# Patient Record
Sex: MALE | Race: BLACK OR AFRICAN AMERICAN | Employment: OTHER | URBAN - METROPOLITAN AREA
[De-identification: names, ages, dates, MRNs, and addresses within clinical notes are randomized per-mention and may not be internally consistent; named-entity substitution may affect disease eponyms.]

---

## 2017-04-03 ENCOUNTER — OP HISTORICAL/CONVERTED ENCOUNTER (OUTPATIENT)
Dept: OTHER | Age: 82
End: 2017-04-03

## 2018-05-16 ENCOUNTER — OP HISTORICAL/CONVERTED ENCOUNTER (OUTPATIENT)
Dept: OTHER | Age: 83
End: 2018-05-16

## 2019-05-22 ENCOUNTER — OP HISTORICAL/CONVERTED ENCOUNTER (OUTPATIENT)
Dept: OTHER | Age: 84
End: 2019-05-22

## 2020-05-26 ENCOUNTER — OP HISTORICAL/CONVERTED ENCOUNTER (OUTPATIENT)
Dept: OTHER | Age: 85
End: 2020-05-26

## 2020-09-23 ENCOUNTER — HOSPITAL ENCOUNTER (EMERGENCY)
Age: 85
Discharge: HOME | End: 2020-09-23
Attending: FAMILY MEDICINE
Payer: MEDICARE

## 2020-09-23 VITALS
OXYGEN SATURATION: 97 % | BODY MASS INDEX: 18.52 KG/M2 | SYSTOLIC BLOOD PRESSURE: 138 MMHG | WEIGHT: 118 LBS | RESPIRATION RATE: 16 BRPM | TEMPERATURE: 97.9 F | DIASTOLIC BLOOD PRESSURE: 82 MMHG | HEIGHT: 67 IN

## 2020-09-23 DIAGNOSIS — N48.1 BALANITIS: Primary | ICD-10-CM

## 2020-09-23 DIAGNOSIS — B35.6 TINEA OF SCROTUM: ICD-10-CM

## 2020-09-23 PROCEDURE — 99282 EMERGENCY DEPT VISIT SF MDM: CPT

## 2020-09-23 RX ORDER — SODIUM BICARBONATE 325 MG/1
325 TABLET ORAL 2 TIMES DAILY
COMMUNITY

## 2020-09-23 RX ORDER — METOPROLOL TARTRATE 50 MG/1
25 TABLET ORAL 2 TIMES DAILY
COMMUNITY

## 2020-09-23 RX ORDER — AMLODIPINE BESYLATE 5 MG/1
5 TABLET ORAL DAILY
Status: ON HOLD | COMMUNITY
End: 2022-04-02

## 2020-09-23 RX ORDER — CETIRIZINE HCL 10 MG
10 TABLET ORAL DAILY
Status: ON HOLD | COMMUNITY
End: 2022-08-04

## 2020-09-23 RX ORDER — CLOTRIMAZOLE AND BETAMETHASONE DIPROPIONATE 10; .64 MG/G; MG/G
CREAM TOPICAL 2 TIMES DAILY
Qty: 1 TUBE | Refills: 0 | Status: ON HOLD | OUTPATIENT
Start: 2020-09-23 | End: 2022-04-02

## 2020-09-23 RX ORDER — DOXAZOSIN 4 MG/1
4 TABLET ORAL DAILY
COMMUNITY

## 2020-09-23 RX ORDER — PANTOPRAZOLE SODIUM 40 MG/1
40 TABLET, DELAYED RELEASE ORAL 2 TIMES DAILY
Status: ON HOLD | COMMUNITY
End: 2022-04-02

## 2020-09-23 NOTE — ED PROVIDER NOTES
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 Patient states he noticed a itchy rash on his testicles and penis causing mild to moderate irritation. Nothing seems to exacerbate or improve the condition. Currently, the patient denies F/C, N/V/D/C, urinary F/U/N/D, or penile discharge. There are no other complaints, changes, or physical findings at this time. PCP: KARL Aly    No current facility-administered medications on file prior to encounter. Current Outpatient Medications on File Prior to Encounter   Medication Sig Dispense Refill    metoprolol tartrate (LOPRESSOR) 50 mg tablet Take 50 mg by mouth two (2) times a day.  sodium bicarbonate 325 mg tablet Take 325 mg by mouth two (2) times a day.  pantoprazole (PROTONIX) 40 mg tablet Take 40 mg by mouth two (2) times a day.  doxazosin (CARDURA) 4 mg tablet Take 4 mg by mouth daily.  amLODIPine (NORVASC) 5 mg tablet Take 5 mg by mouth daily.  cetirizine (ZYRTEC) 10 mg tablet Take 10 mg by mouth daily. Past History     PAST MEDICAL   has a past medical history of Gastrointestinal disorder and Hypertension. PAST SURGICAL   has no past surgical history on file. SOCIAL HISTORY   reports that he quit smoking about 3 years ago. He has never used smokeless tobacco. He reports that he does not drink alcohol or use drugs. Allergies:  No Known Allergies    Review of Systems     Review of Systems   Constitutional: Negative for chills and fever. HENT: Negative. Eyes: Negative for visual disturbance. Respiratory: Negative for cough and shortness of breath.     Cardiovascular: Negative for chest pain. Gastrointestinal: Negative for abdominal pain, diarrhea, nausea and vomiting. Endocrine: Negative. Genitourinary: Negative for discharge, dysuria, frequency and penile pain. Musculoskeletal: Negative. Skin: Negative. Allergic/Immunologic: Negative. Neurological: Negative. Hematological: Negative. Psychiatric/Behavioral: Negative. Physical Exam     Vital Signs-Reviewed the patient's vital signs. Patient Vitals for the past 12 hrs:   Temp Resp BP SpO2   09/23/20 0958 97.9 °F (36.6 °C) 16 138/82 97 %     Physical Exam  Vitals signs and nursing note reviewed. Constitutional:       Appearance: Normal appearance. He is normal weight. HENT:      Head: Normocephalic and atraumatic. Nose: Nose normal.      Mouth/Throat:      Lips: Pink. Mouth: Mucous membranes are moist.   Eyes:      Extraocular Movements: Extraocular movements intact. Conjunctiva/sclera: Conjunctivae normal.      Pupils: Pupils are equal, round, and reactive to light. Neck:      Musculoskeletal: Normal range of motion and neck supple. Cardiovascular:      Pulses: Normal pulses. Pulmonary:      Effort: Pulmonary effort is normal. No respiratory distress. Genitourinary:     Penis: Uncircumcised. Erythema and swelling present. Scrotum/Testes: Normal.       Musculoskeletal: Normal range of motion. Skin:     General: Skin is warm and dry. Neurological:      General: No focal deficit present. Mental Status: He is alert and oriented to person, place, and time. Psychiatric:         Mood and Affect: Mood normal.         Behavior: Behavior normal.       Diagnostic Study Results     Labs -   No results found for this or any previous visit (from the past 12 hour(s)).     Radiologic Studies -   No orders to display     CT Results  (Last 48 hours)    None        CXR Results  (Last 48 hours)    None          Procedures/Critical Care     PROCEDURES  None    Medical Decision Making   I am the first provider for this patient. I reviewed the vital signs, available nursing notes, past medical history, past surgical history, family history and social history. Records Reviewed: Nursing Notes    ED Course:   Initial assessment performed. The patients presenting problems have been discussed, and they are in agreement with the care plan formulated and outlined with them. I have encouraged them to ask questions as they arise throughout their visit. Medications - No data to display    Provider Notes (Medical Decision Making):   Patient presents to the ED with complaint of itchy rash in genital area. Evaluation of the patient is consistent with probable tinea infection involving scrotum and glans penis and foreskin. Topical antifungal prescribed. The patient has received maximum benefit from this visit and felt eligible for discharge. All questions were answered and concerns addressed. The patient was advised to follow up with PCP and/or return to the emergency department if condition worsens. The patient was discharged in stable condition. Diagnosis/Plan/Follow Up     CLINICAL IMPRESSION:      ICD-10-CM ICD-9-CM   1. Balanitis  N48.1 607.1   2. Tinea of scrotum  B35.6 110.3        DISPOSITION:  Discharged to  in stable condition. PLAN/FOLLOW UP  1. Current Discharge Medication List      START taking these medications    Details   clotrimazole-betamethasone (Lotrisone) topical cream Apply  to affected area two (2) times a day. Apply to affected area  Qty: 1 Tube, Refills: 0           2. The patients results have been reviewed with them. The patient has been counseled regarding their diagnosis. The patient verbally conveys understanding and agreement of the signs, symptoms, diagnosis, treatment and prognosis and additionally agrees to follow up as recommended with their PCP.   The patient also agrees with the care-plan and conveys that all of their questions have been answered. I have also put together some discharge instructions for them that include: 1) educational information regarding their diagnosis, 2) how to care for their diagnosis at home, as well as a 3) list of reasons why they would want to return to the ED prior to their follow-up appointment, should their condition change. Follow-up Information     Follow up With Specialties Details Why Contact Info    Kristina Jeff  Schedule an appointment as soon as possible for a visit in 3 days As needed [address] [place] [address]  [phone]      [facility] Emergency Medicine  If symptoms worsen [address] [place] [id number] [phone]        Return to ED if worse       JANY Murrell M.D.   [facility]  Emergency Department Physician

## 2021-09-06 ENCOUNTER — APPOINTMENT (OUTPATIENT)
Dept: GENERAL RADIOLOGY | Age: 86
End: 2021-09-06
Attending: STUDENT IN AN ORGANIZED HEALTH CARE EDUCATION/TRAINING PROGRAM
Payer: MEDICARE

## 2021-09-06 ENCOUNTER — HOSPITAL ENCOUNTER (EMERGENCY)
Age: 86
Discharge: HOME | End: 2021-09-06
Attending: STUDENT IN AN ORGANIZED HEALTH CARE EDUCATION/TRAINING PROGRAM
Payer: MEDICARE

## 2021-09-06 VITALS
DIASTOLIC BLOOD PRESSURE: 70 MMHG | HEART RATE: 69 BPM | SYSTOLIC BLOOD PRESSURE: 140 MMHG | HEIGHT: 67 IN | OXYGEN SATURATION: 99 % | WEIGHT: 105 LBS | BODY MASS INDEX: 16.48 KG/M2 | TEMPERATURE: 98.1 F | RESPIRATION RATE: 21 BRPM

## 2021-09-06 DIAGNOSIS — R63.0 APPETITE ABSENT: Primary | ICD-10-CM

## 2021-09-06 LAB
ALBUMIN SERPL-MCNC: 2.9 G/DL (ref 3.5–5)
ALBUMIN/GLOB SERPL: 0.7 {RATIO} (ref 1.1–2.2)
ALP SERPL-CCNC: 67 U/L (ref 45–117)
ALT SERPL-CCNC: 12 U/L (ref 12–78)
ANION GAP SERPL CALC-SCNC: 7 MMOL/L (ref 5–15)
APPEARANCE UR: ABNORMAL
AST SERPL W P-5'-P-CCNC: 15 U/L (ref 15–37)
BACTERIA URNS QL MICRO: NEGATIVE /HPF
BASOPHILS # BLD: 0.1 K/UL (ref 0–0.1)
BASOPHILS NFR BLD: 1 % (ref 0–1)
BILIRUB SERPL-MCNC: 0.5 MG/DL (ref 0.2–1)
BILIRUB UR QL: NEGATIVE
BUN SERPL-MCNC: 35 MG/DL (ref 6–20)
BUN/CREAT SERPL: 20 (ref 12–20)
CA-I BLD-MCNC: 9 MG/DL (ref 8.5–10.1)
CHLORIDE SERPL-SCNC: 104 MMOL/L (ref 97–108)
CO2 SERPL-SCNC: 27 MMOL/L (ref 21–32)
COLOR UR: ABNORMAL
CREAT SERPL-MCNC: 1.78 MG/DL (ref 0.7–1.3)
DIFFERENTIAL METHOD BLD: ABNORMAL
EOSINOPHIL # BLD: 0.4 K/UL (ref 0–0.4)
EOSINOPHIL NFR BLD: 10 % (ref 0–7)
ERYTHROCYTE [DISTWIDTH] IN BLOOD BY AUTOMATED COUNT: 13.3 % (ref 11.5–14.5)
GLOBULIN SER CALC-MCNC: 4.1 G/DL (ref 2–4)
GLUCOSE SERPL-MCNC: 130 MG/DL (ref 65–100)
GLUCOSE UR STRIP.AUTO-MCNC: NEGATIVE MG/DL
HCT VFR BLD AUTO: 37.5 % (ref 36.6–50.3)
HGB BLD-MCNC: 11.8 G/DL (ref 12.1–17)
HGB UR QL STRIP: ABNORMAL
HYALINE CASTS URNS QL MICRO: ABNORMAL /LPF (ref 0–5)
IMM GRANULOCYTES # BLD AUTO: 0 K/UL (ref 0–0.04)
IMM GRANULOCYTES NFR BLD AUTO: 0 % (ref 0–0.5)
KETONES UR QL STRIP.AUTO: 5 MG/DL
LEUKOCYTE ESTERASE UR QL STRIP.AUTO: NEGATIVE
LYMPHOCYTES # BLD: 1.2 K/UL (ref 0.8–3.5)
LYMPHOCYTES NFR BLD: 28 % (ref 12–49)
MCH RBC QN AUTO: 27.8 PG (ref 26–34)
MCHC RBC AUTO-ENTMCNC: 31.5 G/DL (ref 30–36.5)
MCV RBC AUTO: 88.2 FL (ref 80–99)
MONOCYTES # BLD: 0.5 K/UL (ref 0–1)
MONOCYTES NFR BLD: 12 % (ref 5–13)
MUCOUS THREADS URNS QL MICRO: ABNORMAL /LPF
NEUTS SEG # BLD: 2.1 K/UL (ref 1.8–8)
NEUTS SEG NFR BLD: 49 % (ref 32–75)
NITRITE UR QL STRIP.AUTO: NEGATIVE
NRBC # BLD: 0 K/UL (ref 0–0.01)
NRBC BLD-RTO: 0 PER 100 WBC
PH UR STRIP: 5 [PH] (ref 5–8)
PLATELET # BLD AUTO: 255 K/UL (ref 150–400)
PMV BLD AUTO: 9.8 FL (ref 8.9–12.9)
POTASSIUM SERPL-SCNC: 4.4 MMOL/L (ref 3.5–5.1)
PROT SERPL-MCNC: 7 G/DL (ref 6.4–8.2)
PROT UR STRIP-MCNC: 100 MG/DL
RBC # BLD AUTO: 4.25 M/UL (ref 4.1–5.7)
RBC #/AREA URNS HPF: ABNORMAL /HPF (ref 0–5)
SODIUM SERPL-SCNC: 138 MMOL/L (ref 136–145)
SP GR UR REFRACTOMETRY: 1.02 (ref 1–1.03)
UA: UC IF INDICATED,UAUC: ABNORMAL
UROBILINOGEN UR QL STRIP.AUTO: 0.1 EU/DL (ref 0.1–1)
WBC # BLD AUTO: 4.3 K/UL (ref 4.1–11.1)
WBC URNS QL MICRO: ABNORMAL /HPF (ref 0–4)

## 2021-09-06 PROCEDURE — 80053 COMPREHEN METABOLIC PANEL: CPT

## 2021-09-06 PROCEDURE — 74018 RADEX ABDOMEN 1 VIEW: CPT

## 2021-09-06 PROCEDURE — 99284 EMERGENCY DEPT VISIT MOD MDM: CPT

## 2021-09-06 PROCEDURE — 36415 COLL VENOUS BLD VENIPUNCTURE: CPT

## 2021-09-06 PROCEDURE — 81001 URINALYSIS AUTO W/SCOPE: CPT

## 2021-09-06 PROCEDURE — 85025 COMPLETE CBC W/AUTO DIFF WBC: CPT

## 2021-09-06 RX ORDER — ONDANSETRON 4 MG/1
4 TABLET, ORALLY DISINTEGRATING ORAL
Qty: 8 TABLET | Refills: 0 | Status: ON HOLD | OUTPATIENT
Start: 2021-09-06 | End: 2022-04-02

## 2021-09-06 NOTE — ED NOTES
1:09 PM    Reviewed discharge instructions with patient and patient's family. Vebralized understanding of d/c instruction, follow up, and RX. No complaints verbalized. No acute distress noted. Self ambulated to waiting room to be d/c home.

## 2021-09-06 NOTE — ED PROVIDER NOTES
EMERGENCY DEPARTMENT HISTORY AND PHYSICAL EXAM      Date: 9/6/2021  Patient Name: Georgia Quinones    History of Presenting Illness     Chief Complaint   Patient presents with    Nausea       HPI: Georgia Quinones, 82 y.o. male with a history of hypertension and CKD presents with nausea and decreased appetite. The patient has been feeling nauseous intermittently for the past few weeks and has had decreased p.o. intake. He is tolerating p.o. intake. No vomiting. Denies any abdominal pain, fevers, or chills. No constipation or diarrhea. No dysuria or hematuria. No flank pain. No other symptoms. He has been to his PCP who is aware of his decreased p.o. intake. Patient is alone and is able to take care of himself. He is compliant with all medications. Daughter is at bedside and corroborates the story. PCP: KARL Vang    Current Outpatient Medications   Medication Sig Dispense Refill    ondansetron (Zofran ODT) 4 mg disintegrating tablet 1 Tablet by SubLINGual route every eight (8) hours as needed for Nausea or Vomiting. 8 Tablet 0    metoprolol tartrate (LOPRESSOR) 50 mg tablet Take 50 mg by mouth two (2) times a day.  sodium bicarbonate 325 mg tablet Take 325 mg by mouth two (2) times a day.  pantoprazole (PROTONIX) 40 mg tablet Take 40 mg by mouth two (2) times a day.  doxazosin (CARDURA) 4 mg tablet Take 4 mg by mouth daily.  amLODIPine (NORVASC) 5 mg tablet Take 5 mg by mouth daily.  cetirizine (ZYRTEC) 10 mg tablet Take 10 mg by mouth daily.  clotrimazole-betamethasone (Lotrisone) topical cream Apply  to affected area two (2) times a day.  Apply to affected area 1 Tube 0       Medical History   I reviewed the medical, surgical, family, and social history, as well as allergies:    Past Medical History:  Past Medical History:   Diagnosis Date    Gastrointestinal disorder     Hypertension        Past Surgical History:  No past surgical history on file.    Family History:  History reviewed. No pertinent family history. Social History:  Social History     Tobacco Use    Smoking status: Former Smoker     Quit date: 9/23/2017     Years since quitting: 3.9    Smokeless tobacco: Never Used   Substance Use Topics    Alcohol use: Never    Drug use: Never       Allergies:  No Known Allergies    Review of Systems     Review of Systems   Constitutional: Positive for appetite change. Negative for chills and fever. HENT: Negative for congestion, rhinorrhea and sore throat. Eyes: Negative. Respiratory: Negative for cough and shortness of breath. Cardiovascular: Negative for chest pain and leg swelling. Gastrointestinal: Positive for nausea. Negative for abdominal pain and vomiting. Endocrine: Negative. Genitourinary: Negative for dysuria and hematuria. Musculoskeletal: Negative for back pain and myalgias. Skin: Negative for rash and wound. Allergic/Immunologic: Negative. Neurological: Negative for light-headedness and headaches. Hematological: Negative. Psychiatric/Behavioral: Negative for agitation and confusion. Physical Exam and Vital Signs   Vital Signs - Reviewed the patient's vital signs.     Patient Vitals for the past 12 hrs:   Temp Pulse Resp BP SpO2   09/06/21 1143  74 23 115/72 100 %   09/06/21 1036 98.1 °F (36.7 °C) 78 16 103/65 98 %       Physical Exam:    GENERAL: awake, alert, cooperative, not in distress  HEENT:  * Pupils equal, EOMI  * Head atraumatic  CV:  * regular rhythm  * warm and perfused extremities bilaterally  PULMONARY: Good air movement, no wheezes or crackles  ABDOMEN: soft, not distended, no guarding, not tenderness to palpation  : No suprapubic tenderness  EXTREMITIES/BACK: warm and perfused, no tenderness, no edema  SKIN: no rashes or signs of trauma  NEURO:  * Speech clear  * Moves U&LE to command      Medical Decision Making and ED Course   - I am the first and primary provider for this patient and am the primary provider of record. - I reviewed the vital signs, available nursing notes, past medical history, past surgical history, family history and social history. - Initial assessment performed. The patients presenting problems have been discussed, and the staff are in agreement with the care plan formulated and outlined with them. I have encouraged them to ask questions as they arise throughout their visit. - Available medical records, nursing notes, old EKGs, and EMS run sheets (if patient was EMS transported) were reviewed    MDM:   Patient is a 82 y.o. male presenting for nausea and decreased p.o. intake. Vitals reveal no abnormalities and physical exam reveals abnormalities. Based on the history, physical exam, risk factors, and vitals signs, differential includes: Decreased p.o. intake, dehydration, electro disparities, UTI, obstruction. Results     Labs:  Recent Results (from the past 12 hour(s))   CBC WITH AUTOMATED DIFF    Collection Time: 09/06/21 10:57 AM   Result Value Ref Range    WBC 4.3 4.1 - 11.1 K/uL    RBC 4.25 4. 10 - 5.70 M/uL    HGB 11.8 (L) 12.1 - 17.0 g/dL    HCT 37.5 36.6 - 50.3 %    MCV 88.2 80.0 - 99.0 FL    MCH 27.8 26.0 - 34.0 PG    MCHC 31.5 30.0 - 36.5 g/dL    RDW 13.3 11.5 - 14.5 %    PLATELET 777 177 - 767 K/uL    MPV 9.8 8.9 - 12.9 FL    NRBC 0.0 0.0  WBC    ABSOLUTE NRBC 0.00 0.00 - 0.01 K/uL    NEUTROPHILS 49 32 - 75 %    LYMPHOCYTES 28 12 - 49 %    MONOCYTES 12 5 - 13 %    EOSINOPHILS 10 (H) 0 - 7 %    BASOPHILS 1 0 - 1 %    IMMATURE GRANULOCYTES 0 0 - 0.5 %    ABS. NEUTROPHILS 2.1 1.8 - 8.0 K/UL    ABS. LYMPHOCYTES 1.2 0.8 - 3.5 K/UL    ABS. MONOCYTES 0.5 0.0 - 1.0 K/UL    ABS. EOSINOPHILS 0.4 0.0 - 0.4 K/UL    ABS. BASOPHILS 0.1 0.0 - 0.1 K/UL    ABS. IMM.  GRANS. 0.0 0.00 - 0.04 K/UL    DF AUTOMATED     METABOLIC PANEL, COMPREHENSIVE    Collection Time: 09/06/21 10:57 AM   Result Value Ref Range    Sodium 138 136 - 145 mmol/L    Potassium 4.4 3.5 - 5.1 mmol/L    Chloride 104 97 - 108 mmol/L    CO2 27 21 - 32 mmol/L    Anion gap 7 5 - 15 mmol/L    Glucose 130 (H) 65 - 100 mg/dL    BUN 35 (H) 6 - 20 mg/dL    Creatinine 1.78 (H) 0.70 - 1.30 mg/dL    BUN/Creatinine ratio 20 12 - 20      GFR est AA 44 (L) >60 ml/min/1.73m2    GFR est non-AA 37 (L) >60 ml/min/1.73m2    Calcium 9.0 8.5 - 10.1 mg/dL    Bilirubin, total 0.5 0.2 - 1.0 mg/dL    AST (SGOT) 15 15 - 37 U/L    ALT (SGPT) 12 12 - 78 U/L    Alk. phosphatase 67 45 - 117 U/L    Protein, total 7.0 6.4 - 8.2 g/dL    Albumin 2.9 (L) 3.5 - 5.0 g/dL    Globulin 4.1 (H) 2.0 - 4.0 g/dL    A-G Ratio 0.7 (L) 1.1 - 2.2     URINALYSIS W/ REFLEX CULTURE    Collection Time: 09/06/21 10:57 AM    Specimen: Urine   Result Value Ref Range    Color Yellow/Straw      Appearance Turbid (A) Clear      Specific gravity 1.020 1.003 - 1.030      pH (UA) 5.0 5.0 - 8.0      Protein 100 (A) Negative mg/dL    Glucose Negative Negative mg/dL    Ketone 5 (A) Negative mg/dL    Bilirubin Negative Negative      Blood Small (A) Negative      Urobilinogen 0.1 0.1 - 1.0 EU/dL    Nitrites Negative Negative      Leukocyte Esterase Negative Negative      WBC 0-4 0 - 4 /hpf    RBC 0-5 0 - 5 /hpf    Bacteria Negative Negative /hpf    UA:UC IF INDICATED Culture not indicated by UA result Culture not indicated by UA result      Mucus Trace (A) Negative /lpf    Hyaline cast 5-10 0 - 5 /lpf       Radiologic Studies:  CT Results  (Last 48 hours)    None        CXR Results  (Last 48 hours)    None          Medications ordered:  Medications - No data to display     ED Course     ED Course:     ED Course as of Sep 06 1229   Mon Sep 06, 2021   1221 KB is within normal limits without any signs of obstruction.    [SS]   1223 Urinalysis is within normal limits without any evidence of UTI: no bacteria, nitrites, or leukocyte esterase. Mild ketonemia, likely mild dehydration. No glucosuria. [SS]   1132 CBC is within normal limits.  No evidence of leukocytosis. No anemia. Normal platelet count. Electrolytes are within normal limits. Creatinine is 1.78 with the patient reports that he has a kidney doctor and has had elevated creatinines in the past.  Unknown if acute and chronic however due to the lack of any other findings, likely chronic. Patient is tolerating p.o. intake. No transaminitis noted. Normal bilirubin. [SS]   6416 Patient has normal vital signs and reassuring physical exam.  Patient has decreased appetite with mild nausea without any vomiting. Due to the normal labs and the 2 weeks history of symptoms, it is unlikely the patient has significant pathology or surgical pathology. Patient will be following up with his doctor ASAP this week. Will discharge with rescue doses of Zofran.    [SS]      ED Course User Index  [SS] Sandra Mayer MD         Final Disposition     Disposition: Condition stable  DC- Adult Discharges: All of the diagnostic tests were reviewed and questions answered. Diagnosis, care plan and treatment options were discussed. The patient understands the instructions and will follow up as directed. The patients results have been reviewed with them. They have been counseled regarding their diagnosis. The patient verbally convey understanding and agreement of the signs, symptoms, diagnosis, treatment and prognosis and additionally agrees to follow up as recommended with their PCP in 24 - 48 hours. They also agree with the care-plan and convey that all of their questions have been answered. I have also put together some discharge instructions for them that include: 1) educational information regarding their diagnosis, 2) how to care for their diagnosis at home, as well a 3) list of reasons why they would want to return to the ED prior to their follow-up appointment, should their condition change. DISCHARGE PLAN:  1.    Current Discharge Medication List      CONTINUE these medications which have NOT CHANGED    Details metoprolol tartrate (LOPRESSOR) 50 mg tablet Take 50 mg by mouth two (2) times a day. sodium bicarbonate 325 mg tablet Take 325 mg by mouth two (2) times a day. pantoprazole (PROTONIX) 40 mg tablet Take 40 mg by mouth two (2) times a day. doxazosin (CARDURA) 4 mg tablet Take 4 mg by mouth daily. amLODIPine (NORVASC) 5 mg tablet Take 5 mg by mouth daily. cetirizine (ZYRTEC) 10 mg tablet Take 10 mg by mouth daily. clotrimazole-betamethasone (Lotrisone) topical cream Apply  to affected area two (2) times a day. Apply to affected area  Qty: 1 Tube, Refills: 0           2. Follow-up Information     Follow up With Specialties Details Why Contact Info    Kristina Cervantes Physician Assistant Schedule an appointment as soon as possible for a visit in 2 days  [phone] [phone]          3. Return to ED if worse   4. Current Discharge Medication List      START taking these medications    Details   ondansetron (Zofran ODT) 4 mg disintegrating tablet 1 Tablet by SubLINGual route every eight (8) hours as needed for Nausea or Vomiting. Qty: 8 Tablet, Refills: 0  Start date: 9/6/2021               Diagnosis     Clinical Impression:   1. Appetite absent        Attestations:    Abel Ram MD    Please note that this dictation was completed with [employer], the computer voice recognition software. Quite often unanticipated grammatical, syntax, homophones, and other interpretive errors are inadvertently transcribed by the computer software. Please disregard these errors. Please excuse any errors that have escaped final proofreading. Thank you.

## 2021-09-06 NOTE — DISCHARGE INSTRUCTIONS
Thank you! Thank you for allowing me to care for you in the emergency department. I sincerely hope that you are satisfied with your visit today. It is my goal to provide you with excellent care. Below you will find a list of your labs and imaging from your visit today. Should you have any questions regarding these results please do not hesitate to call the emergency department. Labs -     Recent Results (from the past 12 hour(s))   CBC WITH AUTOMATED DIFF    Collection Time: 09/06/21 10:57 AM   Result Value Ref Range    WBC 4.3 4.1 - 11.1 K/uL    RBC 4.25 4. 10 - 5.70 M/uL    HGB 11.8 (L) 12.1 - 17.0 g/dL    HCT 37.5 36.6 - 50.3 %    MCV 88.2 80.0 - 99.0 FL    MCH 27.8 26.0 - 34.0 PG    MCHC 31.5 30.0 - 36.5 g/dL    RDW 13.3 11.5 - 14.5 %    PLATELET 243 421 - 726 K/uL    MPV 9.8 8.9 - 12.9 FL    NRBC 0.0 0.0  WBC    ABSOLUTE NRBC 0.00 0.00 - 0.01 K/uL    NEUTROPHILS 49 32 - 75 %    LYMPHOCYTES 28 12 - 49 %    MONOCYTES 12 5 - 13 %    EOSINOPHILS 10 (H) 0 - 7 %    BASOPHILS 1 0 - 1 %    IMMATURE GRANULOCYTES 0 0 - 0.5 %    ABS. NEUTROPHILS 2.1 1.8 - 8.0 K/UL    ABS. LYMPHOCYTES 1.2 0.8 - 3.5 K/UL    ABS. MONOCYTES 0.5 0.0 - 1.0 K/UL    ABS. EOSINOPHILS 0.4 0.0 - 0.4 K/UL    ABS. BASOPHILS 0.1 0.0 - 0.1 K/UL    ABS. IMM. GRANS. 0.0 0.00 - 0.04 K/UL    DF AUTOMATED     METABOLIC PANEL, COMPREHENSIVE    Collection Time: 09/06/21 10:57 AM   Result Value Ref Range    Sodium 138 136 - 145 mmol/L    Potassium 4.4 3.5 - 5.1 mmol/L    Chloride 104 97 - 108 mmol/L    CO2 27 21 - 32 mmol/L    Anion gap 7 5 - 15 mmol/L    Glucose 130 (H) 65 - 100 mg/dL    BUN 35 (H) 6 - 20 mg/dL    Creatinine 1.78 (H) 0.70 - 1.30 mg/dL    BUN/Creatinine ratio 20 12 - 20      GFR est AA 44 (L) >60 ml/min/1.73m2    GFR est non-AA 37 (L) >60 ml/min/1.73m2    Calcium 9.0 8.5 - 10.1 mg/dL    Bilirubin, total 0.5 0.2 - 1.0 mg/dL    AST (SGOT) 15 15 - 37 U/L    ALT (SGPT) 12 12 - 78 U/L    Alk.  phosphatase 67 45 - 117 U/L Protein, total 7.0 6.4 - 8.2 g/dL    Albumin 2.9 (L) 3.5 - 5.0 g/dL    Globulin 4.1 (H) 2.0 - 4.0 g/dL    A-G Ratio 0.7 (L) 1.1 - 2.2     URINALYSIS W/ REFLEX CULTURE    Collection Time: 09/06/21 10:57 AM    Specimen: Urine   Result Value Ref Range    Color Yellow/Straw      Appearance Turbid (A) Clear      Specific gravity 1.020 1.003 - 1.030      pH (UA) 5.0 5.0 - 8.0      Protein 100 (A) Negative mg/dL    Glucose Negative Negative mg/dL    Ketone 5 (A) Negative mg/dL    Bilirubin Negative Negative      Blood Small (A) Negative      Urobilinogen 0.1 0.1 - 1.0 EU/dL    Nitrites Negative Negative      Leukocyte Esterase Negative Negative      WBC 0-4 0 - 4 /hpf    RBC 0-5 0 - 5 /hpf    Bacteria Negative Negative /hpf    UA:UC IF INDICATED Culture not indicated by UA result Culture not indicated by UA result      Mucus Trace (A) Negative /lpf    Hyaline cast 5-10 0 - 5 /lpf       Radiologic Studies -   XR ABD (KUB)   Final Result        CT Results  (Last 48 hours)      None          CXR Results  (Last 48 hours)      None               If you feel that you have not received excellent quality care or timely care, please ask to speak to the nurse manager. Please choose us in the future for your continued health care needs. ------------------------------------------------------------------------------------------------------------  The exam and treatment you received in the Emergency Department were for an urgent problem and are not intended as complete care. It is important that you follow-up with a doctor, nurse practitioner, or physician assistant to:  (1) confirm your diagnosis,  (2) re-evaluation of changes in your illness and treatment, and  (3) for ongoing care. If your symptoms become worse or you do not improve as expected and you are unable to reach your usual health care provider, you should return to the Emergency Department. We are available 24 hours a day.      Please take your discharge instructions with you when you go to your follow-up appointment. If you have any problem arranging a follow-up appointment, contact the Emergency Department immediately. If a prescription has been provided, please have it filled as soon as possible to prevent a delay in treatment. Read the entire medication instruction sheet provided to you by the pharmacy. If you have any questions or reservations about taking the medication due to side effects or interactions with other medications, please call your primary care physician or contact the ER to speak with the charge nurse. Make an appointment with your family doctor or the physician you were referred to for follow-up of this visit as instructed on your discharge paperwork, as this is a mandatory follow-up. Return to the ER if you are unable to be seen or if you are unable to be seen in a timely manner. If you have any problem arranging the follow-up visit, contact the Emergency Department immediately.

## 2022-04-01 ENCOUNTER — APPOINTMENT (OUTPATIENT)
Dept: MRI IMAGING | Age: 87
DRG: 066 | End: 2022-04-01
Attending: INTERNAL MEDICINE
Payer: MEDICARE

## 2022-04-01 ENCOUNTER — HOSPITAL ENCOUNTER (INPATIENT)
Age: 87
LOS: 1 days | Discharge: HOME HEALTH | DRG: 066 | End: 2022-04-03
Attending: EMERGENCY MEDICINE | Admitting: INTERNAL MEDICINE
Payer: MEDICARE

## 2022-04-01 ENCOUNTER — APPOINTMENT (OUTPATIENT)
Dept: CT IMAGING | Age: 87
DRG: 066 | End: 2022-04-01
Attending: EMERGENCY MEDICINE
Payer: MEDICARE

## 2022-04-01 DIAGNOSIS — G45.9 TIA (TRANSIENT ISCHEMIC ATTACK): Primary | ICD-10-CM

## 2022-04-01 PROBLEM — M79.609 PARESTHESIA AND PAIN OF RIGHT EXTREMITY: Status: ACTIVE | Noted: 2022-04-01

## 2022-04-01 PROBLEM — R20.2 PARESTHESIA AND PAIN OF RIGHT EXTREMITY: Status: ACTIVE | Noted: 2022-04-01

## 2022-04-01 LAB
ALBUMIN SERPL-MCNC: 2.6 G/DL (ref 3.5–5)
ALBUMIN/GLOB SERPL: 0.8 {RATIO} (ref 1.1–2.2)
ALP SERPL-CCNC: 78 U/L (ref 45–117)
ALT SERPL-CCNC: 15 U/L (ref 12–78)
ANION GAP SERPL CALC-SCNC: 6 MMOL/L (ref 5–15)
AST SERPL W P-5'-P-CCNC: 21 U/L (ref 15–37)
ATRIAL RATE: 58 BPM
BASOPHILS # BLD: 0 K/UL (ref 0–0.1)
BASOPHILS NFR BLD: 1 % (ref 0–1)
BILIRUB SERPL-MCNC: 0.6 MG/DL (ref 0.2–1)
BUN SERPL-MCNC: 28 MG/DL (ref 6–20)
BUN/CREAT SERPL: 13 (ref 12–20)
CA-I BLD-MCNC: 8.7 MG/DL (ref 8.5–10.1)
CALCULATED P AXIS, ECG09: 83 DEGREES
CALCULATED R AXIS, ECG10: -60 DEGREES
CALCULATED T AXIS, ECG11: 81 DEGREES
CHLORIDE SERPL-SCNC: 107 MMOL/L (ref 97–108)
CO2 SERPL-SCNC: 27 MMOL/L (ref 21–32)
CREAT SERPL-MCNC: 2.12 MG/DL (ref 0.7–1.3)
DIAGNOSIS, 93000: NORMAL
DIFFERENTIAL METHOD BLD: ABNORMAL
EOSINOPHIL # BLD: 0.3 K/UL (ref 0–0.4)
EOSINOPHIL NFR BLD: 7 % (ref 0–7)
ERYTHROCYTE [DISTWIDTH] IN BLOOD BY AUTOMATED COUNT: 13.7 % (ref 11.5–14.5)
GLOBULIN SER CALC-MCNC: 3.2 G/DL (ref 2–4)
GLUCOSE BLD STRIP.AUTO-MCNC: 94 MG/DL (ref 65–117)
GLUCOSE SERPL-MCNC: 99 MG/DL (ref 65–100)
HCT VFR BLD AUTO: 36.5 % (ref 36.6–50.3)
HGB BLD-MCNC: 11.2 G/DL (ref 12.1–17)
IMM GRANULOCYTES # BLD AUTO: 0 K/UL (ref 0–0.04)
IMM GRANULOCYTES NFR BLD AUTO: 0 % (ref 0–0.5)
INR PPP: 1 (ref 0.9–1.1)
LYMPHOCYTES # BLD: 1.7 K/UL (ref 0.8–3.5)
LYMPHOCYTES NFR BLD: 35 % (ref 12–49)
MCH RBC QN AUTO: 27.9 PG (ref 26–34)
MCHC RBC AUTO-ENTMCNC: 30.7 G/DL (ref 30–36.5)
MCV RBC AUTO: 91 FL (ref 80–99)
MONOCYTES # BLD: 0.5 K/UL (ref 0–1)
MONOCYTES NFR BLD: 10 % (ref 5–13)
NEUTS SEG # BLD: 2.2 K/UL (ref 1.8–8)
NEUTS SEG NFR BLD: 47 % (ref 32–75)
NRBC # BLD: 0 K/UL (ref 0–0.01)
NRBC BLD-RTO: 0 PER 100 WBC
P-R INTERVAL, ECG05: 134 MS
PERFORMED BY, TECHID: NORMAL
PLATELET # BLD AUTO: 192 K/UL (ref 150–400)
PMV BLD AUTO: 10.3 FL (ref 8.9–12.9)
POTASSIUM SERPL-SCNC: 4 MMOL/L (ref 3.5–5.1)
PROT SERPL-MCNC: 5.8 G/DL (ref 6.4–8.2)
PROTHROMBIN TIME: 13.2 SEC (ref 11.9–14.6)
Q-T INTERVAL, ECG07: 450 MS
QRS DURATION, ECG06: 76 MS
QTC CALCULATION (BEZET), ECG08: 441 MS
RBC # BLD AUTO: 4.01 M/UL (ref 4.1–5.7)
SODIUM SERPL-SCNC: 140 MMOL/L (ref 136–145)
VENTRICULAR RATE, ECG03: 58 BPM
WBC # BLD AUTO: 4.7 K/UL (ref 4.1–11.1)

## 2022-04-01 PROCEDURE — 36415 COLL VENOUS BLD VENIPUNCTURE: CPT

## 2022-04-01 PROCEDURE — 93005 ELECTROCARDIOGRAM TRACING: CPT

## 2022-04-01 PROCEDURE — G0378 HOSPITAL OBSERVATION PER HR: HCPCS

## 2022-04-01 PROCEDURE — 74011250637 HC RX REV CODE- 250/637: Performed by: INTERNAL MEDICINE

## 2022-04-01 PROCEDURE — 82962 GLUCOSE BLOOD TEST: CPT

## 2022-04-01 PROCEDURE — 96372 THER/PROPH/DIAG INJ SC/IM: CPT

## 2022-04-01 PROCEDURE — 85610 PROTHROMBIN TIME: CPT

## 2022-04-01 PROCEDURE — 80053 COMPREHEN METABOLIC PANEL: CPT

## 2022-04-01 PROCEDURE — 96374 THER/PROPH/DIAG INJ IV PUSH: CPT

## 2022-04-01 PROCEDURE — 99285 EMERGENCY DEPT VISIT HI MDM: CPT

## 2022-04-01 PROCEDURE — 94762 N-INVAS EAR/PLS OXIMTRY CONT: CPT

## 2022-04-01 PROCEDURE — 85025 COMPLETE CBC W/AUTO DIFF WBC: CPT

## 2022-04-01 PROCEDURE — 74011250636 HC RX REV CODE- 250/636: Performed by: INTERNAL MEDICINE

## 2022-04-01 PROCEDURE — 92610 EVALUATE SWALLOWING FUNCTION: CPT

## 2022-04-01 PROCEDURE — 70551 MRI BRAIN STEM W/O DYE: CPT

## 2022-04-01 PROCEDURE — 70450 CT HEAD/BRAIN W/O DYE: CPT

## 2022-04-01 RX ORDER — SODIUM BICARBONATE 650 MG/1
325 TABLET ORAL 2 TIMES DAILY
Status: DISCONTINUED | OUTPATIENT
Start: 2022-04-01 | End: 2022-04-03 | Stop reason: HOSPADM

## 2022-04-01 RX ORDER — ATORVASTATIN CALCIUM 40 MG/1
40 TABLET, FILM COATED ORAL
Status: DISCONTINUED | OUTPATIENT
Start: 2022-04-01 | End: 2022-04-03 | Stop reason: HOSPADM

## 2022-04-01 RX ORDER — CETIRIZINE HCL 10 MG
10 TABLET ORAL DAILY
Status: DISCONTINUED | OUTPATIENT
Start: 2022-04-02 | End: 2022-04-03 | Stop reason: HOSPADM

## 2022-04-01 RX ORDER — DOXAZOSIN 2 MG/1
4 TABLET ORAL DAILY
Status: DISCONTINUED | OUTPATIENT
Start: 2022-04-02 | End: 2022-04-03 | Stop reason: HOSPADM

## 2022-04-01 RX ORDER — GUAIFENESIN 100 MG/5ML
81 LIQUID (ML) ORAL DAILY
Status: DISCONTINUED | OUTPATIENT
Start: 2022-04-02 | End: 2022-04-03 | Stop reason: HOSPADM

## 2022-04-01 RX ORDER — CLOTRIMAZOLE AND BETAMETHASONE DIPROPIONATE 10; .64 MG/G; MG/G
CREAM TOPICAL 2 TIMES DAILY
Status: DISCONTINUED | OUTPATIENT
Start: 2022-04-01 | End: 2022-04-02

## 2022-04-01 RX ORDER — ASPIRIN 81 MG/1
81 TABLET ORAL DAILY
Status: ON HOLD | COMMUNITY
End: 2022-08-04

## 2022-04-01 RX ORDER — ACETAMINOPHEN 325 MG/1
650 TABLET ORAL
Status: DISCONTINUED | OUTPATIENT
Start: 2022-04-01 | End: 2022-04-03 | Stop reason: HOSPADM

## 2022-04-01 RX ORDER — HEPARIN SODIUM 5000 [USP'U]/ML
5000 INJECTION, SOLUTION INTRAVENOUS; SUBCUTANEOUS EVERY 8 HOURS
Status: DISCONTINUED | OUTPATIENT
Start: 2022-04-01 | End: 2022-04-03 | Stop reason: HOSPADM

## 2022-04-01 RX ORDER — AMLODIPINE BESYLATE 5 MG/1
5 TABLET ORAL DAILY
Status: DISCONTINUED | OUTPATIENT
Start: 2022-04-02 | End: 2022-04-02

## 2022-04-01 RX ORDER — METOPROLOL TARTRATE 50 MG/1
50 TABLET ORAL 2 TIMES DAILY
Status: DISCONTINUED | OUTPATIENT
Start: 2022-04-01 | End: 2022-04-03 | Stop reason: HOSPADM

## 2022-04-01 RX ORDER — HYDRALAZINE HYDROCHLORIDE 20 MG/ML
10 INJECTION INTRAMUSCULAR; INTRAVENOUS
Status: DISCONTINUED | OUTPATIENT
Start: 2022-04-01 | End: 2022-04-03 | Stop reason: HOSPADM

## 2022-04-01 RX ORDER — PANTOPRAZOLE SODIUM 40 MG/1
40 TABLET, DELAYED RELEASE ORAL 2 TIMES DAILY
Status: DISCONTINUED | OUTPATIENT
Start: 2022-04-01 | End: 2022-04-03 | Stop reason: HOSPADM

## 2022-04-01 RX ORDER — ACETAMINOPHEN 650 MG/1
650 SUPPOSITORY RECTAL
Status: DISCONTINUED | OUTPATIENT
Start: 2022-04-01 | End: 2022-04-03 | Stop reason: HOSPADM

## 2022-04-01 RX ADMIN — METOPROLOL TARTRATE 50 MG: 50 TABLET, FILM COATED ORAL at 21:41

## 2022-04-01 RX ADMIN — HEPARIN SODIUM 5000 UNITS: 5000 INJECTION INTRAVENOUS; SUBCUTANEOUS at 17:22

## 2022-04-01 RX ADMIN — HYDRALAZINE HYDROCHLORIDE 10 MG: 20 INJECTION, SOLUTION INTRAMUSCULAR; INTRAVENOUS at 15:29

## 2022-04-01 RX ADMIN — PANTOPRAZOLE SODIUM 40 MG: 40 TABLET, DELAYED RELEASE ORAL at 21:41

## 2022-04-01 RX ADMIN — SODIUM BICARBONATE 325 MG: 650 TABLET ORAL at 21:41

## 2022-04-01 RX ADMIN — ATORVASTATIN CALCIUM 40 MG: 40 TABLET, FILM COATED ORAL at 21:41

## 2022-04-01 NOTE — ED TRIAGE NOTES
Patient woke up about 830 yesterday morning and noticed right sided weakness. Started having difficulty walking as the day went on, Called daughter this morning to bring to ER.

## 2022-04-01 NOTE — ACP (ADVANCE CARE PLANNING)
Advance Care Planning   Healthcare Decision Maker:       Primary Decision Maker: Elizabeth Hernandez - Daughter - [phone]

## 2022-04-01 NOTE — PROGRESS NOTES
Problem: Dysphagia (Adult)  Goal: *Acute Goals and Plan of Care (Insert Text)  Description: Speech Therapy Goals  Initiated 4/1/2022  -Patient will participate in modified barium swallow study within 7 day(s), as indicated pending pt's progress. [ ] Not met  [ ]  MET   [ ] Progressing  [ ] Gemini Anguiano  -Patient will tolerate easy to chew diet with thin liquids without signs/symptoms of aspiration given no cues within 7 day(s). [ ] Not met  [ ]  MET   [ ] Progressing  [ ] Gemini Annmarie  -Patient will demonstrate understanding of swallow safety precautions and aspiration precautions, diet recs with no cues within 7 day(s). [ ] Not met  [ ]  MET   [ ] Progressing  [ ] Discontinue  Outcome: Not Met   SPEECH [address] EVALUATION  Patient: Mihaela Ho (89 y.o. male)  Date: 4/1/2022  Primary Diagnosis: Paresthesia and pain of right extremity [M79.609, R20.2]        Precautions: aspiration       ASSESSMENT :  Based on the objective data described below, the patient presents with mild oropharyngeal dysphagia c/b persistent throat clear with thin liquids. Pt seen in ED, being admitted for CVA workup due to RLE tingling (now resolved), difficulty ambulating. Pt's speech and language appear intact and pt reports this is baseline. Strained/strangled vocal quality is present and pt reports this is baseline but worse when feeling SOB related to underlying asthma. Pt with top and bottom dentures present. Oral motor function WNL. Pt able to self-feed without difficulty. Pt with somewhat thin/frail appearance but does report he has regained some of the weight he has lost. Pt also reports his wife passed away. Pt given trials of thin via cup/straw, puree, and hard solids. Oral phase WNL. Pharyngeal phase appears WNL to palpation; however, persistent throat clear is intermittently present with thin via cup and straw. 02 sats 99% on RA.  Pt does also  have a congested cough at baseline prior to PO trials. WBC WNL, pt afebrile. Elevated BP noted. Patient will benefit from skilled intervention to address the above impairments. Patients rehabilitation potential is considered to be Good     PLAN :  Recommendations and Planned Interventions: At this time, recommend easy to chew/thin diet with aspiration and GERD precautions, meds as tolerated. Recommend SLP follow-up to ensure diet tolerance and sw safety due to possible s/s aspiration with bedside sw evaluation. Will request MBS as indicated if s/s aspiration persist.   Frequency/Duration: Patient will be followed by speech-language pathology 5 times a week to address goals. Discharge Recommendations: cont SLP tx at this time. SUBJECTIVE:   Patient alert, agreeable, pleasant. OBJECTIVE:     CT Results  (Last 48 hours)                 04/01/22 1152  CT CODE NEURO HEAD WO CONTRAST Final result    Impression:      No acute abnormality. I called the report to Dr. Bernardo Richter 12:07 PM 4/1/2022. Narrative: Indication: Code neuro       Dose reduction: All CT scans done at this facility are performed using dose   reduction optimization techniques as appropriate to a performed exam including   the following: Automated exposure control, adjustments of the mA and/or kV   according to patient size, or use of iterative reconstruction technique. CT head unenhanced 4/1/2022. No comparison. No intracranial hemorrhage. Mild diffuse parenchymal volume loss. Mild heterogeneous white matter hypoattenuation which is nonspecific, may be   sequela of chronic microangiopathy. Bilateral basal ganglia calcification. Normal ventricles. Intact skull. Normal mastoids, tympanic cavities. Normal included paranasal sinuses. Absent bilateral native lens. Past Medical History:   Diagnosis Date    Gastrointestinal disorder     Hypertension    No past surgical history on file.   Prior Level of Function/Home Situation: independent, his daughter checks on him per pt     Diet prior to admission: regular/thin per pt  Current Diet:  regular/thin   Cognitive and Communication Status:  Neurologic State: Alert  Orientation Level: Oriented X4  Cognition: Appropriate decision making,Follows commands,Recognition of people/places    Pain:  0    After treatment:   Patient left in no apparent distress in bed, Call bell within reach, Nursing notified, and Caregiver / family present    COMMUNICATION/EDUCATION:   Patient was educated regarding purpose of SLP assessment, POC, diet recs and sw safety precautions. Patient demonstrated Good understanding as evidenced by verbal responsiveness. The patient's plan of care including recommendations, planned interventions, and recommended diet changes were discussed with: Registered nurse. Patient/family have participated as able in goal setting and plan of care. Patient/family agree to work toward stated goals and plan of care.     Thank you for this referral.  Sandor Fall M.S. CCC-SLP  Time Calculation: 15 mins

## 2022-04-01 NOTE — H&P
GENERAL GENERIC H&P/CONSULT  Presenting complaint:Weakness/numbness    Subjective: 82-year-old male with past medical history multiple comorbidities presents [facility] with complaints of weakness as well as numbness and tingling. Patient states he was in his usual state of health until yesterday a.m. when he started experiencing sudden onset numbness/tingling as well as weakness of his right lower extremity, there is no improvement in the symptoms patient presented to the ED. Patient denies any fevers chills nausea vomiting lightheadedness dizziness dyspnea orthopnea paroxysmal nocturnal dyspnea chest pain palpitations headache auditory or visual symptoms abdominal stool or urinary complaints or any other associated symptoms. Past Medical History:   Diagnosis Date    Gastrointestinal disorder     Hypertension       No past surgical history on file. Prior to Admission medications    Medication Sig Start Date End Date Taking? Authorizing Provider   ondansetron (Zofran ODT) 4 mg disintegrating tablet 1 Tablet by SubLINGual route every eight (8) hours as needed for Nausea or Vomiting. 9/6/21   Camilla Ying MD   metoprolol tartrate (LOPRESSOR) 50 mg tablet Take 50 mg by mouth two (2) times a day. Andrea Rankin MD   sodium bicarbonate 325 mg tablet Take 325 mg by mouth two (2) times a day. Andrea Rankin MD   pantoprazole (PROTONIX) 40 mg tablet Take 40 mg by mouth two (2) times a day. Andrea Rankin MD   doxazosin (CARDURA) 4 mg tablet Take 4 mg by mouth daily. Andrea Rankin MD   amLODIPine (NORVASC) 5 mg tablet Take 5 mg by mouth daily. Andrea Rankin MD   cetirizine (ZYRTEC) 10 mg tablet Take 10 mg by mouth daily. Andrea Rankin MD   clotrimazole-betamethasone (Lotrisone) topical cream Apply  to affected area two (2) times a day.  Apply to affected area 9/23/20   Eben Yousif MD     No Known Allergies   Social History     Tobacco Use    Smoking status: Former Smoker Quit date: 2017     Years since quittin.5    Smokeless tobacco: Never Used   Substance Use Topics    Alcohol use: Never      No family history on file. Review of Systems   Constitutional: Positive for activity change, appetite change and fatigue. Negative for chills, diaphoresis, fever and unexpected weight change. HENT: Negative for congestion, dental problem, drooling, ear discharge, ear pain, facial swelling, hearing loss, mouth sores, nosebleeds, postnasal drip, rhinorrhea, sinus pressure, sinus pain, sneezing, sore throat, tinnitus, trouble swallowing and voice change. Eyes: Negative for photophobia, pain, discharge, redness, itching and visual disturbance. Respiratory: Negative for apnea, cough, choking, chest tightness, shortness of breath, wheezing and stridor. Cardiovascular: Negative for chest pain, palpitations and leg swelling. Gastrointestinal: Negative for abdominal distention, abdominal pain, anal bleeding, blood in stool, constipation, diarrhea, nausea, rectal pain and vomiting. Endocrine: Negative for cold intolerance, heat intolerance, polydipsia, polyphagia and polyuria. Genitourinary: Negative for decreased urine volume, difficulty urinating, dysuria, enuresis, flank pain, frequency, genital sores, hematuria, penile discharge, penile pain, penile swelling, scrotal swelling, testicular pain and urgency. Musculoskeletal: Negative for arthralgias, back pain, gait problem, joint swelling, myalgias, neck pain and neck stiffness. Skin: Negative for color change, pallor, rash and wound. Allergic/Immunologic: Negative for environmental allergies, food allergies and immunocompromised state. Neurological: Positive for weakness and numbness. Negative for dizziness, tremors, seizures, syncope, facial asymmetry, speech difficulty, light-headedness and headaches. Hematological: Negative for adenopathy. Does not bruise/bleed easily.    Psychiatric/Behavioral: Negative for agitation, behavioral problems, confusion, decreased concentration, dysphoric mood, hallucinations, self-injury, sleep disturbance and suicidal ideas. The patient is not nervous/anxious and is not hyperactive. Objective:    No intake/output data recorded. No intake/output data recorded. Patient Vitals for the past 8 hrs:   BP Temp Pulse Resp SpO2 Height Weight   04/01/22 1418 -- -- (P) 60 (P) 16 -- -- --   04/01/22 1110 (!) 200/87 97.9 °F (36.6 °C) 67 18 99 % 5' 7\" (1.702 m) 52.2 kg (115 lb)     Physical Exam  Vitals reviewed. Constitutional:       General: He is not in acute distress. Appearance: Normal appearance. He is normal weight. He is not ill-appearing, toxic-appearing or diaphoretic. HENT:      Head: Normocephalic and atraumatic. Nose: Nose normal. No congestion or rhinorrhea. Mouth/Throat:      Mouth: Mucous membranes are moist.      Pharynx: Oropharynx is clear. No oropharyngeal exudate or posterior oropharyngeal erythema. Eyes:      General: No scleral icterus. Right eye: No discharge. Left eye: No discharge. Extraocular Movements: Extraocular movements intact. Conjunctiva/sclera: Conjunctivae normal.      Pupils: Pupils are equal, round, and reactive to light. Neck:      Vascular: No carotid bruit. Cardiovascular:      Rate and Rhythm: Normal rate and regular rhythm. Pulses: Normal pulses. Heart sounds: Normal heart sounds. No murmur heard. No friction rub. No gallop. Pulmonary:      Effort: Pulmonary effort is normal. No respiratory distress. Breath sounds: Normal breath sounds. No stridor. No wheezing, rhonchi or rales. Chest:      Chest wall: No tenderness. Abdominal:      General: Abdomen is flat. Bowel sounds are normal. There is no distension. Palpations: Abdomen is soft. There is no mass. Tenderness: There is no abdominal tenderness.  There is no right CVA tenderness, left CVA tenderness, guarding or rebound. Hernia: No hernia is present. Musculoskeletal:         General: No swelling, tenderness, deformity or signs of injury. Normal range of motion. Cervical back: Normal range of motion and neck supple. No rigidity or tenderness. Right lower leg: No edema. Left lower leg: No edema. Lymphadenopathy:      Cervical: No cervical adenopathy. Skin:     General: Skin is warm. Capillary Refill: Capillary refill takes less than 2 seconds. Coloration: Skin is not jaundiced or pale. Findings: No bruising, erythema, lesion or rash. Neurological:      General: No focal deficit present. Mental Status: He is alert and oriented to person, place, and time. Mental status is at baseline. Cranial Nerves: No cranial nerve deficit. Sensory: Sensory deficit present. Motor: Weakness present. Coordination: Coordination normal.      Gait: Gait normal.      Deep Tendon Reflexes: Reflexes normal.   Psychiatric:         Mood and Affect: Mood normal.         Behavior: Behavior normal.         Thought Content:  Thought content normal.         Judgment: Judgment normal.          Labs:    Recent Results (from the past 24 hour(s))   GLUCOSE, POC    Collection Time: 04/01/22 11:13 AM   Result Value Ref Range    Glucose (POC) 94 65 - 117 mg/dL    Performed by [facility]    CBC WITH AUTOMATED DIFF    Collection Time: 04/01/22 11:18 AM   Result Value Ref Range    WBC 4.7 4.1 - 11.1 K/uL    RBC 4.01 (L) 4.10 - 5.70 M/uL    HGB 11.2 (L) 12.1 - 17.0 g/dL    HCT 36.5 (L) 36.6 - 50.3 %    MCV 91.0 80.0 - 99.0 FL    MCH 27.9 26.0 - 34.0 PG    MCHC 30.7 30.0 - 36.5 g/dL    RDW 13.7 11.5 - 14.5 %    PLATELET 216 060 - 392 K/uL    MPV 10.3 8.9 - 12.9 FL    NRBC 0.0 0.0  WBC    ABSOLUTE NRBC 0.00 0.00 - 0.01 K/uL    NEUTROPHILS 47 32 - 75 %    LYMPHOCYTES 35 12 - 49 %    MONOCYTES 10 5 - 13 %    EOSINOPHILS 7 0 - 7 %    BASOPHILS 1 0 - 1 %    IMMATURE GRANULOCYTES 0 0 - 0.5 % ABS. NEUTROPHILS 2.2 1.8 - 8.0 K/UL    ABS. LYMPHOCYTES 1.7 0.8 - 3.5 K/UL    ABS. MONOCYTES 0.5 0.0 - 1.0 K/UL    ABS. EOSINOPHILS 0.3 0.0 - 0.4 K/UL    ABS. BASOPHILS 0.0 0.0 - 0.1 K/UL    ABS. IMM. GRANS. 0.0 0.00 - 0.04 K/UL    DF AUTOMATED     METABOLIC PANEL, COMPREHENSIVE    Collection Time: 04/01/22 11:18 AM   Result Value Ref Range    Sodium 140 136 - 145 mmol/L    Potassium 4.0 3.5 - 5.1 mmol/L    Chloride 107 97 - 108 mmol/L    CO2 27 21 - 32 mmol/L    Anion gap 6 5 - 15 mmol/L    Glucose 99 65 - 100 mg/dL    BUN 28 (H) 6 - 20 mg/dL    Creatinine 2.12 (H) 0.70 - 1.30 mg/dL    BUN/Creatinine ratio 13 12 - 20      GFR est AA 36 (L) >60 ml/min/1.73m2    GFR est non-AA 30 (L) >60 ml/min/1.73m2    Calcium 8.7 8.5 - 10.1 mg/dL    Bilirubin, total 0.6 0.2 - 1.0 mg/dL    AST (SGOT) 21 15 - 37 U/L    ALT (SGPT) 15 12 - 78 U/L    Alk. phosphatase 78 45 - 117 U/L    Protein, total 5.8 (L) 6.4 - 8.2 g/dL    Albumin 2.6 (L) 3.5 - 5.0 g/dL    Globulin 3.2 2.0 - 4.0 g/dL    A-G Ratio 0.8 (L) 1.1 - 2.2     PROTHROMBIN TIME + INR    Collection Time: 04/01/22 11:18 AM   Result Value Ref Range    Prothrombin time 13.2 11.9 - 14.6 sec    INR 1.0 0.9 - 1.1         ECG: nonspecific ST and T waves changes     CT head:No intracranial hemorrhage. Mild diffuse parenchymal volume loss. Mild heterogeneous white matter hypoattenuation which is nonspecific, may be  sequela of chronic microangiopathy. Bilateral basal ganglia calcification. Normal ventricles. Intact skull. Normal mastoids, tympanic cavities. Normal included paranasal sinuses.  Absent bilateral native lens.       Assessment:  Active Problems:    Paresthesia and pain of right extremity (4/1/2022)        Plan:    Transient ischemic attack-of note patient presents with above and symptomatology based on patient's clinical presentation as well as physical examination conjunction radiographic findings consistent with transient ischemic attack, patient winsome hemodynamically stable at this time  Continuous telemetry monitoring  Obtain MRI brain further evaluation  Aspirin once daily  Lipitor once daily  If MRI consistent with CVA, will do further CVA work-up  Aspiration seizure precautions  Physical therapy Occupational Therapy evaluation  Neurology consult further evaluation    Hypertensive urgency-of note patient presented with hypertensive urgency  Reinitiate home antihypertensive medications  Hydralazine as needed for systolic blood pressure over 160    Gastroesophageal reflux disease-continue current medications    BPH-continue home medications    Prophylaxis-Heparin subcu  FEN-cardiac diet, replete potassium and magnesium  Full code, will clarify about surrogate decision-maker, admitted to telemetry for observation and further management    Signed:   Letty Patino MD 4/1/2022

## 2022-04-01 NOTE — ED PROVIDER NOTES
EMERGENCY DEPARTMENT HISTORY AND PHYSICAL EXAM      Date: 2022  Patient Name: Elizabeth Oakley      History of Presenting Illness     Chief Complaint   Patient presents with    Extremity Weakness       History Provided By: Patient    HPI: Elizabeth Oakley, 82 y.o. male with a past medical history significant hypertension presents to the ED with cc of right leg tingling that started yesterday. There are no appreciable neuro, motor deficits. He also says that he has good sensation but just has a sharp prickly and tingling feeling at times. There were no other exacerbating or relieving factors and is not treated with anything. He denies any fever, chills, nausea, vomiting, chest pain, shortness of breath, rash, diarrhea, headache, night sweats, weight loss. There are no other complaints, changes, or physical findings at this time. PCP: Radha Laguerre    Current Outpatient Medications   Medication Sig Dispense Refill    amLODIPine (NORVASC) 2.5 mg tablet Take 1 Tablet by mouth daily. 30 Tablet 1    atorvastatin (LIPITOR) 40 mg tablet Take 1 Tablet by mouth nightly. 30 Tablet 1    aspirin delayed-release 81 mg tablet Take 81 mg by mouth daily.  metoprolol tartrate (LOPRESSOR) 50 mg tablet Take 50 mg by mouth two (2) times a day.  sodium bicarbonate 325 mg tablet Take 325 mg by mouth two (2) times a day.  doxazosin (CARDURA) 4 mg tablet Take 4 mg by mouth daily.  cetirizine (ZYRTEC) 10 mg tablet Take 10 mg by mouth daily. Past History     Past Medical History:  Past Medical History:   Diagnosis Date    Gastrointestinal disorder     Hypertension        Past Surgical History:  No past surgical history on file. Family History:  No family history on file.     Social History:  Social History     Tobacco Use    Smoking status: Former Smoker     Quit date: 2017     Years since quittin.5    Smokeless tobacco: Never Used   Substance Use Topics    Alcohol use: Never  Drug use: Never       Allergies:  No Known Allergies      Review of Systems     Review of Systems   Constitutional: Negative. Negative for appetite change, chills, fatigue and fever. HENT: Negative. Negative for congestion and sinus pain. Eyes: Negative. Negative for pain and visual disturbance. Respiratory: Negative. Negative for chest tightness and shortness of breath. Cardiovascular: Negative. Negative for chest pain. Gastrointestinal: Negative. Negative for abdominal pain, diarrhea, nausea and vomiting. Genitourinary: Negative. Negative for difficulty urinating. No discharge   Musculoskeletal: Negative. Negative for arthralgias. Skin: Negative. Negative for rash. Neurological: Negative. Negative for weakness and headaches. Tingling   Hematological: Negative. Psychiatric/Behavioral: Negative. Negative for agitation. The patient is not nervous/anxious. All other systems reviewed and are negative. Physical Exam     Physical Exam  Vitals and nursing note reviewed. Constitutional:       General: He is not in acute distress. Appearance: He is well-developed. HENT:      Head: Normocephalic and atraumatic. Nose: Nose normal.      Mouth/Throat:      Mouth: Mucous membranes are moist.      Pharynx: Oropharynx is clear. No oropharyngeal exudate. Eyes:      General:         Right eye: No discharge. Left eye: No discharge. Conjunctiva/sclera: Conjunctivae normal.      Pupils: Pupils are equal, round, and reactive to light. Cardiovascular:      Rate and Rhythm: Normal rate and regular rhythm. Chest Wall: PMI is not displaced. No thrill. Heart sounds: Normal heart sounds. No murmur heard. No friction rub. No gallop. Pulmonary:      Effort: Pulmonary effort is normal. No respiratory distress. Breath sounds: Normal breath sounds. No wheezing or rales. Chest:      Chest wall: No tenderness.    Abdominal:      General: Bowel sounds are normal. There is no distension. Palpations: Abdomen is soft. There is no mass. Tenderness: There is no abdominal tenderness. There is no guarding or rebound. Musculoskeletal:         General: Normal range of motion. Cervical back: Normal range of motion and neck supple. Lymphadenopathy:      Cervical: No cervical adenopathy. Skin:     General: Skin is warm and dry. Capillary Refill: Capillary refill takes less than 2 seconds. Findings: No erythema or rash. Neurological:      Mental Status: He is alert and oriented to person, place, and time. Cranial Nerves: No cranial nerve deficit. Coordination: Coordination normal.   Psychiatric:         Mood and Affect: Mood normal.         Behavior: Behavior normal.         Lab and Diagnostic Study Results     Labs -     Recent Results (from the past 12 hour(s))   CBC WITH AUTOMATED DIFF    Collection Time: 04/03/22 10:18 PM   Result Value Ref Range    WBC 5.1 4.1 - 11.1 K/uL    RBC 4.23 4. 10 - 5.70 M/uL    HGB 11.8 (L) 12.1 - 17.0 g/dL    HCT 37.7 36.6 - 50.3 %    MCV 89.1 80.0 - 99.0 FL    MCH 27.9 26.0 - 34.0 PG    MCHC 31.3 30.0 - 36.5 g/dL    RDW 13.6 11.5 - 14.5 %    PLATELET 647 726 - 729 K/uL    MPV 10.6 8.9 - 12.9 FL    NRBC 0.0 0.0  WBC    ABSOLUTE NRBC 0.00 0.00 - 0.01 K/uL    NEUTROPHILS 54 32 - 75 %    LYMPHOCYTES 31 12 - 49 %    MONOCYTES 10 5 - 13 %    EOSINOPHILS 4 0 - 7 %    BASOPHILS 1 0 - 1 %    IMMATURE GRANULOCYTES 0 0 - 0.5 %    ABS. NEUTROPHILS 2.8 1.8 - 8.0 K/UL    ABS. LYMPHOCYTES 1.6 0.8 - 3.5 K/UL    ABS. MONOCYTES 0.5 0.0 - 1.0 K/UL    ABS. EOSINOPHILS 0.2 0.0 - 0.4 K/UL    ABS. BASOPHILS 0.0 0.0 - 0.1 K/UL    ABS. IMM.  GRANS. 0.0 0.00 - 0.04 K/UL    DF AUTOMATED     METABOLIC PANEL, COMPREHENSIVE    Collection Time: 04/03/22 10:18 PM   Result Value Ref Range    Sodium 138 136 - 145 mmol/L    Potassium 4.4 3.5 - 5.1 mmol/L    Chloride 106 97 - 108 mmol/L    CO2 26 21 - 32 mmol/L    Anion gap 6 5 - 15 mmol/L    Glucose 164 (H) 65 - 100 mg/dL    BUN 41 (H) 6 - 20 mg/dL    Creatinine 2.82 (H) 0.70 - 1.30 mg/dL    BUN/Creatinine ratio 15 12 - 20      GFR est AA 26 (L) >60 ml/min/1.73m2    GFR est non-AA 22 (L) >60 ml/min/1.73m2    Calcium 8.7 8.5 - 10.1 mg/dL    Bilirubin, total 0.7 0.2 - 1.0 mg/dL    AST (SGOT) 26 15 - 37 U/L    ALT (SGPT) 15 12 - 78 U/L    Alk. phosphatase 75 45 - 117 U/L    Protein, total 5.9 (L) 6.4 - 8.2 g/dL    Albumin 2.8 (L) 3.5 - 5.0 g/dL    Globulin 3.1 2.0 - 4.0 g/dL    A-G Ratio 0.9 (L) 1.1 - 2.2     TROPONIN-HIGH SENSITIVITY    Collection Time: 04/03/22 10:18 PM   Result Value Ref Range    Troponin-High Sensitivity 19 0 - 76 ng/L       Radiologic Studies -   [unfilled]  CT Results  (Last 48 hours)    None        CXR Results  (Last 48 hours)    None          Medical Decision Making and ED Course   - I am the first and primary provider for this patient AND AM THE PRIMARY PROVIDER OF RECORD. - I reviewed the vital signs, available nursing notes, past medical history, past surgical history, family history and social history. - Initial assessment performed. The patients presenting problems have been discussed, and the staff are in agreement with the care plan formulated and outlined with them. I have encouraged them to ask questions as they arise throughout their visit. Vital Signs-Reviewed the patient's vital signs. No data found. Assess with CT of the head as well as checking electrolytes. Patient has no focal neurologic deficits at this point time and is not tender over the greater trochanter. ED Course:              Provider Notes (Medical Decision Making):   77-year-old male with musculoskeletal pain.   MDM           Consultations:       Consultations: - NONE        Procedures and Critical Care       Performed by: Elin Arreguin MD  PROCEDURES:  Procedures           Disposition     Disposition: Condition stable    Admitted    Remove if not discharged  DISCHARGE PLAN:  1. Current Discharge Medication List      CONTINUE these medications which have NOT CHANGED    Details   ondansetron (Zofran ODT) 4 mg disintegrating tablet 1 Tablet by SubLINGual route every eight (8) hours as needed for Nausea or Vomiting. Qty: 8 Tablet, Refills: 0      metoprolol tartrate (LOPRESSOR) 50 mg tablet Take 50 mg by mouth two (2) times a day. sodium bicarbonate 325 mg tablet Take 325 mg by mouth two (2) times a day. pantoprazole (PROTONIX) 40 mg tablet Take 40 mg by mouth two (2) times a day. doxazosin (CARDURA) 4 mg tablet Take 4 mg by mouth daily. amLODIPine (NORVASC) 5 mg tablet Take 5 mg by mouth daily. cetirizine (ZYRTEC) 10 mg tablet Take 10 mg by mouth daily. clotrimazole-betamethasone (Lotrisone) topical cream Apply  to affected area two (2) times a day. Apply to affected area  Qty: 1 Tube, Refills: 0           2. Follow-up Information     Follow up With Specialties Details Why Contact Info    [facility], [address] [place] [address] Nurse Practitioner In 1 week Patient must make an appointment for follow up visit. [facility]      Jorge Beavers MD Neurology In 1 week  [address] [place] [address]  [phone]      [facility], [facility] Vascular Surgery, Interventional Cardiology, Cardiology In 2 weeks  [place] [id number] [phone]          3. Return to ED if worse   4. Discharge Medication List as of 4/3/2022 11:38 AM      START taking these medications    Details   amLODIPine (NORVASC) 2.5 mg tablet Take 1 Tablet by mouth daily. , Normal, Disp-30 Tablet, R-1      atorvastatin (LIPITOR) 40 mg tablet Take 1 Tablet by mouth nightly., Normal, Disp-30 Tablet, R-1         CONTINUE these medications which have NOT CHANGED    Details   aspirin delayed-release 81 mg tablet Take 81 mg by mouth daily. , Historical Med      metoprolol tartrate (LOPRESSOR) 50 mg tablet Take 50 mg by mouth two (2) times a day., Historical Med      sodium bicarbonate 325 mg tablet Take 325 mg by mouth two (2) times a day., Historical Med      doxazosin (CARDURA) 4 mg tablet Take 4 mg by mouth daily. , Historical Med      cetirizine (ZYRTEC) 10 mg tablet Take 10 mg by mouth daily. , Historical Med         STOP taking these medications       pantoprazole (PROTONIX) 40 mg tablet Comments:   Reason for Stopping:               Diagnosis     Clinical Impression:   1. TIA (transient ischemic attack)        Attestations:    Latia Scott MD    Please note that this dictation was completed with [employer], the [employer] voice recognition software. Quite often unanticipated grammatical, syntax, homophones, and other interpretive errors are inadvertently transcribed by the computer software. Please disregard these errors. Please excuse any errors that have escaped final proofreading. Thank you.

## 2022-04-01 NOTE — PROGRESS NOTES
Patient arrived to unit, patient AX4, call bell in reach, bed alarm in place, patient oriented to hospital settings. daughter at bedside.  Skin assessment completed with Tiffanie Hernandez RN

## 2022-04-01 NOTE — PROGRESS NOTES
TRANSFER - OUT REPORT:    Verbal report given to DARIANA Santizo on Fabby Fu  being transferred to [facility] 1209, 4E for routine progression of care       Report consisted of patients Situation, Background, Assessment and   Recommendations(SBAR). Information from the following report(s) ED Summary, MAR and Cardiac Rhythm , NSR-Sundar was reviewed with the receiving nurse. Lines:       Opportunity for questions and clarification was provided.       Patient transported with:   [facility]

## 2022-04-02 ENCOUNTER — APPOINTMENT (OUTPATIENT)
Dept: NON INVASIVE DIAGNOSTICS | Age: 87
DRG: 066 | End: 2022-04-02
Attending: PHYSICIAN ASSISTANT
Payer: MEDICARE

## 2022-04-02 LAB
ANION GAP SERPL CALC-SCNC: 6 MMOL/L (ref 5–15)
BUN SERPL-MCNC: 27 MG/DL (ref 6–20)
BUN/CREAT SERPL: 14 (ref 12–20)
CA-I BLD-MCNC: 8.3 MG/DL (ref 8.5–10.1)
CHLORIDE SERPL-SCNC: 110 MMOL/L (ref 97–108)
CHOLEST SERPL-MCNC: 241 MG/DL
CO2 SERPL-SCNC: 27 MMOL/L (ref 21–32)
CREAT SERPL-MCNC: 1.89 MG/DL (ref 0.7–1.3)
ECHO AO ASC DIAM: 3.4 CM
ECHO AO ASCENDING AORTA INDEX: 2.13 CM/M2
ECHO AO ROOT DIAM: 3.4 CM
ECHO AO ROOT INDEX: 2.13 CM/M2
ECHO AR MAX VEL PISA: 6.5 M/S
ECHO AV AREA PEAK VELOCITY: 4.4 CM2
ECHO AV AREA VTI: 4.7 CM2
ECHO AV AREA/BSA PEAK VELOCITY: 2.8 CM2/M2
ECHO AV AREA/BSA VTI: 2.9 CM2/M2
ECHO AV MEAN GRADIENT: 3 MMHG
ECHO AV MEAN VELOCITY: 0.8 M/S
ECHO AV PEAK GRADIENT: 5 MMHG
ECHO AV PEAK VELOCITY: 1.2 M/S
ECHO AV REGURGITANT PHT: 385 MS
ECHO AV VELOCITY RATIO: 1
ECHO AV VTI: 25.2 CM
ECHO EST RA PRESSURE: 3 MMHG
ECHO LA AREA 2C: 4.5 CM2
ECHO LA AREA 4C: 7.9 CM2
ECHO LA MAJOR AXIS: 2.9 CM
ECHO LA MINOR AXIS: 2.4 CM
ECHO LA VOL BP: 11 ML (ref 18–58)
ECHO LA VOL/BSA BIPLANE: 7 ML/M2 (ref 16–34)
ECHO LV E' LATERAL VELOCITY: 7 CM/S
ECHO LV E' SEPTAL VELOCITY: 5 CM/S
ECHO LV EDV A2C: 30 ML
ECHO LV EDV A4C: 12 ML
ECHO LV EDV INDEX A4C: 8 ML/M2
ECHO LV EDV NDEX A2C: 19 ML/M2
ECHO LV EJECTION FRACTION A2C: 69 %
ECHO LV EJECTION FRACTION A4C: 75 %
ECHO LV ESV A2C: 9 ML
ECHO LV ESV A4C: 3 ML
ECHO LV ESV INDEX A2C: 6 ML/M2
ECHO LV ESV INDEX A4C: 2 ML/M2
ECHO LV FRACTIONAL SHORTENING: 39 % (ref 28–44)
ECHO LV INTERNAL DIMENSION DIASTOLE INDEX: 2.56 CM/M2
ECHO LV INTERNAL DIMENSION DIASTOLIC: 4.1 CM (ref 4.2–5.9)
ECHO LV INTERNAL DIMENSION SYSTOLIC INDEX: 1.56 CM/M2
ECHO LV INTERNAL DIMENSION SYSTOLIC: 2.5 CM
ECHO LV IVSD: 0.9 CM (ref 0.6–1)
ECHO LV MASS 2D: 123 G (ref 88–224)
ECHO LV MASS INDEX 2D: 76.9 G/M2 (ref 49–115)
ECHO LV POSTERIOR WALL DIASTOLIC: 1 CM (ref 0.6–1)
ECHO LV RELATIVE WALL THICKNESS RATIO: 0.49
ECHO LVOT AREA: 4.2 CM2
ECHO LVOT AV VTI INDEX: 1.12
ECHO LVOT DIAM: 2.3 CM
ECHO LVOT MEAN GRADIENT: 4 MMHG
ECHO LVOT PEAK GRADIENT: 6 MMHG
ECHO LVOT PEAK VELOCITY: 1.2 M/S
ECHO LVOT STROKE VOLUME INDEX: 73.4 ML/M2
ECHO LVOT SV: 117.5 ML
ECHO LVOT VTI: 28.3 CM
ECHO MV A VELOCITY: 0.87 M/S
ECHO MV AREA VTI: 5.3 CM2
ECHO MV E VELOCITY: 0.71 M/S
ECHO MV E/A RATIO: 0.82
ECHO MV E/E' LATERAL: 10.14
ECHO MV E/E' RATIO (AVERAGED): 12.17
ECHO MV E/E' SEPTAL: 14.2
ECHO MV LVOT VTI INDEX: 0.79
ECHO MV MAX VELOCITY: 0.8 M/S
ECHO MV MEAN GRADIENT: 1 MMHG
ECHO MV MEAN VELOCITY: 0.4 M/S
ECHO MV PEAK GRADIENT: 3 MMHG
ECHO MV VTI: 22.3 CM
ECHO RIGHT VENTRICULAR SYSTOLIC PRESSURE (RVSP): 34 MMHG
ECHO RV TAPSE: 2.5 CM (ref 1.5–2)
ECHO TV REGURGITANT MAX VELOCITY: 2.78 M/S
ECHO TV REGURGITANT PEAK GRADIENT: 31 MMHG
ERYTHROCYTE [DISTWIDTH] IN BLOOD BY AUTOMATED COUNT: 13.7 % (ref 11.5–14.5)
EST. AVERAGE GLUCOSE BLD GHB EST-MCNC: 108 MG/DL
GLUCOSE SERPL-MCNC: 78 MG/DL (ref 65–100)
HBA1C MFR BLD: 5.4 % (ref 4–5.6)
HCT VFR BLD AUTO: 35.2 % (ref 36.6–50.3)
HDLC SERPL-MCNC: 83 MG/DL
HDLC SERPL: 2.9 {RATIO} (ref 0–5)
HGB BLD-MCNC: 10.8 G/DL (ref 12.1–17)
LDLC SERPL CALC-MCNC: 147 MG/DL (ref 0–100)
LIPID PROFILE,FLP: ABNORMAL
MCH RBC QN AUTO: 27.1 PG (ref 26–34)
MCHC RBC AUTO-ENTMCNC: 30.7 G/DL (ref 30–36.5)
MCV RBC AUTO: 88.4 FL (ref 80–99)
NRBC # BLD: 0 K/UL (ref 0–0.01)
NRBC BLD-RTO: 0 PER 100 WBC
PLATELET # BLD AUTO: 211 K/UL (ref 150–400)
PMV BLD AUTO: 10.6 FL (ref 8.9–12.9)
POTASSIUM SERPL-SCNC: 4.6 MMOL/L (ref 3.5–5.1)
RBC # BLD AUTO: 3.98 M/UL (ref 4.1–5.7)
SODIUM SERPL-SCNC: 143 MMOL/L (ref 136–145)
TRIGL SERPL-MCNC: 55 MG/DL (ref ?–150)
VLDLC SERPL CALC-MCNC: 11 MG/DL
WBC # BLD AUTO: 5 K/UL (ref 4.1–11.1)

## 2022-04-02 PROCEDURE — 80061 LIPID PANEL: CPT

## 2022-04-02 PROCEDURE — 97161 PT EVAL LOW COMPLEX 20 MIN: CPT

## 2022-04-02 PROCEDURE — 93880 EXTRACRANIAL BILAT STUDY: CPT

## 2022-04-02 PROCEDURE — 36415 COLL VENOUS BLD VENIPUNCTURE: CPT

## 2022-04-02 PROCEDURE — 97165 OT EVAL LOW COMPLEX 30 MIN: CPT

## 2022-04-02 PROCEDURE — 83036 HEMOGLOBIN GLYCOSYLATED A1C: CPT

## 2022-04-02 PROCEDURE — 93306 TTE W/DOPPLER COMPLETE: CPT

## 2022-04-02 PROCEDURE — 97116 GAIT TRAINING THERAPY: CPT

## 2022-04-02 PROCEDURE — 85027 COMPLETE CBC AUTOMATED: CPT

## 2022-04-02 PROCEDURE — 80048 BASIC METABOLIC PNL TOTAL CA: CPT

## 2022-04-02 PROCEDURE — 74011250637 HC RX REV CODE- 250/637: Performed by: PHYSICIAN ASSISTANT

## 2022-04-02 PROCEDURE — 74011250637 HC RX REV CODE- 250/637: Performed by: INTERNAL MEDICINE

## 2022-04-02 PROCEDURE — G0378 HOSPITAL OBSERVATION PER HR: HCPCS

## 2022-04-02 RX ORDER — AMLODIPINE BESYLATE 2.5 MG/1
2.5 TABLET ORAL DAILY
Status: DISCONTINUED | OUTPATIENT
Start: 2022-04-02 | End: 2022-04-03 | Stop reason: HOSPADM

## 2022-04-02 RX ADMIN — ATORVASTATIN CALCIUM 40 MG: 40 TABLET, FILM COATED ORAL at 20:54

## 2022-04-02 RX ADMIN — AMLODIPINE BESYLATE 5 MG: 5 TABLET ORAL at 08:32

## 2022-04-02 RX ADMIN — ASPIRIN 81 MG 81 MG: 81 TABLET ORAL at 08:31

## 2022-04-02 RX ADMIN — DOXAZOSIN 4 MG: 2 TABLET ORAL at 08:31

## 2022-04-02 RX ADMIN — CETIRIZINE HYDROCHLORIDE 10 MG: 10 TABLET, FILM COATED ORAL at 08:31

## 2022-04-02 RX ADMIN — AMLODIPINE BESYLATE 2.5 MG: 2.5 TABLET ORAL at 10:05

## 2022-04-02 RX ADMIN — METOPROLOL TARTRATE 50 MG: 50 TABLET, FILM COATED ORAL at 20:54

## 2022-04-02 RX ADMIN — METOPROLOL TARTRATE 50 MG: 50 TABLET, FILM COATED ORAL at 08:31

## 2022-04-02 RX ADMIN — SODIUM BICARBONATE 325 MG: 650 TABLET ORAL at 20:54

## 2022-04-02 RX ADMIN — PANTOPRAZOLE SODIUM 40 MG: 40 TABLET, DELAYED RELEASE ORAL at 08:32

## 2022-04-02 RX ADMIN — PANTOPRAZOLE SODIUM 40 MG: 40 TABLET, DELAYED RELEASE ORAL at 20:54

## 2022-04-02 RX ADMIN — SODIUM BICARBONATE 325 MG: 650 TABLET ORAL at 08:31

## 2022-04-02 NOTE — ROUTINE PROCESS
Bedside and Verbal shift change report given to Vinod Melchor (oncoming nurse) by Kylie Riley (offgoing nurse). Report included the following information SBAR and MAR.

## 2022-04-02 NOTE — PROGRESS NOTES
Problem: Mobility Impaired (Adult and Pediatric)  Goal: *Acute Goals and Plan of Care (Insert Text)  Description: Physical Therapy Goals  Initiated 4/2/22  1)  I with HEP in 7 days to improve overall functional mobility. 2)  Bed mobility and transfers I in 7 days to prevent skin breakdown. 3)  Pt to amb 300ft with LRAD and sup in 7 days    Pt. Goal:  Pt to be able to walk safely without falls. Outcome: Not Met   PHYSICAL THERAPY EVALUATION  Patient: Mihaela Ho (10 y.o. male)  Date: 4/2/2022  Primary Diagnosis: Paresthesia and pain of right extremity [M79.609, R20.2]        Precautions: fall, TIA       ASSESSMENT  Pt admitted for weakness, numbness/tinging in R LE and here for stroke workup. Head CT (-) and MRI (-) for acute process. Pt with hx of HTN, GERD, BPH, JULIENNE with probable CKD. Pt reports he lives in a first floor apartment alone but his sister is close by to check on him. Pt reports he has a walk in entrance with no steps to negotiate and that he was I with ADL's, driving, shopping, cooking and cleaning  and was no using AD for ambulation; however, he has a RW at home. Pt reports no falls and no pain today. Based on the objective data described below, the patient presents with good LE strength, intact sensation, impaired balance, able to perform bed mobility with SBA, transfers with CGA and ambulation was tested with and without AD. Without AD, pt demos slow geoffrey, decreased step length, narrow RAYNE with scissoring of L LE noted with slight R lean. Pt attempted ambulation with 1 HHA and RW, still requiring CGA, but was able to increase step length and increase geoffrey but still required cues to increase RAYNE to avoid scissoring and pt still leaning slightly to the R.  Pt still slightly unsteady with ambulation with RW and required cues to keep walker close to him. Pt reports his R LE still doesn't feeling like it is doing what it is supposed to.   Pt would benefit from continued skilled PT services for balance, functional mobility and gait training so pt can return home safely to PLOF. Recommend d/c to home with family care (sister lives close) and HHPT. Other factors to consider for discharge: impaired mobility, level of assist     Patient will benefit from skilled therapy intervention to address the above noted impairments. PLAN :  Recommendations and Planned Interventions: bed mobility training, transfer training, gait training, therapeutic exercises, patient and family training/education, and therapeutic activities      Frequency/Duration: Patient will be followed by physical therapy:  3-5x/week to address goals. Recommendation for discharge: (in order for the patient to meet his/her long term goals)  Home with [address] [place] [address]    This discharge recommendation:  Has been made in collaboration with the attending provider and/or case management    IF patient discharges home will need the following DME: to be determined (TBD)         SUBJECTIVE:   Patient stated I feel like my leg feels a little numb.     OBJECTIVE DATA SUMMARY:   HISTORY:    Past Medical History:   Diagnosis Date    Gastrointestinal disorder     Hypertension    No past surgical history on file. Personal factors and/or comorbidities impacting plan of care: impaired mobility, level of assist    Home Situation  Home Environment: Apartment  # Steps to Enter: 0  One/Two Story Residence: One story  Living Alone: Yes  Support Systems: Other Family Member(s)  Patient Expects to be Discharged to[de-identified] Home  Current DME Used/Available at Home: Walker, rolling    PLOF: Pt I for ADLS/IADLS, I with mobility prior to admission. EXAMINATION/PRESENTATION/DECISION MAKING:   Critical Behavior:  Neurologic State: Alert  Orientation Level: Oriented X4  Cognition: Appropriate decision making     Hearing:   Auditory  Auditory Impairment: None    Range Of Motion:  AROM: Within functional limits Strength:    Strength: Within functional limits      4+/5 throughout B LE's              Tone & Sensation:                  Sensation: Intact               Coordination:  Coordination: Within functional limits   Finger Opposition Finger to Nose Dysdiadokinesis Proprioception   Right UE  [] Intact    [] Impaired   [] Intact    [] Impaired [] Intact    [] Impaired [] Intact    [] Impaired   Left UE [] Intact    [] Impaired [] Intact    [] Impaired [] Intact    [] Impaired [] Intact    [] Impaired    Heel to Connor Heel taps Trace square Proprioception   Right LE  [x] Intact    [] Impaired [x] Intact    [] Impaired [x] Intact    [] Impaired [x] Intact    [] Impaired   Left LE [x] Intact    [] Impaired [x] Intact    [] Impaired [x] Intact    [] Impaired [x] Intact    [] Impaired     Vision:      Functional Mobility:  Bed Mobility:  Rolling: Stand-by assistance  Supine to Sit: Stand-by assistance     Scooting: Stand-by assistance  Transfers:  Sit to Stand: Contact guard assistance  Stand to Sit: Contact guard assistance                       Balance:   Sitting: Intact  Standing: Impaired  Standing - Static: Good  Standing - Dynamic : Fair;Constant support  Ambulation/Gait Training:  Distance (ft): 125 Feet (ft)  Assistive Device: Gait belt;Walker, rolling (no AD)  Ambulation - Level of Assistance: Minimal assistance     Gait Description (WDL): Exceptions to WDL  Gait Abnormalities: Scissoring              Speed/Melia: Slow  Step Length: Right shortened;Left shortened                Functional Measure:    [facility] [address] [id number] AM-PAC 6 Clicks         Basic Mobility Inpatient Short Form  How much difficulty does the patient currently have. .. Unable A Lot A Little None   1. Turning over in bed (including adjusting bedclothes, sheets and blankets)? [] 1   [] 2   [] 3   [x] 4   2. Sitting down on and standing up from a chair with arms ( e.g., wheelchair, bedside commode, etc.)   [] 1   [] 2   [] 3   [x] 4   3.   Moving from lying on back to sitting on the side of the bed? [] 1   [] 2   [] 3   [x] 4          How much help from another person does the patient currently need. .. Total A Lot A Little None   4. Moving to and from a bed to a chair (including a wheelchair)? [] 1   [] 2   [x] 3   [] 4   5. Need to walk in hospital room? [] 1   [] 2   [x] 3   [] 4   6. Climbing 3-5 steps with a railing? [] 1   [] 2   [x] 3   [] 4   © , Trustees of [facility] [employer], under license to [id number]. All rights reserved     Score:  Initial: 21 Most Recent: X (Date: 22)   Interpretation of Tool:  Represents activities that are increasingly more difficult (i.e. Bed mobility, Transfers, Gait). Score 24 23 22-20 19-15 14-10 9-7 6   Modifier CH CI CJ CK CL CM CN          Physical Therapy Evaluation Charge Determination   History Examination Presentation Decision-Making   MEDIUM  Complexity : 1-2 comorbidities / personal factors will impact the outcome/ POC  MEDIUM Complexity : 3 Standardized tests and measures addressing body structure, function, activity limitation and / or participation in recreation  LOW Complexity : Stable, uncomplicated  Other Functional Measure AMPA 6 low      Based on the above components, the patient evaluation is determined to be of the following complexity level: LOW     Pain Ratin/10    Activity Tolerance:   Fair  Please refer to the flowsheet for vital signs taken during this treatment. After treatment patient left in no apparent distress:   Sitting in chair and Call bell within reach    COMMUNICATION/EDUCATION:   The patients plan of care was discussed with: Registered nurse. Patient/family agree to work toward stated goals and plan of care.     Thank you for this referral.  Yenny Duque   Time Calculation: 30 mins

## 2022-04-02 NOTE — PROGRESS NOTES
Problem: Self Care Deficits Care Plan (Adult)  Goal: *Acute Goals and Plan of Care (Insert Text)  Description: Pt will be independence  LB dressing EOB level  Pt will be independence  sit<->stand   Pt will be independence  BSC/toilet transfer with LRAD  Pt will be independence  toileting/cloth mgmt LRAD  Pt will be independence  grooming standing sink  Pt will be independence bathing sitting/standing sink LRAD  Pt will be MI andrade UE HEP in prep for self care tasks    Outcome: Not Met   OCCUPATIONAL THERAPY EVALUATION  Patient: Jeffrey Ingram (89 y.o. male)  Date: 4/2/2022  Primary Diagnosis: Paresthesia and pain of right extremity [M79.609, R20.2]        Precautions:  Seizure, falls, hypertensive urgency    ASSESSMENT  Pt is an 83 y/o male came to [facility] with R sided weakness with numbness and tingling. He is adm 4/1/22 for TIA. Head CT and brain MRI negative for acute findings in ED. Pt has hx of gastrointestinal disorder and HTN. Pt received semi supine, asleep, however easily aroused, A&Ox4 and agreeable for OT eval/tx. Per pt report, pt lives alone in ground level apartment with 0 [address]. He is independent for self care tasks and functional transfers/mobility. Patient owns a rolling walker, however does not use it. Since being admitted he reports all symptoms have resolved and he is back to his reported baseline. Patient has good, local, family support who checks on him regularly. He is independent with all IADLS, drives himself, takes care of his home tasks, and is R handed. He denies fall hx in the last 6 months. Pt currently presents with decreased standing balance, decreased need for safety awareness, lack of insight, mild impulsivity, and increased need for assist with functional transfers/mobility and self care tasks. SPV bed level mobility rolling, scooting hips fwd to EOB, and transferring sup>sit with minimal exertion. Patient requires SPV to danyelle socks sitting EOB using crossed legs method.  Does not require constant support to maintain good sitting balance. Close CGA sit>stand and CGA for bedroom/bathroom mobility without DME. May benefit from using RW at home for stability. Fair standing dynamic balance without support. CGA toilet transfer in bathroom, minimal vc's for controlled descend using grab bars for balance. SBA bladder/bowel hygiene. Patient cleaning self in standing position with fair balance and reach. Patient managing hospital gown independently to perform all toileting tasks. CGA to walk to the sink and IND for washing hands and face at sink, standing. SPV for oral hygiene standing at sink, patient managing all packages w/o difficulty and tolerates standing for ~5 minutes. Bathing simulated seated EOB with SPV for safety and monitoring vitals. Patient returned to supine with SPV, able to reposition self in bed for comfort. Pt would benefit from skilled OT services while at [facility] in order to increase safety and independence with self care and functional transfers/mobility. Recommend [facility] to [facility] w/ family support/assist when medically appropriate. Other factors to consider for discharge: good family support, symptom severity, insight, DME        PLAN :  Recommendations and Planned Interventions: self care training, functional mobility training, therapeutic exercise, balance training, endurance activities, patient education and home safety training    Frequency/Duration: Patient will be followed by occupational therapy 3-5x/week to address goals. Recommendation for discharge: (in order for the patient to meet his/her long term goals)  Home with [address] [place] [address]    This discharge recommendation:  Has not yet been discussed the attending provider and/or case management    IF patient discharges home will need the following DME: shower chair       SUBJECTIVE:   Patient stated I'm doing pretty good. I have family that checks on my once in awhile. They're real close.     OBJECTIVE DATA SUMMARY:   HISTORY:   Past Medical History:   Diagnosis Date    Gastrointestinal disorder     Hypertension    No past surgical history on file. Expanded or extensive additional review of patient history:     Home Situation  Home Environment: Apartment  # Steps to Enter: 0  Rails to Enter: No  Wheelchair Ramp: No  One/Two Story Residence: One story  Living Alone: Yes  Support Systems: Other Family Member(s)  Patient Expects to be Discharged to[de-identified] Home  Current DME Used/Available at Home: Walker, rolling  Tub or Shower Type: Shower    PLOF: Pt IND for ADLS/IADLS, IND with mobility prior to admission. Hand dominance: Right    EXAMINATION OF PERFORMANCE DEFICITS:  Cognitive/Behavioral Status:  Neurologic State: Alert  Orientation Level: Oriented X4  Cognition: Appropriate for age attention/concentration; Follows commands  Perception: Appears intact  Perseveration: No perseveration noted  Safety/Judgement: Decreased awareness of need for safety    Skin: Intact    Edema: None    Hearing: Auditory  Auditory Impairment: None    Vision/Perceptual:    Tracking: Able to track stimulus in all quadrants w/o difficulty      Range of Motion:  AROM: Within functional limits    Strength:  Strength: Within functional limits    Coordination:  Coordination: Within functional limits  Fine Motor Skills-Upper: Left Intact; Right Intact    Gross Motor Skills-Upper: Left Intact; Right Intact    Tone & Sensation:  Sensation: Intact    Balance:  Sitting: Intact  Standing: Impaired  Standing - Static: Good  Standing - Dynamic : Fair;Unsupported    Functional Mobility and Transfers for ADLs:  Bed Mobility:  Rolling: Supervision  Supine to Sit: Supervision  Sit to Supine: Supervision  Scooting: Supervision    Transfers:  Sit to Stand: Contact guard assistance  Stand to Sit: Contact guard assistance  Bathroom Mobility: Contact guard assistance  Toilet Transfer : Contact guard assistance  Assistive Device : Gait Belt    ADL Assessment:  Feeding: Independent  Oral Facial Hygiene/Grooming: Supervision  Bathing: Supervision  Upper Body Dressing: Independent  Lower Body Dressing: Supervision  Toileting: Contact guard assistance    ADL Intervention and task modifications:  Feeding  Feeding Assistance: Independent  Container Management: Independent  Cutting Food: Independent  Utensil Management: Independent  Food to Mouth: Independent  Drink to Mouth: Independent    Grooming  Grooming Assistance: Supervision  Position Performed: Standing  Washing Face: Independent  Washing Hands: Independent  Brushing Teeth: Supervision    Upper Body Bathing  Bathing Assistance: Supervision  Position Performed: Seated edge of bed    Lower Body Bathing  Bathing Assistance: Supervision  Lower Body : Supervision  Position Performed: Seated edge of bed    Upper Body Dressing Assistance  Dressing Assistance: [id number] [id number]: Independent    Lower Body Dressing Assistance  Dressing Assistance: Supervision  Socks: Supervision  Leg Crossed Method Used: Yes  Position Performed: Seated edge of bed    Toileting  Toileting Assistance: Contact guard assistance  Bladder Hygiene: Stand-by assistance  Bowel Hygiene: Stand-by assistance  Clothing Management: Modified independent  Cues: Verbal cues provided  Adaptive Equipment: Grab bars    Cognitive Retraining  Following Commands: Follows two step commands/directions  Safety/Judgement: Decreased awareness of need for safety  Cues: Verbal cues provided    [address] [place] [address] [address] [id number] -[facility] \"6 Clicks\"                                                       Daily Activity Inpatient Short Form  How much help from another person does the patient currently need. .. Total; A Lot A Little None   1. Putting on and taking off regular lower body clothing? []  1 []  2 []  3 [x]  4   2. Bathing (including washing, rinsing, drying)? []  1 []  2 []  3 [x]  4   3.   Toileting, which includes using toilet, bedpan or urinal? [] 1 []  2 [x]  3 []  4   4. Putting on and taking off regular upper body clothing? []  1 []  2 []  3 [x]  4   5. Taking care of personal grooming such as brushing teeth? []  1 []  2 []  3 [x]  4   6. Eating meals? []  1 []  2 []  3 [x]  4   © 2007, Trustees of [facility], under license to [employer]. All rights reserved     Score: 23/24     Interpretation of Tool:  Represents clinically-significant functional categories (i.e. Activities of daily living). Percentage of Impairment CH    0%   CI    1-19% CJ    20-39% CK    40-59% CL    60-79% CM    80-99% CN     100%   [facility]  Score 6-24 24 23 20-22 15-19 10-14 7-9 6        Occupational Therapy Evaluation Charge Determination   History Examination Decision-Making   LOW Complexity : Brief history review  LOW Complexity : 1-3 performance deficits relating to physical, cognitive , or psychosocial skils that result in activity limitations and / or participation restrictions  LOW Complexity : No comorbidities that affect functional and no verbal or physical assistance needed to complete eval tasks       Based on the above components, the patient evaluation is determined to be of the following complexity level: LOW   Pain Rating:  No pain    Activity Tolerance:   Good and tolerates ADLs without rest breaks  Please refer to the flowsheet for vital signs taken during this treatment. After treatment patient left in no apparent distress:    Supine in bed and Bed / chair alarm activated    COMMUNICATION/EDUCATION:   The patients plan of care was discussed with: Physical therapist and Registered nurse. Home safety education was provided and the patient/caregiver indicated understanding. This patients plan of care is appropriate for delegation to [facility].     Thank you for this referral.  Radha Jones, OT  Time Calculation: 15 mins

## 2022-04-02 NOTE — CONSULTS
NEUROLOGY CONSULT    Name Gab Renner Age 82 y.o. MRN [id number]  1935     Referring Physician: Mayelin Noe    Chief Complaint: Right lower extremity and index finger numbness with some weakness     This is a 82 y.o. right handed -American male who noticed numbness of the right lower extremity and index finger 2 days ago. He also felt slight weakness of the right leg and came to the hospital yesterday. A CT scan of the head was unremarkable and he was admitted subsequently had an MRI of the brain which was also unremarkable for any acute infarct. The patient's symptoms continue to improve. At the time of my evaluation this morning the patient was feeling almost back to his normal self but not 100%    Assessment and Plan:  1. Likely lacunar infarct: Patient developed sudden onset of right leg and index finger numbness and weakness of the leg which has not yet resolved. MRI did not show any acute infarct. The films were reviewed. However, the basis of the history patient seems to have a lacunar infarct in the left hemisphere in the distribution of the left YOLY probably not seen because of a small stroke in between the 2 cuts of the MRI. 2D echo and carotid Doppler results pending. Agree with the aspirin 81 mg a day. We will follow up on the results. 2.  Hypertension  3.   History of GERD    Thank you for the consult    No Known Allergies     Current Facility-Administered Medications   Medication Dose Route Frequency    amLODIPine (NORVASC) tablet 2.5 mg  2.5 mg Oral DAILY    hydrALAZINE (APRESOLINE) 20 mg/mL injection 10 mg  10 mg IntraVENous Q6H PRN    aspirin chewable tablet 81 mg  81 mg Oral DAILY    cetirizine (ZYRTEC) tablet 10 mg  10 mg Oral DAILY    doxazosin (CARDURA) tablet 4 mg  4 mg Oral DAILY    metoprolol tartrate (LOPRESSOR) tablet 50 mg  50 mg Oral BID    pantoprazole (PROTONIX) tablet 40 mg  40 mg Oral BID    sodium bicarbonate tablet 325 mg  325 mg Oral BID    acetaminophen (TYLENOL) tablet 650 mg  650 mg Oral Q4H PRN    Or    acetaminophen (TYLENOL) solution 650 mg  650 mg Per NG tube Q4H PRN    Or    acetaminophen (TYLENOL) suppository 650 mg  650 mg Rectal Q4H PRN    atorvastatin (LIPITOR) tablet 40 mg  40 mg Oral QHS    heparin (porcine) injection 5,000 Units  5,000 Units SubCUTAneous Q8H     Past Medical History:   Diagnosis Date    Gastrointestinal disorder     Hypertension      Social History     Tobacco Use    Smoking status: Former Smoker     Quit date: 2017     Years since quittin.5    Smokeless tobacco: Never Used   Substance Use Topics    Alcohol use: Never    Drug use: Never     Exam  Visit Vitals  BP (!) 150/73 (BP 1 Location: Right upper arm)   Pulse 72   Temp 98.4 °F (36.9 °C)   Resp 18   Ht 5' 7\" (1.702 m)   Wt 52.2 kg (115 lb)   SpO2 98%   BMI 18.01 kg/m²     General: Well developed, well nourished. Patient in no distress   Head: Normocephalic, atraumatic, anicteric sclera   Neck Normal ROM, No thyromegally               Ext: No pedal edema   Skin: Supple no rash     NeurologicExam:  Mental Status: Alert and oriented to person place and time   Speech: Fluent no aphasia or dysarthria. Cranial Nerves:   Pupils are equal round and reactive to light and accommodation, extraocular movements are intact and full, visual fields are intact by confrontation, no nystagmus noted, face is symmetric, sensation on face is intact and symmetric, hearing is intact and symmetric, tongue and uvula are in midline with normal movements, palate is elevating equally, shoulder shrug is intact and symmetric. Motor:  Full and symmetric strength of upper and lower ext. Normal bulk and tone. Reflexes:   Deep tendon reflexes 2/4 and symmetric. Sensory:   Symmetric and intact    Gait:  Gait is slightly wide-based and leaning to the right   Tremor:   No tremor noted. Cerebellar:  Coordination intact for finger-nose-finger.     Neurovascular: No carotid bruits.  No JVD   Lab Review  Lab Results   Component Value Date/Time    WBC 5.0 04/02/2022 06:46 AM    HCT 35.2 (L) 04/02/2022 06:46 AM    HGB 10.8 (L) 04/02/2022 06:46 AM    PLATELET 771 57/05/2028 06:46 AM     Lab Results   Component Value Date/Time    Sodium 143 04/02/2022 06:46 AM    Potassium 4.6 04/02/2022 06:46 AM    Chloride 110 (H) 04/02/2022 06:46 AM    CO2 27 04/02/2022 06:46 AM    Glucose 78 04/02/2022 06:46 AM    BUN 27 (H) 04/02/2022 06:46 AM    Creatinine 1.89 (H) 04/02/2022 06:46 AM    Calcium 8.3 (L) 04/02/2022 06:46 AM     No results found for: B12LT, FOL, RBCF  Lab Results   Component Value Date/Time    LDL, calculated 147 (H) 04/02/2022 06:46 AM     No results found for: HBA1C, HPS9SSQI, [id number]  No components found for: TROPQUANT  No results found for: TIFFANY

## 2022-04-02 NOTE — PROGRESS NOTES
Hospitalist Progress Note    Subjective:   Daily Progress Note: 2022 8:54 AM    Right arm weakness resolved    Current Facility-Administered Medications   Medication Dose Route Frequency    amLODIPine (NORVASC) tablet 2.5 mg  2.5 mg Oral DAILY    hydrALAZINE (APRESOLINE) 20 mg/mL injection 10 mg  10 mg IntraVENous Q6H PRN    aspirin chewable tablet 81 mg  81 mg Oral DAILY    cetirizine (ZYRTEC) tablet 10 mg  10 mg Oral DAILY    doxazosin (CARDURA) tablet 4 mg  4 mg Oral DAILY    metoprolol tartrate (LOPRESSOR) tablet 50 mg  50 mg Oral BID    pantoprazole (PROTONIX) tablet 40 mg  40 mg Oral BID    sodium bicarbonate tablet 325 mg  325 mg Oral BID    acetaminophen (TYLENOL) tablet 650 mg  650 mg Oral Q4H PRN    Or    acetaminophen (TYLENOL) solution 650 mg  650 mg Per NG tube Q4H PRN    Or    acetaminophen (TYLENOL) suppository 650 mg  650 mg Rectal Q4H PRN    atorvastatin (LIPITOR) tablet 40 mg  40 mg Oral QHS    heparin (porcine) injection 5,000 Units  5,000 Units SubCUTAneous Q8H        Review of Systems  Review of Systems   Constitutional: Negative for chills and malaise/fatigue. HENT: Negative. Respiratory: Negative for cough and shortness of breath. Cardiovascular: Negative for chest pain and leg swelling. Gastrointestinal: Negative for abdominal pain, nausea and vomiting. Genitourinary: Negative. Musculoskeletal: Negative. Skin: Negative. Neurological: Negative for dizziness, tingling, weakness and headaches. Psychiatric/Behavioral: Negative. Objective:     Visit Vitals  BP (!) 171/78 (BP 1 Location: Right upper arm, BP Patient Position: At rest;Lying)   Pulse 71   Temp 98.6 °F (37 °C)   Resp 18   Ht 5' 7\" (1.702 m)   Wt 52.2 kg (115 lb)   SpO2 98%   BMI 18.01 kg/m²      O2 Device: None (Room air)    Temp (24hrs), Av °F (36.7 °C), Min:97.3 °F (36.3 °C), Max:98.6 °F (37 °C)      No intake/output data recorded. No intake/output data recorded.     Recent Results (from the past 24 hour(s))   GLUCOSE, POC    Collection Time: 04/01/22 11:13 AM   Result Value Ref Range    Glucose (POC) 94 65 - 117 mg/dL    Performed by MANNY OLIVER    CBC WITH AUTOMATED DIFF    Collection Time: 04/01/22 11:18 AM   Result Value Ref Range    WBC 4.7 4.1 - 11.1 K/uL    RBC 4.01 (L) 4.10 - 5.70 M/uL    HGB 11.2 (L) 12.1 - 17.0 g/dL    HCT 36.5 (L) 36.6 - 50.3 %    MCV 91.0 80.0 - 99.0 FL    MCH 27.9 26.0 - 34.0 PG    MCHC 30.7 30.0 - 36.5 g/dL    RDW 13.7 11.5 - 14.5 %    PLATELET 925 668 - 145 K/uL    MPV 10.3 8.9 - 12.9 FL    NRBC 0.0 0.0  WBC    ABSOLUTE NRBC 0.00 0.00 - 0.01 K/uL    NEUTROPHILS 47 32 - 75 %    LYMPHOCYTES 35 12 - 49 %    MONOCYTES 10 5 - 13 %    EOSINOPHILS 7 0 - 7 %    BASOPHILS 1 0 - 1 %    IMMATURE GRANULOCYTES 0 0 - 0.5 %    ABS. NEUTROPHILS 2.2 1.8 - 8.0 K/UL    ABS. LYMPHOCYTES 1.7 0.8 - 3.5 K/UL    ABS. MONOCYTES 0.5 0.0 - 1.0 K/UL    ABS. EOSINOPHILS 0.3 0.0 - 0.4 K/UL    ABS. BASOPHILS 0.0 0.0 - 0.1 K/UL    ABS. IMM. GRANS. 0.0 0.00 - 0.04 K/UL    DF AUTOMATED     METABOLIC PANEL, COMPREHENSIVE    Collection Time: 04/01/22 11:18 AM   Result Value Ref Range    Sodium 140 136 - 145 mmol/L    Potassium 4.0 3.5 - 5.1 mmol/L    Chloride 107 97 - 108 mmol/L    CO2 27 21 - 32 mmol/L    Anion gap 6 5 - 15 mmol/L    Glucose 99 65 - 100 mg/dL    BUN 28 (H) 6 - 20 mg/dL    Creatinine 2.12 (H) 0.70 - 1.30 mg/dL    BUN/Creatinine ratio 13 12 - 20      GFR est AA 36 (L) >60 ml/min/1.73m2    GFR est non-AA 30 (L) >60 ml/min/1.73m2    Calcium 8.7 8.5 - 10.1 mg/dL    Bilirubin, total 0.6 0.2 - 1.0 mg/dL    AST (SGOT) 21 15 - 37 U/L    ALT (SGPT) 15 12 - 78 U/L    Alk.  phosphatase 78 45 - 117 U/L    Protein, total 5.8 (L) 6.4 - 8.2 g/dL    Albumin 2.6 (L) 3.5 - 5.0 g/dL    Globulin 3.2 2.0 - 4.0 g/dL    A-G Ratio 0.8 (L) 1.1 - 2.2     PROTHROMBIN TIME + INR    Collection Time: 04/01/22 11:18 AM   Result Value Ref Range    Prothrombin time 13.2 11.9 - 14.6 sec    INR 1.0 0.9 - 1.1     EKG, 12 LEAD, INITIAL    Collection Time: 04/01/22  2:24 PM   Result Value Ref Range    Ventricular Rate 58 BPM    Atrial Rate 58 BPM    P-R Interval 134 ms    QRS Duration 76 ms    Q-T Interval 450 ms    QTC Calculation (Bezet) 441 ms    Calculated P Axis 83 degrees    Calculated R Axis -60 degrees    Calculated T Axis 81 degrees    Diagnosis       Sinus bradycardia with marked sinus arrhythmia  Left axis deviation  Anteroseptal infarct , age undetermined  Abnormal ECG  No previous ECGs available  Confirmed by Mena Humphrey MD, [facility] ([id number]) on 4/1/2022 11:01:38 PM     CBC W/O DIFF    Collection Time: 04/02/22  6:46 AM   Result Value Ref Range    WBC 5.0 4.1 - 11.1 K/uL    RBC 3.98 (L) 4.10 - 5.70 M/uL    HGB 10.8 (L) 12.1 - 17.0 g/dL    HCT 35.2 (L) 36.6 - 50.3 %    MCV 88.4 80.0 - 99.0 FL    MCH 27.1 26.0 - 34.0 PG    MCHC 30.7 30.0 - 36.5 g/dL    RDW 13.7 11.5 - 14.5 %    PLATELET 112 812 - 064 K/uL    MPV 10.6 8.9 - 12.9 FL    NRBC 0.0 0.0  WBC    ABSOLUTE NRBC 0.00 0.00 - 2.40 K/uL   METABOLIC PANEL, BASIC    Collection Time: 04/02/22  6:46 AM   Result Value Ref Range    Sodium 143 136 - 145 mmol/L    Potassium 4.6 3.5 - 5.1 mmol/L    Chloride 110 (H) 97 - 108 mmol/L    CO2 27 21 - 32 mmol/L    Anion gap 6 5 - 15 mmol/L    Glucose 78 65 - 100 mg/dL    BUN 27 (H) 6 - 20 mg/dL    Creatinine 1.89 (H) 0.70 - 1.30 mg/dL    BUN/Creatinine ratio 14 12 - 20      GFR est AA 41 (L) >60 ml/min/1.73m2    GFR est non-AA 34 (L) >60 ml/min/1.73m2    Calcium 8.3 (L) 8.5 - 10.1 mg/dL        MRI BRAIN WO CONT   Final Result   Ischemic microvascular disease and cerebral atrophy. No acute   intracranial abnormality. CT CODE NEURO HEAD WO CONTRAST   Final Result      No acute abnormality. I called the report to Dr. Imani Rolon 12:07 PM 4/1/2022. DUPLEX CAROTID BILATERAL    (Results Pending)        PHYSICAL EXAM:    Physical Exam  Vitals reviewed. HENT:      Head: Normocephalic and atraumatic. Mouth/Throat:      Mouth: Mucous membranes are dry. Eyes:      Conjunctiva/sclera: Conjunctivae normal.   Cardiovascular:      Rate and Rhythm: Normal rate and regular rhythm. Heart sounds: Normal heart sounds. Pulmonary:      Effort: Pulmonary effort is normal.      Breath sounds: Normal breath sounds. Abdominal:      General: Abdomen is flat. Bowel sounds are normal.      Palpations: Abdomen is soft. Musculoskeletal:         General: Normal range of motion. Cervical back: Normal range of motion and neck supple. Skin:     General: Skin is warm and dry. Neurological:      General: No focal deficit present. Mental Status: He is alert and oriented to person, place, and time. Mental status is at baseline. Psychiatric:         Mood and Affect: Mood normal.          Data Review    Recent Results (from the past 24 hour(s))   GLUCOSE, POC    Collection Time: 04/01/22 11:13 AM   Result Value Ref Range    Glucose (POC) 94 65 - 117 mg/dL    Performed by MANNY OLIVER    CBC WITH AUTOMATED DIFF    Collection Time: 04/01/22 11:18 AM   Result Value Ref Range    WBC 4.7 4.1 - 11.1 K/uL    RBC 4.01 (L) 4.10 - 5.70 M/uL    HGB 11.2 (L) 12.1 - 17.0 g/dL    HCT 36.5 (L) 36.6 - 50.3 %    MCV 91.0 80.0 - 99.0 FL    MCH 27.9 26.0 - 34.0 PG    MCHC 30.7 30.0 - 36.5 g/dL    RDW 13.7 11.5 - 14.5 %    PLATELET 222 299 - 035 K/uL    MPV 10.3 8.9 - 12.9 FL    NRBC 0.0 0.0  WBC    ABSOLUTE NRBC 0.00 0.00 - 0.01 K/uL    NEUTROPHILS 47 32 - 75 %    LYMPHOCYTES 35 12 - 49 %    MONOCYTES 10 5 - 13 %    EOSINOPHILS 7 0 - 7 %    BASOPHILS 1 0 - 1 %    IMMATURE GRANULOCYTES 0 0 - 0.5 %    ABS. NEUTROPHILS 2.2 1.8 - 8.0 K/UL    ABS. LYMPHOCYTES 1.7 0.8 - 3.5 K/UL    ABS. MONOCYTES 0.5 0.0 - 1.0 K/UL    ABS. EOSINOPHILS 0.3 0.0 - 0.4 K/UL    ABS. BASOPHILS 0.0 0.0 - 0.1 K/UL    ABS. IMM.  GRANS. 0.0 0.00 - 0.04 K/UL    DF AUTOMATED     METABOLIC PANEL, COMPREHENSIVE    Collection Time: 04/01/22 11:18 AM   Result Value Ref Range    Sodium 140 136 - 145 mmol/L    Potassium 4.0 3.5 - 5.1 mmol/L    Chloride 107 97 - 108 mmol/L    CO2 27 21 - 32 mmol/L    Anion gap 6 5 - 15 mmol/L    Glucose 99 65 - 100 mg/dL    BUN 28 (H) 6 - 20 mg/dL    Creatinine 2.12 (H) 0.70 - 1.30 mg/dL    BUN/Creatinine ratio 13 12 - 20      GFR est AA 36 (L) >60 ml/min/1.73m2    GFR est non-AA 30 (L) >60 ml/min/1.73m2    Calcium 8.7 8.5 - 10.1 mg/dL    Bilirubin, total 0.6 0.2 - 1.0 mg/dL    AST (SGOT) 21 15 - 37 U/L    ALT (SGPT) 15 12 - 78 U/L    Alk.  phosphatase 78 45 - 117 U/L    Protein, total 5.8 (L) 6.4 - 8.2 g/dL    Albumin 2.6 (L) 3.5 - 5.0 g/dL    Globulin 3.2 2.0 - 4.0 g/dL    A-G Ratio 0.8 (L) 1.1 - 2.2     PROTHROMBIN TIME + INR    Collection Time: 04/01/22 11:18 AM   Result Value Ref Range    Prothrombin time 13.2 11.9 - 14.6 sec    INR 1.0 0.9 - 1.1     EKG, 12 LEAD, INITIAL    Collection Time: 04/01/22  2:24 PM   Result Value Ref Range    Ventricular Rate 58 BPM    Atrial Rate 58 BPM    P-R Interval 134 ms    QRS Duration 76 ms    Q-T Interval 450 ms    QTC Calculation (Bezet) 441 ms    Calculated P Axis 83 degrees    Calculated R Axis -60 degrees    Calculated T Axis 81 degrees    Diagnosis       Sinus bradycardia with marked sinus arrhythmia  Left axis deviation  Anteroseptal infarct , age undetermined  Abnormal ECG  No previous ECGs available  Confirmed by Pro Tapia MD, [facility] ([id number]) on 4/1/2022 11:01:38 PM     CBC W/O DIFF    Collection Time: 04/02/22  6:46 AM   Result Value Ref Range    WBC 5.0 4.1 - 11.1 K/uL    RBC 3.98 (L) 4.10 - 5.70 M/uL    HGB 10.8 (L) 12.1 - 17.0 g/dL    HCT 35.2 (L) 36.6 - 50.3 %    MCV 88.4 80.0 - 99.0 FL    MCH 27.1 26.0 - 34.0 PG    MCHC 30.7 30.0 - 36.5 g/dL    RDW 13.7 11.5 - 14.5 %    PLATELET 447 209 - 404 K/uL    MPV 10.6 8.9 - 12.9 FL    NRBC 0.0 0.0  WBC    ABSOLUTE NRBC 0.00 0.00 - 3.15 K/uL   METABOLIC PANEL, BASIC    Collection Time: 04/02/22  6:46 AM   Result Value Ref Range    Sodium 143 136 - 145 mmol/L    Potassium 4.6 3.5 - 5.1 mmol/L    Chloride 110 (H) 97 - 108 mmol/L    CO2 27 21 - 32 mmol/L    Anion gap 6 5 - 15 mmol/L    Glucose 78 65 - 100 mg/dL    BUN 27 (H) 6 - 20 mg/dL    Creatinine 1.89 (H) 0.70 - 1.30 mg/dL    BUN/Creatinine ratio 14 12 - 20      GFR est AA 41 (L) >60 ml/min/1.73m2    GFR est non-AA 34 (L) >60 ml/min/1.73m2    Calcium 8.3 (L) 8.5 - 10.1 mg/dL        Assessment/Plan:     Active Problems:    Paresthesia and pain of right extremity (4/1/2022)      Hospital course: This is an 79-year-old male with a past medical history of essential hypertension who presents emergency department and admitted on 4/1/2022 for right arm weakness, numbness and tingling. The symptoms have resolved consistent with a TIA. CT of the head revealed no acute abnormalities. MRI of the brain revealed ischemic microvascular disease and central atrophy with no acute intracranial abnormality. Neurology consultation pending. Echocardiogram and carotid duplex pending. Patient with accelerated hypertension and was on metoprolol and added Norvasc with hydralazine as as needed. Pression\plan:    1. TIA  Right arm weakness, numbness and tingling resolved  MRI with no acute intracranial abnormality  CT of the head with no acute intracranial abnormality  Echocardiogram pending  Carotid duplex pending  Neurology consultation pending  May be able to be discharged pending neurology consultation  Hemoglobin A1c pending    2. Essential hypertension  Continue metoprolol  Continue Norvasc  Hydralazine as needed  Echocardiogram    3.   JULIENNE with presumed chronic kidney disease  Creatinine 2.12  Continue gentle hydration    CODE STATUS: Full code    DVT prophylaxis: Heparin  Ulcer prophylaxis: Protonix    Discharge barriers: Neurology consultation, echocardiogram and carotid duplex as well as improvement in overall blood pressure    Care Plan discussed with: Patient/Family    Total time spent with patient: >35 minutes.

## 2022-04-02 NOTE — PROGRESS NOTES
Reason for Admission:  Paresthesia and pain of right extremity                     RUR Score:    N/A - OBS                 Plan for utilizing home health:      Yes    PCP: First and Last name:  Adri Watson     Name of Practice:    Are you a current patient: Yes/No: Yes   Approximate date of last visit: about six weeks ago. Patient has an appointment scheduled for April 15th   Can you participate in a virtual visit with your PCP:                     Current Advanced Directive/Advance Care Plan: Full Code      Healthcare Decision Maker:   Click here to complete [address] including selection of the Healthcare Decision Maker Relationship (ie \"Primary\")             Primary Decision Maker: Amauri Goode - Daughter - [phone]                  Transition of Care Plan:                      Cm met with pt and pt's daughter - Tania Martin at the bedside to complete discharge assessment. Cm verified home address and daughter's contact information. Daughter would like to add the following contact# [phone]. Pt lives at home alone. Pt ambulates without DME and is not current with home health. Pt's PCP is Dr. Ange Rod. PT/OT is recommending home health services. Cm discussed and educated home health. Pt is agreeable with home health services. Choice letter signed for no preference. Medicare Outpatient Observation Notice (MOON)/ [place] Outpatient Observation Notice (Ceferino Garcia) provided to patient/representative with verbal explanation of the notice. Time allotted for questions regarding the notice. Patient /representative provided a completed copy of the MOON/VOON notice. Copy placed on bedside chart. Referral made via Ricardo Gray.

## 2022-04-03 ENCOUNTER — HOSPITAL ENCOUNTER (EMERGENCY)
Age: 87
Discharge: HOME | End: 2022-04-04
Payer: MEDICARE

## 2022-04-03 VITALS
TEMPERATURE: 97.8 F | DIASTOLIC BLOOD PRESSURE: 73 MMHG | RESPIRATION RATE: 19 BRPM | HEIGHT: 67 IN | OXYGEN SATURATION: 97 % | WEIGHT: 117 LBS | BODY MASS INDEX: 18.36 KG/M2 | HEART RATE: 77 BPM | SYSTOLIC BLOOD PRESSURE: 134 MMHG

## 2022-04-03 VITALS
SYSTOLIC BLOOD PRESSURE: 153 MMHG | HEIGHT: 67 IN | BODY MASS INDEX: 18.44 KG/M2 | WEIGHT: 117.5 LBS | RESPIRATION RATE: 16 BRPM | DIASTOLIC BLOOD PRESSURE: 68 MMHG | HEART RATE: 69 BPM | OXYGEN SATURATION: 96 % | TEMPERATURE: 97.8 F

## 2022-04-03 DIAGNOSIS — R53.1 WEAKNESS: Primary | ICD-10-CM

## 2022-04-03 PROBLEM — E78.00 HYPERCHOLESTEREMIA: Status: ACTIVE | Noted: 2022-04-03

## 2022-04-03 PROBLEM — I10 ACCELERATED HYPERTENSION: Status: ACTIVE | Noted: 2022-04-03

## 2022-04-03 PROBLEM — I63.9 CVA (CEREBRAL VASCULAR ACCIDENT) (HCC): Status: ACTIVE | Noted: 2022-04-03

## 2022-04-03 PROBLEM — I63.9 ACUTE CVA (CEREBROVASCULAR ACCIDENT) (HCC): Status: ACTIVE | Noted: 2022-04-03

## 2022-04-03 LAB
ALBUMIN SERPL-MCNC: 2.4 G/DL (ref 3.5–5)
ALBUMIN SERPL-MCNC: 2.8 G/DL (ref 3.5–5)
ALBUMIN/GLOB SERPL: 0.8 {RATIO} (ref 1.1–2.2)
ALBUMIN/GLOB SERPL: 0.9 {RATIO} (ref 1.1–2.2)
ALP SERPL-CCNC: 67 U/L (ref 45–117)
ALP SERPL-CCNC: 75 U/L (ref 45–117)
ALT SERPL-CCNC: 12 U/L (ref 12–78)
ALT SERPL-CCNC: 15 U/L (ref 12–78)
ANION GAP SERPL CALC-SCNC: 5 MMOL/L (ref 5–15)
ANION GAP SERPL CALC-SCNC: 6 MMOL/L (ref 5–15)
AST SERPL W P-5'-P-CCNC: 22 U/L (ref 15–37)
AST SERPL W P-5'-P-CCNC: 26 U/L (ref 15–37)
BASOPHILS # BLD: 0 K/UL (ref 0–0.1)
BASOPHILS # BLD: 0 K/UL (ref 0–0.1)
BASOPHILS NFR BLD: 1 % (ref 0–1)
BASOPHILS NFR BLD: 1 % (ref 0–1)
BILIRUB SERPL-MCNC: 0.7 MG/DL (ref 0.2–1)
BILIRUB SERPL-MCNC: 0.9 MG/DL (ref 0.2–1)
BUN SERPL-MCNC: 30 MG/DL (ref 6–20)
BUN SERPL-MCNC: 41 MG/DL (ref 6–20)
BUN/CREAT SERPL: 14 (ref 12–20)
BUN/CREAT SERPL: 15 (ref 12–20)
CA-I BLD-MCNC: 8.5 MG/DL (ref 8.5–10.1)
CA-I BLD-MCNC: 8.7 MG/DL (ref 8.5–10.1)
CHLORIDE SERPL-SCNC: 106 MMOL/L (ref 97–108)
CHLORIDE SERPL-SCNC: 107 MMOL/L (ref 97–108)
CHOLEST SERPL-MCNC: 236 MG/DL
CO2 SERPL-SCNC: 26 MMOL/L (ref 21–32)
CO2 SERPL-SCNC: 28 MMOL/L (ref 21–32)
CREAT SERPL-MCNC: 2.2 MG/DL (ref 0.7–1.3)
CREAT SERPL-MCNC: 2.82 MG/DL (ref 0.7–1.3)
DIFFERENTIAL METHOD BLD: ABNORMAL
DIFFERENTIAL METHOD BLD: ABNORMAL
EOSINOPHIL # BLD: 0.2 K/UL (ref 0–0.4)
EOSINOPHIL # BLD: 0.3 K/UL (ref 0–0.4)
EOSINOPHIL NFR BLD: 4 % (ref 0–7)
EOSINOPHIL NFR BLD: 8 % (ref 0–7)
ERYTHROCYTE [DISTWIDTH] IN BLOOD BY AUTOMATED COUNT: 13.5 % (ref 11.5–14.5)
ERYTHROCYTE [DISTWIDTH] IN BLOOD BY AUTOMATED COUNT: 13.6 % (ref 11.5–14.5)
GLOBULIN SER CALC-MCNC: 2.9 G/DL (ref 2–4)
GLOBULIN SER CALC-MCNC: 3.1 G/DL (ref 2–4)
GLUCOSE SERPL-MCNC: 164 MG/DL (ref 65–100)
GLUCOSE SERPL-MCNC: 88 MG/DL (ref 65–100)
HCT VFR BLD AUTO: 35.6 % (ref 36.6–50.3)
HCT VFR BLD AUTO: 37.7 % (ref 36.6–50.3)
HDLC SERPL-MCNC: 86 MG/DL
HDLC SERPL: 2.7 {RATIO} (ref 0–5)
HGB BLD-MCNC: 11.2 G/DL (ref 12.1–17)
HGB BLD-MCNC: 11.8 G/DL (ref 12.1–17)
IMM GRANULOCYTES # BLD AUTO: 0 K/UL (ref 0–0.04)
IMM GRANULOCYTES # BLD AUTO: 0 K/UL (ref 0–0.04)
IMM GRANULOCYTES NFR BLD AUTO: 0 % (ref 0–0.5)
IMM GRANULOCYTES NFR BLD AUTO: 0 % (ref 0–0.5)
LDLC SERPL CALC-MCNC: 139 MG/DL (ref 0–100)
LEFT ARM BP: 176 MMHG
LEFT CCA DIST DIAS: 10 CM/S
LEFT CCA DIST SYS: 55.9 CM/S
LEFT CCA PROX DIAS: 4.6 CM/S
LEFT CCA PROX SYS: 87.8 CM/S
LEFT ECA DIAS: 0 CM/S
LEFT ECA SYS: 61.4 CM/S
LEFT ICA DIST DIAS: 15.1 CM/S
LEFT ICA DIST SYS: 76.6 CM/S
LEFT ICA PROX DIAS: 16.3 CM/S
LEFT ICA PROX SYS: 97.1 CM/S
LEFT ICA/CCA SYS: 1.74
LEFT VERTEBRAL DIAS: 0 CM/S
LEFT VERTEBRAL SYS: 14.3 CM/S
LIPID PROFILE,FLP: ABNORMAL
LYMPHOCYTES # BLD: 1.4 K/UL (ref 0.8–3.5)
LYMPHOCYTES # BLD: 1.6 K/UL (ref 0.8–3.5)
LYMPHOCYTES NFR BLD: 31 % (ref 12–49)
LYMPHOCYTES NFR BLD: 36 % (ref 12–49)
MCH RBC QN AUTO: 27.8 PG (ref 26–34)
MCH RBC QN AUTO: 27.9 PG (ref 26–34)
MCHC RBC AUTO-ENTMCNC: 31.3 G/DL (ref 30–36.5)
MCHC RBC AUTO-ENTMCNC: 31.5 G/DL (ref 30–36.5)
MCV RBC AUTO: 88.3 FL (ref 80–99)
MCV RBC AUTO: 89.1 FL (ref 80–99)
MONOCYTES # BLD: 0.5 K/UL (ref 0–1)
MONOCYTES # BLD: 0.5 K/UL (ref 0–1)
MONOCYTES NFR BLD: 10 % (ref 5–13)
MONOCYTES NFR BLD: 12 % (ref 5–13)
NEUTS SEG # BLD: 1.7 K/UL (ref 1.8–8)
NEUTS SEG # BLD: 2.8 K/UL (ref 1.8–8)
NEUTS SEG NFR BLD: 43 % (ref 32–75)
NEUTS SEG NFR BLD: 54 % (ref 32–75)
NRBC # BLD: 0 K/UL (ref 0–0.01)
NRBC # BLD: 0 K/UL (ref 0–0.01)
NRBC BLD-RTO: 0 PER 100 WBC
NRBC BLD-RTO: 0 PER 100 WBC
PLATELET # BLD AUTO: 195 K/UL (ref 150–400)
PLATELET # BLD AUTO: 208 K/UL (ref 150–400)
PMV BLD AUTO: 10.4 FL (ref 8.9–12.9)
PMV BLD AUTO: 10.6 FL (ref 8.9–12.9)
POTASSIUM SERPL-SCNC: 4.1 MMOL/L (ref 3.5–5.1)
POTASSIUM SERPL-SCNC: 4.4 MMOL/L (ref 3.5–5.1)
PROT SERPL-MCNC: 5.3 G/DL (ref 6.4–8.2)
PROT SERPL-MCNC: 5.9 G/DL (ref 6.4–8.2)
RBC # BLD AUTO: 4.03 M/UL (ref 4.1–5.7)
RBC # BLD AUTO: 4.23 M/UL (ref 4.1–5.7)
RIGHT ARM BP: 170 MMHG
RIGHT CCA DIST DIAS: 0 CM/S
RIGHT CCA DIST SYS: 70 CM/S
RIGHT CCA PROX DIAS: 0 CM/S
RIGHT CCA PROX SYS: 88.7 CM/S
RIGHT ECA DIAS: 0 CM/S
RIGHT ECA SYS: 80.8 CM/S
RIGHT ICA DIST DIAS: 11.9 CM/S
RIGHT ICA DIST SYS: 68.3 CM/S
RIGHT ICA PROX DIAS: 6.1 CM/S
RIGHT ICA PROX SYS: 52.3 CM/S
RIGHT ICA/CCA SYS: 0.75
RIGHT VERTEBRAL DIAS: 13.8 CM/S
RIGHT VERTEBRAL SYS: 74.5 CM/S
SODIUM SERPL-SCNC: 138 MMOL/L (ref 136–145)
SODIUM SERPL-SCNC: 140 MMOL/L (ref 136–145)
TRIGL SERPL-MCNC: 55 MG/DL (ref ?–150)
TROPONIN-HIGH SENSITIVITY: 19 NG/L (ref 0–76)
VAS LEFT SUBCLAVIAN PROX EDV: 0 CM/S
VAS LEFT SUBCLAVIAN PROX PSV: 79.4 CM/S
VAS RIGHT SUBCLAVIAN PROX EDV: 0 CM/S
VAS RIGHT SUBCLAVIAN PROX PSV: 80.2 CM/S
VLDLC SERPL CALC-MCNC: 11 MG/DL
WBC # BLD AUTO: 3.9 K/UL (ref 4.1–11.1)
WBC # BLD AUTO: 5.1 K/UL (ref 4.1–11.1)

## 2022-04-03 PROCEDURE — 74011250637 HC RX REV CODE- 250/637: Performed by: PHYSICIAN ASSISTANT

## 2022-04-03 PROCEDURE — 85025 COMPLETE CBC W/AUTO DIFF WBC: CPT

## 2022-04-03 PROCEDURE — G0378 HOSPITAL OBSERVATION PER HR: HCPCS

## 2022-04-03 PROCEDURE — 65270000029 HC RM PRIVATE

## 2022-04-03 PROCEDURE — 80053 COMPREHEN METABOLIC PANEL: CPT

## 2022-04-03 PROCEDURE — 80061 LIPID PANEL: CPT

## 2022-04-03 PROCEDURE — 84484 ASSAY OF TROPONIN QUANT: CPT

## 2022-04-03 PROCEDURE — 93005 ELECTROCARDIOGRAM TRACING: CPT

## 2022-04-03 PROCEDURE — 74011250637 HC RX REV CODE- 250/637: Performed by: INTERNAL MEDICINE

## 2022-04-03 PROCEDURE — 74011250636 HC RX REV CODE- 250/636: Performed by: INTERNAL MEDICINE

## 2022-04-03 PROCEDURE — 93880 EXTRACRANIAL BILAT STUDY: CPT | Performed by: SURGERY

## 2022-04-03 PROCEDURE — 99284 EMERGENCY DEPT VISIT MOD MDM: CPT

## 2022-04-03 PROCEDURE — 36415 COLL VENOUS BLD VENIPUNCTURE: CPT

## 2022-04-03 PROCEDURE — 97110 THERAPEUTIC EXERCISES: CPT

## 2022-04-03 PROCEDURE — 97116 GAIT TRAINING THERAPY: CPT

## 2022-04-03 RX ORDER — ATORVASTATIN CALCIUM 40 MG/1
40 TABLET, FILM COATED ORAL
Qty: 30 TABLET | Refills: 1 | Status: SHIPPED | OUTPATIENT
Start: 2022-04-03

## 2022-04-03 RX ORDER — AMLODIPINE BESYLATE 2.5 MG/1
2.5 TABLET ORAL DAILY
Qty: 30 TABLET | Refills: 1 | Status: SHIPPED | OUTPATIENT
Start: 2022-04-04

## 2022-04-03 RX ADMIN — AMLODIPINE BESYLATE 2.5 MG: 2.5 TABLET ORAL at 08:57

## 2022-04-03 RX ADMIN — CETIRIZINE HYDROCHLORIDE 10 MG: 10 TABLET, FILM COATED ORAL at 08:56

## 2022-04-03 RX ADMIN — PANTOPRAZOLE SODIUM 40 MG: 40 TABLET, DELAYED RELEASE ORAL at 08:56

## 2022-04-03 RX ADMIN — HEPARIN SODIUM 5000 UNITS: 5000 INJECTION INTRAVENOUS; SUBCUTANEOUS at 08:57

## 2022-04-03 RX ADMIN — DOXAZOSIN 4 MG: 2 TABLET ORAL at 08:56

## 2022-04-03 RX ADMIN — METOPROLOL TARTRATE 50 MG: 50 TABLET, FILM COATED ORAL at 08:56

## 2022-04-03 RX ADMIN — SODIUM BICARBONATE 325 MG: 650 TABLET ORAL at 08:56

## 2022-04-03 RX ADMIN — ASPIRIN 81 MG 81 MG: 81 TABLET ORAL at 08:57

## 2022-04-03 NOTE — PROGRESS NOTES
Problem: Falls - Risk of  Goal: *Absence of Falls  Description: Document Fiordaliza Knapp Fall Risk and appropriate interventions in the flowsheet.   Outcome: Progressing Towards Goal  Note: Fall Risk Interventions:            Medication Interventions: Bed/chair exit alarm

## 2022-04-03 NOTE — PROGRESS NOTES
Discharge plan of care/case management plan validated with provider discharge order. All discharge instruction reviewed with patient and daughter. IV removed with no issues.  Patient left in stable condition with all belongings

## 2022-04-03 NOTE — PROGRESS NOTES
Problem: Mobility Impaired (Adult and Pediatric)  Goal: *Acute Goals and Plan of Care (Insert Text)  Description: Physical Therapy Goals  Initiated 4/2/22  1)  I with HEP in 7 days to improve overall functional mobility. 2)  Bed mobility and transfers I in 7 days to prevent skin breakdown. 3)  Pt to amb 300ft with LRAD and sup in 7 days    Pt. Goal:  Pt to be able to walk safely without falls. Outcome: Progressing Towards Goal   PHYSICAL THERAPY TREATMENT  Patient: Delphina Olszewski (20 y.o. male)  Date: 4/3/2022  Diagnosis: Paresthesia and pain of right extremity [M79.609, R20.2]  CVA (cerebral vascular accident) ([facility] [address] [I63.9] <principal problem not specified>       Precautions: Seizure  Chart, physical therapy assessment, plan of care and goals were reviewed. ASSESSMENT  Patient continues with skilled PT services and is progressing towards goals. IND with bed mobility. Increased amb distance. Amb first 100ft with RW, narrowed RAYNE with occ scissoring noted, Able to correct with verbal/visual cues. Amb 100ft back to room with no AD, improved RAYNE, however became increasingly unsteady. Participated with seated, and standing therex, tolerated added exercises well. Current Level of Function Impacting Discharge (mobility/balance): Fair balance. PLAN :  Patient continues to benefit from skilled intervention to address the above impairments. Continue treatment per established plan of care. to address goals. Recommendation for discharge: (in order for the patient to meet his/her long term goals)  Home with Home Health Therapy       SUBJECTIVE:   Patient stated i'm feeling better than yesterday.     OBJECTIVE DATA SUMMARY:   Critical Behavior:  Neurologic State: Alert  Orientation Level: Oriented X4  Cognition: Appropriate decision making  Safety/Judgement: Decreased awareness of need for safety  Functional Mobility Training:  Bed Mobility:  Rolling: Independent  Supine to Sit: Independent  Sit to Supine: Independent  Scooting: Independent        Transfers:  Sit to Stand: Stand-by assistance  Stand to Sit: Stand-by assistance                             Balance:  Sitting: Intact  Standing: Intact; With support  Standing - Static: Good  Standing - Dynamic : Fair  Ambulation/Gait Training:  Distance (ft): 200 Feet (ft)  Assistive Device: Gait belt;Walker, rolling (~100ft with no AD)  Ambulation - Level of Assistance: Minimal assistance        Gait Abnormalities: Scissoring        Base of Support: Narrowed     Speed/Melia: Slow             Therapeutic Exercises:   Standing:   Hip abd/add x15  Seated:   Heel/toe raises x15   LAQ x15  S/L   Clam shells x10  Pain Ratin/10    Activity Tolerance:   Good    After treatment patient left in no apparent distress:   Supine in bed, Call bell within reach, and Caregiver / family present      Jaylan Syed   Time Calculation: 30 mins

## 2022-04-03 NOTE — PROGRESS NOTES
Screen completed. Nsg reports patient tolerating ETC diet/thin liquids without difficulty. Patient's family member present in room. Patient with D/C orders home this date with [facility]. Patient reports no difficulty with mastication or swallowing with current diet. Educated patient and family member that [facility] can be completed as outpatient if/as indicated.

## 2022-04-03 NOTE — PROGRESS NOTES
OT treatment attempt this date with patient stating he already worked with therapy once today and wanted to save his energy due to patient discharging home this date. Educated pt on home safety with pt verbalizing understanding. Thank you.

## 2022-04-03 NOTE — DISCHARGE SUMMARY
Admit date: 4/1/2022   Admitting Provider: Wille Gaucher, MD    Discharge date: 4/3/2022  Discharging Provider: Eliel Denney PA-C      * Admission Diagnoses: Paresthesia and pain of right extremity [M79.609, R20.2]  CVA (cerebral vascular accident) [facility]) [I63.9]    * Discharge Diagnoses:    Hospital Problems as of 4/3/2022 Never Reviewed          Codes Class Noted - Resolved POA    Acute CVA (cerebrovascular accident) ([facility] [address] ICD-10-CM: I63.9  ICD-9-CM: 434.91  4/3/2022 - Present Unknown        Hypercholesteremia ICD-10-CM: E78.00  ICD-9-CM: 272.0  4/3/2022 - Present Unknown        Accelerated hypertension ICD-10-CM: I10  ICD-9-CM: 401.0  4/3/2022 - Present Unknown        CVA (cerebral vascular accident) [facility]) ICD-10-CM: I63.9  ICD-9-CM: 434.91  4/3/2022 - Present Unknown        Paresthesia and pain of right extremity ICD-10-CM: M79.609, R20.2  ICD-9-CM: 729.5, 782.0  4/1/2022 - Present Unknown              * Hospital Course: This is an 53-year-old male with a past medical history of essential hypertension who presents emergency department and admitted on 4/1/2022 for right arm weakness, numbness and tingling. The symptoms wax and wane. CT of the head revealed no acute abnormalities. MRI of the brain revealed ischemic microvascular disease and central atrophy with no acute intracranial abnormality. Neurology consultation and feels that this truly was indeed a CVA. [facility] Echocardiogram revealed an EF of 65 to 70%. There was mild hypokinesis present in the basal inferior and mid and inferior segments. EKG did not reveal atrial fibrillation. Patient will follow up with cardiology as an outpatient. 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 Bilateral vertebral arteries with anterograde flow and patent. Patient with accelerated hypertension and was on metoprolol and added Norvasc with improvement. Cholesterol was elevated at 241 and Lipitor added. Hemoglobin A1c was 5.4.   Patient be discharged to home with physical therapy after neurology clearance. * Procedures:   * No surgery found *      Consults:   Neurology    Significant Diagnostic Studies: As discussed in hospital course    Discharge Exam:  Visit Vitals  BP (!) 153/68 (BP 1 Location: Right upper arm, BP Patient Position: At rest)   Pulse 69   Temp 97.8 °F (36.6 °C)   Resp 16   Ht 5' 7\" (1.702 m)   Wt 53.3 kg (117 lb 8.1 oz)   SpO2 96%   BMI 18.40 kg/m²     PHYSICAL EXAM:  Constitutional: Alert in no acute distress   HEENT: Sclerae anicteric, The neck is supple. Cardiovascular: Regular rate and rhythm. No murmurs, gallops, or rubs. Donzell Pyo Respiratory: Clear breath sounds with no wheezes, rales, or rhonchi. GI: Abdomen nondistended, soft, and nontender. Normal active bowel sounds. Rectal: Deferred   Musculoskeletal: No pitting edema of the lower legs. Extremities have good range of motion. Neurological:  Patient is alert and oriented. Cranial nerves II-XII intact  Psychiatric: Mood appears appropriate with judgement intact. Lymphatic: No cervical or supraclavicular adenopathy. Skin: No rashes or breakdown of the skin      * Discharge Condition: stable  * Disposition: Home with home health services    Discharge Medications:  Current Discharge Medication List      START taking these medications    Details   amLODIPine (NORVASC) 2.5 mg tablet Take 1 Tablet by mouth daily. Qty: 30 Tablet, Refills: 1  Start date: 4/4/2022      atorvastatin (LIPITOR) 40 mg tablet Take 1 Tablet by mouth nightly. Qty: 30 Tablet, Refills: 1  Start date: 4/3/2022         CONTINUE these medications which have NOT CHANGED    Details   aspirin delayed-release 81 mg tablet Take 81 mg by mouth daily. metoprolol tartrate (LOPRESSOR) 50 mg tablet Take 50 mg by mouth two (2) times a day. sodium bicarbonate 325 mg tablet Take 325 mg by mouth two (2) times a day. doxazosin (CARDURA) 4 mg tablet Take 4 mg by mouth daily.       cetirizine (ZYRTEC) 10 mg tablet Take 10 mg by mouth daily. STOP taking these medications       pantoprazole (PROTONIX) 40 mg tablet Comments:   Reason for Stopping:               * Follow-up Care/Patient Instructions: Activity: Activity as tolerated  Diet: Cardiac Diet  Wound Care: None needed    Follow-up Information     Follow up With Specialties Details Why Contact Info    Marc Marcelo Nurse Practitioner In 1 week Patient must make an appointment for follow up visit.  [facility]      Chris Ty MD Neurology In 1 week  [address]  [phone]      Rebeka Todd, [address] [place] [address] Vascular Surgery, Interventional Cardiology, Cardiology In 2 weeks  [address] [facility]  [phone]            Discharge summary greater than 35 minutes spent with the patient performing discharge instructions, medication review and physical exam    Signed:  Kassy Jerez PA-C  4/3/2022  11:39 AM

## 2022-04-03 NOTE — PROGRESS NOTES
DC  Order noted. Chart reviewed. Pt has been accepted for post hospital care with [address] for PT/OT  Their anticipated [facility] will be 04/05/2022    Recent clinicals faxed with DC order to [address] for their use with continued care. Medicare pt has received, reviewed, and signed 2nd IM letter informing them of their right to appeal the discharge. Signed copy by daughter, Molly Parry filed in [address]. No other intervention is required for this pt DC. Please call [phone] if status changes and assist is needed. DC summary faxed. 12:40

## 2022-04-03 NOTE — PROGRESS NOTES
Neurology Progress Note    Patient ID:  Judy Urban  [id number]  82 y.o.  1935    Subjective: Patient is seen in follow-up this morning because of the right index finger and leg weakness and numbness and he is feeling even better today but when walking he feels numbness in the whole right hand and the leg. Patient is a 82 y.o. right handed -American male who noticed numbness of the right lower extremity and index finger 2 days ago. He also felt slight weakness of the right leg and came to the hospital yesterday. A CT scan of the head was unremarkable and he was admitted subsequently had an MRI of the brain which was also unremarkable for any acute infarct. The patient's symptoms continue to improve. Current Facility-Administered Medications   Medication Dose Route Frequency    amLODIPine (NORVASC) tablet 2.5 mg  2.5 mg Oral DAILY    hydrALAZINE (APRESOLINE) 20 mg/mL injection 10 mg  10 mg IntraVENous Q6H PRN    aspirin chewable tablet 81 mg  81 mg Oral DAILY    cetirizine (ZYRTEC) tablet 10 mg  10 mg Oral DAILY    doxazosin (CARDURA) tablet 4 mg  4 mg Oral DAILY    metoprolol tartrate (LOPRESSOR) tablet 50 mg  50 mg Oral BID    pantoprazole (PROTONIX) tablet 40 mg  40 mg Oral BID    sodium bicarbonate tablet 325 mg  325 mg Oral BID    acetaminophen (TYLENOL) tablet 650 mg  650 mg Oral Q4H PRN    Or    acetaminophen (TYLENOL) solution 650 mg  650 mg Per NG tube Q4H PRN    Or    acetaminophen (TYLENOL) suppository 650 mg  650 mg Rectal Q4H PRN    atorvastatin (LIPITOR) tablet 40 mg  40 mg Oral QHS    heparin (porcine) injection 5,000 Units  5,000 Units SubCUTAneous Q8H     Objective:     Patient Vitals for the past 8 hrs:   BP Temp Pulse Resp SpO2   04/03/22 1200 -- -- 69 -- --   04/03/22 0818 (!) 153/68 97.8 °F (36.6 °C) 69 16 96 %   04/03/22 0800 -- -- 70 -- --     No intake/output data recorded. No intake/output data recorded.     Lab Review   Recent Results (from the past 24 hour(s))   CBC WITH AUTOMATED DIFF    Collection Time: 04/03/22  8:10 AM   Result Value Ref Range    WBC 3.9 (L) 4.1 - 11.1 K/uL    RBC 4.03 (L) 4.10 - 5.70 M/uL    HGB 11.2 (L) 12.1 - 17.0 g/dL    HCT 35.6 (L) 36.6 - 50.3 %    MCV 88.3 80.0 - 99.0 FL    MCH 27.8 26.0 - 34.0 PG    MCHC 31.5 30.0 - 36.5 g/dL    RDW 13.5 11.5 - 14.5 %    PLATELET 039 223 - 804 K/uL    MPV 10.4 8.9 - 12.9 FL    NRBC 0.0 0.0  WBC    ABSOLUTE NRBC 0.00 0.00 - 0.01 K/uL    NEUTROPHILS 43 32 - 75 %    LYMPHOCYTES 36 12 - 49 %    MONOCYTES 12 5 - 13 %    EOSINOPHILS 8 (H) 0 - 7 %    BASOPHILS 1 0 - 1 %    IMMATURE GRANULOCYTES 0 0 - 0.5 %    ABS. NEUTROPHILS 1.7 (L) 1.8 - 8.0 K/UL    ABS. LYMPHOCYTES 1.4 0.8 - 3.5 K/UL    ABS. MONOCYTES 0.5 0.0 - 1.0 K/UL    ABS. EOSINOPHILS 0.3 0.0 - 0.4 K/UL    ABS. BASOPHILS 0.0 0.0 - 0.1 K/UL    ABS. IMM. GRANS. 0.0 0.00 - 0.04 K/UL    DF AUTOMATED     METABOLIC PANEL, COMPREHENSIVE    Collection Time: 04/03/22  8:10 AM   Result Value Ref Range    Sodium 140 136 - 145 mmol/L    Potassium 4.1 3.5 - 5.1 mmol/L    Chloride 107 97 - 108 mmol/L    CO2 28 21 - 32 mmol/L    Anion gap 5 5 - 15 mmol/L    Glucose 88 65 - 100 mg/dL    BUN 30 (H) 6 - 20 mg/dL    Creatinine 2.20 (H) 0.70 - 1.30 mg/dL    BUN/Creatinine ratio 14 12 - 20      GFR est AA 35 (L) >60 ml/min/1.73m2    GFR est non-AA 29 (L) >60 ml/min/1.73m2    Calcium 8.5 8.5 - 10.1 mg/dL    Bilirubin, total 0.9 0.2 - 1.0 mg/dL    AST (SGOT) 22 15 - 37 U/L    ALT (SGPT) 12 12 - 78 U/L    Alk. phosphatase 67 45 - 117 U/L    Protein, total 5.3 (L) 6.4 - 8.2 g/dL    Albumin 2.4 (L) 3.5 - 5.0 g/dL    Globulin 2.9 2.0 - 4.0 g/dL    A-G Ratio 0.8 (L) 1.1 - 2.2       Additional comments: Patient walked with a walker with the physical therapist and did very well. NEUROLOGICAL EXAM:    Appearance: The patient is well developed, well nourished, provides a coherent history and is in no acute distress. Mental Status: Oriented to time, place and person. Mood and affect appropriate. Cranial Nerves:   Intact visual fields. MIKHAIL, EOM's full, no nystagmus, no ptosis. Facial movement is symmetric. Hearing is normal bilaterally. Motor:  5/5 strength in upper and lower proximal and distal muscles. Normal bulk and tone. Reflexes:   Deep tendon reflexes 2/4 and symmetrical.   Sensory:   Normal to touch, pinprick. Gait:  Walks with a walker without any difficulty. Tremor:   No tremor noted. Cerebellar:  No cerebellar signs present. Neurovascular:  Normal heart sounds and regular rhythm. Assessment:   1. Likely lacunar infarct: Patient developed sudden onset of right leg and index finger numbness and weakness of the leg which is much better. MRI did not show any acute infarct. The films were reviewed. However, on the basis of history patient seems to have a lacunar infarct in the left hemisphere in the distribution of the left YOLY probably a small stroke in between the 2 cuts of the MRI. 2D echo showed an EF of 60 to 70% and no shunt and carotid Doppler is unremarkable. Continue with the aspirin 81 mg a day. The patient can be discharged from neurology standpoint on aspirin 81 mg daily and atorvastatin. 2.  Hypertension  3. History of GERD  4. Paresthesia and pain of right extremity (4/1/2022)  5. Hypercholesteremia (4/3/2022)  6. Accelerated hypertension (4/3/2022)    Plan:   1. The patient is doing very well with mild symptoms in the right hand and the leg when he walks but did very well with a walker. He can be discharged from neurology standpoint and follow-up in the clinic if needed. 2.  Continue with aspirin 81 mg a day and Lipitor 40 mg at night. 3.  Follow-up as needed.     Thank you,    Signed:  Aniyah Doty MD  4/3/2022  2:24 PM

## 2022-04-04 LAB
ATRIAL RATE: 90 BPM
CALCULATED P AXIS, ECG09: 71 DEGREES
CALCULATED R AXIS, ECG10: -28 DEGREES
CALCULATED T AXIS, ECG11: 79 DEGREES
DIAGNOSIS, 93000: NORMAL
P-R INTERVAL, ECG05: 152 MS
Q-T INTERVAL, ECG07: 362 MS
QRS DURATION, ECG06: 68 MS
QTC CALCULATION (BEZET), ECG08: 442 MS
VENTRICULAR RATE, ECG03: 90 BPM

## 2022-04-04 PROCEDURE — 36415 COLL VENOUS BLD VENIPUNCTURE: CPT

## 2022-04-04 NOTE — ED PROVIDER NOTES
EMERGENCY DEPARTMENT HISTORY AND PHYSICAL EXAM      Date: 4/3/2022  Patient Name: Eugenio Henson    History of Presenting Illness     Chief Complaint   Patient presents with    Extremity Weakness       History Provided By: Patient and Caregiver    HPI: Eugenio Henson, 82 y.o. male with a past medical history significant For recent stroke, treatment and discharge presents to the ED with cc of continued weakness and fearful of being home alone. I discussed the care with the caregiver and he said no new symptoms at this point time is able to ambulate but she feels that he might need more care at home. To have home health will be showing up tomorrow to evaluate the patient. We have discussed with them and as well as family in [place] that perhaps asking for more aid and assistance at home would be helpful. They will make that determination after their evaluation    There are no other complaints, changes, or physical findings at this time.     PCP: Dannielle Olvera    Current Facility-Administered Medications on File Prior to Encounter   Medication Dose Route Frequency Provider Last Rate Last Admin    [DISCONTINUED] amLODIPine (NORVASC) tablet 2.5 mg  2.5 mg Oral DAILY Luca Herrera PA-C   2.5 mg at 04/03/22 0857    [DISCONTINUED] hydrALAZINE (APRESOLINE) 20 mg/mL injection 10 mg  10 mg IntraVENous Q6H PRN Gaurav Pineda MD   10 mg at 04/01/22 1529    [DISCONTINUED] aspirin chewable tablet 81 mg  81 mg Oral DAILY Ana Maria Ozuna MD   81 mg at 04/03/22 0857    [DISCONTINUED] cetirizine (ZYRTEC) tablet 10 mg  10 mg Oral DAILY Olu Ozuna MD   10 mg at 04/03/22 0856    [DISCONTINUED] doxazosin (CARDURA) tablet 4 mg  4 mg Oral DAILY Olu Ozuna MD   4 mg at 04/03/22 0856    [DISCONTINUED] metoprolol tartrate (LOPRESSOR) tablet 50 mg  50 mg Oral BID Gaurav Pineda MD   50 mg at 04/03/22 0856    [DISCONTINUED] pantoprazole (PROTONIX) tablet 40 mg  40 mg Oral BID Liseth Pleitez MD   40 mg at 22 6974    [DISCONTINUED] sodium bicarbonate tablet 325 mg  325 mg Oral BID Liseth Pleitez MD   325 mg at 22 0856    [DISCONTINUED] acetaminophen (TYLENOL) tablet 650 mg  650 mg Oral Q4H PRN Liseth Pleitez MD        [DISCONTINUED] acetaminophen (TYLENOL) solution 650 mg  650 mg Per NG tube Q4H PRN Liseth Pleitez MD        [DISCONTINUED] acetaminophen (TYLENOL) suppository 650 mg  650 mg Rectal Q4H PRN Liseth Pleitez MD        [DISCONTINUED] atorvastatin (LIPITOR) tablet 40 mg  40 mg Oral QHS WaltdaOlu escalante MD   40 mg at 22    [DISCONTINUED] heparin (porcine) injection 5,000 Units  5,000 Units SubCUTAneous Olu Dickey MD   5,000 Units at 22 9801     Current Outpatient Medications on File Prior to Encounter   Medication Sig Dispense Refill    [START ON 2022] amLODIPine (NORVASC) 2.5 mg tablet Take 1 Tablet by mouth daily. 30 Tablet 1    atorvastatin (LIPITOR) 40 mg tablet Take 1 Tablet by mouth nightly. 30 Tablet 1    aspirin delayed-release 81 mg tablet Take 81 mg by mouth daily.  metoprolol tartrate (LOPRESSOR) 50 mg tablet Take 50 mg by mouth two (2) times a day.  sodium bicarbonate 325 mg tablet Take 325 mg by mouth two (2) times a day.  doxazosin (CARDURA) 4 mg tablet Take 4 mg by mouth daily.  cetirizine (ZYRTEC) 10 mg tablet Take 10 mg by mouth daily. Past History     Past Medical History:  Past Medical History:   Diagnosis Date    Gastrointestinal disorder     Hypertension        Past Surgical History:  No past surgical history on file. Family History:  No family history on file.     Social History:  Social History     Tobacco Use    Smoking status: Former Smoker     Quit date: 2017     Years since quittin.5    Smokeless tobacco: Never Used   Substance Use Topics    Alcohol use: Never    Drug use: Never       Allergies:  No Known Allergies      Review of Systems     Review of Systems   Constitutional: Negative. Negative for appetite change, chills, fatigue and fever. HENT: Negative. Negative for congestion and sinus pain. Eyes: Negative. Negative for pain and visual disturbance. Respiratory: Negative. Negative for chest tightness and shortness of breath. Cardiovascular: Negative. Negative for chest pain. Gastrointestinal: Negative. Negative for abdominal pain, diarrhea, nausea and vomiting. Genitourinary: Negative. Negative for difficulty urinating. No discharge   Musculoskeletal: Negative. Negative for arthralgias. Skin: Negative. Negative for rash. Neurological: Positive for weakness. Negative for headaches. Hematological: Negative. Psychiatric/Behavioral: Negative. Negative for agitation. The patient is not nervous/anxious. All other systems reviewed and are negative. Physical Exam     Physical Exam  Vitals and nursing note reviewed. Constitutional:       General: He is not in acute distress. Appearance: He is well-developed. HENT:      Head: Normocephalic and atraumatic. Nose: Nose normal.      Mouth/Throat:      Mouth: Mucous membranes are moist.      Pharynx: Oropharynx is clear. No oropharyngeal exudate. Eyes:      General:         Right eye: No discharge. Left eye: No discharge. Conjunctiva/sclera: Conjunctivae normal.      Pupils: Pupils are equal, round, and reactive to light. Cardiovascular:      Rate and Rhythm: Normal rate and regular rhythm. Chest Wall: PMI is not displaced. No thrill. Heart sounds: Normal heart sounds. No murmur heard. No friction rub. No gallop. Pulmonary:      Effort: Pulmonary effort is normal. No respiratory distress. Breath sounds: Normal breath sounds. No wheezing or rales. Chest:      Chest wall: No tenderness.    Abdominal:      General: Bowel sounds are normal. There is no distension. Palpations: Abdomen is soft. There is no mass. Tenderness: There is no abdominal tenderness. There is no guarding or rebound. Musculoskeletal:         General: Normal range of motion. Cervical back: Normal range of motion and neck supple. Lymphadenopathy:      Cervical: No cervical adenopathy. Skin:     General: Skin is warm and dry. Capillary Refill: Capillary refill takes less than 2 seconds. Findings: No erythema or rash. Neurological:      Mental Status: He is alert and oriented to person, place, and time. Cranial Nerves: No cranial nerve deficit. Coordination: Coordination normal.   Psychiatric:         Mood and Affect: Mood normal.         Behavior: Behavior normal.         Lab and Diagnostic Study Results     Labs -     Recent Results (from the past 12 hour(s))   CBC WITH AUTOMATED DIFF    Collection Time: 04/03/22 10:18 PM   Result Value Ref Range    WBC 5.1 4.1 - 11.1 K/uL    RBC 4.23 4. 10 - 5.70 M/uL    HGB 11.8 (L) 12.1 - 17.0 g/dL    HCT 37.7 36.6 - 50.3 %    MCV 89.1 80.0 - 99.0 FL    MCH 27.9 26.0 - 34.0 PG    MCHC 31.3 30.0 - 36.5 g/dL    RDW 13.6 11.5 - 14.5 %    PLATELET 651 370 - 762 K/uL    MPV 10.6 8.9 - 12.9 FL    NRBC 0.0 0.0  WBC    ABSOLUTE NRBC 0.00 0.00 - 0.01 K/uL    NEUTROPHILS 54 32 - 75 %    LYMPHOCYTES 31 12 - 49 %    MONOCYTES 10 5 - 13 %    EOSINOPHILS 4 0 - 7 %    BASOPHILS 1 0 - 1 %    IMMATURE GRANULOCYTES 0 0 - 0.5 %    ABS. NEUTROPHILS 2.8 1.8 - 8.0 K/UL    ABS. LYMPHOCYTES 1.6 0.8 - 3.5 K/UL    ABS. MONOCYTES 0.5 0.0 - 1.0 K/UL    ABS. EOSINOPHILS 0.2 0.0 - 0.4 K/UL    ABS. BASOPHILS 0.0 0.0 - 0.1 K/UL    ABS. IMM. GRANS. 0.0 0.00 - 0.04 K/UL    DF AUTOMATED         Radiologic Studies -   @lastxrresult@  CT Results  (Last 48 hours)    None        CXR Results  (Last 48 hours)    None            Medical Decision Making   - I am the first provider for this patient.     - I reviewed the vital signs, available nursing notes, past medical history, past surgical history, family history and social history. - Initial assessment performed. The patients presenting problems have been discussed, and they are in agreement with the care plan formulated and outlined with them. I have encouraged them to ask questions as they arise throughout their visit. Vital Signs-Reviewed the patient's vital signs. Patient Vitals for the past 12 hrs:   Temp Pulse Resp BP SpO2   04/03/22 2223 -- -- -- -- 96 %   04/03/22 2215 97.8 °F (36.6 °C) 82 22 (!) 109/97 94 %         ED Course:     ED Course as of 04/03/22 2306   Sun Apr 03, 2022   2302 Stood patient and ambulated without difficulty with assist with no noted weakness, arm drift, leg weakness or pulling/favoring one side. Patient has home health set up to start at home. His family members just concerned that he lives at home alone. Dr. Imani Rolon has also evaluated and agreed patient was stable for discharge home with home health follow-up and is going to speak with the family member as well. [KR]      ED Course User Index  [KR] Denise Jhaveri NP       Provider Notes (Medical Decision Making):   63-year-old male with recent stroke presents with greater needs for home health. He will receive home health evaluation tomorrow between [phone] according to the family. [facility]       Procedures   Medical Decision Makingedical Decision Making  Performed by: Fiorella Porras MD  PROCEDURES:  Procedures       Disposition   Disposition: Condition stable    Discharged    DISCHARGE PLAN:  1. Current Discharge Medication List      CONTINUE these medications which have NOT CHANGED    Details   amLODIPine (NORVASC) 2.5 mg tablet Take 1 Tablet by mouth daily. Qty: 30 Tablet, Refills: 1      atorvastatin (LIPITOR) 40 mg tablet Take 1 Tablet by mouth nightly. Qty: 30 Tablet, Refills: 1      aspirin delayed-release 81 mg tablet Take 81 mg by mouth daily.       metoprolol tartrate (LOPRESSOR) 50 mg tablet Take 50 mg by mouth two (2) times a day. sodium bicarbonate 325 mg tablet Take 325 mg by mouth two (2) times a day. doxazosin (CARDURA) 4 mg tablet Take 4 mg by mouth daily. cetirizine (ZYRTEC) 10 mg tablet Take 10 mg by mouth daily. 2.   Follow-up Information     Follow up With Specialties Details Why Contact Info    [facility], [address] [facility] Nurse Practitioner Call in 2 days  Aliyah Rangel 2  [phone]          3. Return to ED if worse   4. Current Discharge Medication List            Diagnosis     Clinical Impression:   1. Weakness        Attestations:    Sylvester Florence MD    Please note that this dictation was completed with [employer], the computer voice recognition software. Quite often unanticipated grammatical, syntax, homophones, and other interpretive errors are inadvertently transcribed by the computer software. Please disregard these errors. Please excuse any errors that have escaped final proofreading. Thank you.

## 2022-04-04 NOTE — ED TRIAGE NOTES
Pt to ER via EMS from home. C/o right sided leg weakness since Thursday, discharged this morning from [facility]. A&ox4 on arrival to ER.

## 2022-04-04 NOTE — ED PROVIDER NOTES
EMERGENCY DEPARTMENT HISTORY AND PHYSICAL EXAM      Date: 4/3/2022  Patient Name: Zoya Casiano    History of Presenting Illness     Chief Complaint   Patient presents with    Extremity Weakness       History Provided By: Patient, Patient's Daughter and Patient's Sister    HPI: Zoya Casiano, 82 y.o. male with a past medical history significant hypertension, hyperlipidemia, stroke, osteoarthritis and TIA presents to the ED with cc of right-sided weakness. He was just discharged yesterday after admission for same complaints and found to not have had a stroke and is supposed to be following with neurology with home health to see at home for PT and OT. Patient lives alone and when his family member came to check on him he had been sitting in his chair all day and was having trouble ambulating and felt that his right side was weak again prompting her to bring him to the emergency department. He denies falls, numbness, confusion, head injury, fever, nausea vomiting diarrhea, decreased intake or decreased water or fluid intake since discharge. There are no other complaints, changes, or physical findings at this time.     PCP: Alka Wolf    Current Facility-Administered Medications on File Prior to Encounter   Medication Dose Route Frequency Provider Last Rate Last Admin    [DISCONTINUED] amLODIPine (NORVASC) tablet 2.5 mg  2.5 mg Oral DAILY Gideon Herrera PA-C   2.5 mg at 04/03/22 0857    [DISCONTINUED] hydrALAZINE (APRESOLINE) 20 mg/mL injection 10 mg  10 mg IntraVENous Q6H PRN Benita Doan MD   10 mg at 04/01/22 1529    [DISCONTINUED] aspirin chewable tablet 81 mg  81 mg Oral DAILY Benita Doan MD   81 mg at 04/03/22 0857    [DISCONTINUED] cetirizine (ZYRTEC) tablet 10 mg  10 mg Oral DAILY Olu Ozuna MD   10 mg at 04/03/22 0856    [DISCONTINUED] doxazosin (CARDURA) tablet 4 mg  4 mg Oral DAILY Olu Ozuna MD   4 mg at 04/03/22 1008  [DISCONTINUED] metoprolol tartrate (LOPRESSOR) tablet 50 mg  50 mg Oral BID Era Muse MD   50 mg at 04/03/22 0856    [DISCONTINUED] pantoprazole (PROTONIX) tablet 40 mg  40 mg Oral BID Era Muse MD   40 mg at 04/03/22 1754    [DISCONTINUED] sodium bicarbonate tablet 325 mg  325 mg Oral BID Era Muse MD   325 mg at 04/03/22 0856    [DISCONTINUED] acetaminophen (TYLENOL) tablet 650 mg  650 mg Oral Q4H PRN Era Muse MD        [DISCONTINUED] acetaminophen (TYLENOL) solution 650 mg  650 mg Per NG tube Q4H PRN Era Muse MD        [DISCONTINUED] acetaminophen (TYLENOL) suppository 650 mg  650 mg Rectal Q4H PRN Era Muse MD        [DISCONTINUED] atorvastatin (LIPITOR) tablet 40 mg  40 mg Oral QHS Olu Ozuna MD   40 mg at 04/02/22 2054    [DISCONTINUED] heparin (porcine) injection 5,000 Units  5,000 Units SubCUTAneous Olu Martinez MD   5,000 Units at 04/03/22 8126     Current Outpatient Medications on File Prior to Encounter   Medication Sig Dispense Refill    [START ON 4/4/2022] amLODIPine (NORVASC) 2.5 mg tablet Take 1 Tablet by mouth daily. 30 Tablet 1    atorvastatin (LIPITOR) 40 mg tablet Take 1 Tablet by mouth nightly. 30 Tablet 1    aspirin delayed-release 81 mg tablet Take 81 mg by mouth daily.  metoprolol tartrate (LOPRESSOR) 50 mg tablet Take 50 mg by mouth two (2) times a day.  sodium bicarbonate 325 mg tablet Take 325 mg by mouth two (2) times a day.  doxazosin (CARDURA) 4 mg tablet Take 4 mg by mouth daily.  cetirizine (ZYRTEC) 10 mg tablet Take 10 mg by mouth daily. Past History     Past Medical History:  Past Medical History:   Diagnosis Date    Gastrointestinal disorder     Hypertension        Past Surgical History:  No past surgical history on file. Family History:  No family history on file.     Social History:  Social History     Tobacco Use    Smoking status: Former Smoker     Quit date: 2017     Years since quittin.5    Smokeless tobacco: Never Used   Substance Use Topics    Alcohol use: Never    Drug use: Never       Allergies:  No Known Allergies      Review of Systems     Review of Systems   Constitutional: Negative. Negative for chills, fatigue and fever. HENT: Negative. Eyes: Negative. Negative for visual disturbance. Respiratory: Negative. Negative for cough and shortness of breath. Cardiovascular: Negative. Negative for chest pain and palpitations. Gastrointestinal: Negative. Negative for abdominal pain, diarrhea, nausea and vomiting. Endocrine: Negative. Genitourinary: Negative. Negative for decreased urine volume, difficulty urinating, dysuria and frequency. Musculoskeletal: Positive for gait problem. Negative for arthralgias, back pain and myalgias. Skin: Negative. Allergic/Immunologic: Negative. Neurological: Positive for weakness. Negative for dizziness, tremors, speech difficulty, light-headedness, numbness and headaches. Hematological: Negative. Psychiatric/Behavioral: Negative. All other systems reviewed and are negative. Physical Exam     Physical Exam  Vitals and nursing note reviewed. Constitutional:       Appearance: He is underweight. He is not ill-appearing, toxic-appearing or diaphoretic. HENT:      Head: Normocephalic and atraumatic. Nose: Nose normal.   Eyes:      Conjunctiva/sclera: Conjunctivae normal.   Cardiovascular:      Rate and Rhythm: Normal rate and regular rhythm. Pulses: Normal pulses. Heart sounds: Normal heart sounds. Pulmonary:      Effort: Pulmonary effort is normal. No respiratory distress. Breath sounds: Normal breath sounds. Abdominal:      General: Bowel sounds are normal.      Palpations: Abdomen is soft. Tenderness: There is no abdominal tenderness.    Musculoskeletal:         General: Normal range of motion. Cervical back: Normal range of motion and neck supple. Right lower leg: No edema. Left lower leg: No edema. Neurological:      General: No focal deficit present. Mental Status: He is alert and oriented to person, place, and time. Mental status is at baseline. Cranial Nerves: Cranial nerves are intact. Sensory: Sensation is intact. Motor: Weakness present. No abnormal muscle tone or pronator drift. Gait: Gait is intact. Comments: Generalized weakness and frailty         Lab and Diagnostic Study Results     Labs -     Recent Results (from the past 12 hour(s))   CBC WITH AUTOMATED DIFF    Collection Time: 04/03/22 10:18 PM   Result Value Ref Range    WBC 5.1 4.1 - 11.1 K/uL    RBC 4.23 4. 10 - 5.70 M/uL    HGB 11.8 (L) 12.1 - 17.0 g/dL    HCT 37.7 36.6 - 50.3 %    MCV 89.1 80.0 - 99.0 FL    MCH 27.9 26.0 - 34.0 PG    MCHC 31.3 30.0 - 36.5 g/dL    RDW 13.6 11.5 - 14.5 %    PLATELET 663 391 - 878 K/uL    MPV 10.6 8.9 - 12.9 FL    NRBC 0.0 0.0  WBC    ABSOLUTE NRBC 0.00 0.00 - 0.01 K/uL    NEUTROPHILS 54 32 - 75 %    LYMPHOCYTES 31 12 - 49 %    MONOCYTES 10 5 - 13 %    EOSINOPHILS 4 0 - 7 %    BASOPHILS 1 0 - 1 %    IMMATURE GRANULOCYTES 0 0 - 0.5 %    ABS. NEUTROPHILS 2.8 1.8 - 8.0 K/UL    ABS. LYMPHOCYTES 1.6 0.8 - 3.5 K/UL    ABS. MONOCYTES 0.5 0.0 - 1.0 K/UL    ABS. EOSINOPHILS 0.2 0.0 - 0.4 K/UL    ABS. BASOPHILS 0.0 0.0 - 0.1 K/UL    ABS. IMM.  GRANS. 0.0 0.00 - 0.04 K/UL    DF AUTOMATED       Labs Reviewed   CBC WITH AUTOMATED DIFF - Abnormal; Notable for the following components:       Result Value    HGB 11.8 (*)     All other components within normal limits   METABOLIC PANEL, COMPREHENSIVE - Abnormal; Notable for the following components:    Glucose 164 (*)     BUN 41 (*)     Creatinine 2.82 (*)     GFR est AA 26 (*)     GFR est non-AA 22 (*)     Protein, total 5.9 (*)     Albumin 2.8 (*)     A-G Ratio 0.9 (*)     All other components within normal limits   TROPONIN-HIGH SENSITIVITY     Radiologic Studies -   @lastxrresult@  CT Results  (Last 48 hours)    None        CXR Results  (Last 48 hours)    None            Medical Decision Making   - I am the first provider for this patient. - I reviewed the vital signs, available nursing notes, past medical history, past surgical history, family history and social history. - Initial assessment performed. The patients presenting problems have been discussed, and they are in agreement with the care plan formulated and outlined with them. I have encouraged them to ask questions as they arise throughout their visit. Vital Signs-Reviewed the patient's vital signs. Patient Vitals for the past 12 hrs:   Temp Pulse Resp BP SpO2   04/03/22 2223 -- -- -- -- 96 %   04/03/22 2215 97.8 °F (36.6 °C) 82 22 (!) 109/97 94 %       Records Reviewed: Nursing Notes, Old Medical Records, Previous electrocardiograms, Previous Radiology Studies and Previous Laboratory Studies    The patient presents with right-sided weakness with a differential diagnosis of TIA, CVA, electrolyte derangement, anemia, frailty      ED Course:     ED Course as of 04/03/22 2307   Sun Apr 03, 2022   2302 Stood patient and ambulated without difficulty with assist with no noted weakness, arm drift, leg weakness or pulling/favoring one side. Patient has home health set up to start at home. His family members just concerned that he lives at home alone. Dr. Simi Calvillo has also evaluated and agreed patient was stable for discharge home with home health follow-up and is going to speak with the family member as well.  [KR]      ED Course User Index  [KR] Ramesh Ruiz NP       Provider Notes (Medical Decision Making):     MDM  Number of Diagnoses or Management Options  Weakness: new, needed workup     Amount and/or Complexity of Data Reviewed  Clinical lab tests: reviewed and ordered  Tests in the radiology section of CPT®: reviewed  Obtain history from someone other than the patient: yes  Review and summarize past medical records: yes  Discuss the patient with other providers: yes  Independent visualization of images, tracings, or specimens: yes    Risk of Complications, Morbidity, and/or Mortality  Presenting problems: low  Management options: low    Patient Progress  Patient progress: stable         Disposition   Disposition: Condition stable and improved  DC- Adult Discharges: All of the diagnostic tests were reviewed and questions answered. Diagnosis, care plan and treatment options were discussed. The patient understands the instructions and will follow up as directed. The patients results have been reviewed with them. They have been counseled regarding their diagnosis. The patient and family member patient and sister verbally convey understanding and agreement of the signs, symptoms, diagnosis, treatment and prognosis and additionally agrees to follow up as recommended with their PCP in 24 - 48 hours. They also agree with the care-plan and convey that all of their questions have been answered. I have also put together some discharge instructions for them that include: 1) educational information regarding their diagnosis, 2) how to care for their diagnosis at home, as well a 3) list of reasons why they would want to return to the ED prior to their follow-up appointment, should their condition change. Discharged    DISCHARGE PLAN:  1. Current Discharge Medication List      CONTINUE these medications which have NOT CHANGED    Details   amLODIPine (NORVASC) 2.5 mg tablet Take 1 Tablet by mouth daily. Qty: 30 Tablet, Refills: 1      atorvastatin (LIPITOR) 40 mg tablet Take 1 Tablet by mouth nightly. Qty: 30 Tablet, Refills: 1      aspirin delayed-release 81 mg tablet Take 81 mg by mouth daily. metoprolol tartrate (LOPRESSOR) 50 mg tablet Take 50 mg by mouth two (2) times a day.       sodium bicarbonate 325 mg tablet Take 325 mg by mouth two (2) times a day. doxazosin (CARDURA) 4 mg tablet Take 4 mg by mouth daily. cetirizine (ZYRTEC) 10 mg tablet Take 10 mg by mouth daily. 2.   Follow-up Information     Follow up With Specialties Details Why Contact Tiffany Sánchez, [address] [facility] Nurse Practitioner Call in 2 days  Aliyah Rangel 2  [phone]          3. Return to ED if worse   4. Discharge Medication List as of 4/3/2022 11:43 PM            Diagnosis     Clinical Impression:   1. Weakness        Attestations:    Rolland Gosselin, NP    Please note that this dictation was completed with [employer], the computer voice recognition software. Quite often unanticipated grammatical, syntax, homophones, and other interpretive errors are inadvertently transcribed by the computer software. Please disregard these errors. Please excuse any errors that have escaped final proofreading. Thank you.

## 2022-07-29 ENCOUNTER — HOSPITAL ENCOUNTER (EMERGENCY)
Age: 87
Discharge: HOME | End: 2022-07-29
Attending: EMERGENCY MEDICINE
Payer: MEDICARE

## 2022-07-29 ENCOUNTER — APPOINTMENT (OUTPATIENT)
Dept: GENERAL RADIOLOGY | Age: 87
End: 2022-07-29
Attending: EMERGENCY MEDICINE
Payer: MEDICARE

## 2022-07-29 VITALS
BODY MASS INDEX: 17.27 KG/M2 | HEIGHT: 67 IN | DIASTOLIC BLOOD PRESSURE: 80 MMHG | OXYGEN SATURATION: 100 % | TEMPERATURE: 97.7 F | WEIGHT: 110 LBS | HEART RATE: 78 BPM | RESPIRATION RATE: 18 BRPM | SYSTOLIC BLOOD PRESSURE: 157 MMHG

## 2022-07-29 DIAGNOSIS — R53.82 CHRONIC FATIGUE: Primary | ICD-10-CM

## 2022-07-29 DIAGNOSIS — R06.02 SOB (SHORTNESS OF BREATH): ICD-10-CM

## 2022-07-29 LAB
ALBUMIN SERPL-MCNC: 2.4 G/DL (ref 3.5–5)
ALBUMIN/GLOB SERPL: 0.6 {RATIO} (ref 1.1–2.2)
ALP SERPL-CCNC: 79 U/L (ref 45–117)
ALT SERPL-CCNC: 17 U/L (ref 12–78)
ANION GAP SERPL CALC-SCNC: 5 MMOL/L (ref 5–15)
APPEARANCE UR: CLEAR
AST SERPL W P-5'-P-CCNC: 17 U/L (ref 15–37)
BACTERIA URNS QL MICRO: NEGATIVE /HPF
BASOPHILS # BLD: 0 K/UL (ref 0–0.1)
BASOPHILS NFR BLD: 1 % (ref 0–1)
BILIRUB SERPL-MCNC: 0.3 MG/DL (ref 0.2–1)
BILIRUB UR QL: NEGATIVE
BUN SERPL-MCNC: 45 MG/DL (ref 6–20)
BUN/CREAT SERPL: 21 (ref 12–20)
CA-I BLD-MCNC: 8.4 MG/DL (ref 8.5–10.1)
CHLORIDE SERPL-SCNC: 110 MMOL/L (ref 97–108)
CO2 SERPL-SCNC: 25 MMOL/L (ref 21–32)
COLOR UR: ABNORMAL
CREAT SERPL-MCNC: 2.19 MG/DL (ref 0.7–1.3)
DIFFERENTIAL METHOD BLD: ABNORMAL
EOSINOPHIL # BLD: 0.4 K/UL (ref 0–0.4)
EOSINOPHIL NFR BLD: 8 % (ref 0–7)
ERYTHROCYTE [DISTWIDTH] IN BLOOD BY AUTOMATED COUNT: 13.8 % (ref 11.5–14.5)
GLOBULIN SER CALC-MCNC: 3.8 G/DL (ref 2–4)
GLUCOSE SERPL-MCNC: 89 MG/DL (ref 65–100)
GLUCOSE UR STRIP.AUTO-MCNC: NORMAL MG/DL
HCT VFR BLD AUTO: 31 % (ref 36.6–50.3)
HGB BLD-MCNC: 9.7 G/DL (ref 12.1–17)
HGB UR QL STRIP: 50
IMM GRANULOCYTES # BLD AUTO: 0 K/UL (ref 0–0.04)
IMM GRANULOCYTES NFR BLD AUTO: 0 % (ref 0–0.5)
KETONES UR QL STRIP.AUTO: NEGATIVE MG/DL
LEUKOCYTE ESTERASE UR QL STRIP.AUTO: NEGATIVE
LYMPHOCYTES # BLD: 1.4 K/UL (ref 0.8–3.5)
LYMPHOCYTES NFR BLD: 30 % (ref 12–49)
MCH RBC QN AUTO: 27.9 PG (ref 26–34)
MCHC RBC AUTO-ENTMCNC: 31.3 G/DL (ref 30–36.5)
MCV RBC AUTO: 89.1 FL (ref 80–99)
MONOCYTES # BLD: 0.5 K/UL (ref 0–1)
MONOCYTES NFR BLD: 11 % (ref 5–13)
NEUTS SEG # BLD: 2.3 K/UL (ref 1.8–8)
NEUTS SEG NFR BLD: 50 % (ref 32–75)
NITRITE UR QL STRIP.AUTO: NEGATIVE
NRBC # BLD: 0 K/UL (ref 0–0.01)
NRBC BLD-RTO: 0 PER 100 WBC
PH UR STRIP: 6 [PH] (ref 5–8)
PLATELET # BLD AUTO: 213 K/UL (ref 150–400)
PMV BLD AUTO: 10.4 FL (ref 8.9–12.9)
POTASSIUM SERPL-SCNC: 4.5 MMOL/L (ref 3.5–5.1)
PROT SERPL-MCNC: 6.2 G/DL (ref 6.4–8.2)
PROT UR STRIP-MCNC: 100 MG/DL
RBC # BLD AUTO: 3.48 M/UL (ref 4.1–5.7)
RBC #/AREA URNS HPF: ABNORMAL /HPF (ref 0–5)
SODIUM SERPL-SCNC: 140 MMOL/L (ref 136–145)
SP GR UR REFRACTOMETRY: 1.01 (ref 1–1.03)
SP GR UR REFRACTOMETRY: 1.01 (ref 1–1.03)
TROPONIN-HIGH SENSITIVITY: 9 NG/L (ref 0–76)
UROBILINOGEN UR QL STRIP.AUTO: NORMAL EU/DL (ref 0.1–1)
WBC # BLD AUTO: 4.8 K/UL (ref 4.1–11.1)
WBC URNS QL MICRO: ABNORMAL /HPF (ref 0–4)

## 2022-07-29 PROCEDURE — 99284 EMERGENCY DEPT VISIT MOD MDM: CPT

## 2022-07-29 PROCEDURE — 36415 COLL VENOUS BLD VENIPUNCTURE: CPT

## 2022-07-29 PROCEDURE — 74011250636 HC RX REV CODE- 250/636: Performed by: EMERGENCY MEDICINE

## 2022-07-29 PROCEDURE — 81003 URINALYSIS AUTO W/O SCOPE: CPT

## 2022-07-29 PROCEDURE — 80053 COMPREHEN METABOLIC PANEL: CPT

## 2022-07-29 PROCEDURE — 84484 ASSAY OF TROPONIN QUANT: CPT

## 2022-07-29 PROCEDURE — 85025 COMPLETE CBC W/AUTO DIFF WBC: CPT

## 2022-07-29 PROCEDURE — 71045 X-RAY EXAM CHEST 1 VIEW: CPT

## 2022-07-29 RX ADMIN — SODIUM CHLORIDE 500 ML: 9 INJECTION, SOLUTION INTRAVENOUS at 20:01

## 2022-07-29 NOTE — ED TRIAGE NOTES
EMS reports pt had a stroke back in April. Denies any deficits, chest pain and SOB. Per EMS family reports that they are concerned about his walking. EMS reports that pt walked to the stretcher with steady gait for pt age. Pt c/o weakness since having the stroke.

## 2022-07-29 NOTE — ED TRIAGE NOTES
Pt arrived via EMS for a complaint of Generalized Weakness. Pt has a hx of CVA and heart attack x2 around April 1, 2022. Pt states he has been feeling weak since.

## 2022-07-30 NOTE — ED PROVIDER NOTES
EMERGENCY DEPARTMENT HISTORY AND PHYSICAL EXAM      Date: 7/29/2022  Patient Name: Kari Beavers    History of Presenting Illness     Chief Complaint   Patient presents with    Lethargy    Fatigue       History Provided By: Patient and Patient's Daughter    HPI: Kari Beavers, 82 y.o. male with a significant past medical history of COPD, hypertension, CVA presents to the ED with progressively worsening fatigue, weakness and shortness of breath. Patient reports that the symptoms been ongoing since his stroke in April. Family at bedside is concerned that patient lives at home and is unable to properly care for himself. Family states that they feel that he needs assisted living or rehab placement. Per EMS reports, patient ambulatory upon their arrival.  Patient has no other complaints at this time. Reports chronic, generalized fatigue. There are no other complaints, changes, or physical findings at this time. PCP: Katelynn Busby    No current facility-administered medications on file prior to encounter. Current Outpatient Medications on File Prior to Encounter   Medication Sig Dispense Refill    amLODIPine (NORVASC) 2.5 mg tablet Take 1 Tablet by mouth daily. 30 Tablet 1    atorvastatin (LIPITOR) 40 mg tablet Take 1 Tablet by mouth nightly. 30 Tablet 1    aspirin delayed-release 81 mg tablet Take 81 mg by mouth daily. metoprolol tartrate (LOPRESSOR) 50 mg tablet Take 50 mg by mouth two (2) times a day. sodium bicarbonate 325 mg tablet Take 325 mg by mouth two (2) times a day. doxazosin (CARDURA) 4 mg tablet Take 4 mg by mouth daily. cetirizine (ZYRTEC) 10 mg tablet Take 10 mg by mouth daily. Past History     Past Medical History:  Past Medical History:   Diagnosis Date    Chronic obstructive pulmonary disease ([address]     Gastrointestinal disorder     Hypertension     Psychiatric disorder     Stroke [facility])        Past Surgical History:  History reviewed.  No pertinent surgical history. Family History:  History reviewed. No pertinent family history. Social History:  Social History     Tobacco Use    Smoking status: Former     Types: Cigarettes     Quit date: 2017     Years since quittin.8    Smokeless tobacco: Never   Substance Use Topics    Alcohol use: Never    Drug use: Never       Allergies:  No Known Allergies    Review of Systems   Review of Systems   Constitutional:  Positive for fatigue. Negative for chills and fever. HENT:  Negative for congestion and rhinorrhea. Respiratory:  Positive for shortness of breath. Negative for cough. Cardiovascular:  Negative for chest pain and palpitations. Gastrointestinal:  Negative for abdominal pain, diarrhea, nausea and vomiting. Genitourinary:  Negative for difficulty urinating and dysuria. Musculoskeletal:  Negative for arthralgias and myalgias. Skin:  Negative for color change and rash. Neurological:  Negative for weakness and headaches. Psychiatric/Behavioral:  Negative for dysphoric mood and sleep disturbance. Physical Exam   Physical Exam  Constitutional:       General: He is not in acute distress. Appearance: Normal appearance. He is not ill-appearing. HENT:      Head: Normocephalic and atraumatic. Right Ear: External ear normal.      Left Ear: External ear normal.      Nose: Nose normal.      Mouth/Throat:      Mouth: Mucous membranes are moist.   Eyes:      Extraocular Movements: Extraocular movements intact. Conjunctiva/sclera: Conjunctivae normal.      Pupils: Pupils are equal, round, and reactive to light. Cardiovascular:      Rate and Rhythm: Normal rate and regular rhythm. Pulses: Normal pulses. Pulmonary:      Effort: Pulmonary effort is normal. No respiratory distress. Breath sounds: Normal breath sounds. Abdominal:      General: Abdomen is flat. There is no distension. Musculoskeletal:         General: Normal range of motion.       Cervical back: Normal range of motion. Skin:     General: Skin is warm and dry. Neurological:      General: No focal deficit present. Mental Status: He is alert and oriented to person, place, and time. Psychiatric:         Mood and Affect: Mood normal.         Behavior: Behavior normal.         Thought Content: Thought content normal.         Judgment: Judgment normal.       Lab and Diagnostic Study Results   Labs -   No results found for this or any previous visit (from the past 12 hour(s)). Radiologic Studies -   @lastxrresult@  CT Results  (Last 48 hours)      None          CXR Results  (Last 48 hours)                 07/29/22 2028  XR CHEST PORT Final result    Impression:  No acute cardiopulmonary process       Narrative:  EXAM: XR CHEST PORT       INDICATION: fatigue       COMPARISON: None. FINDINGS: A portable AP radiograph of the chest was obtained at 2024 hours. The   patient is on a cardiac monitor. The lungs are clear. The cardiac and   mediastinal contours and pulmonary vascularity are normal.  The bones and soft   tissues are grossly within normal limits. Medical Decision Making and ED Course   - I am the first provider for this patient. I reviewed the vital signs, available nursing notes, past medical history, past surgical history, family history and social history. - Initial assessment performed. The patients presenting problems have been discussed, and they are in agreement with the care plan formulated and outlined with them. I have encouraged them to ask questions as they arise throughout their visit. Vital Signs-Reviewed the patient's vital signs. No data found. Differential Diagnosis & Medical Decision Making Provider Note:   79-year-old male presenting to the emergency department with his daughters for evaluation of generalized fatigue and shortness of breath ongoing, however worsening since his stroke in April. On exam, patient resting comfortably in bed. Vital signs within normal limits. SPO2 100% on room air. Patient is nontachypneic. Chest x-ray, CBC, CMP, troponin, urinalysis without significant abnormality. Discussed with family at bedside that since patient has arrived on a Friday evening, we do not have case management available until Monday morning. Patient and family do not want to stay in the hospital until Monday and instead state they will follow-up with their primary care physician for evaluation of alternate living situation. Patient is stable for discharge home at this time. [facility]       ED Course:        Procedures   Performed by: Gail Gerber DO  Procedures      Disposition   Disposition: Condition stable  DC- Adult Discharges: All of the diagnostic tests were reviewed and questions answered. Diagnosis, care plan and treatment options were discussed. The patient understands the instructions and will follow up as directed. The patients results have been reviewed with them. They have been counseled regarding their diagnosis. The patient verbally convey understanding and agreement of the signs, symptoms, diagnosis, treatment and prognosis and additionally agrees to follow up as recommended with their PCP in 24 - 48 hours. They also agree with the care-plan and convey that all of their questions have been answered. I have also put together some discharge instructions for them that include: 1) educational information regarding their diagnosis, 2) how to care for their diagnosis at home, as well a 3) list of reasons why they would want to return to the ED prior to their follow-up appointment, should their condition change. DC-The patient was given verbal follow-up and fatigue instructions  Discharged    DISCHARGE PLAN:  1. Cannot display discharge medications since this patient is not currently admitted.     2.   Follow-up Information       Follow up With Specialties Details Why [address] EMERGENCY DEPT Emergency Medicine  As needed, If symptoms worsen [address] [facility], [place] [address]  [phone]    [facility] Nurse Practitioner Schedule an appointment as soon as possible for a visit   Aliyah Rangel 2  [phone]            3. Return to ED if worse   4. Discharge Medication List as of 7/29/2022 10:32 PM         Remove if admitted/transferred    Diagnosis/Clinical Impression     Clinical Impression:   1. Chronic fatigue    2. SOB (shortness of breath)        Attestations: I, Yaneli Awad, DO, am the primary clinician of record. Please note that this dictation was completed with [employer], the [employer] voice recognition software. Quite often unanticipated grammatical, syntax, homophones, and other interpretive errors are inadvertently transcribed by the computer software. Please disregard these errors. Please excuse any errors that have escaped final proofreading. Thank you.

## 2022-07-30 NOTE — DISCHARGE INSTRUCTIONS
Thank you! Thank you for allowing me to care for you in the emergency department. It is my goal to provide you with excellent care. If you have not received excellent quality care, please ask to speak to the nurse manager. Please fill out the survey that will come to you by mail or email since we listen to your feedback! Below you will find a list of your tests from today's visit. Should you have any questions, please do not hesitate to call the emergency department. Labs  Recent Results (from the past 12 hour(s))   CBC WITH AUTOMATED DIFF    Collection Time: 07/29/22  8:15 PM   Result Value Ref Range    WBC 4.8 4.1 - 11.1 K/uL    RBC 3.48 (L) 4.10 - 5.70 M/uL    HGB 9.7 (L) 12.1 - 17.0 g/dL    HCT 31.0 (L) 36.6 - 50.3 %    MCV 89.1 80.0 - 99.0 FL    MCH 27.9 26.0 - 34.0 PG    MCHC 31.3 30.0 - 36.5 g/dL    RDW 13.8 11.5 - 14.5 %    PLATELET 247 906 - 091 K/uL    MPV 10.4 8.9 - 12.9 FL    NRBC 0.0 0.0  WBC    ABSOLUTE NRBC 0.00 0.00 - 0.01 K/uL    NEUTROPHILS 50 32 - 75 %    LYMPHOCYTES 30 12 - 49 %    MONOCYTES 11 5 - 13 %    EOSINOPHILS 8 (H) 0 - 7 %    BASOPHILS 1 0 - 1 %    IMMATURE GRANULOCYTES 0 0 - 0.5 %    ABS. NEUTROPHILS 2.3 1.8 - 8.0 K/UL    ABS. LYMPHOCYTES 1.4 0.8 - 3.5 K/UL    ABS. MONOCYTES 0.5 0.0 - 1.0 K/UL    ABS. EOSINOPHILS 0.4 0.0 - 0.4 K/UL    ABS. BASOPHILS 0.0 0.0 - 0.1 K/UL    ABS. IMM.  GRANS. 0.0 0.00 - 0.04 K/UL    DF AUTOMATED     METABOLIC PANEL, COMPREHENSIVE    Collection Time: 07/29/22  8:15 PM   Result Value Ref Range    Sodium 140 136 - 145 mmol/L    Potassium 4.5 3.5 - 5.1 mmol/L    Chloride 110 (H) 97 - 108 mmol/L    CO2 25 21 - 32 mmol/L    Anion gap 5 5 - 15 mmol/L    Glucose 89 65 - 100 mg/dL    BUN 45 (H) 6 - 20 mg/dL    Creatinine 2.19 (H) 0.70 - 1.30 mg/dL    BUN/Creatinine ratio 21 (H) 12 - 20      GFR est AA 35 (L) >60 ml/min/1.73m2    GFR est non-AA 29 (L) >60 ml/min/1.73m2    Calcium 8.4 (L) 8.5 - 10.1 mg/dL    Bilirubin, total 0.3 0.2 - 1.0 mg/dL    AST (SGOT) 17 15 - 37 U/L    ALT (SGPT) 17 12 - 78 U/L    Alk. phosphatase 79 45 - 117 U/L    Protein, total 6.2 (L) 6.4 - 8.2 g/dL    Albumin 2.4 (L) 3.5 - 5.0 g/dL    Globulin 3.8 2.0 - 4.0 g/dL    A-G Ratio 0.6 (L) 1.1 - 2.2     TROPONIN-HIGH SENSITIVITY    Collection Time: 07/29/22  8:15 PM   Result Value Ref Range    Troponin-High Sensitivity 9 0 - 76 ng/L   URINALYSIS W/ RFLX MICROSCOPIC    Collection Time: 07/29/22  9:17 PM   Result Value Ref Range    Color Straw     Appearance Clear Clear    Specific gravity 1.015 1.003 - 1.030      Specific gravity 1.015 1.003 - 1.030      pH (UA) 6.0 5.0 - 8.0      Protein 100 (A) Negative mg/dL    Glucose Normal (A) Negative mg/dL    Ketone Negative Negative mg/dL    Bilirubin Negative Negative      Blood 50 (A) Negative      Urobilinogen Normal 0.1 - 1.0 EU/dL    Nitrites Negative Negative      Leukocyte Esterase Negative Negative         Radiologic Studies  XR CHEST PORT   Final Result   No acute cardiopulmonary process        CT Results  (Last 48 hours)      None          CXR Results  (Last 48 hours)                 07/29/22 2028  XR CHEST PORT Final result    Impression:  No acute cardiopulmonary process       Narrative:  EXAM: XR CHEST PORT       INDICATION: fatigue       COMPARISON: None. FINDINGS: A portable AP radiograph of the chest was obtained at 2024 hours. The   patient is on a cardiac monitor. The lungs are clear. The cardiac and   mediastinal contours and pulmonary vascularity are normal.  The bones and soft   tissues are grossly within normal limits.                  ------------------------------------------------------------------------------------------------------------  The exam and treatment you received in the Emergency Department were for an urgent problem and are not intended as complete care.  It is important that you follow-up with a doctor, nurse practitioner, or physician assistant to:  (1) confirm your diagnosis,  (2) re-evaluation of changes in your illness and treatment, and  (3) for ongoing care. Please take your discharge instructions with you when you go to your follow-up appointment. If you have any problem arranging a follow-up appointment, contact the Emergency Department. If your symptoms become worse or you do not improve as expected and you are unable to reach your health care provider, please return to the Emergency Department. We are available 24 hours a day. If a prescription has been provided, please have it filled as soon as possible to prevent a delay in treatment. If you have any questions or reservations about taking the medication due to side effects or interactions with other medications, please call your primary care provider or contact the ER.

## 2022-08-01 ENCOUNTER — APPOINTMENT (OUTPATIENT)
Dept: GENERAL RADIOLOGY | Age: 87
End: 2022-08-01
Attending: EMERGENCY MEDICINE
Payer: MEDICARE

## 2022-08-01 ENCOUNTER — HOSPITAL ENCOUNTER (EMERGENCY)
Age: 87
Discharge: HOME | End: 2022-08-01
Attending: STUDENT IN AN ORGANIZED HEALTH CARE EDUCATION/TRAINING PROGRAM | Admitting: STUDENT IN AN ORGANIZED HEALTH CARE EDUCATION/TRAINING PROGRAM
Payer: MEDICARE

## 2022-08-01 VITALS
SYSTOLIC BLOOD PRESSURE: 130 MMHG | BODY MASS INDEX: 16.95 KG/M2 | HEIGHT: 67 IN | OXYGEN SATURATION: 98 % | HEART RATE: 76 BPM | RESPIRATION RATE: 18 BRPM | WEIGHT: 108 LBS | TEMPERATURE: 97.7 F | DIASTOLIC BLOOD PRESSURE: 75 MMHG

## 2022-08-01 DIAGNOSIS — R53.1 WEAKNESS: Primary | ICD-10-CM

## 2022-08-01 LAB
ALBUMIN SERPL-MCNC: 2.6 G/DL (ref 3.5–5)
ALBUMIN/GLOB SERPL: 0.7 {RATIO} (ref 1.1–2.2)
ALP SERPL-CCNC: 81 U/L (ref 45–117)
ALT SERPL-CCNC: 15 U/L (ref 12–78)
ANION GAP SERPL CALC-SCNC: 4 MMOL/L (ref 5–15)
AST SERPL W P-5'-P-CCNC: 17 U/L (ref 15–37)
BASOPHILS # BLD: 0.1 K/UL (ref 0–0.1)
BASOPHILS NFR BLD: 1 % (ref 0–1)
BILIRUB SERPL-MCNC: 0.3 MG/DL (ref 0.2–1)
BNP SERPL-MCNC: 946 PG/ML
BUN SERPL-MCNC: 41 MG/DL (ref 6–20)
BUN/CREAT SERPL: 19 (ref 12–20)
CA-I BLD-MCNC: 8.7 MG/DL (ref 8.5–10.1)
CHLORIDE SERPL-SCNC: 110 MMOL/L (ref 97–108)
CO2 SERPL-SCNC: 28 MMOL/L (ref 21–32)
CREAT SERPL-MCNC: 2.14 MG/DL (ref 0.7–1.3)
DIFFERENTIAL METHOD BLD: ABNORMAL
EOSINOPHIL # BLD: 0.5 K/UL (ref 0–0.4)
EOSINOPHIL NFR BLD: 8 % (ref 0–7)
ERYTHROCYTE [DISTWIDTH] IN BLOOD BY AUTOMATED COUNT: 13.7 % (ref 11.5–14.5)
GLOBULIN SER CALC-MCNC: 3.5 G/DL (ref 2–4)
GLUCOSE SERPL-MCNC: 110 MG/DL (ref 65–100)
HCT VFR BLD AUTO: 34 % (ref 36.6–50.3)
HGB BLD-MCNC: 10.6 G/DL (ref 12.1–17)
IMM GRANULOCYTES # BLD AUTO: 0 K/UL (ref 0–0.04)
IMM GRANULOCYTES NFR BLD AUTO: 0 % (ref 0–0.5)
LYMPHOCYTES # BLD: 2.1 K/UL (ref 0.8–3.5)
LYMPHOCYTES NFR BLD: 34 % (ref 12–49)
MCH RBC QN AUTO: 27.7 PG (ref 26–34)
MCHC RBC AUTO-ENTMCNC: 31.2 G/DL (ref 30–36.5)
MCV RBC AUTO: 89 FL (ref 80–99)
MONOCYTES # BLD: 0.5 K/UL (ref 0–1)
MONOCYTES NFR BLD: 9 % (ref 5–13)
NEUTS SEG # BLD: 2.9 K/UL (ref 1.8–8)
NEUTS SEG NFR BLD: 48 % (ref 32–75)
NRBC # BLD: 0 K/UL (ref 0–0.01)
NRBC BLD-RTO: 0 PER 100 WBC
PLATELET # BLD AUTO: 242 K/UL (ref 150–400)
PMV BLD AUTO: 9.9 FL (ref 8.9–12.9)
POTASSIUM SERPL-SCNC: 5 MMOL/L (ref 3.5–5.1)
PROT SERPL-MCNC: 6.1 G/DL (ref 6.4–8.2)
RBC # BLD AUTO: 3.82 M/UL (ref 4.1–5.7)
SODIUM SERPL-SCNC: 142 MMOL/L (ref 136–145)
TROPONIN-HIGH SENSITIVITY: 10 NG/L (ref 0–76)
WBC # BLD AUTO: 6 K/UL (ref 4.1–11.1)

## 2022-08-01 PROCEDURE — 93005 ELECTROCARDIOGRAM TRACING: CPT

## 2022-08-01 PROCEDURE — 83880 ASSAY OF NATRIURETIC PEPTIDE: CPT

## 2022-08-01 PROCEDURE — 80053 COMPREHEN METABOLIC PANEL: CPT

## 2022-08-01 PROCEDURE — 85025 COMPLETE CBC W/AUTO DIFF WBC: CPT

## 2022-08-01 PROCEDURE — 84484 ASSAY OF TROPONIN QUANT: CPT

## 2022-08-01 PROCEDURE — 71045 X-RAY EXAM CHEST 1 VIEW: CPT

## 2022-08-01 PROCEDURE — 36415 COLL VENOUS BLD VENIPUNCTURE: CPT

## 2022-08-01 PROCEDURE — 99285 EMERGENCY DEPT VISIT HI MDM: CPT

## 2022-08-02 LAB
ATRIAL RATE: 79 BPM
CALCULATED P AXIS, ECG09: 75 DEGREES
CALCULATED R AXIS, ECG10: 39 DEGREES
CALCULATED T AXIS, ECG11: 80 DEGREES
DIAGNOSIS, 93000: NORMAL
P-R INTERVAL, ECG05: 124 MS
Q-T INTERVAL, ECG07: 390 MS
QRS DURATION, ECG06: 70 MS
QTC CALCULATION (BEZET), ECG08: 447 MS
VENTRICULAR RATE, ECG03: 79 BPM

## 2022-08-02 NOTE — ED PROVIDER NOTES
EMERGENCY DEPARTMENT HISTORY AND PHYSICAL EXAM      Date: 8/1/2022  Patient Name: Wayne Frey    History of Presenting Illness     Chief Complaint   Patient presents with    Shortness of Breath    Chest Pain    Cough       History Provided By: Patient    HPI: Wayne Frey, 82 y.o. male with a past medical history significant  COPD, hypertension, stroke  presents to the ED with cc of shortness of breath, generalized weakness. Patient was seen 3 days ago for similar, work-up at that time did not show any acute process. Patient was discharged home, instructed that they have case management available Monday morning, family came to emergency department late afternoon today. No note of any recent fevers chills, no worsening cough shortness of breath. Concerned that patient is having increased trouble caring for himself. There are no other complaints, changes, or physical findings at this time. PCP: Shyla Rod    No current facility-administered medications on file prior to encounter. Current Outpatient Medications on File Prior to Encounter   Medication Sig Dispense Refill    amLODIPine (NORVASC) 2.5 mg tablet Take 1 Tablet by mouth daily. 30 Tablet 1    atorvastatin (LIPITOR) 40 mg tablet Take 1 Tablet by mouth nightly. 30 Tablet 1    aspirin delayed-release 81 mg tablet Take 81 mg by mouth daily. metoprolol tartrate (LOPRESSOR) 50 mg tablet Take 50 mg by mouth two (2) times a day. sodium bicarbonate 325 mg tablet Take 325 mg by mouth two (2) times a day. doxazosin (CARDURA) 4 mg tablet Take 4 mg by mouth daily. cetirizine (ZYRTEC) 10 mg tablet Take 10 mg by mouth daily. Past History     Past Medical History:  Past Medical History:   Diagnosis Date    Chronic obstructive pulmonary disease ([address]     Gastrointestinal disorder     Hypertension     Psychiatric disorder     Stroke [facility])        Past Surgical History:  No past surgical history on file.     Family History:  No family history on file. Social History:  Social History     Tobacco Use    Smoking status: Former     Types: Cigarettes     Quit date: 2017     Years since quittin.8    Smokeless tobacco: Never   Substance Use Topics    Alcohol use: Never    Drug use: Never       Allergies:  No Known Allergies      Review of Systems     Review of Systems   Constitutional:  Positive for fatigue. Negative for activity change, appetite change, chills and fever. HENT:  Negative for congestion, sore throat and trouble swallowing. Eyes:  Negative for photophobia and visual disturbance. Respiratory:  Positive for shortness of breath. Negative for cough and chest tightness. Cardiovascular:  Negative for chest pain and palpitations. Gastrointestinal:  Negative for abdominal pain and nausea. Genitourinary:  Negative for dysuria and flank pain. Musculoskeletal:  Negative for arthralgias and neck pain. Skin:  Negative for color change and pallor. Neurological:  Positive for dizziness and weakness. Negative for numbness and headaches. Physical Exam     Physical Exam  Vitals and nursing note reviewed. Constitutional:       Appearance: Normal appearance. He is underweight. HENT:      Head: Normocephalic and atraumatic. Nose: Nose normal.      Mouth/Throat:      Mouth: Mucous membranes are moist.   Eyes:      Extraocular Movements: Extraocular movements intact. Pupils: Pupils are equal, round, and reactive to light. Cardiovascular:      Rate and Rhythm: Normal rate and regular rhythm. Pulses: Normal pulses. Heart sounds: Normal heart sounds. Pulmonary:      Effort: Pulmonary effort is normal.      Breath sounds: Normal breath sounds. Abdominal:      General: Abdomen is flat. Bowel sounds are normal.      Palpations: Abdomen is soft. Musculoskeletal:         General: No swelling or tenderness. Normal range of motion.       Cervical back: Normal range of motion and neck supple. Right lower leg: No edema. Left lower leg: No edema. Skin:     General: Skin is warm and dry. Capillary Refill: Capillary refill takes less than 2 seconds. Neurological:      General: No focal deficit present. Mental Status: He is alert and oriented to person, place, and time. Cranial Nerves: No cranial nerve deficit. Sensory: No sensory deficit. Motor: No weakness. Psychiatric:         Mood and Affect: Mood normal.         Behavior: Behavior normal.       Diagnostic Study Results     Labs -   No results found for this or any previous visit (from the past 12 hour(s)). Radiologic Studies -   @lastxrresult@  CT Results  (Last 48 hours)      None          CXR Results  (Last 48 hours)                 08/01/22 2107  XR CHEST PORT Final result    Impression:  1. Questionable pulmonary nodules in the right lung measuring up 17 mm. Recommend CT chest for further evaluation. 2. Emphysema. Narrative:  EXAM:  XR CHEST PORT       INDICATION:   chest pain       COMPARISON: Chest radiograph 7/29/2022. FINDINGS: AP radiograph of the chest was obtained. Unchanged chronic emphysematous changes with hyperexpanded lungs and flattening   of the hemidiaphragms. Unchanged pleural thickening at the lung bases. There are   questionable pulmonary nodules in the right mid and lower lung measuring up to   17 mm. No pneumothorax. Heart size is normal. Calcifications of the thoracic   aorta. No acute osseous abnormalities. Medical Decision Making   I am the first provider for this patient. I reviewed the vital signs, available nursing notes, past medical history, past surgical history, family history and social history. Vital Signs-Reviewed the patient's vital signs. No data found.     EKG: Normal sinus rhythm 79, no ST elevation, normal axis      Records Reviewed: Nursing Notes    The patient presents with generalized weakness fatigue shortness of breath with a differential diagnosis of pneumonia, electrolyte abnormality, dehydration, deconditioning,      Provider Notes (Medical Decision Making):     MDM  24-year-old male, past medical history of COPD, hypertension, presents emergency department from home for evaluation for increased generalized weakness, occasional shortness of breath, increased difficulty caring for self at home. Physical exam shows underweight male however in no distress, stable vitals, saturations 98% on room air, clear breath sounds bilateral, nonfocal neurological exam        At this time do not suspect acute process, most likely patient suffering from chronic deconditioning. ED Course:   Initial assessment performed. The patients presenting problems have been discussed, and they are in agreement with the care plan formulated and outlined with them. I have encouraged them to ask questions as they arise throughout their visit. ED Course as of 08/03/22 0035   Mon Aug 01, 2022   2258 Patient's lab work resulting, metabolic panel shows chronic renal sufficiency BUN 41 creatinine 2.14, unchanged from previous, CBC shows no leukocytosis, stable H&H, troponin 10. At this time do not suspect acute process causing weakness.  did not show evidence of UTI. Patient denies any urinary symptoms, denies feel the patient requires repeat urinalysis at this time. Discussed results with patient family. At this time patient requires evaluation by case management for possible home health aide, also family interested in starting discussions regarding placement of patient. Will place case management consult. [PZ]      ED Course User Index  [PZ] Rashi Carter MD         PROCEDURES  Procedures         PLAN:  1. Discharge Medication List as of 8/1/2022 11:12 PM        2.    Follow-up Information       Follow up With Specialties Details Why Contact Tiffany Tripp Nurse Practitioner In 3 days  [address] [place] [address] [address] [id number]  [phone]      [facility] EMERGENCY DEPT Emergency Medicine  If symptoms worsen [address] [address] [phone]          Return to ED if worse     Diagnosis     Clinical Impression:   1.  Weakness

## 2022-08-02 NOTE — DISCHARGE INSTRUCTIONS
You were evaluated for generalized fatigue and weakness, your blood work today was not significantly changed from when you were here 2 days ago. At this time we do not feel that you have a significant infection, electrolyte imbalance causing the generalized weakness. We would like for the case management to evaluate you. An order has been placed for them to contact you look further call within the next 24 to 48 hours and they will discuss home health and possible placement options.

## 2022-08-04 ENCOUNTER — HOSPITAL ENCOUNTER (OUTPATIENT)
Age: 87
Setting detail: OUTPATIENT SURGERY
Discharge: HOME | End: 2022-08-04
Attending: INTERNAL MEDICINE | Admitting: INTERNAL MEDICINE
Payer: MEDICARE

## 2022-08-04 ENCOUNTER — ANESTHESIA (OUTPATIENT)
Dept: ENDOSCOPY | Age: 87
End: 2022-08-04
Payer: MEDICARE

## 2022-08-04 ENCOUNTER — APPOINTMENT (OUTPATIENT)
Dept: ENDOSCOPY | Age: 87
End: 2022-08-04
Attending: INTERNAL MEDICINE
Payer: MEDICARE

## 2022-08-04 ENCOUNTER — ANESTHESIA EVENT (OUTPATIENT)
Dept: ENDOSCOPY | Age: 87
End: 2022-08-04
Payer: MEDICARE

## 2022-08-04 VITALS
SYSTOLIC BLOOD PRESSURE: 148 MMHG | HEIGHT: 67 IN | RESPIRATION RATE: 18 BRPM | HEART RATE: 70 BPM | WEIGHT: 115 LBS | TEMPERATURE: 97.5 F | DIASTOLIC BLOOD PRESSURE: 77 MMHG | BODY MASS INDEX: 18.05 KG/M2 | OXYGEN SATURATION: 99 %

## 2022-08-04 PROCEDURE — 2709999900 HC NON-CHARGEABLE SUPPLY: Performed by: INTERNAL MEDICINE

## 2022-08-04 PROCEDURE — 76040000008: Performed by: INTERNAL MEDICINE

## 2022-08-04 PROCEDURE — 88360 TUMOR IMMUNOHISTOCHEM/MANUAL: CPT

## 2022-08-04 PROCEDURE — 88305 TISSUE EXAM BY PATHOLOGIST: CPT

## 2022-08-04 PROCEDURE — 77030020018 HC MRKR ENDOSC SPOT 5ML SYR GISP -B: Performed by: INTERNAL MEDICINE

## 2022-08-04 PROCEDURE — 74011250636 HC RX REV CODE- 250/636: Performed by: ANESTHESIOLOGY

## 2022-08-04 PROCEDURE — 77030041709 HC NDL SCLER BSC -C: Performed by: INTERNAL MEDICINE

## 2022-08-04 PROCEDURE — 74011250636 HC RX REV CODE- 250/636: Performed by: INTERNAL MEDICINE

## 2022-08-04 PROCEDURE — 88342 IMHCHEM/IMCYTCHM 1ST ANTB: CPT

## 2022-08-04 PROCEDURE — 88341 IMHCHEM/IMCYTCHM EA ADD ANTB: CPT

## 2022-08-04 PROCEDURE — 77030019988 HC FCPS ENDOSC DISP BSC -B: Performed by: INTERNAL MEDICINE

## 2022-08-04 PROCEDURE — 77030021593 HC FCPS BIOP ENDOSC BSC -A: Performed by: INTERNAL MEDICINE

## 2022-08-04 PROCEDURE — 76060000033 HC ANESTHESIA 1 TO 1.5 HR: Performed by: INTERNAL MEDICINE

## 2022-08-04 RX ORDER — ERGOCALCIFEROL 1.25 MG/1
50000 CAPSULE ORAL
COMMUNITY

## 2022-08-04 RX ORDER — SODIUM CHLORIDE, SODIUM LACTATE, POTASSIUM CHLORIDE, CALCIUM CHLORIDE 600; 310; 30; 20 MG/100ML; MG/100ML; MG/100ML; MG/100ML
INJECTION, SOLUTION INTRAVENOUS
Status: DISCONTINUED | OUTPATIENT
Start: 2022-08-04 | End: 2022-08-04 | Stop reason: HOSPADM

## 2022-08-04 RX ORDER — SODIUM CHLORIDE, SODIUM LACTATE, POTASSIUM CHLORIDE, CALCIUM CHLORIDE 600; 310; 30; 20 MG/100ML; MG/100ML; MG/100ML; MG/100ML
50 INJECTION, SOLUTION INTRAVENOUS CONTINUOUS
Status: DISCONTINUED | OUTPATIENT
Start: 2022-08-04 | End: 2022-08-04 | Stop reason: HOSPADM

## 2022-08-04 RX ORDER — PROPOFOL 10 MG/ML
INJECTION, EMULSION INTRAVENOUS
Status: COMPLETED
Start: 2022-08-04 | End: 2022-08-04

## 2022-08-04 RX ORDER — PROPOFOL 10 MG/ML
INJECTION, EMULSION INTRAVENOUS AS NEEDED
Status: DISCONTINUED | OUTPATIENT
Start: 2022-08-04 | End: 2022-08-04 | Stop reason: HOSPADM

## 2022-08-04 RX ORDER — EPINEPHRINE INHALATION 125 UG/1
AEROSOL RESPIRATORY (INHALATION)
Status: ON HOLD | COMMUNITY
End: 2022-09-15

## 2022-08-04 RX ADMIN — PROPOFOL 30 MG: 10 INJECTION, EMULSION INTRAVENOUS at 11:46

## 2022-08-04 RX ADMIN — SODIUM CHLORIDE, POTASSIUM CHLORIDE, SODIUM LACTATE AND CALCIUM CHLORIDE: 600; 310; 30; 20 INJECTION, SOLUTION INTRAVENOUS at 11:37

## 2022-08-04 RX ADMIN — PROPOFOL 10 MG: 10 INJECTION, EMULSION INTRAVENOUS at 11:43

## 2022-08-04 RX ADMIN — PROPOFOL 30 MG: 10 INJECTION, EMULSION INTRAVENOUS at 11:42

## 2022-08-04 RX ADMIN — SODIUM CHLORIDE, POTASSIUM CHLORIDE, SODIUM LACTATE AND CALCIUM CHLORIDE 50 ML/HR: 600; 310; 30; 20 INJECTION, SOLUTION INTRAVENOUS at 09:26

## 2022-08-04 RX ADMIN — PROPOFOL 60 MG: 10 INJECTION, EMULSION INTRAVENOUS at 11:41

## 2022-08-04 NOTE — PERIOP NOTES
Notified ENDO and spoke with Micaela Atkins RN that daughters here to  patient and they wish to speak with Dr. Gary Han about findings.

## 2022-08-04 NOTE — DISCHARGE INSTRUCTIONS
Any issue with procedure, such as pain, bleeding, fever, please call my office at daytime, or call [phone] to page me, or go to ER at other time. Monitory HB, may need more blood transfusion. Follow-up biopsy report. Surgical consultation.

## 2022-08-04 NOTE — ANESTHESIA POSTPROCEDURE EVALUATION
Procedure(s):  COLONOSCOPY  ESOPHAGOGASTRODUODENOSCOPY (EGD)  ESOPHAGOGASTRODUODENAL (EGD) BIOPSY.     MAC    Anesthesia Post Evaluation        Patient location during evaluation: PACU  Patient participation: complete - patient participated  Level of consciousness: awake  Pain score: 0  Pain management: adequate  Airway patency: patent  Anesthetic complications: no  Cardiovascular status: acceptable  Respiratory status: acceptable  Hydration status: acceptable  Post anesthesia nausea and vomiting:  controlled  Final Post Anesthesia Temperature Assessment:  Normothermia (36.0-37.5 degrees C)      INITIAL Post-op Vital signs:   Vitals Value Taken Time   /89 08/04/22 1201   Temp     Pulse 77 08/04/22 1201   Resp 17 08/04/22 1201   SpO2 100 % 08/04/22 1201

## 2022-08-04 NOTE — PERIOP NOTES
Patient alert and oriented x4, VS stable, no complaints of pain at this time. Daughters at bedside. Discharge instructions/education provided to daughter, Ela Bhat and she verbalized understanding and had no questions. Patient was discharged to home in wheelchair at main entrance of hospital with daughters via private vehicle.

## 2022-08-04 NOTE — ANESTHESIA PREPROCEDURE EVALUATION
Relevant Problems   No relevant active problems       Anesthetic History               Review of Systems / Medical History  Patient summary reviewed, nursing notes reviewed and pertinent labs reviewed    Pulmonary    COPD               Neuro/Psych       CVA (RIGHT SIDED WEAKNESS. )  Psychiatric history     Cardiovascular    Hypertension          Past MI      Comments:   EKG (8-2-22) Normal sinus rhythm   Anteroseptal infarct (cited on or before 01-APR-2022)   Abnormal ECG   When compared with ECG of 03-APR-2022 22:14,   Premature atrial complexes are no longer Present   ST elevation now present in Lateral leads. GI/Hepatic/Renal               Comments: COLON POLYPS  RECTAL BLEEDING.  Endo/Other        Anemia     Other Findings              Physical Exam    Airway  Mallampati: I  TM Distance: > 6 cm  Neck ROM: normal range of motion   Mouth opening: Normal     Cardiovascular    Rhythm: regular  Rate: normal         Dental    Dentition: Edentulous     Pulmonary  Breath sounds clear to auscultation               Abdominal  GI exam deferred       Other Findings            Anesthetic Plan    ASA: 3  Anesthesia type: MAC          Induction: Intravenous  Anesthetic plan and risks discussed with: Patient

## 2022-08-10 ENCOUNTER — TRANSCRIBE ORDER (OUTPATIENT)
Dept: SCHEDULING | Age: 87
End: 2022-08-10

## 2022-08-10 DIAGNOSIS — K25.9 ESOPHAGOGASTRIC ULCER: ICD-10-CM

## 2022-08-10 DIAGNOSIS — K92.1 BLOOD IN STOOL: ICD-10-CM

## 2022-08-10 DIAGNOSIS — K29.00 GASTRITIS, ACUTE: ICD-10-CM

## 2022-08-10 DIAGNOSIS — D12.6 BENIGN NEOPLASM OF COLON: Primary | ICD-10-CM

## 2022-08-19 ENCOUNTER — HOSPITAL ENCOUNTER (OUTPATIENT)
Dept: CT IMAGING | Age: 87
Discharge: HOME | End: 2022-08-19
Attending: INTERNAL MEDICINE
Payer: MEDICARE

## 2022-08-19 DIAGNOSIS — K92.1 BLOOD IN STOOL: ICD-10-CM

## 2022-08-19 DIAGNOSIS — D12.6 BENIGN NEOPLASM OF COLON: ICD-10-CM

## 2022-08-19 DIAGNOSIS — K25.9 ESOPHAGOGASTRIC ULCER: ICD-10-CM

## 2022-08-19 DIAGNOSIS — K29.00 GASTRITIS, ACUTE: ICD-10-CM

## 2022-08-19 PROCEDURE — 74176 CT ABD & PELVIS W/O CONTRAST: CPT

## 2022-09-09 ENCOUNTER — HOSPITAL ENCOUNTER (OUTPATIENT)
Dept: PREADMISSION TESTING | Age: 87
Discharge: HOME | End: 2022-09-09
Payer: MEDICARE

## 2022-09-09 VITALS
BODY MASS INDEX: 15.91 KG/M2 | HEART RATE: 71 BPM | TEMPERATURE: 97.7 F | RESPIRATION RATE: 18 BRPM | DIASTOLIC BLOOD PRESSURE: 60 MMHG | OXYGEN SATURATION: 100 % | HEIGHT: 68 IN | SYSTOLIC BLOOD PRESSURE: 100 MMHG | WEIGHT: 105 LBS

## 2022-09-09 LAB
ABO + RH BLD: NORMAL
ANION GAP SERPL CALC-SCNC: 5 MMOL/L (ref 5–15)
BLOOD GROUP ANTIBODIES SERPL: NEGATIVE
BUN SERPL-MCNC: 42 MG/DL (ref 6–20)
BUN/CREAT SERPL: 20 (ref 12–20)
CA-I BLD-MCNC: 9.1 MG/DL (ref 8.5–10.1)
CHLORIDE SERPL-SCNC: 109 MMOL/L (ref 97–108)
CO2 SERPL-SCNC: 27 MMOL/L (ref 21–32)
CREAT SERPL-MCNC: 2.07 MG/DL (ref 0.7–1.3)
ERYTHROCYTE [DISTWIDTH] IN BLOOD BY AUTOMATED COUNT: 14.2 % (ref 11.5–14.5)
EST. AVERAGE GLUCOSE BLD GHB EST-MCNC: 120 MG/DL
GLUCOSE SERPL-MCNC: 55 MG/DL (ref 65–100)
HBA1C MFR BLD: 5.8 % (ref 4–5.6)
HCT VFR BLD AUTO: 32.4 % (ref 36.6–50.3)
HGB BLD-MCNC: 10.1 G/DL (ref 12.1–17)
MCH RBC QN AUTO: 27.8 PG (ref 26–34)
MCHC RBC AUTO-ENTMCNC: 31.2 G/DL (ref 30–36.5)
MCV RBC AUTO: 89.3 FL (ref 80–99)
MRSA DNA SPEC QL NAA+PROBE: NOT DETECTED
NRBC # BLD: 0 K/UL (ref 0–0.01)
NRBC BLD-RTO: 0 PER 100 WBC
PLATELET # BLD AUTO: 214 K/UL (ref 150–400)
PMV BLD AUTO: 10.3 FL (ref 8.9–12.9)
POTASSIUM SERPL-SCNC: 4.5 MMOL/L (ref 3.5–5.1)
RBC # BLD AUTO: 3.63 M/UL (ref 4.1–5.7)
SODIUM SERPL-SCNC: 141 MMOL/L (ref 136–145)
SPECIMEN EXP DATE BLD: NORMAL
WBC # BLD AUTO: 4.7 K/UL (ref 4.1–11.1)

## 2022-09-09 PROCEDURE — 83036 HEMOGLOBIN GLYCOSYLATED A1C: CPT

## 2022-09-09 PROCEDURE — 86900 BLOOD TYPING SEROLOGIC ABO: CPT

## 2022-09-09 PROCEDURE — 80048 BASIC METABOLIC PNL TOTAL CA: CPT

## 2022-09-09 PROCEDURE — 85027 COMPLETE CBC AUTOMATED: CPT

## 2022-09-09 PROCEDURE — 87641 MR-STAPH DNA AMP PROBE: CPT

## 2022-09-09 PROCEDURE — 36415 COLL VENOUS BLD VENIPUNCTURE: CPT

## 2022-09-09 RX ORDER — ALBUTEROL SULFATE 90 UG/1
1 AEROSOL, METERED RESPIRATORY (INHALATION)
COMMUNITY

## 2022-09-09 NOTE — PROGRESS NOTES
200- Dr. Joseline Calvert called made aware of patient's abnormal labs CBC-- RBC 3.63, HGB 10.1, HCT 32.4, CMP-- Chloride 109, Glucose 55, BUN 42, Creatinine 2.07, GFR est AA 37, GFR est non-AA 31. No new orders received Dr. Joseline Calvert stated he will discuss patient's kidney function with his nephrologist Dr. Ina Yarbrough prior to surgery. Will follow up with Dr. Joseline Calvert. Also verified with Dr. Joseline Calvert Consent and order should read Exploratory Laparotomy and Left Hemicolectomy possible colostomy. 46-  [facility] anesthesiologist called reviewed patient chart no new orders received stated ok to proceed with surgery as scheduled.

## 2022-09-09 NOTE — PROGRESS NOTES
1300- Cardiac clearance note and EKG received from Dr. Banda Repress office, placed on patient's chart.

## 2022-09-09 NOTE — ROUTINE PROCESS
1514- Attempted to obtain patient's office note from Dr. Ernestine Toure neurologist office unable to obtain note has not been signed off. Office staff stated they will send a message to Dr. Ernestine Toure to have him sign off and fax to [facility]. Office staff stated per note patient was instructed to follow up as needed after May 2022 visit.

## 2022-09-09 NOTE — PROGRESS NOTES
26- Dr. Brandon Ortiz office called spoke with Mrs. Ruffin made her aware patient and his daughter were not made aware of the patient needing a bowel prep. Mrs. Ruffin stated the patient will need a bowel prep and she will have Dr. Ameena Huertas call it into the patient's pharmacy over the weekend and if the patient or his daughter have any questions they can reach out to the office. Patient and his daughter instructed verbalized understanding. Will follow up with patient beginning of next week.

## 2022-09-12 NOTE — PROGRESS NOTES
Robertuth with patient's daughter she verified that her father received a call from his pharmacy that his bowel prep will be ready for  tomorrow 9/13/22 and he plans to  at this time.

## 2022-09-13 NOTE — PROGRESS NOTES
56- Spoke with patient's daughter made aware Dr. Carr Diver to reach out regarding her questions about patient's medications. Also verified patient's pharmacy has the prescription for patient's bowel prep patient just has to  when bowel prep is ready.

## 2022-09-13 NOTE — PROGRESS NOTES
1348- Followed up with Dr. Hola Cheema regarding patient's kidney function. Dr. Hola Cheema stated he has not yet spoken to Dr. Emery Null will do so today 9/13/22. Made Dr. Hola Cheema aware patient is to start his bowel prep in the morning 9/14/22, will follow up. Also made Dr. Hola Cheema aware patient's daughter has questions as to which medications patient should take morning of surgery. Dr. Hola Cheema stated he will reach out to the patient's daughter this afternoon. Also made aware of patient's hemoglobin A1C level of 5.8. No new orders received.

## 2022-09-14 NOTE — PERIOP NOTES
Spoke to Dr. Rebel Jaimes MD stated he spoke to patient's daughter , confirmed bowel prep was received and stated patient took prep early this morning, patient is aware to continue clear liquids. Dr. Rebel Jaimes also stated he spoke to Dr. Rishi Real in re: to patient CKD and CMP results, Dr. Rishi Real is aware and ok to proceed with scheduled surgery    Dr. Rebel Jaimes stated patient will need fluids morning of surgery to hydrate patient and confirmed patient had type and screen drawn.

## 2022-09-15 ENCOUNTER — APPOINTMENT (OUTPATIENT)
Dept: GENERAL RADIOLOGY | Age: 87
DRG: 329 | End: 2022-09-15
Attending: SURGERY
Payer: MEDICARE

## 2022-09-15 ENCOUNTER — HOSPITAL ENCOUNTER (INPATIENT)
Age: 87
LOS: 8 days | Discharge: NURSING, REHAB OR LONG-TERM CARE | DRG: 329 | End: 2022-09-23
Attending: SURGERY | Admitting: SURGERY
Payer: MEDICARE

## 2022-09-15 ENCOUNTER — ANESTHESIA EVENT (OUTPATIENT)
Dept: SURGERY | Age: 87
DRG: 329 | End: 2022-09-15
Payer: MEDICARE

## 2022-09-15 ENCOUNTER — APPOINTMENT (OUTPATIENT)
Dept: GENERAL RADIOLOGY | Age: 87
DRG: 329 | End: 2022-09-15
Attending: ANESTHESIOLOGY
Payer: MEDICARE

## 2022-09-15 ENCOUNTER — ANESTHESIA (OUTPATIENT)
Dept: SURGERY | Age: 87
DRG: 329 | End: 2022-09-15
Payer: MEDICARE

## 2022-09-15 PROBLEM — Z98.890 STATUS POST SURGERY: Status: ACTIVE | Noted: 2022-09-15

## 2022-09-15 PROBLEM — C18.9 COLON CANCER (HCC): Status: ACTIVE | Noted: 2022-09-15

## 2022-09-15 LAB
BASE DEFICIT BLD-SCNC: 3.6 MMOL/L
CA-I BLD-MCNC: 1.16 MMOL/L (ref 1.12–1.32)
CHLORIDE BLD-SCNC: 108 MMOL/L (ref 98–107)
CO2 BLD-SCNC: 23 MMOL/L
CREAT UR-MCNC: 2.53 MG/DL (ref 0.6–1.3)
FIO2, L/MIN - FIO2P: ABNORMAL
GLUCOSE BLD STRIP.AUTO-MCNC: 129 MG/DL (ref 65–100)
GLUCOSE BLD STRIP.AUTO-MCNC: 81 MG/DL (ref 65–100)
GLUCOSE BLD STRIP.AUTO-MCNC: 85 MG/DL (ref 65–100)
HCO3 BLD-SCNC: 22.6 MMOL/L (ref 19–28)
LACTATE BLD-SCNC: 2.04 MMOL/L (ref 0.4–2)
PCO2 BLD: 44.7 MMHG (ref 35–45)
PERFORMED BY, TECHID: ABNORMAL
PERFORMED BY, TECHID: ABNORMAL
PERFORMED BY, TECHID: NORMAL
PH BLD: 7.31 [PH] (ref 7.35–7.45)
PO2 BLD: 25 MMHG (ref 75–100)
POTASSIUM BLD-SCNC: 5.1 MMOL/L (ref 3.5–5.5)
RESPIRATORY RATE: ABNORMAL (ref 5–40)
SAO2 % BLD: 40 %
SODIUM BLD-SCNC: 142 MMOL/L (ref 136–145)
SPECIMEN SITE: ABNORMAL

## 2022-09-15 PROCEDURE — 0DTF0ZZ RESECTION OF RIGHT LARGE INTESTINE, OPEN APPROACH: ICD-10-PCS | Performed by: SURGERY

## 2022-09-15 PROCEDURE — 77030011278 HC ELECTRD LIG IMPT COVD -F: Performed by: SURGERY

## 2022-09-15 PROCEDURE — 77030003029 HC SUT VCRL J&J -B: Performed by: SURGERY

## 2022-09-15 PROCEDURE — 77030010294 HC STPLR INT TI J&J -C: Performed by: SURGERY

## 2022-09-15 PROCEDURE — 88112 CYTOPATH CELL ENHANCE TECH: CPT

## 2022-09-15 PROCEDURE — 77030008462 HC STPLR SKN PROX J&J -A: Performed by: SURGERY

## 2022-09-15 PROCEDURE — 87086 URINE CULTURE/COLONY COUNT: CPT

## 2022-09-15 PROCEDURE — 74011250636 HC RX REV CODE- 250/636: Performed by: SURGERY

## 2022-09-15 PROCEDURE — 74011000250 HC RX REV CODE- 250: Performed by: SURGERY

## 2022-09-15 PROCEDURE — 71045 X-RAY EXAM CHEST 1 VIEW: CPT

## 2022-09-15 PROCEDURE — 77030011264 HC ELECTRD BLD EXT COVD -A: Performed by: SURGERY

## 2022-09-15 PROCEDURE — 82962 GLUCOSE BLOOD TEST: CPT

## 2022-09-15 PROCEDURE — 77030031139 HC SUT VCRL2 J&J -A: Performed by: SURGERY

## 2022-09-15 PROCEDURE — C1765 ADHESION BARRIER: HCPCS | Performed by: SURGERY

## 2022-09-15 PROCEDURE — 82947 ASSAY GLUCOSE BLOOD QUANT: CPT

## 2022-09-15 PROCEDURE — 88305 TISSUE EXAM BY PATHOLOGIST: CPT

## 2022-09-15 PROCEDURE — 74011000250 HC RX REV CODE- 250: Performed by: ANESTHESIOLOGY

## 2022-09-15 PROCEDURE — 88309 TISSUE EXAM BY PATHOLOGIST: CPT

## 2022-09-15 PROCEDURE — 74018 RADEX ABDOMEN 1 VIEW: CPT

## 2022-09-15 PROCEDURE — 77030040361 HC SLV COMPR DVT MDII -B: Performed by: SURGERY

## 2022-09-15 PROCEDURE — 77030002996 HC SUT SLK J&J -A: Performed by: SURGERY

## 2022-09-15 PROCEDURE — 65610000006 HC RM INTENSIVE CARE

## 2022-09-15 PROCEDURE — 76210000001 HC OR PH I REC 2.5 TO 3 HR: Performed by: SURGERY

## 2022-09-15 PROCEDURE — 2709999900 HC NON-CHARGEABLE SUPPLY: Performed by: SURGERY

## 2022-09-15 PROCEDURE — 74011000258 HC RX REV CODE- 258: Performed by: ANESTHESIOLOGY

## 2022-09-15 PROCEDURE — 88331 PATH CONSLTJ SURG 1 BLK 1SPC: CPT

## 2022-09-15 PROCEDURE — 77030005513 HC CATH URETH FOL11 MDII -B: Performed by: SURGERY

## 2022-09-15 PROCEDURE — 77030009978 HC RELD STPLR TCR J&J -B: Performed by: SURGERY

## 2022-09-15 PROCEDURE — 76010000174 HC OR TIME 3.5 TO 4 HR INTENSV-TIER 1: Performed by: SURGERY

## 2022-09-15 PROCEDURE — 74011250636 HC RX REV CODE- 250/636: Performed by: ANESTHESIOLOGY

## 2022-09-15 PROCEDURE — 77030012058: Performed by: SURGERY

## 2022-09-15 PROCEDURE — 76060000038 HC ANESTHESIA 3.5 TO 4 HR: Performed by: SURGERY

## 2022-09-15 DEVICE — BARRIER, ABSORBABLE, ADHESION
Type: IMPLANTABLE DEVICE | Site: ABDOMEN | Status: FUNCTIONAL
Brand: SEPRAFILM®

## 2022-09-15 RX ORDER — ROCURONIUM BROMIDE 10 MG/ML
INJECTION, SOLUTION INTRAVENOUS AS NEEDED
Status: DISCONTINUED | OUTPATIENT
Start: 2022-09-15 | End: 2022-09-15 | Stop reason: HOSPADM

## 2022-09-15 RX ORDER — SODIUM CHLORIDE 9 MG/ML
20 INJECTION, SOLUTION INTRAVENOUS CONTINUOUS
Status: DISCONTINUED | OUTPATIENT
Start: 2022-09-15 | End: 2022-09-18

## 2022-09-15 RX ORDER — LIDOCAINE HYDROCHLORIDE 20 MG/ML
INJECTION, SOLUTION EPIDURAL; INFILTRATION; INTRACAUDAL; PERINEURAL AS NEEDED
Status: DISCONTINUED | OUTPATIENT
Start: 2022-09-15 | End: 2022-09-15 | Stop reason: HOSPADM

## 2022-09-15 RX ORDER — ONDANSETRON 2 MG/ML
INJECTION INTRAMUSCULAR; INTRAVENOUS AS NEEDED
Status: DISCONTINUED | OUTPATIENT
Start: 2022-09-15 | End: 2022-09-15 | Stop reason: HOSPADM

## 2022-09-15 RX ORDER — SODIUM CHLORIDE 0.9 % (FLUSH) 0.9 %
5-40 SYRINGE (ML) INJECTION AS NEEDED
Status: DISCONTINUED | OUTPATIENT
Start: 2022-09-15 | End: 2022-09-15 | Stop reason: HOSPADM

## 2022-09-15 RX ORDER — HYDROMORPHONE HYDROCHLORIDE 1 MG/ML
0.2 INJECTION, SOLUTION INTRAMUSCULAR; INTRAVENOUS; SUBCUTANEOUS
Status: DISCONTINUED | OUTPATIENT
Start: 2022-09-15 | End: 2022-09-15 | Stop reason: HOSPADM

## 2022-09-15 RX ORDER — BUPIVACAINE HYDROCHLORIDE 2.5 MG/ML
INJECTION, SOLUTION EPIDURAL; INFILTRATION; INTRACAUDAL AS NEEDED
Status: DISCONTINUED | OUTPATIENT
Start: 2022-09-15 | End: 2022-09-15 | Stop reason: HOSPADM

## 2022-09-15 RX ORDER — PROPOFOL 10 MG/ML
INJECTION, EMULSION INTRAVENOUS AS NEEDED
Status: DISCONTINUED | OUTPATIENT
Start: 2022-09-15 | End: 2022-09-15 | Stop reason: HOSPADM

## 2022-09-15 RX ORDER — FENTANYL CITRATE 50 UG/ML
INJECTION, SOLUTION INTRAMUSCULAR; INTRAVENOUS AS NEEDED
Status: DISCONTINUED | OUTPATIENT
Start: 2022-09-15 | End: 2022-09-15 | Stop reason: HOSPADM

## 2022-09-15 RX ORDER — SODIUM CHLORIDE 0.9 % (FLUSH) 0.9 %
5-40 SYRINGE (ML) INJECTION AS NEEDED
Status: DISCONTINUED | OUTPATIENT
Start: 2022-09-15 | End: 2022-09-21

## 2022-09-15 RX ORDER — SODIUM CHLORIDE 0.9 % (FLUSH) 0.9 %
5-40 SYRINGE (ML) INJECTION EVERY 8 HOURS
Status: DISCONTINUED | OUTPATIENT
Start: 2022-09-15 | End: 2022-09-15 | Stop reason: HOSPADM

## 2022-09-15 RX ORDER — SODIUM CHLORIDE, SODIUM LACTATE, POTASSIUM CHLORIDE, CALCIUM CHLORIDE 600; 310; 30; 20 MG/100ML; MG/100ML; MG/100ML; MG/100ML
INJECTION, SOLUTION INTRAVENOUS
Status: DISCONTINUED | OUTPATIENT
Start: 2022-09-15 | End: 2022-09-15 | Stop reason: HOSPADM

## 2022-09-15 RX ORDER — SODIUM CHLORIDE 0.9 % (FLUSH) 0.9 %
5-40 SYRINGE (ML) INJECTION EVERY 8 HOURS
Status: DISCONTINUED | OUTPATIENT
Start: 2022-09-15 | End: 2022-09-21

## 2022-09-15 RX ORDER — NORETHINDRONE AND ETHINYL ESTRADIOL 0.5-0.035
KIT ORAL AS NEEDED
Status: DISCONTINUED | OUTPATIENT
Start: 2022-09-15 | End: 2022-09-15 | Stop reason: HOSPADM

## 2022-09-15 RX ORDER — SODIUM CHLORIDE, SODIUM LACTATE, POTASSIUM CHLORIDE, CALCIUM CHLORIDE 600; 310; 30; 20 MG/100ML; MG/100ML; MG/100ML; MG/100ML
75 INJECTION, SOLUTION INTRAVENOUS CONTINUOUS
Status: DISCONTINUED | OUTPATIENT
Start: 2022-09-15 | End: 2022-09-17

## 2022-09-15 RX ORDER — MORPHINE SULFATE 10 MG/ML
1 INJECTION, SOLUTION INTRAMUSCULAR; INTRAVENOUS
Status: DISCONTINUED | OUTPATIENT
Start: 2022-09-15 | End: 2022-09-16

## 2022-09-15 RX ADMIN — PROPOFOL 200 MG: 10 INJECTION, EMULSION INTRAVENOUS at 12:14

## 2022-09-15 RX ADMIN — FENTANYL CITRATE 50 MCG: 50 INJECTION, SOLUTION INTRAMUSCULAR; INTRAVENOUS at 12:38

## 2022-09-15 RX ADMIN — FENTANYL CITRATE 50 MCG: 50 INJECTION, SOLUTION INTRAMUSCULAR; INTRAVENOUS at 12:15

## 2022-09-15 RX ADMIN — AMPICILLIN SODIUM AND SULBACTAM SODIUM 3 G: 2; 1 INJECTION, POWDER, FOR SOLUTION INTRAMUSCULAR; INTRAVENOUS at 12:29

## 2022-09-15 RX ADMIN — ROCURONIUM BROMIDE 10 MG: 10 INJECTION, SOLUTION INTRAVENOUS at 13:16

## 2022-09-15 RX ADMIN — LIDOCAINE HYDROCHLORIDE 50 MG: 20 INJECTION, SOLUTION EPIDURAL; INFILTRATION; INTRACAUDAL; PERINEURAL at 12:14

## 2022-09-15 RX ADMIN — PHENYLEPHRINE HYDROCHLORIDE 200 MCG: 10 INJECTION INTRAVENOUS at 13:27

## 2022-09-15 RX ADMIN — ROCURONIUM BROMIDE 40 MG: 10 INJECTION, SOLUTION INTRAVENOUS at 12:14

## 2022-09-15 RX ADMIN — EPHEDRINE SULFATE 10 MG: 50 INJECTION INTRAVENOUS at 13:25

## 2022-09-15 RX ADMIN — EPHEDRINE SULFATE 10 MG: 50 INJECTION INTRAVENOUS at 13:26

## 2022-09-15 RX ADMIN — PHENYLEPHRINE HYDROCHLORIDE 200 MCG: 10 INJECTION INTRAVENOUS at 13:30

## 2022-09-15 RX ADMIN — SODIUM CHLORIDE, POTASSIUM CHLORIDE, SODIUM LACTATE AND CALCIUM CHLORIDE 75 ML/HR: 600; 310; 30; 20 INJECTION, SOLUTION INTRAVENOUS at 22:29

## 2022-09-15 RX ADMIN — PHENYLEPHRINE HYDROCHLORIDE 100 MCG: 10 INJECTION INTRAVENOUS at 13:22

## 2022-09-15 RX ADMIN — EPHEDRINE SULFATE 10 MG: 50 INJECTION INTRAVENOUS at 13:24

## 2022-09-15 RX ADMIN — PHENYLEPHRINE HYDROCHLORIDE 200 MCG: 10 INJECTION INTRAVENOUS at 13:31

## 2022-09-15 RX ADMIN — ONDANSETRON 4 MG: 2 INJECTION INTRAMUSCULAR; INTRAVENOUS at 12:30

## 2022-09-15 RX ADMIN — SODIUM CHLORIDE, POTASSIUM CHLORIDE, SODIUM LACTATE AND CALCIUM CHLORIDE: 600; 310; 30; 20 INJECTION, SOLUTION INTRAVENOUS at 12:14

## 2022-09-15 RX ADMIN — PHENYLEPHRINE HYDROCHLORIDE 100 MCG: 10 INJECTION INTRAVENOUS at 13:24

## 2022-09-15 RX ADMIN — PHENYLEPHRINE HYDROCHLORIDE 200 MCG: 10 INJECTION INTRAVENOUS at 13:33

## 2022-09-15 RX ADMIN — EPHEDRINE SULFATE 10 MG: 50 INJECTION INTRAVENOUS at 13:28

## 2022-09-15 RX ADMIN — MORPHINE SULFATE 1 MG: 10 INJECTION INTRAVENOUS at 19:41

## 2022-09-15 RX ADMIN — SODIUM CHLORIDE, PRESERVATIVE FREE 5 ML: 5 INJECTION INTRAVENOUS at 22:30

## 2022-09-15 RX ADMIN — SODIUM CHLORIDE, POTASSIUM CHLORIDE, SODIUM LACTATE AND CALCIUM CHLORIDE: 600; 310; 30; 20 INJECTION, SOLUTION INTRAVENOUS at 11:58

## 2022-09-15 NOTE — PROGRESS NOTES
Bedside and Verbal shift change report given to Tory Munoz (oncoming nurse) by Jeremy Stevens (offgoing nurse). Report included the following information SBAR, OR Summary, Cardiac Rhythm NSR, and Quality Measures.

## 2022-09-15 NOTE — PROGRESS NOTES
Pharmacy Note - Zosyn    2.25 mg Zosyn IVPB q 8 h ordered for treatment of Intra-Abdominal Infection. Per [facility]  Renal / Extended Infusion B Lactam Policy, Zosyn will be changed to 4500 mg IVPB x 1 over 30 min to be followed in ~ 8 h. Estimated Creatinine Clearance: Estimated Creatinine Clearance: 17.6 mL/min (A) (by C-G formula based on SCr of 2.07 mg/dL (H)). Dialysis Status, JULIENNE, CKD: CKD    BMI:  There is no height or weight on file to calculate BMI. 16.1    No results for input(s): WBC in the last 72 hours. Temp (24hrs), Av.6 °F (36.4 °C), Min:97.6 °F (36.4 °C), Max:97.6 °F (36.4 °C)      Rationale for Adjustment:  [facility] Renal / Extended Infusion B Lactam Policy, CrCl <94  (74.2 ml/min)    Pharmacy will continue to monitor and adjust dose as necessary. Please call Inpatient Pharmacy with any questions.     Thank you,  Norm Bucio MS R.PH

## 2022-09-15 NOTE — PROGRESS NOTES
Patient arrived to ICU bed 271 prior to shift from PACU. Patient in bed moaning with c/o abdominal pain. SHift report given at bedside from Cesar Cooley, [facility]. As patient just arrived, will assume patient as a new admit.

## 2022-09-15 NOTE — PERIOP NOTES
17:25    Dr Marco Antonio Barrera inserted a new NG Tube in the right nostril without complication. NG Tube was attached on the patient's nose.     JUAN CARLOS Hunter

## 2022-09-15 NOTE — OP NOTES
OPERATIVE NOTE  Patient: Laura Montes  YOB: 1935  MRN: [id number]    Date of Procedure: 9/15/2022     Pre-Op Diagnosis:Transverse colon /Hepatic flexure mass/Cancer    Post-Op Diagnosis: Same as preoperative diagnosis. Procedure(s):  EXPLORATORY LAPAROTOMY, EXTENDED RIGHT  ELIZABETH COLECTOMY   Surgeon(s):  Carolyn Hobson MD    Surgical Assistant: Surg Asst-1: Chelsea Oneil    Anesthesia: General / local    Anesthesiologist: Dr. 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 Patient was laid supine. Patient received prophylactic dose of antibiotic. Patient had teds and SCD applied to both lower extremities. After intubation a Wright catheter was placed under aseptic precaution. The abdomen was prepped and draped usual sterile fashion. Timeout was completed. An incision was placed over the old right paramedian incision and extended inferiorly up to the level of the umbilicus. Incision deepened through subcutaneous tissue and the linea alba. The peritoneal cavity was entered without any complication. A Bookwalter retractor was placed. Initially we looked for a mass with the tattoo along the sigmoid colon and the descending colon since the colonoscopy was dictated as descending colon mass. Unfortunately could not feel a mass or even see a tattoo either in the sigmoid colon or in the descending colon. Then we started running the colon more proximally along the entire transverse colon where he felt a mass hard and nodular close to the hepatic flexure. But unfortunately there was no tattoo. I examined the colon further proximally along the ascending colon and the cecum. We did not find any mass or feel any mass. But there seem to be a tattoo in the cecum. So this confused the whole picture.   Colonoscopy dictation was suggestive of a 6 cm near obstructing mass located in the descending colon at approximately 65 cm with tatoo. But contrary to the colonoscopy report we found a mass in the transverse colon close to the hepatic flexure but without any tattoo. The tattoo marks were in the cecum. Ran the entire colon starting from sigmoid up the descending colon and the transverse and ascending colon multiple times to make sure that this was the only mass that we could clinically feel and was located in the transverse colon close to the hepatic flexure. This mass was corresponding to the findings in the CT scan that was dictated. At this time the decision was made to proceed with an extended right hemicolectomy. The cecum and ascending colon was mobilized along the white line of Toldt. The hepatic flexure was taken down carefully. The transverse colon was  from the gastrocolic omentum all the area past the middle colic vessels. The entire right and the transverse colon was completely mobilized. The right ureter was identified and carefully preserved. The duodenum was identified and carefully preserved. Then we went and divided the small bowel centimeter proximal to the terminal ileum with a MAGED-75 blue load. In a similar fashion the transverse colon was divided midway with MAGED-75 blue load. The mesentery was taken down with the ileocolic vessels and the right colic and the right branch of the middle colic using a LigaSure device. The specimen was passed off. This was sent immediately for was frozen section to the pathology department to make sure that this mass was the only mass and was malignant. The frozen section confirmed that this was a malignant neoplasm. Also we sent some peritoneal fluid for cytology as well. Brought the distal ileum and anastomosed to the transverse colon in a side-to-side functional end-to-end fashion using a MAGED-75 blue load.   The common channel was closed using 2 layer closure technique using an inner layer of 3-0 Vicryl and outer layer of 3-0 silk pop-off's. The mesenteric defect was closed using 2-0 Vicryl running stitches. A crotch stitch was also placed using 3-0 silk pop-off. There was a large wad of omentum that was also excised using ligasure device and submitted as a separate specimen. Then we went ahead and replaced the bowel back and is well cavity. Seprafilm was placed. The fascia was closed using #0 looped PDS from above and below. Gloves were changed. The incision site was irrigated. Subtenons tissues were closed using 3-0 Vicryl simple interrupted stitches. Skin was stapled together. A Silverlon dressing was placed. An abdominal binder was placed. Patient tolerated procedure very well. There were no complication. Estimated blood loss was minimal.  Patient was extubated and transferred to recovery room in a stable condition. All counts were correct. Estimated Blood Loss (mL): Minimal    Complications: None    Specimens:   ID Type Source Tests Collected by Time Destination   1 : TRANSVERSE COLON MASS  Frozen Section Colon  Santiago Matthew MD 9/15/2022 1358 Pathology   1 : URINE CULTURE  Urine Wright Specimen CULTURE, ANAEROBIC Yanelis Ruffin MD 9/15/2022 1241 Microbiology   1 : peritoneal fluid Fresh Peritoneal Fluid  Santiago Matthew MD 9/15/2022 1323 Cytology        Implants:   Implant Name Type Inv.  Item Serial No.  Lot No. LRB No. Used Action   BARRIER ADH SHT 6X5 IN SODIUM HYALURONATE CMC SEPRAFILM - SNA  BARRIER ADH SHT 6X5 IN SODIUM HYALURONATE CMC SEPRAFILM NA [employer]_WD [id number] N/A 1 Implanted       Drains: None    Findings: as above    Electronically Signed by Ilene Griffiths MD on 9/15/2022 at 3:38 PM

## 2022-09-15 NOTE — PERIOP NOTES
15:55    I just got this patient out of the operating room with an NG tube in his right nostril not attached to the patient's nose. I checked the position of the NG tube and this test was inconclusive. We called Dr. Kamla Wolf who guided us to insert the NG tube a little further. We tried to do that, found resistance and called the X-Ray to check the position of the NG Tube. The result is still inconclusive and the patient has an oxygen saturation of approximately 80% at this moment even with 5l of oxygen. After the X-ray I called Dr Kamla Wolf, explained the doubt about the placement of the NG Tube and he oriented me to send this patient to the unit (505). We spoke with Dr Devonte Almeida (Anesthetist) who agreed with us about the doubt and about the X-Ray result and, given the situation, he removed the NG Tube. After removing the NG Tube, the patient's oxygen saturation reached 100% with the same 5l of oxygen.     JUAN CARLOS Yo No

## 2022-09-15 NOTE — PROGRESS NOTES
TRANSFER - OUT REPORT:    Verbal report given to Amelia Reed RN(name) on Rosi Doe  being transferred to 271(unit) for routine post - op       Report consisted of patients Situation, Background, Assessment and   Recommendations(SBAR). Information from the following report(s) SBAR, Procedure Summary, Intake/Output, and MAR was reviewed with the receiving nurse. Opportunity for questions and clarification was provided.       Patient transported with:   O2 @ 3 liters  Registered Nurse

## 2022-09-15 NOTE — ANESTHESIA PREPROCEDURE EVALUATION
Relevant Problems   NEUROLOGY   (+) Acute CVA (cerebrovascular accident) ([facility] [address]   (+) CVA (cerebral vascular accident) ([facility] [address]      CARDIOVASCULAR   (+) Accelerated hypertension       Anesthetic History               Review of Systems / Medical History  Patient summary reviewed, nursing notes reviewed and pertinent labs reviewed    Pulmonary    COPD               Neuro/Psych       CVA  TIA and psychiatric history     Cardiovascular    Hypertension          CAD and hyperlipidemia         GI/Hepatic/Renal         Renal disease: CRI       Endo/Other        Cancer     Other Findings              Physical Exam    Airway  Mallampati: II  TM Distance: 4 - 6 cm         Cardiovascular    Rhythm: regular           Dental    Dentition: Edentulous     Pulmonary      Decreased breath sounds: bilateral           Abdominal         Other Findings            Anesthetic Plan    ASA: 3  Anesthesia type: general          Induction: Intravenous  Anesthetic plan and risks discussed with: Patient

## 2022-09-16 LAB — MRSA DNA SPEC QL NAA+PROBE: NOT DETECTED

## 2022-09-16 PROCEDURE — 74011000250 HC RX REV CODE- 250: Performed by: SURGERY

## 2022-09-16 PROCEDURE — 65610000006 HC RM INTENSIVE CARE

## 2022-09-16 PROCEDURE — 87641 MR-STAPH DNA AMP PROBE: CPT

## 2022-09-16 PROCEDURE — 77010033678 HC OXYGEN DAILY

## 2022-09-16 PROCEDURE — 74011000258 HC RX REV CODE- 258: Performed by: SURGERY

## 2022-09-16 PROCEDURE — 74011000250 HC RX REV CODE- 250: Performed by: ANESTHESIOLOGY

## 2022-09-16 PROCEDURE — 74011250636 HC RX REV CODE- 250/636: Performed by: SURGERY

## 2022-09-16 RX ORDER — HYDROMORPHONE HYDROCHLORIDE 1 MG/ML
0.2 INJECTION, SOLUTION INTRAMUSCULAR; INTRAVENOUS; SUBCUTANEOUS
Status: DISPENSED | OUTPATIENT
Start: 2022-09-16 | End: 2022-09-19

## 2022-09-16 RX ADMIN — HYDROMORPHONE HYDROCHLORIDE 0.2 MG: 1 INJECTION, SOLUTION INTRAMUSCULAR; INTRAVENOUS; SUBCUTANEOUS at 17:02

## 2022-09-16 RX ADMIN — SODIUM CHLORIDE, PRESERVATIVE FREE 20 MG: 5 INJECTION INTRAVENOUS at 16:56

## 2022-09-16 RX ADMIN — SODIUM CHLORIDE, PRESERVATIVE FREE 10 ML: 5 INJECTION INTRAVENOUS at 16:56

## 2022-09-16 RX ADMIN — HYDROMORPHONE HYDROCHLORIDE 0.2 MG: 1 INJECTION, SOLUTION INTRAMUSCULAR; INTRAVENOUS; SUBCUTANEOUS at 10:34

## 2022-09-16 RX ADMIN — PIPERACILLIN AND TAZOBACTAM 3.38 G: 3; .375 INJECTION, POWDER, FOR SOLUTION INTRAVENOUS at 00:29

## 2022-09-16 RX ADMIN — PIPERACILLIN AND TAZOBACTAM 3.38 G: 3; .375 INJECTION, POWDER, FOR SOLUTION INTRAVENOUS at 12:04

## 2022-09-16 RX ADMIN — SODIUM CHLORIDE, PRESERVATIVE FREE 5 ML: 5 INJECTION INTRAVENOUS at 05:56

## 2022-09-16 NOTE — PROGRESS NOTES
Patient rested well overnight. Pain control managed with Morphine. NAD noted. Wright drained total of 350 cc over 12 hours. O2 removed as patient is 100%on 2L, will add prn. Midline abd incision dressing intact with minimal old drainage noted.

## 2022-09-16 NOTE — PROGRESS NOTES
CXR reviewed. NG tube placed in the OR found to be in right main stem bronchus. Discussed with Dr. Fredy Layton. NG tube was removed & repeat CXR showed no pneumothorax or pneumomediastinum. New NG tube placed by Dr. Fredy Layton and repeat CXR looked ok with NG tube in appropriate location.

## 2022-09-16 NOTE — PROGRESS NOTES
Bedside and Verbal shift change report given to Marisa Love RN (oncoming nurse) by Doloris Essex, RN (offgoing nurse). Report included the following information SBAR, Kardex, Intake/Output, and Cardiac Rhythm SINUS ARRYTHMIA .

## 2022-09-16 NOTE — PROGRESS NOTES
Reason for Admission:  Colon cancer [facility]), Status post surgery                     RUR Score:    11% Low                 Plan for utilizing home health:      Prior [facility]. TBD after therapy eval    PCP: First and Last name:  Krystyna Tripp     Name of Practice:    Are you a current patient: Yes/No:    Approximate date of last visit: August 19th, 2022   Can you participate in a virtual visit with your PCP:                     Current Advanced Directive/Advance Care Plan: Full Code      Healthcare Decision Maker:   Click here to complete [employer] including selection of the Healthcare Decision Maker Relationship (ie \"Primary\")             Primary Decision Maker: Byron Hernandez - Daughter - [phone]    Secondary Decision Maker: Loki Torres - Daughter - [phone]                  Transition of Care Plan:                      Met w/patient and daughter Eduardo Griffith) at bedside. Patient awake and participated w/assessment. Lives alone in a senior apartment located on the second floor, and the apartment has an elevator. Last seen in PCP Bryson Cevallos) office Aug 19th, 2022. Oncologist is Dr. Smith Neri and currently isn't doing chemo treatment. [facility] at PCP. Self reports independent w/all ADL's & IADL's and drives himself to/from all medical appointment's. Family assists w/transportation needs when needed. No home O2. DME consists of a walker-PRN usage. Prior [facility] & [facility] (inpatient acute rehab) in the past.  No prior SNF. Patient has six adult children total.  Patient verbalized daughter's Eduardo Griffith as primary decision maker, and David Brooks as his secondary decision maker. Will require PT/OT eval when medically appropriate. Care Management Interventions  PCP Verified by CM: Yes  Last Visit to PCP: 08/19/22  Mode of Transport at Discharge:  Other (see comment) (Family)  Transition of Care Consult (CM Consult): Discharge Planning  Discharge Durable Medical Equipment: No (DME (walker-PRN))  Support Systems: Child(elba)  Confirm Follow Up Transport: Family  Discharge Location  Patient Expects to be Discharged to[de-identified] [address], [place]

## 2022-09-17 ENCOUNTER — APPOINTMENT (OUTPATIENT)
Dept: GENERAL RADIOLOGY | Age: 87
DRG: 329 | End: 2022-09-17
Attending: SURGERY
Payer: MEDICARE

## 2022-09-17 ENCOUNTER — APPOINTMENT (OUTPATIENT)
Dept: NON INVASIVE DIAGNOSTICS | Age: 87
DRG: 329 | End: 2022-09-17
Attending: INTERNAL MEDICINE
Payer: MEDICARE

## 2022-09-17 LAB
ALBUMIN SERPL-MCNC: 2.4 G/DL (ref 3.5–5)
ALBUMIN/GLOB SERPL: 1 {RATIO} (ref 1.1–2.2)
ALP SERPL-CCNC: 42 U/L (ref 45–117)
ALT SERPL-CCNC: 17 U/L (ref 12–78)
ANION GAP SERPL CALC-SCNC: 8 MMOL/L (ref 5–15)
ARTERIAL PATENCY WRIST A: YES
AST SERPL W P-5'-P-CCNC: 42 U/L (ref 15–37)
BASE DEFICIT BLDA-SCNC: 2.1 MMOL/L
BASOPHILS # BLD: 0 K/UL (ref 0–0.1)
BASOPHILS NFR BLD: 0 % (ref 0–1)
BDY SITE: ABNORMAL
BILIRUB SERPL-MCNC: 1.5 MG/DL (ref 0.2–1)
BNP SERPL-MCNC: 1897 PG/ML
BODY TEMPERATURE: 98
BUN SERPL-MCNC: 29 MG/DL (ref 6–20)
BUN/CREAT SERPL: 16 (ref 12–20)
CA-I BLD-MCNC: 7.7 MG/DL (ref 8.5–10.1)
CHLORIDE SERPL-SCNC: 112 MMOL/L (ref 97–108)
CO2 SERPL-SCNC: 23 MMOL/L (ref 21–32)
COHGB MFR BLD: 0.3 % (ref 1–2)
CREAT SERPL-MCNC: 1.87 MG/DL (ref 0.7–1.3)
DIFFERENTIAL METHOD BLD: ABNORMAL
EOSINOPHIL # BLD: 0 K/UL (ref 0–0.4)
EOSINOPHIL NFR BLD: 0 % (ref 0–7)
ERYTHROCYTE [DISTWIDTH] IN BLOOD BY AUTOMATED COUNT: 14.2 % (ref 11.5–14.5)
FIO2 ON VENT: 28 %
GAS FLOW.O2 O2 DELIVERY SYS: 35 L/MIN
GLOBULIN SER CALC-MCNC: 2.5 G/DL (ref 2–4)
GLUCOSE SERPL-MCNC: 71 MG/DL (ref 65–100)
HCO3 BLDA-SCNC: 22 MMOL/L (ref 22–26)
HCT VFR BLD AUTO: 25.4 % (ref 36.6–50.3)
HCT VFR BLD AUTO: 29 % (ref 36.6–50.3)
HGB BLD-MCNC: 8 G/DL (ref 12.1–17)
HGB BLD-MCNC: 9.3 G/DL (ref 12.1–17)
IMM GRANULOCYTES # BLD AUTO: 0 K/UL
IMM GRANULOCYTES NFR BLD AUTO: 0 %
LYMPHOCYTES # BLD: 0.6 K/UL (ref 0.8–3.5)
LYMPHOCYTES NFR BLD: 9 % (ref 12–49)
MCH RBC QN AUTO: 27.5 PG (ref 26–34)
MCHC RBC AUTO-ENTMCNC: 31.5 G/DL (ref 30–36.5)
MCV RBC AUTO: 87.3 FL (ref 80–99)
METHGB MFR BLD: 0.6 % (ref 0–1.4)
MONOCYTES # BLD: 0.5 K/UL (ref 0–1)
MONOCYTES NFR BLD: 7 % (ref 5–13)
NEUTS SEG # BLD: 6 K/UL (ref 1.8–8)
NEUTS SEG NFR BLD: 84 % (ref 32–75)
NRBC # BLD: 0 K/UL (ref 0–0.01)
NRBC BLD-RTO: 0 PER 100 WBC
OXYHGB MFR BLD: 95.4 % (ref 95–99)
PCO2 BLDA: 36 MMHG (ref 35–45)
PERFORMED BY, TECHID: ABNORMAL
PH BLDA: 7.41 [PH] (ref 7.35–7.45)
PLATELET # BLD AUTO: 157 K/UL (ref 150–400)
PMV BLD AUTO: 10.7 FL (ref 8.9–12.9)
PO2 BLDA: 89 MMHG (ref 80–100)
POTASSIUM SERPL-SCNC: 4.6 MMOL/L (ref 3.5–5.1)
PROT SERPL-MCNC: 4.9 G/DL (ref 6.4–8.2)
RBC # BLD AUTO: 2.91 M/UL (ref 4.1–5.7)
RBC MORPH BLD: ABNORMAL
SAO2 % BLD: 96 % (ref 95–99)
SAO2% DEVICE SAO2% SENSOR NAME: ABNORMAL
SODIUM SERPL-SCNC: 143 MMOL/L (ref 136–145)
SPECIMEN SITE: ABNORMAL
WBC # BLD AUTO: 7.1 K/UL (ref 4.1–11.1)

## 2022-09-17 PROCEDURE — 93970 EXTREMITY STUDY: CPT

## 2022-09-17 PROCEDURE — 71045 X-RAY EXAM CHEST 1 VIEW: CPT

## 2022-09-17 PROCEDURE — 74011000258 HC RX REV CODE- 258: Performed by: SURGERY

## 2022-09-17 PROCEDURE — 77010033678 HC OXYGEN DAILY

## 2022-09-17 PROCEDURE — 80053 COMPREHEN METABOLIC PANEL: CPT

## 2022-09-17 PROCEDURE — 74011000250 HC RX REV CODE- 250: Performed by: INTERNAL MEDICINE

## 2022-09-17 PROCEDURE — 82803 BLOOD GASES ANY COMBINATION: CPT

## 2022-09-17 PROCEDURE — 93005 ELECTROCARDIOGRAM TRACING: CPT

## 2022-09-17 PROCEDURE — 74018 RADEX ABDOMEN 1 VIEW: CPT

## 2022-09-17 PROCEDURE — 74011000250 HC RX REV CODE- 250: Performed by: ANESTHESIOLOGY

## 2022-09-17 PROCEDURE — 74011000250 HC RX REV CODE- 250: Performed by: SURGERY

## 2022-09-17 PROCEDURE — 83880 ASSAY OF NATRIURETIC PEPTIDE: CPT

## 2022-09-17 PROCEDURE — 36415 COLL VENOUS BLD VENIPUNCTURE: CPT

## 2022-09-17 PROCEDURE — 85025 COMPLETE CBC W/AUTO DIFF WBC: CPT

## 2022-09-17 PROCEDURE — 65610000006 HC RM INTENSIVE CARE

## 2022-09-17 PROCEDURE — 85018 HEMOGLOBIN: CPT

## 2022-09-17 PROCEDURE — 36600 WITHDRAWAL OF ARTERIAL BLOOD: CPT

## 2022-09-17 PROCEDURE — 74011250636 HC RX REV CODE- 250/636: Performed by: INTERNAL MEDICINE

## 2022-09-17 PROCEDURE — 74011250636 HC RX REV CODE- 250/636: Performed by: SURGERY

## 2022-09-17 RX ORDER — FUROSEMIDE 10 MG/ML
80 INJECTION INTRAMUSCULAR; INTRAVENOUS EVERY 12 HOURS
Status: COMPLETED | OUTPATIENT
Start: 2022-09-17 | End: 2022-09-18

## 2022-09-17 RX ORDER — FUROSEMIDE 10 MG/ML
80 INJECTION INTRAMUSCULAR; INTRAVENOUS ONCE
Status: DISCONTINUED | OUTPATIENT
Start: 2022-09-17 | End: 2022-09-17

## 2022-09-17 RX ORDER — FUROSEMIDE 10 MG/ML
40 INJECTION INTRAMUSCULAR; INTRAVENOUS ONCE
Status: COMPLETED | OUTPATIENT
Start: 2022-09-17 | End: 2022-09-17

## 2022-09-17 RX ORDER — METOPROLOL TARTRATE 5 MG/5ML
5 INJECTION INTRAVENOUS EVERY 6 HOURS
Status: DISCONTINUED | OUTPATIENT
Start: 2022-09-17 | End: 2022-09-23 | Stop reason: HOSPADM

## 2022-09-17 RX ADMIN — METOPROLOL TARTRATE 5 MG: 5 INJECTION INTRAVENOUS at 08:30

## 2022-09-17 RX ADMIN — PIPERACILLIN AND TAZOBACTAM 3.38 G: 3; .375 INJECTION, POWDER, FOR SOLUTION INTRAVENOUS at 11:31

## 2022-09-17 RX ADMIN — SODIUM CHLORIDE, PRESERVATIVE FREE 10 ML: 5 INJECTION INTRAVENOUS at 15:35

## 2022-09-17 RX ADMIN — HYDROMORPHONE HYDROCHLORIDE 0.2 MG: 1 INJECTION, SOLUTION INTRAMUSCULAR; INTRAVENOUS; SUBCUTANEOUS at 00:56

## 2022-09-17 RX ADMIN — FUROSEMIDE 80 MG: 10 INJECTION, SOLUTION INTRAMUSCULAR; INTRAVENOUS at 11:31

## 2022-09-17 RX ADMIN — SODIUM CHLORIDE, PRESERVATIVE FREE 10 ML: 5 INJECTION INTRAVENOUS at 00:48

## 2022-09-17 RX ADMIN — SODIUM CHLORIDE, PRESERVATIVE FREE 20 MG: 5 INJECTION INTRAVENOUS at 15:34

## 2022-09-17 RX ADMIN — SODIUM CHLORIDE, POTASSIUM CHLORIDE, SODIUM LACTATE AND CALCIUM CHLORIDE 75 ML/HR: 600; 310; 30; 20 INJECTION, SOLUTION INTRAVENOUS at 00:47

## 2022-09-17 RX ADMIN — SODIUM CHLORIDE, PRESERVATIVE FREE 10 ML: 5 INJECTION INTRAVENOUS at 05:33

## 2022-09-17 RX ADMIN — FUROSEMIDE 40 MG: 10 INJECTION, SOLUTION INTRAMUSCULAR; INTRAVENOUS at 07:47

## 2022-09-17 RX ADMIN — PIPERACILLIN AND TAZOBACTAM 3.38 G: 3; .375 INJECTION, POWDER, FOR SOLUTION INTRAVENOUS at 00:55

## 2022-09-17 RX ADMIN — PIPERACILLIN AND TAZOBACTAM 3.38 G: 3; .375 INJECTION, POWDER, FOR SOLUTION INTRAVENOUS at 23:33

## 2022-09-17 RX ADMIN — METOPROLOL TARTRATE 5 MG: 5 INJECTION INTRAVENOUS at 23:35

## 2022-09-17 RX ADMIN — SODIUM CHLORIDE, PRESERVATIVE FREE 10 ML: 5 INJECTION INTRAVENOUS at 21:13

## 2022-09-17 RX ADMIN — METOPROLOL TARTRATE 5 MG: 5 INJECTION INTRAVENOUS at 11:31

## 2022-09-17 RX ADMIN — METOPROLOL TARTRATE 5 MG: 5 INJECTION INTRAVENOUS at 17:37

## 2022-09-17 NOTE — PROGRESS NOTES
12:47- Patient removed NG tube and IV from right AC. Informed Dr. Guerra Moder he stated to leave out NG tube until further notice.

## 2022-09-17 NOTE — PROGRESS NOTES
POD # 2    Chief Complaint: confused      Subjective:  Mr. Kelsey Rose is a 82y.o. year old * male S/P underwent extended right hemicolectomy . Earlier today desaturated & tachycardic & required high flow nasal O2. Had to be diuresed & appreciate Pulmonary consult. No fever or chills. No abdominal pain. Slightly confused but answers appropriately. No new complaints. Pulled out NG tube. Review of Systems:   Constitutional:  no fever,  no chills,  no sweats, No weakness, No fatigue, No decreased activity. Respiratory: No shortness of breath, No cough, No sputum production, No hemoptysis, No wheezing, No cyanosis. Cardiovascular: No chest pain, No palpitations, No bradycardia, No tachycardia, No peripheral edema, No syncope. Gastrointestinal: No nausea,  No vomiting, No diarrhea, No constipation, No heartburn, No abdominal pain. Genitourinary: No dysuria, No hematuria, No change in urine stream, No urethral discharge, No lesions. Musculoskeletal: No back pain, No neck pain, No joint pain, No muscle pain, No claudication, No decreased range of motion, No trauma. Integumentary: No rash, No pruritus, No abrasions. Neurologic: Alert and oriented X4, No abnormal balance, No headache, No confusion, No numbness, No tingling. Psychiatric: No anxiety, No depression, No rupinder. Physical Exam:     Vitals & Measurements:     Wt Readings from Last 3 Encounters:   09/09/22 47.6 kg (105 lb)   07/27/22 52.2 kg (115 lb)   08/01/22 49 kg (108 lb)     Temp Readings from Last 3 Encounters:   09/17/22 98.1 °F (36.7 °C)   09/09/22 97.7 °F (36.5 °C)   08/04/22 97.5 °F (36.4 °C)     BP Readings from Last 3 Encounters:   09/17/22 (!) 145/82   09/09/22 100/60   08/04/22 (!) 148/77     Pulse Readings from Last 3 Encounters:   09/17/22 94   09/09/22 71   08/04/22 70      Ht Readings from Last 3 Encounters:   09/09/22 5' 7.72\" (1.72 m)   07/27/22 5' 7\" (1.702 m)   08/01/22 5' 7\" (1.702 m)      Date 09/16/22 0700 - 09/17/22 0659 09/17/22 0700 - 09/18/22 0659   Shift 0767-5458 8174-2077 24 Hour Total 8279-9937 5642-2132 24 Hour Total   INTAKE   I.V.  1350 1350 100  100     Volume (0.9% sodium chloride infusion 20 mL)    0  0     Volume (lactated Ringers infusion)  1250 1250        Volume (piperacillin-tazobactam (ZOSYN) 3.375 g in 0.9% sodium chloride (MBP/ADV) 100 mL MBP)  100 100 100  100   Shift Total  1350 1350 100  100   OUTPUT   Urine    3200     Urine Output (mL) (Urinary Catheter 09/15/22 Wright)    3200   Shift Total    3200   NET  775 775 -3100  -3100   Weight (kg)               General: well appearing, no acute distress  Respiratory: normal chest wall expansion, CTA B, no r/r/w, no rubs  Cardiovascular: RRR, no m/r/g, Normal S1 and S2  Abdomen: Soft, non tender, non-distended, normal bowel sounds in all quadrants, no hepatosplenomegaly, no tympany. Incision scar: dressing Intact.   Genitourinary: No inguinal hernia, normal external gentalia, Testis & scrotum normal, no renal angle tenderness  Musculoskeletal: normal ROM in upper and lower extremities  Integumentary: warm, dry, and pink, with no rash, purpura, or petechia  Neurological:Cranial Nerves II-XII grossly intact, No sensory or motor deficit  Psychiatric: Cooperative with normal mood, affect, and cognition    Laboratory Values:   Recent Results (from the past 24 hour(s))   CBC WITH AUTOMATED DIFF    Collection Time: 09/17/22  4:10 AM   Result Value Ref Range    WBC 7.1 4.1 - 11.1 K/uL    RBC 2.91 (L) 4.10 - 5.70 M/uL    HGB 8.0 (L) 12.1 - 17.0 g/dL    HCT 25.4 (L) 36.6 - 50.3 %    MCV 87.3 80.0 - 99.0 FL    MCH 27.5 26.0 - 34.0 PG    MCHC 31.5 30.0 - 36.5 g/dL    RDW 14.2 11.5 - 14.5 %    PLATELET 590 859 - 389 K/uL    MPV 10.7 8.9 - 12.9 FL    NRBC 0.0 0.0  WBC    ABSOLUTE NRBC 0.00 0.00 - 0.01 K/uL    NEUTROPHILS 84 (H) 32 - 75 %    LYMPHOCYTES 9 (L) 12 - 49 %    MONOCYTES 7 5 - 13 %    EOSINOPHILS 0 0 - 7 % BASOPHILS 0 0 - 1 %    IMMATURE GRANULOCYTES 0 %    ABS. NEUTROPHILS 6.0 1.8 - 8.0 K/UL    ABS. LYMPHOCYTES 0.6 (L) 0.8 - 3.5 K/UL    ABS. MONOCYTES 0.5 0.0 - 1.0 K/UL    ABS. EOSINOPHILS 0.0 0.0 - 0.4 K/UL    ABS. BASOPHILS 0.0 0.0 - 0.1 K/UL    ABS. IMM. GRANS. 0.0 K/UL    DF Smear Scanned      RBC COMMENTS Normocytic, Normochromic     METABOLIC PANEL, COMPREHENSIVE    Collection Time: 09/17/22  4:10 AM   Result Value Ref Range    Sodium 143 136 - 145 mmol/L    Potassium 4.6 3.5 - 5.1 mmol/L    Chloride 112 (H) 97 - 108 mmol/L    CO2 23 21 - 32 mmol/L    Anion gap 8 5 - 15 mmol/L    Glucose 71 65 - 100 mg/dL    BUN 29 (H) 6 - 20 mg/dL    Creatinine 1.87 (H) 0.70 - 1.30 mg/dL    BUN/Creatinine ratio 16 12 - 20      GFR est AA 42 (L) >60 ml/min/1.73m2    GFR est non-AA 34 (L) >60 ml/min/1.73m2    Calcium 7.7 (L) 8.5 - 10.1 mg/dL    Bilirubin, total 1.5 (H) 0.2 - 1.0 mg/dL    AST (SGOT) 42 (H) 15 - 37 U/L    ALT (SGPT) 17 12 - 78 U/L    Alk.  phosphatase 42 (L) 45 - 117 U/L    Protein, total 4.9 (L) 6.4 - 8.2 g/dL    Albumin 2.4 (L) 3.5 - 5.0 g/dL    Globulin 2.5 2.0 - 4.0 g/dL    A-G Ratio 1.0 (L) 1.1 - 2.2     BLOOD GAS, ARTERIAL    Collection Time: 09/17/22  4:13 AM   Result Value Ref Range    pH 7.41 7.35 - 7.45      PCO2 36 35 - 45 mmHg    PO2 89 80 - 100 mmHg    O2 SATURATION 96 95 - 99 %    BICARBONATE 22 22 - 26 mmol/L    BASE DEFICIT 2.1 mmol/L    O2 METHOD High Flow Nasal Cannula      O2 FLOW RATE 35.00 L/min    FIO2 28 %    Sample source Arterial      SITE Right Radial      RAFIA'S TEST YES      Carboxy-Hgb 0.3 (L) 1 - 2 %    Methemoglobin 0.6 0 - 1.4 %    Oxyhemoglobin 95.4 95 - 99 %    Performed by [facility]     TEMPERATURE 98.0     NT-PRO BNP    Collection Time: 09/17/22  8:15 AM   Result Value Ref Range    NT pro-BNP 1,897 (H) <450 pg/mL   HGB & HCT    Collection Time: 09/17/22  3:40 PM   Result Value Ref Range    HGB 9.3 (L) 12.1 - 17.0 g/dL    HCT 29.0 (L) 36.6 - 50.3 %         Radiology:   XR ABD PORT  1 V   Final Result   Unremarkable bowel gas pattern. DUPLEX LOWER EXT VENOUS BILAT   Final Result      XR CHEST PORT   Final Result   Bilateral pleural effusions. XR CHEST PORT   Final Result   Enteric tube is in good position. No acute process on portable chest.         XR ABD (KUB)   Final Result   1. Satisfactory positioning of nasogastric tube         XR CHEST PORT   Final Result      Enteric tube has been removed. No evidence of pulmonary hemorrhage or   pneumothorax. XR CHEST PORT   Final Result   Enteric tube is in in the right lung base bronchus. At the time of this   interpretation, enteric tube had been removed. XR CHEST PORT    (Results Pending)         Assessment:  Transverse colon cancer  S/P Extended right hemicolectomy, POD 2    Plan:    Admission  Diet: NPO  IV fluids  SCD  IS  Pain medications  Antibiotics  Nausea medication  Wright  KUB stat  Labs in am.  Appreciate Pulmonary consult. 13. Plan discussed with patient and family and answered all their questions. Thank you for allowing me to participate in the care of this patient.

## 2022-09-17 NOTE — CONSULTS
Consult  Pulmonary, Critical Care    Name: Laura Ards MRN: [id number]   : 1935 Hospital: [facility]   Date: 2022  Admission date: 9/15/2022 Hospital Day: 3       Subjective/Interval History:   Seen in the ICU currently on nasal high flow oxygen. Patient underwent exploratory laparotomy 9/15 with right hemicolectomy for colon cancer. He has been on IV fluids since that time. He is normally on metoprolol as an outpatient. Yesterday he developed tachycardia and during the night hypoxia and placed on nasal high flow oxygen. Chest x-ray shows bilateral pleural effusions with mild congestion. At the current time he denies any discomfort. Patient Active Problem List   Diagnosis Code    Paresthesia and pain of right extremity M79.609, R20.2    Acute CVA (cerebrovascular accident) ([facility] [address] I63.9    Hypercholesteremia E78.00    Accelerated hypertension I10    CVA (cerebral vascular accident) ([facility] [address] I63.9    Colon cancer ([facility] [address] C18.9    Status post surgery Z98.890       IMPRESSION:   Acute hypoxic respiratory failure  Pulmonary edema  Bilateral pleural effusion  Diastolic left ventricular dysfunction  Anemia  Chronic kidney disease  Sinus tachycardia  Colon cancer status post exploratory laparotomy with right hemicolectomy  There is no height or weight on file to calculate BMI. RECOMMENDATIONS/PLAN:   Have ordered 1 dose of Lasix 40 mg. We will observe urinary response will probably need a higher dose with his chronic kidney disease if inadequate urine output from the 40 we will place him on 80 mg every 12 hours  He normally is on Lopressor probably having tachycardia secondary to beta-blocker lack we will start IV Lopressor  Will check duplex scan of the legs to rule out DVT   follow electrolytes, renal function and and blood counts  Hopefully can taper oxygen as he improves         Subjective/Initial History:   I have reviewed the flowsheet and previous days notes.      I was asked by Ilene Griffiths MD to see Soraya carvajal 82 y.o.  male  in consultation for a chief complaint of hypoxic respiratory failure pulmonary edema bilateral pleural effusions          Patient PCP: Robinson Woods  PMH:  has a past medical history of Cancer ([facility] [address] Chronic kidney disease, Chronic obstructive pulmonary disease ([address] Gastrointestinal disorder, Heart attack ([address] Hyperlipidemia, Hypertension, Psychiatric disorder, and Stroke ([facility] [address] PSH:   has a past surgical history that includes colonoscopy (N/A, 08/04/2022); pr abdomen surgery proc unlisted; and hx heent (Bilateral). FHX: family history includes Cancer in his father and mother. SHX:  reports that he quit smoking about 4 years ago. His smoking use included cigarettes. He has never used smokeless tobacco. He reports that he does not drink alcohol and does not use drugs.     Systemic review: He seems very mildly confused but seems adequate for history  General no significant problem prior to surgery denies any chronic fever chills or sweats ankle edema chest pain or diaphoresis  Eyes no double vision or momentary blindness  ENT no nasal congestion drainage or facial pain  Musculoskeletal no swollen tender joints  Endocrinologic no polyuria polydipsia  Neurologic no seizures or syncope  Gastrointestinal no nausea or vomiting NG tube is in place at the current time  Genitourinary no pain or discomfort on urination is followed by Dr. Kirsten Galeano for chronic kidney disease  Cardiovascular states he has a heart doctor does not remember his name not sure what he sees them for he is normally on Norvasc and metoprolol  Respiratory stop smoking 10 or 15 years ago denies any history of asthma emphysema is not on any inhalers although his med list mentions albuterol again that may be some degree of confusion on his part    No Known Allergies   MEDS:   Current Facility-Administered Medications   Medication    metoprolol (LOPRESSOR) injection 5 mg    furosemide (LASIX) injection 80 mg    HYDROmorphone (DILAUDID) syringe 0.2 mg    0.9% sodium chloride infusion 20 mL    sodium chloride (NS) flush 5-40 mL    sodium chloride (NS) flush 5-40 mL    famotidine (PF) (PEPCID) 20 mg in 0.9% sodium chloride 10 mL injection    piperacillin-tazobactam (ZOSYN) 3.375 g in 0.9% sodium chloride (MBP/ADV) 100 mL MBP        Current Facility-Administered Medications:     metoprolol (LOPRESSOR) injection 5 mg, 5 mg, IntraVENous, Q6H, Kenia Chen MD    furosemide (LASIX) injection 80 mg, 80 mg, IntraVENous, Q12H, Kenia Chen MD    HYDROmorphone (DILAUDID) syringe 0.2 mg, 0.2 mg, IntraVENous, Q4H PRN, Sergio Ruffin MD, 0.2 mg at 22 0056    0.9% sodium chloride infusion 20 mL, 20 mL, IntraVENous, CONTINUOUS, Sergio Ruffin MD    sodium chloride (NS) flush 5-40 mL, 5-40 mL, IntraVENous, Q8H, Nick Donaldson MD, 10 mL at 22 0533    sodium chloride (NS) flush 5-40 mL, 5-40 mL, IntraVENous, PRN, Nick Donaldson MD    famotidine (PF) (PEPCID) 20 mg in 0.9% sodium chloride 10 mL injection, 20 mg, IntraVENous, Q24H, Sergio Ruffin MD, 20 mg at 22 1656    piperacillin-tazobactam (ZOSYN) 3.375 g in 0.9% sodium chloride (MBP/ADV) 100 mL MBP, 3.375 g, IntraVENous, Q12H, Sergio Ruffin MD, Last Rate: 25 mL/hr at 22, 3.375 g at 22      Objective:     Vital Signs: Telemetry:    Rapid sinus rhythm Intake/Output:   Visit Vitals  BP (!) 144/76   Pulse 93   Temp 98.5 °F (36.9 °C)   Resp 13   SpO2 97%       Temp (24hrs), Av.5 °F (36.9 °C), Min:98.2 °F (36.8 °C), Max:99 °F (37.2 °C)        O2 Device: Heated, Hi flow nasal cannula O2 Flow Rate (L/min): 35 l/min         There is no height or weight on file to calculate BMI.     Wt Readings from Last 4 Encounters:   22 47.6 kg (105 lb)   22 52.2 kg (115 lb)   22 49 kg (108 lb)   22 49.9 kg (110 lb)          Intake/Output Summary (Last 24 hours) at 9/17/2022 0820  Last data filed at 9/17/2022 0383  Gross per 24 hour   Intake 1350 ml   Output 575 ml   Net 775 ml       Last shift:      No intake/output data recorded. Last 3 shifts: 09/15 1901 - 09/17 0700  In: 2013.8 [I.V.:2013.8]  Out: 36 [Urine:925]       Hemodynamics:    CO:    CI:    CVP:    SVR:   PAP Systolic:    PAP Diastolic:    PVR:    EK45:       Ventilator Settings:      Mode Rate TV Press PEEP FiO2 PIP Min. Vent               28 %            Physical Exam:    General:  male; lying in bed currently on nasal high flow oxygen looks comfortable no accessory muscle use  HEENT: NCAT, normal oral mucosa  Eyes: anicteric; conjunctiva clear extraocular movements intact  Neck: no nodes, no definite JVD no accessory MM use. Chest: no deformity,   Cardiac: Rapid regular rhythm  Lungs: Clear anteriorly and laterally and upper lungs posteriorly with rales in the lower lungs with no breath sounds in the bases  Abd: Abdominal bandaging in place mildly tender no bowel sounds  Ext: no edema; no joint swelling;  No clubbing  : clear urine  Neuro: Awake alert oriented speech is clear moves all 4 extremities  Psych- no agitation, oriented to person; some mild memory impairment  Skin: warm, dry, no cyanosis;   Pulses: Brachial and radial pulses intact  Capillary: Normal capillary refill      Labs:    Recent Labs     09/17/22  0410   WBC 7.1   HGB 8.0*        Recent Labs     09/17/22  0410      K 4.6   *   CO2 23   GLU 71   BUN 29*   CREA 1.87*   CA 7.7*   ALB 2.4*   ALT 17     Recent Labs     09/17/22  0413   PH 7.41   PCO2 36   PO2 89   HCO3 22   FIO2 28   Nasal MRSA smear negative  4/2/2022 Echo ejection fraction 47% diastolic dysfunction multiple wall motion abnormalities and RVSP 34  Imaging:  I have personally reviewed the patients radiographs and have reviewed the reports:    CXR Results  (Last 48 hours)                 09/17/22 0235  XR CHEST PORT Final result    Impression:  Bilateral pleural effusions. Narrative:  Clinical indication: Shortness of breath. Portable upright view of the chest obtained, comparison September 15, 2022. There are some mild bilateral pleural effusion, the heart size is normal. Tip of   the NG tube is below the left hemidiaphragm. 09/15/22 1824  XR CHEST PORT Final result    Impression:  Enteric tube is in good position. No acute process on portable chest.           Narrative:  EXAM:  XR CHEST PORT       INDICATION: Enteric tube placement. Colon carcinoma. COMPARISON: Portable chest earlier today and on 8/1/2022. TECHNIQUE: 2 images of upright portable chest AP view       FINDINGS: Enteric tube extends into the stomach without the field-of-view. Cardiac monitoring wires overlie the thorax. The cardiomediastinal and hilar   contours are within normal limits. The pulmonary vasculature is within normal   limits. Emphysema is unchanged. No pneumothorax. Bones are unchanged. 09/15/22 1700  XR CHEST PORT Final result    Impression:      Enteric tube has been removed. No evidence of pulmonary hemorrhage or   pneumothorax. Narrative:  EXAM:  XR CHEST PORT       INDICATION: Enteric tube removal       COMPARISON: Portable chest on 9/15/2022 and 7/29/2022       TECHNIQUE: Semiupright portable chest AP view       FINDINGS: Enteric tube has been removed. The cardiomediastinal and hilar   contours are within normal limits. The pulmonary vasculature is within normal   limits. Pulmonary lucencies may represent emphysema. Subtle opacities in the right   midlung are unchanged. Calcified granuloma in the right lateral lung is   unchanged. No pneumothorax. The visualized bones and upper abdomen are   age-appropriate. 09/15/22 1630  XR CHEST PORT Final result    Impression:  Enteric tube is in in the right lung base bronchus.  At the time of this   interpretation, enteric tube had been removed. Narrative:  EXAM: XR CHEST PORT       INDICATION: Enteric tube placement. COMPARISON: Portable chest on 8/1/2022. TECHNIQUE: Portable AP chest view       FINDINGS: Enteric tube terminates in the right lower lobe bronchus. Cardiac   monitoring wires overlie the thorax. Normal cardiac silhouette. Aortic arch is   atherosclerotic but not enlarged. Lungs are grossly clear. No pneumothorax. Results from Hospital Encounter encounter on 09/15/22    XR CHEST PORT    Narrative  Clinical indication: Shortness of breath. Portable upright view of the chest obtained, comparison September 15, 2022. There are some mild bilateral pleural effusion, the heart size is normal. Tip of  the NG tube is below the left hemidiaphragm. Impression  Bilateral pleural effusions. XR ABD (KUB)    Narrative  INDICATION:  NGT placement    EXAM: Supine portable abdomen 1805 hours. FINDINGS: Nasogastric tube in satisfactory position mid stomach. Surgical clips  lower abdomen. No obstruction of the lower abdomen is not included on the film    Impression  1. Satisfactory positioning of nasogastric tube      XR CHEST PORT    Narrative  EXAM:  XR CHEST PORT    INDICATION: Enteric tube placement. Colon carcinoma. COMPARISON: Portable chest earlier today and on 8/1/2022. TECHNIQUE: 2 images of upright portable chest AP view    FINDINGS: Enteric tube extends into the stomach without the field-of-view. Cardiac monitoring wires overlie the thorax. The cardiomediastinal and hilar  contours are within normal limits. The pulmonary vasculature is within normal  limits. Emphysema is unchanged. No pneumothorax. Bones are unchanged. Impression  Enteric tube is in good position. No acute process on portable chest.    Results from Hospital Encounter encounter on 08/19/22    CT ABD PELV WO CONT    Narrative  EXAM: CT ABD PELV WO CONT    INDICATION: Benign neoplasm of colon. Gastritis. Blood in stool. COMPARISON: None    IV CONTRAST: None. ORAL CONTRAST: Oral contrast was administered to better evaluate the bowel. TECHNIQUE:  Thin axial images were obtained through the abdomen and pelvis. Coronal and  sagittal reformats were generated. CT dose reduction was achieved through use of  a standardized protocol tailored for this examination and automatic exposure  control for dose modulation. The absence of intravenous contrast material reduces the sensitivity for  evaluation of the vasculature and solid organs. FINDINGS:  LOWER THORAX: Emphysematous changes at the lung bases. Trace left pleural fluid. LIVER: Scattered hepatic cysts. BILIARY TREE: Gallbladder is within normal limits. CBD is not dilated. SPLEEN: within normal limits. PANCREAS: No focal abnormality. ADRENALS: 2.8 x 1.7 cm solid right adrenal lesion, incompletely characterized on  the basis of this study. Recommend contrast-enhanced imaging for further  evaluation. KIDNEYS/URETERS: Simple bilateral renal cysts not requiring further follow-up. No hydronephrosis or stones. STOMACH: Unremarkable. SMALL BOWEL: No dilatation or wall thickening. COLON: Focal wall thickening of the mid transverse colon (201:75) may be due to  peristalsis, however an underlying lesion cannot be excluded. APPENDIX: Normal  PERITONEUM: No ascites or pneumoperitoneum. RETROPERITONEUM: 3.1 x 3.1 cm infrarenal abdominal aortic aneurysm. REPRODUCTIVE ORGANS: Within normal limits. URINARY BLADDER: No mass or calculus. BONES: Degenerative changes of the lumbar spine. No fracture or aggressive  osseous lesion. ABDOMINAL WALL: No mass or hernia. ADDITIONAL COMMENTS: N/A    Impression  1. Focal wall thickening of the mid transverse colon might be due to  peristalsis but an underlying lesion cannot be excluded. Recommend correlation  with colonoscopy.   2.  Indeterminant right adrenal mass measuring 2.8 x 1.7 cm.  3.  Hepatic and renal cysts. 4.  Emphysematous changes at the lung bases. Contrast-enhanced multiphasic CT of the abdomen is recommended for further  characterization of the right adrenal lesion. Discussion: Acute hypoxic respiratory failure with chest x-ray showing bilateral pleural effusions mild congestion. Previous echo shows diastolic dysfunction. He normally is on metoprolol at home currently has sinus tachycardia. We will start IV Lopressor to control heart rate will give IV Lasix follow renal function. Preop hemoglobin was 10 currently 8 we will repeat later today to make sure not having active bleeding. Time of care 50 minutes           Thank you for allowing us to participate in the care of this patient.   We will follow along with you     Doron Mariscal MD

## 2022-09-17 NOTE — PROGRESS NOTES
0150: Patient suddenly desaturated in the 40's with heart rate in the 140's and blood pressure of 157/88. Patient placed on high flow 35/35  RN called Dr. Hola Cheema and orders given for chest xray, ABG, CBC ,BMP and to consult Dr. Bunch Primer  Dr. Hola Cheema updated on the above examinations.

## 2022-09-17 NOTE — PROGRESS NOTES
4080: Dr. Emiliana Waters notified per consult orders. Chest xray findings read to MD and orders given to discontinued LR and administer lasix 40 mg injection. Bedside and Verbal shift change report given to JUAN CARLOS Landeros  (oncoming nurse) by Chato Nye (offgoing nurse). Report included the following information SBAR, Kardex, Procedure Summary, MAR, Accordion, Recent Results, and Med Rec Status. From: Karrie Gaston  To: Rebekah Menon  Sent: 10/10/2021 10:59 AM CDT  Subject: Flu Vaccine received 10-8-2021    Just notifying you that I received the flu vaccine at work on 10-8-2021 through [facility]. Do you need the administration record? Thank you - Karrie

## 2022-09-17 NOTE — PROGRESS NOTES
POD # 1    Chief Complaint:nonr      Subjective:  Mr. Florian Mayen is a 82y.o. year old * male S/P underwent extended right hemicolectomy . Complaints. No fever or chills. No abdominal pain. No new complaints. Review of Systems:   Constitutional:  no fever,  no chills,  no sweats, No weakness, No fatigue, No decreased activity. Respiratory: No shortness of breath, No cough, No sputum production, No hemoptysis, No wheezing, No cyanosis. Cardiovascular: No chest pain, No palpitations, No bradycardia, No tachycardia, No peripheral edema, No syncope. Gastrointestinal: No nausea,  No vomiting, No diarrhea, No constipation, No heartburn, No abdominal pain. Genitourinary: No dysuria, No hematuria, No change in urine stream, No urethral discharge, No lesions. Musculoskeletal: No back pain, No neck pain, No joint pain, No muscle pain, No claudication, No decreased range of motion, No trauma. Integumentary: No rash, No pruritus, No abrasions. Neurologic: Alert and oriented X4, No abnormal balance, No headache, No confusion, No numbness, No tingling. Psychiatric: No anxiety, No depression, No rupinder. Physical Exam:     Vitals & Measurements:     Wt Readings from Last 3 Encounters:   09/09/22 47.6 kg (105 lb)   07/27/22 52.2 kg (115 lb)   08/01/22 49 kg (108 lb)     Temp Readings from Last 3 Encounters:   09/16/22 98.3 °F (36.8 °C)   09/09/22 97.7 °F (36.5 °C)   08/04/22 97.5 °F (36.4 °C)     BP Readings from Last 3 Encounters:   09/16/22 (!) 142/65   09/09/22 100/60   08/04/22 (!) 148/77     Pulse Readings from Last 3 Encounters:   09/16/22 93   09/09/22 71   08/04/22 70      Ht Readings from Last 3 Encounters:   09/09/22 5' 7.72\" (1.72 m)   07/27/22 5' 7\" (1.702 m)   08/01/22 5' 7\" (1.702 m)      Date 09/15/22 1900 - 09/16/22 0659 09/16/22 0700 - 09/17/22 0659   Shift 6043-6815 24 Hour Total 9179-6555 4543-9052 24 Hour Total   INTAKE   I.V. 663.8 1763.8        Volume (0.9% sodium chloride infusion 20 mL) 0 0        Volume (lactated Ringers infusion)  1000        Volume (lactated Ringers infusion) 563.8 563.8        Volume (ampicillin-sulbactam (UNASYN) 3 g in 0.9% sodium chloride (MBP/ADV) 100 mL MBP)  100        Volume (piperacillin-tazobactam (ZOSYN) 3.375 g in 0.9% sodium chloride (MBP/ADV) 100 mL MBP) 100 100      Shift Total 663.8 1763.8      OUTPUT   Urine 350 350        Urine Output (mL) (Urinary Catheter 09/15/22 Wright) 350 350      Blood  100        Estimated Blood Loss  100      Shift Total 350 450      .8 1313.8      Weight (kg)            General: well appearing, no acute distress  Respiratory: normal chest wall expansion, CTA B, no r/r/w, no rubs  Cardiovascular: RRR, no m/r/g, Normal S1 and S2  Abdomen: Soft, non tender, non-distended, normal bowel sounds in all quadrants, no hepatosplenomegaly, no tympany. Incision scar: dressing Intact. Genitourinary: No inguinal hernia, normal external gentalia, Testis & scrotum normal, no renal angle tenderness  Musculoskeletal: normal ROM in upper and lower extremities  Integumentary: warm, dry, and pink, with no rash, purpura, or petechia  Neurological:Cranial Nerves II-XII grossly intact, No sensory or motor deficit  Psychiatric: Cooperative with normal mood, affect, and cognition    Laboratory Values:   Recent Results (from the past 24 hour(s))   GLUCOSE, POC    Collection Time: 09/15/22 10:35 PM   Result Value Ref Range    Glucose (POC) 129 (H) 65 - 100 mg/dL    Performed by Emerson Ovalle    MRSA SCREEN - PCR (NASAL)    Collection Time: 09/16/22  6:00 AM   Result Value Ref Range    MRSA by PCR, Nasal Not Detected Not Detected           Radiology:   XR CHEST PORT   Final Result   Enteric tube is in good position. No acute process on portable chest.         XR ABD (KUB)   Final Result   1. Satisfactory positioning of nasogastric tube         XR CHEST PORT   Final Result      Enteric tube has been removed.  No evidence of pulmonary hemorrhage or pneumothorax. XR CHEST PORT   Final Result   Enteric tube is in in the right lung base bronchus. At the time of this   interpretation, enteric tube had been removed. Assessment:  Transverse colon cancer  S/P Extended right hemicolectomy, POD 1    Plan:    Admission  Diet: NPO  IV fluids  SCD  IS  Pain medications  Antibiotics  Nausea medication  Wright  NG to low continuous wall suction  Labs & Radiology:  12. Plan discussed with patient and family and answered all their questions. Thank you for allowing me to participate in the care of this patient.

## 2022-09-17 NOTE — PROGRESS NOTES
Patient received 120 mg injection of Lasix during the day. Urine output of 1200 ml during the day. 80 mg of lasix is ordered this evening at 2100. RN called Dr. Cristopher Corbin with concerns of administering this dose. MD recommended to hold 9 pm dose and said to give in the morning.

## 2022-09-18 ENCOUNTER — APPOINTMENT (OUTPATIENT)
Dept: GENERAL RADIOLOGY | Age: 87
DRG: 329 | End: 2022-09-18
Attending: SURGERY
Payer: MEDICARE

## 2022-09-18 ENCOUNTER — APPOINTMENT (OUTPATIENT)
Dept: GENERAL RADIOLOGY | Age: 87
DRG: 329 | End: 2022-09-18
Attending: INTERNAL MEDICINE
Payer: MEDICARE

## 2022-09-18 LAB
ALBUMIN SERPL-MCNC: 2.6 G/DL (ref 3.5–5)
ANION GAP SERPL CALC-SCNC: 9 MMOL/L (ref 5–15)
BACTERIA SPEC CULT: NORMAL
BASOPHILS # BLD: 0 K/UL (ref 0–0.1)
BASOPHILS NFR BLD: 0 % (ref 0–1)
BNP SERPL-MCNC: 4790 PG/ML
BUN SERPL-MCNC: 43 MG/DL (ref 6–20)
BUN/CREAT SERPL: 16 (ref 12–20)
CA-I BLD-MCNC: 8.3 MG/DL (ref 8.5–10.1)
CHLORIDE SERPL-SCNC: 105 MMOL/L (ref 97–108)
CO2 SERPL-SCNC: 26 MMOL/L (ref 21–32)
CREAT SERPL-MCNC: 2.68 MG/DL (ref 0.7–1.3)
DIFFERENTIAL METHOD BLD: ABNORMAL
EOSINOPHIL # BLD: 0 K/UL (ref 0–0.4)
EOSINOPHIL NFR BLD: 0 % (ref 0–7)
ERYTHROCYTE [DISTWIDTH] IN BLOOD BY AUTOMATED COUNT: 14.4 % (ref 11.5–14.5)
FERRITIN SERPL-MCNC: 244 NG/ML (ref 26–388)
GLUCOSE SERPL-MCNC: 63 MG/DL (ref 65–100)
HCT VFR BLD AUTO: 26.2 % (ref 36.6–50.3)
HCT VFR BLD AUTO: 28.1 % (ref 36.6–50.3)
HGB BLD-MCNC: 8.5 G/DL (ref 12.1–17)
HGB BLD-MCNC: 9 G/DL (ref 12.1–17)
IMM GRANULOCYTES # BLD AUTO: 0.1 K/UL (ref 0–0.04)
IMM GRANULOCYTES NFR BLD AUTO: 1 % (ref 0–0.5)
IRON SATN MFR SERPL: 10 % (ref 20–50)
IRON SERPL-MCNC: 16 UG/DL (ref 35–150)
LYMPHOCYTES # BLD: 0.7 K/UL (ref 0.8–3.5)
LYMPHOCYTES NFR BLD: 8 % (ref 12–49)
MAGNESIUM SERPL-MCNC: 2 MG/DL (ref 1.6–2.4)
MCH RBC QN AUTO: 28.4 PG (ref 26–34)
MCHC RBC AUTO-ENTMCNC: 32.4 G/DL (ref 30–36.5)
MCV RBC AUTO: 87.6 FL (ref 80–99)
MONOCYTES # BLD: 0.6 K/UL (ref 0–1)
MONOCYTES NFR BLD: 7 % (ref 5–13)
NEUTS SEG # BLD: 7.8 K/UL (ref 1.8–8)
NEUTS SEG NFR BLD: 84 % (ref 32–75)
NRBC # BLD: 0 K/UL (ref 0–0.01)
NRBC BLD-RTO: 0 PER 100 WBC
PHOSPHATE SERPL-MCNC: 4.4 MG/DL (ref 2.6–4.7)
PLATELET # BLD AUTO: 175 K/UL (ref 150–400)
PMV BLD AUTO: 10.6 FL (ref 8.9–12.9)
POTASSIUM SERPL-SCNC: 4.7 MMOL/L (ref 3.5–5.1)
RBC # BLD AUTO: 2.99 M/UL (ref 4.1–5.7)
SODIUM SERPL-SCNC: 140 MMOL/L (ref 136–145)
SPECIAL REQUESTS,SREQ: NORMAL
TIBC SERPL-MCNC: 155 UG/DL (ref 250–450)
WBC # BLD AUTO: 9.2 K/UL (ref 4.1–11.1)

## 2022-09-18 PROCEDURE — 74011000250 HC RX REV CODE- 250: Performed by: ANESTHESIOLOGY

## 2022-09-18 PROCEDURE — 83880 ASSAY OF NATRIURETIC PEPTIDE: CPT

## 2022-09-18 PROCEDURE — 74011000250 HC RX REV CODE- 250: Performed by: INTERNAL MEDICINE

## 2022-09-18 PROCEDURE — 77010033678 HC OXYGEN DAILY

## 2022-09-18 PROCEDURE — 74011250636 HC RX REV CODE- 250/636: Performed by: SURGERY

## 2022-09-18 PROCEDURE — 85025 COMPLETE CBC W/AUTO DIFF WBC: CPT

## 2022-09-18 PROCEDURE — 82728 ASSAY OF FERRITIN: CPT

## 2022-09-18 PROCEDURE — 74011000258 HC RX REV CODE- 258: Performed by: INTERNAL MEDICINE

## 2022-09-18 PROCEDURE — 74011250636 HC RX REV CODE- 250/636: Performed by: INTERNAL MEDICINE

## 2022-09-18 PROCEDURE — 83735 ASSAY OF MAGNESIUM: CPT

## 2022-09-18 PROCEDURE — 80069 RENAL FUNCTION PANEL: CPT

## 2022-09-18 PROCEDURE — 71045 X-RAY EXAM CHEST 1 VIEW: CPT

## 2022-09-18 PROCEDURE — 74011000258 HC RX REV CODE- 258: Performed by: SURGERY

## 2022-09-18 PROCEDURE — 65610000006 HC RM INTENSIVE CARE

## 2022-09-18 PROCEDURE — 83540 ASSAY OF IRON: CPT

## 2022-09-18 PROCEDURE — 36415 COLL VENOUS BLD VENIPUNCTURE: CPT

## 2022-09-18 PROCEDURE — P9047 ALBUMIN (HUMAN), 25%, 50ML: HCPCS | Performed by: INTERNAL MEDICINE

## 2022-09-18 PROCEDURE — 85018 HEMOGLOBIN: CPT

## 2022-09-18 PROCEDURE — 74011000250 HC RX REV CODE- 250: Performed by: SURGERY

## 2022-09-18 PROCEDURE — 74018 RADEX ABDOMEN 1 VIEW: CPT

## 2022-09-18 RX ORDER — METOCLOPRAMIDE HYDROCHLORIDE 5 MG/ML
10 INJECTION INTRAMUSCULAR; INTRAVENOUS
Status: COMPLETED | OUTPATIENT
Start: 2022-09-18 | End: 2022-09-18

## 2022-09-18 RX ORDER — ALBUMIN HUMAN 250 G/1000ML
25 SOLUTION INTRAVENOUS EVERY 6 HOURS
Status: COMPLETED | OUTPATIENT
Start: 2022-09-18 | End: 2022-09-18

## 2022-09-18 RX ORDER — DEXTROSE, SODIUM CHLORIDE, AND POTASSIUM CHLORIDE 5; .45; .15 G/100ML; G/100ML; G/100ML
50 INJECTION INTRAVENOUS CONTINUOUS
Status: DISCONTINUED | OUTPATIENT
Start: 2022-09-18 | End: 2022-09-18

## 2022-09-18 RX ORDER — DEXTROSE MONOHYDRATE AND SODIUM CHLORIDE 5; .9 G/100ML; G/100ML
125 INJECTION, SOLUTION INTRAVENOUS CONTINUOUS
Status: DISCONTINUED | OUTPATIENT
Start: 2022-09-18 | End: 2022-09-21

## 2022-09-18 RX ADMIN — ALBUMIN (HUMAN) 25 G: 0.25 INJECTION, SOLUTION INTRAVENOUS at 10:48

## 2022-09-18 RX ADMIN — DEXTROSE AND SODIUM CHLORIDE 30 ML/HR: 5; 900 INJECTION, SOLUTION INTRAVENOUS at 09:43

## 2022-09-18 RX ADMIN — HYDROMORPHONE HYDROCHLORIDE 0.2 MG: 1 INJECTION, SOLUTION INTRAMUSCULAR; INTRAVENOUS; SUBCUTANEOUS at 07:44

## 2022-09-18 RX ADMIN — POTASSIUM CHLORIDE, DEXTROSE MONOHYDRATE AND SODIUM CHLORIDE 50 ML/HR: 150; 5; 450 INJECTION, SOLUTION INTRAVENOUS at 08:48

## 2022-09-18 RX ADMIN — FUROSEMIDE 80 MG: 10 INJECTION, SOLUTION INTRAMUSCULAR; INTRAVENOUS at 07:46

## 2022-09-18 RX ADMIN — METOPROLOL TARTRATE 5 MG: 5 INJECTION INTRAVENOUS at 17:47

## 2022-09-18 RX ADMIN — PIPERACILLIN AND TAZOBACTAM 3.38 G: 3; .375 INJECTION, POWDER, FOR SOLUTION INTRAVENOUS at 12:30

## 2022-09-18 RX ADMIN — METOPROLOL TARTRATE 5 MG: 5 INJECTION INTRAVENOUS at 06:07

## 2022-09-18 RX ADMIN — SODIUM CHLORIDE, PRESERVATIVE FREE 10 ML: 5 INJECTION INTRAVENOUS at 06:07

## 2022-09-18 RX ADMIN — METOCLOPRAMIDE 10 MG: 5 INJECTION, SOLUTION INTRAMUSCULAR; INTRAVENOUS at 15:03

## 2022-09-18 RX ADMIN — SODIUM CHLORIDE, PRESERVATIVE FREE 10 ML: 5 INJECTION INTRAVENOUS at 22:40

## 2022-09-18 RX ADMIN — SODIUM CHLORIDE, PRESERVATIVE FREE 10 ML: 5 INJECTION INTRAVENOUS at 16:38

## 2022-09-18 RX ADMIN — METOPROLOL TARTRATE 5 MG: 5 INJECTION INTRAVENOUS at 12:30

## 2022-09-18 RX ADMIN — SODIUM CHLORIDE, PRESERVATIVE FREE 20 MG: 5 INJECTION INTRAVENOUS at 16:37

## 2022-09-18 RX ADMIN — ALBUMIN (HUMAN) 25 G: 0.25 INJECTION, SOLUTION INTRAVENOUS at 17:47

## 2022-09-18 NOTE — PROGRESS NOTES
0- Notified Dr. Ameena Huertas that patient bowel movement was only blood with clots. Dr. Ameena Huertas came to see bedpan full of blood stated \"Repeat H&H blood was likely collected in bowel from previous surgery. \" Writer acquired about paredes removal Dr. Ameena Huertas stated \"We will continue to leave the paredes in to have accurate I&O\"

## 2022-09-18 NOTE — PROGRESS NOTES
POD # 3    Chief Complaint: confused      Subjective:  Mr. Kristina Durand is a 82y.o. year old * male S/P underwent extended right hemicolectomy . Earlier today desaturated & tachycardic & required high flow nasal O2. Had to be diuresed & appreciate Pulmonary consult. Appreciate Nephrology consult. Today he has no fever or chills. No abdominal pain. Mor appropriate today. No new complaints. No flatus. .    Review of Systems:   Constitutional:  no fever,  no chills,  no sweats, No weakness, No fatigue, No decreased activity. Respiratory: No shortness of breath, No cough, No sputum production, No hemoptysis, No wheezing, No cyanosis. Cardiovascular: No chest pain, No palpitations, No bradycardia, No tachycardia, No peripheral edema, No syncope. Gastrointestinal: No nausea,  No vomiting, No diarrhea, No constipation, No heartburn, No abdominal pain. Genitourinary: No dysuria, No hematuria, No change in urine stream, No urethral discharge, No lesions. Musculoskeletal: No back pain, No neck pain, No joint pain, No muscle pain, No claudication, No decreased range of motion, No trauma. Integumentary: No rash, No pruritus, No abrasions. Neurologic: Alert and oriented X4, No abnormal balance, No headache, No confusion, No numbness, No tingling. Psychiatric: No anxiety, No depression, No rupinder. Physical Exam:     Vitals & Measurements:     Wt Readings from Last 3 Encounters:   09/09/22 47.6 kg (105 lb)   07/27/22 52.2 kg (115 lb)   08/01/22 49 kg (108 lb)     Temp Readings from Last 3 Encounters:   09/18/22 97.4 °F (36.3 °C)   09/09/22 97.7 °F (36.5 °C)   08/04/22 97.5 °F (36.4 °C)     BP Readings from Last 3 Encounters:   09/18/22 (!) 140/109   09/09/22 100/60   08/04/22 (!) 148/77     Pulse Readings from Last 3 Encounters:   09/18/22 98   09/09/22 71   08/04/22 70      Ht Readings from Last 3 Encounters:   09/09/22 5' 7.72\" (1.72 m)   07/27/22 5' 7\" (1.702 m)   08/01/22 5' 7\" (1.702 m)      Date 09/17/22 0700 - 09/18/22 0659 09/18/22 0700 - 09/19/22 0659   Shift [id number] 1900-0659 24 Hour Total 1866-3879 7369-0976 24 Hour Total   INTAKE   I.V. 100 100 200 146.7  146. 7     Volume (0.9% sodium chloride infusion 20 mL) 0  0        Volume (dextrose 5% - 0.45% NaCl with KCl 20 mEq/L infusion)    46.7  46.7     Volume (piperacillin-tazobactam (ZOSYN) 3.375 g in 0.9% sodium chloride (MBP/ADV) 100 mL MBP) 100 100 200        Volume (albumin human 25% (BUMINATE) solution 25 g)    100  100   Shift Total 100 100 200 146.7  146.7   OUTPUT   Urine 3200 1125 4325 800  800     Urine Output (mL) (Urinary Catheter 09/15/22 Wright) 3200 1125 4325 800  800   Shift Total 3200 1125 4325 800  800   NET -3100 -1025 -4125 -653.3  -653.3   Weight (kg)               General: well appearing, no acute distress  Respiratory: normal chest wall expansion, CTA B, no r/r/w, no rubs  Cardiovascular: RRR, no m/r/g, Normal S1 and S2  Abdomen: Soft, non tender, non-distended, normal bowel sounds in all quadrants, no hepatosplenomegaly, no tympany. Incision scar: dressing Intact.   Genitourinary: No inguinal hernia, normal external gentalia, Testis & scrotum normal, no renal angle tenderness  Musculoskeletal: normal ROM in upper and lower extremities  Integumentary: warm, dry, and pink, with no rash, purpura, or petechia  Neurological:Cranial Nerves II-XII grossly intact, No sensory or motor deficit  Psychiatric: Cooperative with normal mood, affect, and cognition    Laboratory Values:   Recent Results (from the past 24 hour(s))   HGB & HCT    Collection Time: 09/17/22  3:40 PM   Result Value Ref Range    HGB 9.3 (L) 12.1 - 17.0 g/dL    HCT 29.0 (L) 36.6 - 50.3 %   RENAL FUNCTION PANEL    Collection Time: 09/18/22  4:45 AM   Result Value Ref Range    Sodium 140 136 - 145 mmol/L    Potassium 4.7 3.5 - 5.1 mmol/L    Chloride 105 97 - 108 mmol/L    CO2 26 21 - 32 mmol/L    Anion gap 9 5 - 15 mmol/L    Glucose 63 (L) 65 - 100 mg/dL    BUN 43 (H) 6 - 20 mg/dL    Creatinine 2.68 (H) 0.70 - 1.30 mg/dL    BUN/Creatinine ratio 16 12 - 20      GFR est AA 28 (L) >60 ml/min/1.73m2    GFR est non-AA 23 (L) >60 ml/min/1.73m2    Calcium 8.3 (L) 8.5 - 10.1 mg/dL    Phosphorus 4.4 2.6 - 4.7 mg/dL    Albumin 2.6 (L) 3.5 - 5.0 g/dL   MAGNESIUM    Collection Time: 09/18/22  4:45 AM   Result Value Ref Range    Magnesium 2.0 1.6 - 2.4 mg/dL   NT-PRO BNP    Collection Time: 09/18/22  4:45 AM   Result Value Ref Range    NT pro-BNP 4,790 (H) <450 pg/mL   CBC WITH AUTOMATED DIFF    Collection Time: 09/18/22  4:45 AM   Result Value Ref Range    WBC 9.2 4.1 - 11.1 K/uL    RBC 2.99 (L) 4.10 - 5.70 M/uL    HGB 8.5 (L) 12.1 - 17.0 g/dL    HCT 26.2 (L) 36.6 - 50.3 %    MCV 87.6 80.0 - 99.0 FL    MCH 28.4 26.0 - 34.0 PG    MCHC 32.4 30.0 - 36.5 g/dL    RDW 14.4 11.5 - 14.5 %    PLATELET 785 653 - 252 K/uL    MPV 10.6 8.9 - 12.9 FL    NRBC 0.0 0.0  WBC    ABSOLUTE NRBC 0.00 0.00 - 0.01 K/uL    NEUTROPHILS 84 (H) 32 - 75 %    LYMPHOCYTES 8 (L) 12 - 49 %    MONOCYTES 7 5 - 13 %    EOSINOPHILS 0 0 - 7 %    BASOPHILS 0 0 - 1 %    IMMATURE GRANULOCYTES 1 (H) 0 - 0.5 %    ABS. NEUTROPHILS 7.8 1.8 - 8.0 K/UL    ABS. LYMPHOCYTES 0.7 (L) 0.8 - 3.5 K/UL    ABS. MONOCYTES 0.6 0.0 - 1.0 K/UL    ABS. EOSINOPHILS 0.0 0.0 - 0.4 K/UL    ABS. BASOPHILS 0.0 0.0 - 0.1 K/UL    ABS. IMM. GRANS. 0.1 (H) 0.00 - 0.04 K/UL    DF AUTOMATED           Radiology:   XR ABD (KUB)   Final Result   1. Unchanged few borderline dilated loops of small bowel likely representing   ileus, though difficult to exclude evolving small bowel obstruction. Continued   radiographic follow-up should be considered. XR CHEST PORT   Final Result   1. Unchanged small bilateral pleural effusions, including fluid loculated in the   right minor fissure. 2. Emphysema. XR ABD PORT  1 V   Final Result   Unremarkable bowel gas pattern.       DUPLEX LOWER EXT VENOUS BILAT   Final Result      XR CHEST PORT   Final Result Bilateral pleural effusions. XR CHEST PORT   Final Result   Enteric tube is in good position. No acute process on portable chest.         XR ABD (KUB)   Final Result   1. Satisfactory positioning of nasogastric tube         XR CHEST PORT   Final Result      Enteric tube has been removed. No evidence of pulmonary hemorrhage or   pneumothorax. XR CHEST PORT   Final Result   Enteric tube is in in the right lung base bronchus. At the time of this   interpretation, enteric tube had been removed. XR CHEST PORT    (Results Pending)         Assessment:  Transverse colon cancer  S/P Extended right hemicolectomy, POD 3    Plan:    Admission  Diet: NPO  IV fluids D51/2NS with 20KCL at 50ml/hour. SCD  IS  Pain medications  Antibiotics  Nausea medication  Wright  CXR & KUB stat  Labs in am.  Appreciate Pulmonary & Nephrology consult. 13. Plan discussed with patient and family and answered all their questions. Thank you for allowing me to participate in the care of this patient.

## 2022-09-18 NOTE — PROGRESS NOTES
2100: New onset of agitation and restlessness, with frequent attempts of getting out of bed, hitting staff, and attempting to pull out lines Patient is aware that he is in the hospital but is not redirectable by staff to stay in bed. He is very impulsive. RN called Dr. Abdulaziz Fernandez about the above, recommending medications for anxiety and agitation. MD recommended mitts. Patient would not let RN put on the mitt, but was persuaded by nursing staff. He continuous to get up with mitts and has been refusing nursing care and temperature checks. RN called Dr. Kamal Wolf couple of times but got his voicemail. Rn called Nursing supervisor about the above. Supervisor had to get in touch with MD and call back. Neither MD nor supervisor called back.

## 2022-09-18 NOTE — PROGRESS NOTES
Consult  Pulmonary, Critical Care    Name: Chela Montiel MRN: [id number]   : 1935 Hospital: [facility]   Date: 2022  Admission date: 9/15/2022 Hospital Day: 4       Subjective/Interval History:   Seen in the ICU currently on nasal high flow oxygen. Patient underwent exploratory laparotomy 9/15 with right hemicolectomy for colon cancer. He has been on IV fluids since that time. He is normally on metoprolol as an outpatient. Yesterday he developed tachycardia and during the night hypoxia and placed on nasal high flow oxygen. Chest x-ray shows bilateral pleural effusions with mild congestion. At the current time he denies any discomfort.  now on nasal oxygen 2 L saturation 100%. Mildly confused    Patient Active Problem List   Diagnosis Code    Paresthesia and pain of right extremity M79.609, R20.2    Acute CVA (cerebrovascular accident) ([facility] [address] I63.9    Hypercholesteremia E78.00    Accelerated hypertension I10    CVA (cerebral vascular accident) ([facility] [address] I63.9    Colon cancer ([facility] [address] C18.9    Status post surgery Z98.890       IMPRESSION:   Acute hypoxic respiratory failure improving  Pulmonary edema mildly improved  Bilateral pleural effusion radiology reads as unchanged to me there is slightly decreased fluid  Diastolic left ventricular dysfunction  Anemia  Chronic kidney disease  Sinus tachycardia  Colon cancer status post exploratory laparotomy with right hemicolectomy  There is no height or weight on file to calculate BMI. RECOMMENDATIONS/PLAN:   Tremendous diuresis yesterday but creatinine has risen.   We will hold further Lasix today and give 2 doses of albumin  BNP remains elevated  Duplex scan of the legs no DVT  Hemoglobin stable  Have placed on room air oxygen saturation 96 to 97% but when he moves around it drops questionably artifact from movement of his hand but will maintain oxygen today         Subjective/Initial History:   I have reviewed the flowsheet and previous days notes. I was asked by Sav Claudio MD to see Blaze Tony a 82 y.o.  male  in consultation for a chief complaint of hypoxic respiratory failure pulmonary edema bilateral pleural effusions          Patient PCP: Krystyna Tripp  PMH:  has a past medical history of Cancer ([facility] [address] Chronic kidney disease, Chronic obstructive pulmonary disease ([address] Gastrointestinal disorder, Heart attack ([address] Hyperlipidemia, Hypertension, Psychiatric disorder, and Stroke ([facility] [address] PSH:   has a past surgical history that includes colonoscopy (N/A, 08/04/2022); pr abdomen surgery proc unlisted; and hx heent (Bilateral). FHX: family history includes Cancer in his father and mother. SHX:  reports that he quit smoking about 4 years ago. His smoking use included cigarettes. He has never used smokeless tobacco. He reports that he does not drink alcohol and does not use drugs.     Systemic review: He seems very mildly confused but seems adequate for history  General no significant problem prior to surgery denies any chronic fever chills or sweats ankle edema chest pain or diaphoresis  Eyes no double vision or momentary blindness  ENT no nasal congestion drainage or facial pain  Musculoskeletal no swollen tender joints  Endocrinologic no polyuria polydipsia  Neurologic no seizures or syncope  Gastrointestinal no nausea or vomiting NG tube is in place at the current time  Genitourinary no pain or discomfort on urination is followed by Dr. Smith Neri for chronic kidney disease  Cardiovascular states he has a heart doctor does not remember his name not sure what he sees them for he is normally on Norvasc and metoprolol  Respiratory stop smoking 10 or 15 years ago denies any history of asthma emphysema is not on any inhalers although his med list mentions albuterol again that may be some degree of confusion on his part    No Known Allergies   MEDS:   Current Facility-Administered Medications Medication    dextrose 5% and 0.9% NaCl infusion    metoprolol (LOPRESSOR) injection 5 mg    HYDROmorphone (DILAUDID) syringe 0.2 mg    0.9% sodium chloride infusion 20 mL    sodium chloride (NS) flush 5-40 mL    sodium chloride (NS) flush 5-40 mL    famotidine (PF) (PEPCID) 20 mg in 0.9% sodium chloride 10 mL injection    piperacillin-tazobactam (ZOSYN) 3.375 g in 0.9% sodium chloride (MBP/ADV) 100 mL MBP        Current Facility-Administered Medications:     dextrose 5% and 0.9% NaCl infusion, 30 mL/hr, IntraVENous, CONTINUOUS, Maggy Holm MD    metoprolol (LOPRESSOR) injection 5 mg, 5 mg, IntraVENous, Q6H, Maggy Holm MD, 5 mg at 22 6002    HYDROmorphone (DILAUDID) syringe 0.2 mg, 0.2 mg, IntraVENous, Q4H PRN, Sergio Ruffin MD, 0.2 mg at 22 0744    0.9% sodium chloride infusion 20 mL, 20 mL, IntraVENous, CONTINUOUS, Sergio Ruffin MD    sodium chloride (NS) flush 5-40 mL, 5-40 mL, IntraVENous, Q8H, Annette Aparicio MD, 10 mL at 22 0607    sodium chloride (NS) flush 5-40 mL, 5-40 mL, IntraVENous, PRN, Annette Aparicio MD    famotidine (PF) (PEPCID) 20 mg in 0.9% sodium chloride 10 mL injection, 20 mg, IntraVENous, Q24H, Sergio Ruffin MD, 20 mg at 22 1534    piperacillin-tazobactam (ZOSYN) 3.375 g in 0.9% sodium chloride (MBP/ADV) 100 mL MBP, 3.375 g, IntraVENous, Q12H, Sergio Ruffin MD, Last Rate: 25 mL/hr at 22 2333, 3.375 g at 22 2333      Objective:     Vital Signs: Telemetry:    Rapid sinus rhythm Intake/Output:   Visit Vitals  /88   Pulse 95   Temp 98 °F (36.7 °C)   Resp 20   SpO2 100%       Temp (24hrs), Av.1 °F (36.7 °C), Min:98 °F (36.7 °C), Max:98.1 °F (36.7 °C)        O2 Device: Nasal cannula O2 Flow Rate (L/min): 2 l/min         There is no height or weight on file to calculate BMI.     Wt Readings from Last 4 Encounters:   22 47.6 kg (105 lb)   22 52.2 kg (115 lb)   22 49 kg (108 lb) 07/29/22 49.9 kg (110 lb)          Intake/Output Summary (Last 24 hours) at 9/18/2022 0940  Last data filed at 9/18/2022 0600  Gross per 24 hour   Intake 200 ml   Output 4325 ml   Net -4125 ml         Last shift:      No intake/output data recorded. Last 3 shifts: 09/16 1901 - 09/18 0700  In: 1550 [I.V.:1550]  Out: 4900 [Urine:4900]       Hemodynamics:    CO:    CI:    CVP:    SVR:   PAP Systolic:    PAP Diastolic:    PVR:    WZ93:       Ventilator Settings:      Mode Rate TV Press PEEP FiO2 PIP Min. Vent               28 %            Physical Exam:    General:  male; lying in bed currently on nasal oxygen 2 L  HEENT: NCAT, normal oral mucosa  Eyes: anicteric; conjunctiva clear extraocular movements intact  Neck: no nodes, no definite JVD no accessory MM use. Chest: no deformity,   Cardiac: Regular rate and rhythm  Lungs: Clear anteriorly and laterally and upper lungs posteriorly with rales in the lower lungs with no breath sounds in the bases  Abd: Abdominal bandaging in place mildly tender normal bowel sounds  Ext: no edema; no joint swelling;  No clubbing  : clear urine  Neuro: Awake alert oriented speech is clear moves all 4 extremities  Psych- no agitation, oriented to person; some mild memory impairment mildly confused  Skin: warm, dry, no cyanosis;   Pulses: Brachial and radial pulses intact  Capillary: Normal capillary refill      Labs:    Recent Labs     09/18/22  0445 09/17/22  1540 09/17/22  0410   WBC 9.2  --  7.1   HGB 8.5* 9.3* 8.0*     --  157       Recent Labs     09/18/22  0445 09/17/22  0410    143   K 4.7 4.6    112*   CO2 26 23   GLU 63* 71   BUN 43* 29*   CREA 2.68* 1.87*   CA 8.3* 7.7*   MG 2.0  --    PHOS 4.4  --    ALB 2.6* 2.4*   ALT  --  17       Recent Labs     09/17/22  0413   PH 7.41   PCO2 36   PO2 89   HCO3 22   FIO2 28     Nasal MRSA smear negative  4/2/2022 Echo ejection fraction 41% diastolic dysfunction multiple wall motion abnormalities and RUST 34  Imaging:  I have personally reviewed the patients radiographs and have reviewed the reports:    CXR Results  (Last 48 hours)                 09/18/22 0303  XR CHEST PORT Final result    Impression:  1. Unchanged small bilateral pleural effusions, including fluid loculated in the   right minor fissure. 2. Emphysema. Narrative:  EXAM:  XR CHEST PORT       INDICATION:   Pulmonary edema       COMPARISON: Chest radiograph 9/17/2022. FINDINGS: AP radiograph of the chest was obtained. Unchanged small bilateral pleural effusions, including fluid loculated in the   right minor fissure. Emphysema. There is no new focal consolidation. No   pneumothorax. Heart size is normal. Calcifications of the thoracic aorta. No   acute osseous abnormality. 09/17/22 0235  XR CHEST PORT Final result    Impression:  Bilateral pleural effusions. Narrative:  Clinical indication: Shortness of breath. Portable upright view of the chest obtained, comparison September 15, 2022. There are some mild bilateral pleural effusion, the heart size is normal. Tip of   the NG tube is below the left hemidiaphragm. Results from [place] encounter on 09/15/22    XR ABD PORT  1 V    Narrative  INDICATION: Post op abdominal surgery    EXAM: Abdomen portable, 1809 hours. COMPARISON: 9/15/2022. FINDINGS: Portable supine KUB shows interval removal of NG tube. Bowel gas  pattern is unremarkable. Skin staples remain. Bladder catheter is midline. Impression  Unremarkable bowel gas pattern. XR CHEST PORT    Narrative  EXAM:  XR CHEST PORT    INDICATION:   Pulmonary edema    COMPARISON: Chest radiograph 9/17/2022. FINDINGS: AP radiograph of the chest was obtained. Unchanged small bilateral pleural effusions, including fluid loculated in the  right minor fissure. Emphysema. There is no new focal consolidation. No  pneumothorax.  Heart size is normal. Calcifications of the thoracic aorta. No  acute osseous abnormality. Impression  1. Unchanged small bilateral pleural effusions, including fluid loculated in the  right minor fissure. 2. Emphysema. XR CHEST PORT    Narrative  Clinical indication: Shortness of breath. Portable upright view of the chest obtained, comparison September 15, 2022. There are some mild bilateral pleural effusion, the heart size is normal. Tip of  the NG tube is below the left hemidiaphragm. Impression  Bilateral pleural effusions. Results from [place] encounter on 08/19/22    CT ABD PELV WO CONT    Narrative  EXAM: CT ABD PELV WO CONT    INDICATION: Benign neoplasm of colon. Gastritis. Blood in stool. COMPARISON: None    IV CONTRAST: None. ORAL CONTRAST: Oral contrast was administered to better evaluate the bowel. TECHNIQUE:  Thin axial images were obtained through the abdomen and pelvis. Coronal and  sagittal reformats were generated. CT dose reduction was achieved through use of  a standardized protocol tailored for this examination and automatic exposure  control for dose modulation. The absence of intravenous contrast material reduces the sensitivity for  evaluation of the vasculature and solid organs. FINDINGS:  LOWER THORAX: Emphysematous changes at the lung bases. Trace left pleural fluid. LIVER: Scattered hepatic cysts. BILIARY TREE: Gallbladder is within normal limits. CBD is not dilated. SPLEEN: within normal limits. PANCREAS: No focal abnormality. ADRENALS: 2.8 x 1.7 cm solid right adrenal lesion, incompletely characterized on  the basis of this study. Recommend contrast-enhanced imaging for further  evaluation. KIDNEYS/URETERS: Simple bilateral renal cysts not requiring further follow-up. No hydronephrosis or stones. STOMACH: Unremarkable. SMALL BOWEL: No dilatation or wall thickening.   COLON: Focal wall thickening of the mid transverse colon (201:75) may be due to  peristalsis, however an underlying lesion cannot be excluded. APPENDIX: Normal  PERITONEUM: No ascites or pneumoperitoneum. RETROPERITONEUM: 3.1 x 3.1 cm infrarenal abdominal aortic aneurysm. REPRODUCTIVE ORGANS: Within normal limits. URINARY BLADDER: No mass or calculus. BONES: Degenerative changes of the lumbar spine. No fracture or aggressive  osseous lesion. ABDOMINAL WALL: No mass or hernia. ADDITIONAL COMMENTS: N/A    Impression  1. Focal wall thickening of the mid transverse colon might be due to  peristalsis but an underlying lesion cannot be excluded. Recommend correlation  with colonoscopy. 2.  Indeterminant right adrenal mass measuring 2.8 x 1.7 cm.  3.  Hepatic and renal cysts. 4.  Emphysematous changes at the lung bases. Contrast-enhanced multiphasic CT of the abdomen is recommended for further  characterization of the right adrenal lesion. Discussion: Acute hypoxic respiratory failure with chest x-ray showing bilateral pleural effusions mild congestion. Previous echo shows diastolic dysfunction. He normally is on metoprolol at home currently has sinus tachycardia. We will start IV Lopressor to control heart rate will give IV Lasix follow renal function. Preop hemoglobin was 10 currently 8 we will repeat later today to make sure not having active bleeding. 9/18 oxygenation improved chest x-ray minimal to no improvement. Good diuresis yesterday over 4 L but creatinine has risen. Will give Lasix today we will give 2 doses of albumin BNP remains elevated. We will give small amount IV fluids follow chest x-ray in a.m. Time of care 30 minutes           Thank you for allowing us to participate in the care of this patient.   We will follow along with you     Chastity Olivas MD

## 2022-09-18 NOTE — PROGRESS NOTES
VAT PICC team consult - PowerGlide Pro midline placement note (see vascular access properties and assessment for additional device data): Veins assessed, client had no other suitable vessels for traditional US-guided PIV cannulation. Client noted to be JULIENNE and requiring large bore IV placement for acute blood loss and frequent blood draws per primary care ICU nurse at bedside. Verbal explanation and demonstration given as client education and patient was given opportunity to ask questions; client verbalized understanding, reinforcement required. PowerGlide Pro 20 gauge 10 centimeter (cm) midline placed with ultrasound per policy. Site Location: Right Brachial  Blood return: Yes  Flushes easily with 10 mL NS. Arm circumference: 23 cm    Client tolerated well, no complaints. No complications noted. Dressed per policy with BARD PowerGlide Statlock stabilization device, CHG antimicrobial disk, and sterile Tegaderm. PowerGlide Pro patient education handout with device information placed in chart. Primary care ICU nurse, Jennifer Hernandez, notified.      LOT#: [id number]  Exp: 08/31/2023

## 2022-09-18 NOTE — PROGRESS NOTES
Vascular Access Consult - 20g 1.75 inch PIV placed with US-guidance right anterior [id number] forearm non-cephalic vein. Brisk blood return noted, blood drawn, needleless connector changed and flushed easily with 10 mL NS. Patient tolerated well, no complaints. Primary care ICU nurse notified. Patient benefits from US-guided PIV placement. 09/18/22 1913   Peripheral IV 09/18/22 Anterior;Right Forearm   Placement Date/Time: (c) 09/18/22 1912   Number of Attempts: 1  Inserted By: Agustin Son RN  Present on Admission/Arrival: No  Size: (c) 20 G  Orientation: (c) Anterior;Right  Location: (c) Forearm   Site Assessment Clean, dry, & intact   Phlebitis Assessment 0   Infiltration Assessment 0   Dressing Status Clean, dry, & intact; New   Dressing Type Transparent;Tape   Hub Color/Line Status Pink;Flushed;Patent; End cap changed; Capped   Action Taken Blood drawn;Open ports on tubing capped   Alcohol Cap Used Yes

## 2022-09-18 NOTE — PROGRESS NOTES
Bedside and Verbal shift change report given to JUAN CARLOS Landeros (oncoming nurse) by Wellington Pierre (offgoing nurse). Report included the following information SBAR, Kardex, Intake/Output, MAR, and Med Rec Status.
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
## 2022-09-19 ENCOUNTER — APPOINTMENT (OUTPATIENT)
Dept: GENERAL RADIOLOGY | Age: 87
DRG: 329 | End: 2022-09-19
Attending: INTERNAL MEDICINE
Payer: MEDICARE

## 2022-09-19 LAB
ALBUMIN SERPL-MCNC: 3.6 G/DL (ref 3.5–5)
ANION GAP SERPL CALC-SCNC: 9 MMOL/L (ref 5–15)
ATRIAL RATE: 108 BPM
BASOPHILS # BLD: 0 K/UL (ref 0–0.1)
BASOPHILS # BLD: 0 K/UL (ref 0–0.1)
BASOPHILS NFR BLD: 0 % (ref 0–1)
BASOPHILS NFR BLD: 0 % (ref 0–1)
BUN SERPL-MCNC: 56 MG/DL (ref 6–20)
BUN/CREAT SERPL: 15 (ref 12–20)
CA-I BLD-MCNC: 8.7 MG/DL (ref 8.5–10.1)
CALCULATED P AXIS, ECG09: 80 DEGREES
CALCULATED R AXIS, ECG10: -19 DEGREES
CALCULATED T AXIS, ECG11: 78 DEGREES
CHLORIDE SERPL-SCNC: 106 MMOL/L (ref 97–108)
CO2 SERPL-SCNC: 27 MMOL/L (ref 21–32)
CREAT SERPL-MCNC: 3.62 MG/DL (ref 0.7–1.3)
CREAT UR-MCNC: 112 MG/DL
CREAT UR-MCNC: 113 MG/DL
CRP SERPL-MCNC: 12 MG/DL (ref 0–0.6)
DIAGNOSIS, 93000: NORMAL
DIFFERENTIAL METHOD BLD: ABNORMAL
DIFFERENTIAL METHOD BLD: ABNORMAL
EOSINOPHIL # BLD: 0.3 K/UL (ref 0–0.4)
EOSINOPHIL # BLD: 0.3 K/UL (ref 0–0.4)
EOSINOPHIL NFR BLD: 3 % (ref 0–7)
EOSINOPHIL NFR BLD: 4 % (ref 0–7)
ERYTHROCYTE [DISTWIDTH] IN BLOOD BY AUTOMATED COUNT: 14.1 % (ref 11.5–14.5)
ERYTHROCYTE [DISTWIDTH] IN BLOOD BY AUTOMATED COUNT: 14.2 % (ref 11.5–14.5)
GLUCOSE SERPL-MCNC: 113 MG/DL (ref 65–100)
HCT VFR BLD AUTO: 25.1 % (ref 36.6–50.3)
HCT VFR BLD AUTO: 26.8 % (ref 36.6–50.3)
HCT VFR BLD AUTO: 27.2 % (ref 36.6–50.3)
HGB BLD-MCNC: 8.1 G/DL (ref 12.1–17)
HGB BLD-MCNC: 8.6 G/DL (ref 12.1–17)
HGB BLD-MCNC: 8.9 G/DL (ref 12.1–17)
IMM GRANULOCYTES # BLD AUTO: 0 K/UL (ref 0–0.04)
IMM GRANULOCYTES # BLD AUTO: 0 K/UL (ref 0–0.04)
IMM GRANULOCYTES NFR BLD AUTO: 0 % (ref 0–0.5)
IMM GRANULOCYTES NFR BLD AUTO: 0 % (ref 0–0.5)
LYMPHOCYTES # BLD: 0.8 K/UL (ref 0.8–3.5)
LYMPHOCYTES # BLD: 1.1 K/UL (ref 0.8–3.5)
LYMPHOCYTES NFR BLD: 12 % (ref 12–49)
LYMPHOCYTES NFR BLD: 12 % (ref 12–49)
MCH RBC QN AUTO: 28.2 PG (ref 26–34)
MCH RBC QN AUTO: 28.6 PG (ref 26–34)
MCHC RBC AUTO-ENTMCNC: 32.3 G/DL (ref 30–36.5)
MCHC RBC AUTO-ENTMCNC: 32.7 G/DL (ref 30–36.5)
MCV RBC AUTO: 87.5 FL (ref 80–99)
MCV RBC AUTO: 87.5 FL (ref 80–99)
MONOCYTES # BLD: 0.4 K/UL (ref 0–1)
MONOCYTES # BLD: 0.5 K/UL (ref 0–1)
MONOCYTES NFR BLD: 5 % (ref 5–13)
MONOCYTES NFR BLD: 8 % (ref 5–13)
NEUTS SEG # BLD: 5.1 K/UL (ref 1.8–8)
NEUTS SEG # BLD: 6.8 K/UL (ref 1.8–8)
NEUTS SEG NFR BLD: 76 % (ref 32–75)
NEUTS SEG NFR BLD: 80 % (ref 32–75)
NRBC # BLD: 0 K/UL (ref 0–0.01)
NRBC # BLD: 0 K/UL (ref 0–0.01)
NRBC BLD-RTO: 0 PER 100 WBC
NRBC BLD-RTO: 0 PER 100 WBC
P-R INTERVAL, ECG05: 152 MS
PHOSPHATE SERPL-MCNC: 5.1 MG/DL (ref 2.6–4.7)
PLATELET # BLD AUTO: 185 K/UL (ref 150–400)
PLATELET # BLD AUTO: 216 K/UL (ref 150–400)
PMV BLD AUTO: 10.5 FL (ref 8.9–12.9)
PMV BLD AUTO: 10.8 FL (ref 8.9–12.9)
POTASSIUM SERPL-SCNC: 4 MMOL/L (ref 3.5–5.1)
POTASSIUM UR-SCNC: 41 MMOL/L
PROT UR-MCNC: 123 MG/DL (ref 0–11.9)
PROT UR-MCNC: 126 MG/DL (ref 0–11.9)
PROT/CREAT UR-RTO: 1.1
Q-T INTERVAL, ECG07: 338 MS
QRS DURATION, ECG06: 76 MS
QTC CALCULATION (BEZET), ECG08: 452 MS
RBC # BLD AUTO: 2.87 M/UL (ref 4.1–5.7)
RBC # BLD AUTO: 3.11 M/UL (ref 4.1–5.7)
SODIUM SERPL-SCNC: 142 MMOL/L (ref 136–145)
SODIUM UR-SCNC: 54 MMOL/L
VENTRICULAR RATE, ECG03: 108 BPM
WBC # BLD AUTO: 6.8 K/UL (ref 4.1–11.1)
WBC # BLD AUTO: 8.6 K/UL (ref 4.1–11.1)

## 2022-09-19 PROCEDURE — 74011000250 HC RX REV CODE- 250: Performed by: SURGERY

## 2022-09-19 PROCEDURE — 97530 THERAPEUTIC ACTIVITIES: CPT

## 2022-09-19 PROCEDURE — 84133 ASSAY OF URINE POTASSIUM: CPT

## 2022-09-19 PROCEDURE — 85025 COMPLETE CBC W/AUTO DIFF WBC: CPT

## 2022-09-19 PROCEDURE — 65610000006 HC RM INTENSIVE CARE

## 2022-09-19 PROCEDURE — 97161 PT EVAL LOW COMPLEX 20 MIN: CPT

## 2022-09-19 PROCEDURE — 71045 X-RAY EXAM CHEST 1 VIEW: CPT

## 2022-09-19 PROCEDURE — 86900 BLOOD TYPING SEROLOGIC ABO: CPT

## 2022-09-19 PROCEDURE — 80069 RENAL FUNCTION PANEL: CPT

## 2022-09-19 PROCEDURE — 74011000258 HC RX REV CODE- 258: Performed by: INTERNAL MEDICINE

## 2022-09-19 PROCEDURE — 36415 COLL VENOUS BLD VENIPUNCTURE: CPT

## 2022-09-19 PROCEDURE — 74011000258 HC RX REV CODE- 258: Performed by: SURGERY

## 2022-09-19 PROCEDURE — 84300 ASSAY OF URINE SODIUM: CPT

## 2022-09-19 PROCEDURE — 74011250636 HC RX REV CODE- 250/636: Performed by: SURGERY

## 2022-09-19 PROCEDURE — 84156 ASSAY OF PROTEIN URINE: CPT

## 2022-09-19 PROCEDURE — 86140 C-REACTIVE PROTEIN: CPT

## 2022-09-19 PROCEDURE — 74011000250 HC RX REV CODE- 250: Performed by: ANESTHESIOLOGY

## 2022-09-19 PROCEDURE — 74011000250 HC RX REV CODE- 250: Performed by: INTERNAL MEDICINE

## 2022-09-19 PROCEDURE — 82570 ASSAY OF URINE CREATININE: CPT

## 2022-09-19 PROCEDURE — 86923 COMPATIBILITY TEST ELECTRIC: CPT

## 2022-09-19 PROCEDURE — 85018 HEMOGLOBIN: CPT

## 2022-09-19 RX ORDER — SODIUM CHLORIDE 9 MG/ML
250 INJECTION, SOLUTION INTRAVENOUS AS NEEDED
Status: DISCONTINUED | OUTPATIENT
Start: 2022-09-19 | End: 2022-09-21

## 2022-09-19 RX ADMIN — METOPROLOL TARTRATE 5 MG: 5 INJECTION INTRAVENOUS at 06:23

## 2022-09-19 RX ADMIN — SODIUM CHLORIDE, PRESERVATIVE FREE 10 ML: 5 INJECTION INTRAVENOUS at 06:26

## 2022-09-19 RX ADMIN — METOPROLOL TARTRATE 5 MG: 5 INJECTION INTRAVENOUS at 00:28

## 2022-09-19 RX ADMIN — METOPROLOL TARTRATE 5 MG: 5 INJECTION INTRAVENOUS at 18:19

## 2022-09-19 RX ADMIN — SODIUM CHLORIDE, PRESERVATIVE FREE 10 ML: 5 INJECTION INTRAVENOUS at 18:20

## 2022-09-19 RX ADMIN — DEXTROSE AND SODIUM CHLORIDE 125 ML/HR: 5; 900 INJECTION, SOLUTION INTRAVENOUS at 13:19

## 2022-09-19 RX ADMIN — PIPERACILLIN AND TAZOBACTAM 3.38 G: 3; .375 INJECTION, POWDER, FOR SOLUTION INTRAVENOUS at 00:28

## 2022-09-19 RX ADMIN — METOPROLOL TARTRATE 5 MG: 5 INJECTION INTRAVENOUS at 11:10

## 2022-09-19 RX ADMIN — PIPERACILLIN AND TAZOBACTAM 3.38 G: 3; .375 INJECTION, POWDER, FOR SOLUTION INTRAVENOUS at 11:10

## 2022-09-19 RX ADMIN — DEXTROSE AND SODIUM CHLORIDE 125 ML/HR: 5; 900 INJECTION, SOLUTION INTRAVENOUS at 19:24

## 2022-09-19 RX ADMIN — SODIUM CHLORIDE, PRESERVATIVE FREE 20 MG: 5 INJECTION INTRAVENOUS at 18:19
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
## 2022-09-19 NOTE — PROGRESS NOTES
Consult  Pulmonary, Critical Care    Name: Kristina Durand MRN: [id number]   : 1935 Hospital: [facility]   Date: 2022  Admission date: 9/15/2022 Hospital Day: 5       Subjective/Interval History:   Seen in the ICU currently on nasal high flow oxygen. Patient underwent exploratory laparotomy 9/15 with right hemicolectomy for colon cancer. He has been on IV fluids since that time. He is normally on metoprolol as an outpatient. Yesterday he developed tachycardia and during the night hypoxia and placed on nasal high flow oxygen. Chest x-ray shows bilateral pleural effusions with mild congestion. At the current time he denies any discomfort.  now on nasal oxygen 2 L saturation 100%. Mildly confused   more awake today carries on a simple conversation. He is passing gas and had several bloody bowel movements during the night. Despite no Lasix yesterday he had over 2 L of urine output and creatinine has worsened further    Patient Active Problem List   Diagnosis Code    Paresthesia and pain of right extremity M79.609, R20.2    Acute CVA (cerebrovascular accident) ([address] I63.9    Hypercholesteremia E78.00    Accelerated hypertension I10    CVA (cerebral vascular accident) ([address] I63.9    Colon cancer ([address] C18.9    Status post surgery Z98.890       IMPRESSION:   Acute hypoxic respiratory failure now on room air  Pulmonary edema improved  Bilateral pleural effusion slightly decreased on x-ray  Diastolic left ventricular dysfunction  Anemia hemoglobin stable but he is dehydrated  Acute kidney injury despite no Lasix he had diuresis of over 2 L. We will replace IV today  Chronic kidney disease  Sinus tachycardia controlled with IV Lopressor  Colon cancer status post exploratory laparotomy with right hemicolectomy  There is no height or weight on file to calculate BMI. RECOMMENDATIONS/PLAN:   Continue diuresis without Lasix dosing.   Dehydrated at this point increase IV fluids  If worsening effusions with fluid replacement would consider thoracentesis to help establish if its just a transudate  BNP remains elevated  Duplex scan of the legs no DVT  Hemoglobin stable but is dehydrated           Subjective/Initial History:   I have reviewed the flowsheet and previous days notes. I was asked by Shaq Nolan MD to see Sheila Contreras a 82 y.o.  male  in consultation for a chief complaint of hypoxic respiratory failure pulmonary edema bilateral pleural effusions          Patient PCP: Tristan Freeman  PMH:  has a past medical history of Cancer ([address] Chronic kidney disease, Chronic obstructive pulmonary disease ([address] Gastrointestinal disorder, Heart attack ([address] Hyperlipidemia, Hypertension, Psychiatric disorder, and Stroke ([address] PSH:   has a past surgical history that includes colonoscopy (N/A, 08/04/2022); pr abdomen surgery proc unlisted; and hx heent (Bilateral). FHX: family history includes Cancer in his father and mother. SHX:  reports that he quit smoking about 4 years ago. His smoking use included cigarettes. He has never used smokeless tobacco. He reports that he does not drink alcohol and does not use drugs.     Systemic review: He seems very mildly confused but seems adequate for history  General no significant problem prior to surgery denies any chronic fever chills or sweats ankle edema chest pain or diaphoresis  Eyes no double vision or momentary blindness  ENT no nasal congestion drainage or facial pain  Musculoskeletal no swollen tender joints  Endocrinologic no polyuria polydipsia  Neurologic no seizures or syncope  Gastrointestinal no nausea or vomiting NG tube is in place at the current time  Genitourinary no pain or discomfort on urination is followed by Dr. Yuly Sauceda for chronic kidney disease  Cardiovascular states he has a heart doctor does not remember his name not sure what he sees them for he is normally on Norvasc and metoprolol  Respiratory stop smoking 10 or 15 years ago denies any history of asthma emphysema is not on any inhalers although his med list mentions albuterol again that may be some degree of confusion on his part    No Known Allergies   MEDS:   Current Facility-Administered Medications   Medication    dextrose 5% and 0.9% NaCl infusion    metoprolol (LOPRESSOR) injection 5 mg    sodium chloride (NS) flush 5-40 mL    sodium chloride (NS) flush 5-40 mL    famotidine (PF) (PEPCID) 20 mg in 0.9% sodium chloride 10 mL injection    piperacillin-tazobactam (ZOSYN) 3.375 g in 0.9% sodium chloride (MBP/ADV) 100 mL MBP        Current Facility-Administered Medications:     dextrose 5% and 0.9% NaCl infusion, 125 mL/hr, IntraVENous, CONTINUOUS, Andrez Yousif MD, Last Rate: 30 mL/hr at 22 0943, 30 mL/hr at 22 0943    metoprolol (LOPRESSOR) injection 5 mg, 5 mg, IntraVENous, Q6H, Andrez Yousif MD, 5 mg at 22 3989    sodium chloride (NS) flush 5-40 mL, 5-40 mL, IntraVENous, Q8H, Noelle Cortez MD, 10 mL at 22 0626    sodium chloride (NS) flush 5-40 mL, 5-40 mL, IntraVENous, PRN, Noelle Cortez MD    famotidine (PF) (PEPCID) 20 mg in 0.9% sodium chloride 10 mL injection, 20 mg, IntraVENous, Q24H, Sergio Ruffin MD, 20 mg at 22 1637    piperacillin-tazobactam (ZOSYN) 3.375 g in 0.9% sodium chloride (MBP/ADV) 100 mL MBP, 3.375 g, IntraVENous, Q12H, Sergio Ruffin MD, Last Rate: 25 mL/hr at 22 0028, 3.375 g at 22 0028      Objective:     Vital Signs: Telemetry:    Rapid sinus rhythm Intake/Output:   Visit Vitals  /86   Pulse (!) 104   Temp 97.5 °F (36.4 °C)   Resp 19   SpO2 97%       Temp (24hrs), Av.4 °F (36.3 °C), Min:97.3 °F (36.3 °C), Max:97.5 °F (36.4 °C)        O2 Device: None (Room air) O2 Flow Rate (L/min): 2 l/min         There is no height or weight on file to calculate BMI.     Wt Readings from Last 4 Encounters:   22 47.6 kg (105 lb)   07/27/22 52.2 kg (115 lb)   08/01/22 49 kg (108 lb)   07/29/22 49.9 kg (110 lb)          Intake/Output Summary (Last 24 hours) at 9/19/2022 0910  Last data filed at 9/19/2022 0500  Gross per 24 hour   Intake 146.67 ml   Output 2225 ml   Net -2078.33 ml         Last shift:      No intake/output data recorded. Last 3 shifts: 09/17 1901 - 09/19 0700  In: 246.7 [I.V.:246.7]  Out: 3350 [Urine:3350]       Hemodynamics:    CO:    CI:    CVP:    SVR:   PAP Systolic:    PAP Diastolic:    PVR:    KQ16:       Ventilator Settings:      Mode Rate TV Press PEEP FiO2 PIP Min. Vent               28 %            Physical Exam:    General:  male; lying in bed no distress no accessory muscle use on room air saturation 97%  HEENT: NCAT, normal oral mucosa  Eyes: anicteric; conjunctiva clear extraocular movements intact  Neck: no nodes, no JVD no accessory MM use. Chest: no deformity,   Cardiac: Regular rate and rhythm  Lungs: Clear anteriorly and laterally and upper lungs posteriorly with diminished in the bases  Abd: Abdominal bandaging in place mildly tender normal bowel sounds  Ext: no edema; no joint swelling; No clubbing  : clear urine  Neuro: Awake alert oriented speech is clear moves all 4 extremities  Psych- no agitation, oriented to person; some mild memory impairment mildly confused  Skin: warm, dry, no cyanosis;   Pulses: Brachial and radial pulses intact  Capillary: Normal capillary refill      Labs:    Recent Labs     09/19/22 0445 09/19/22  0018 09/18/22  1841 09/18/22  0445 09/17/22  1540 09/17/22  0410   WBC 8.6  --   --  9.2  --  7.1   HGB 8.9* 8.6* 9.0* 8.5*   < > 8.0*     --   --  175  --  157    < > = values in this interval not displayed.        Recent Labs     09/19/22 0445 09/18/22  0445 09/17/22  0410    140 143   K 4.0 4.7 4.6    105 112*   CO2 27 26 23   * 63* 71   BUN 56* 43* 29*   CREA 3.62* 2.68* 1.87*   CA 8.7 8.3* 7.7*   MG  --  2.0  --    PHOS 5.1* 4.4 --    ALB 3.6 2.6* 2.4*   ALT  --   --  17       Recent Labs     09/17/22  0413   PH 7.41   PCO2 36   PO2 89   HCO3 22   FIO2 28     9/19 room air oxygen saturation 97%   nasal MRSA smear negative  4/2/2022 Echo ejection fraction 00% diastolic dysfunction multiple wall motion abnormalities and RVSP 34  Imaging:  I have personally reviewed the patients radiographs and have reviewed the reports:    CXR Results  (Last 48 hours)                 09/19/22 0321  XR CHEST PORT Final result    Impression:      Unchanged, trace fluid along the horizontal fissure. Mild LEFT basilar   atelectasis. Narrative:  EXAM: XR CHEST PORT       DATE: 9/19/2022 3:21 AM       INDICATION: Pleural effusions       COMPARISON: Chest radiograph September 18, 2022       FINDINGS: AP portable chest radiograph. The lungs are hyperinflated. Heart size   is normal. No new consolidation is seen. Trace pleural fluid is noted along the   horizontal fissure and at the LEFT base. There is improving aeration of the LEFT   days with mild discoid atelectasis. No pneumothorax is seen. 09/18/22 0303  XR CHEST PORT Final result    Impression:  1. Unchanged small bilateral pleural effusions, including fluid loculated in the   right minor fissure. 2. Emphysema. Narrative:  EXAM:  XR CHEST PORT       INDICATION:   Pulmonary edema       COMPARISON: Chest radiograph 9/17/2022. FINDINGS: AP radiograph of the chest was obtained. Unchanged small bilateral pleural effusions, including fluid loculated in the   right minor fissure. Emphysema. There is no new focal consolidation. No   pneumothorax. Heart size is normal. Calcifications of the thoracic aorta. No   acute osseous abnormality.                  Results from Hospital Encounter encounter on 09/15/22    XR CHEST PORT    Narrative  EXAM: XR CHEST PORT    DATE: 9/19/2022 3:21 AM    INDICATION: Pleural effusions    COMPARISON: Chest radiograph September 18, 2022    FINDINGS: AP portable chest radiograph. The lungs are hyperinflated. Heart size  is normal. No new consolidation is seen. Trace pleural fluid is noted along the  horizontal fissure and at the LEFT base. There is improving aeration of the LEFT  days with mild discoid atelectasis. No pneumothorax is seen. Impression  Unchanged, trace fluid along the horizontal fissure. Mild LEFT basilar  atelectasis. XR ABD (KUB)    Narrative  EXAM:  XR ABD (KUB)    INDICATION:   ileus    COMPARISON: Abdominal radiograph 9/17/2022. FINDINGS: Single view the abdomen was obtained. There are a few borderline  dilated loops of small bowel in the left abdomen measuring up to 3.0 cm,  unchanged. Catheter noted in the pelvis. Skin staples noted. Degenerative  changes in the lumbar spine. Impression  1. Unchanged few borderline dilated loops of small bowel likely representing  ileus, though difficult to exclude evolving small bowel obstruction. Continued  radiographic follow-up should be considered. XR ABD PORT  1 V    Narrative  INDICATION: Post op abdominal surgery    EXAM: Abdomen portable, 1809 hours. COMPARISON: 9/15/2022. FINDINGS: Portable supine KUB shows interval removal of NG tube. Bowel gas  pattern is unremarkable. Skin staples remain. Bladder catheter is midline. Impression  Unremarkable bowel gas pattern. Results from [place] encounter on 08/19/22    CT ABD PELV WO CONT    Narrative  EXAM: CT ABD PELV WO CONT    INDICATION: Benign neoplasm of colon. Gastritis. Blood in stool. COMPARISON: None    IV CONTRAST: None. ORAL CONTRAST: Oral contrast was administered to better evaluate the bowel. TECHNIQUE:  Thin axial images were obtained through the abdomen and pelvis. Coronal and  sagittal reformats were generated. CT dose reduction was achieved through use of  a standardized protocol tailored for this examination and automatic exposure  control for dose modulation.     The absence of intravenous contrast material reduces the sensitivity for  evaluation of the vasculature and solid organs. FINDINGS:  LOWER THORAX: Emphysematous changes at the lung bases. Trace left pleural fluid. LIVER: Scattered hepatic cysts. BILIARY TREE: Gallbladder is within normal limits. CBD is not dilated. SPLEEN: within normal limits. PANCREAS: No focal abnormality. ADRENALS: 2.8 x 1.7 cm solid right adrenal lesion, incompletely characterized on  the basis of this study. Recommend contrast-enhanced imaging for further  evaluation. KIDNEYS/URETERS: Simple bilateral renal cysts not requiring further follow-up. No hydronephrosis or stones. STOMACH: Unremarkable. SMALL BOWEL: No dilatation or wall thickening. COLON: Focal wall thickening of the mid transverse colon (201:75) may be due to  peristalsis, however an underlying lesion cannot be excluded. APPENDIX: Normal  PERITONEUM: No ascites or pneumoperitoneum. RETROPERITONEUM: 3.1 x 3.1 cm infrarenal abdominal aortic aneurysm. REPRODUCTIVE ORGANS: Within normal limits. URINARY BLADDER: No mass or calculus. BONES: Degenerative changes of the lumbar spine. No fracture or aggressive  osseous lesion. ABDOMINAL WALL: No mass or hernia. ADDITIONAL COMMENTS: N/A    Impression  1. Focal wall thickening of the mid transverse colon might be due to  peristalsis but an underlying lesion cannot be excluded. Recommend correlation  with colonoscopy. 2.  Indeterminant right adrenal mass measuring 2.8 x 1.7 cm.  3.  Hepatic and renal cysts. 4.  Emphysematous changes at the lung bases. Contrast-enhanced multiphasic CT of the abdomen is recommended for further  characterization of the right adrenal lesion. Discussion: Acute hypoxic respiratory failure with chest x-ray showing bilateral pleural effusions mild congestion. Previous echo shows diastolic dysfunction. He normally is on metoprolol at home currently has sinus tachycardia.   We will start IV Lopressor to control heart rate will give IV Lasix follow renal function. Preop hemoglobin was 10 currently 8 we will repeat later today to make sure not having active bleeding. 9/18 oxygenation improved chest x-ray minimal to no improvement. Good diuresis yesterday over 4 L but creatinine has risen. Will give Lasix today we will give 2 doses of albumin BNP remains elevated. We will give small amount IV fluids follow chest x-ray in a.m.  9/19 oxygenation has improved chest x-ray has slightly diminished effusion although some persists. Lasix held yesterday due to rising creatinine despite this had diuresis over 2 L with resultant worsening creatinine. Will give IV fluids today continue to hold Lasix until creatinine improving. Question if can start oral nutrition today  Time of care 30 minutes           Thank you for allowing us to participate in the care of this patient.   We will follow along with you     Janet Ortiz MD

## 2022-09-19 NOTE — PROGRESS NOTES
[facility] KIDNEY     Renal Daily Progress Note:     Admission Date: 9/15/2022     Subjective: Patient known from office practice. Came in hospital for left hemicolectomy. Pathology is pending. Postop did develop period of confusion which is resolved. Also has worsening azotemia. Without any evidence of hypotension or nephrotoxic toxic drug use. Today he is alert and oriented. Conversant. Now able to have sips of fluids as he is having flatus. No nausea or vomiting. No abdominal pain. Did have some specks of blood in his stool. No gross bleeding. Not short of breath at rest      Review of Systems  Pertinent items are noted in HPI.     Objective:     Visit Vitals  /86   Pulse (!) 104   Temp 97.3 °F (36.3 °C)   Resp 19   SpO2 97%     Temp (24hrs), Av.4 °F (36.3 °C), Min:97.3 °F (36.3 °C), Max:97.5 °F (36.4 °C)        Intake/Output Summary (Last 24 hours) at 2022 1203  Last data filed at 2022 0500  Gross per 24 hour   Intake --   Output 1425 ml   Net -1425 ml     Current Facility-Administered Medications   Medication Dose Route Frequency    dextrose 5% and 0.9% NaCl infusion  125 mL/hr IntraVENous CONTINUOUS    metoprolol (LOPRESSOR) injection 5 mg  5 mg IntraVENous Q6H    sodium chloride (NS) flush 5-40 mL  5-40 mL IntraVENous Q8H    sodium chloride (NS) flush 5-40 mL  5-40 mL IntraVENous PRN    famotidine (PF) (PEPCID) 20 mg in 0.9% sodium chloride 10 mL injection  20 mg IntraVENous Q24H    piperacillin-tazobactam (ZOSYN) 3.375 g in 0.9% sodium chloride (MBP/ADV) 100 mL MBP  3.375 g IntraVENous Q12H       Physical Exam:General appearance: alert, cooperative, no distress, appears stated age  Head: Normocephalic, without obvious abnormality, atraumatic, mild temporal wasting  Eyes: negative findings: anicteric sclera  Neck: supple, symmetrical, trachea midline, no adenopathy, and no JVD  Lungs: clear to auscultation bilaterally  Heart: regularly irregular rhythm, no S3 or S4  Abdomen: Surgical binder in place, bowel sounds positive, no pain on soft palpation  Extremities: no edema  Neurologic: Grossly normal    Data Review:     LABS:  Recent Labs     09/19/22 0445 09/18/22 0445 09/17/22  0410    140 143   K 4.0 4.7 4.6    105 112*   CO2 27 26 23   BUN 56* 43* 29*   CREA 3.62* 2.68* 1.87*   CA 8.7 8.3* 7.7*   ALB 3.6 2.6* 2.4*   PHOS 5.1* 4.4  --    MG  --  2.0  --      Recent Labs     09/19/22 0445 09/19/22  0018 09/18/22  1841 09/18/22 0445 09/17/22  1540 09/17/22  0410   WBC 8.6  --   --  9.2  --  7.1   HGB 8.9* 8.6* 9.0* 8.5*   < > 8.0*   HCT 27.2* 26.8* 28.1* 26.2*   < > 25.4*     --   --  175  --  157    < > = values in this interval not displayed. No results for input(s): MITZI, KU, CLU, CREAU in the last 72 hours. No lab exists for component: PROU    Assessment:   Renal Specific Problems  Chronic kidney disease stage IIIb at baseline  Acute kidney injury postop  Status post left hemicolectomy for colon cancer  Anemia with renal failure and significant iron deficiency        Plan:     Obtain/ Order: labs/cultures/radiology/procedures:  urine lytes and urine creatinine and renal panel        Therapeutic: We will replace iron with IV prep  Increase in serum creatinine postop may be response to the inflammation and not necessarily volume depletion. However, he does have a fairly significant diuresis.   We will continue with IV fluids for the time being watch for worsening shortness of breath    Advanced diet order, will increase protein intake        Bennie Angel MD    [phone]

## 2022-09-19 NOTE — PROGRESS NOTES
Patient had six episodic of red watery bloody stools last night. Hgb this am is 8.9. Blood pressure and heart rate stable.

## 2022-09-19 NOTE — PROGRESS NOTES
Dr. Emelina Cruz notified about two episodes of red bloody watery stool. Patient is not having any abdominal cramping, pain  and discomfort. Patient is aware when he is about to defecate. MD recommended H & H at midnight and at 4 am . Will continue to monitor.

## 2022-09-19 NOTE — PROGRESS NOTES
Bedside and Verbal shift change report given to JUAN CARLOS Landeros (oncoming nurse) by Ramu Lozano (offgoing nurse). Report included the following information SBAR, Kardex, Intake/Output, MAR, Recent Results, and Med Rec Status.

## 2022-09-20 ENCOUNTER — APPOINTMENT (OUTPATIENT)
Dept: GENERAL RADIOLOGY | Age: 87
DRG: 329 | End: 2022-09-20
Attending: INTERNAL MEDICINE
Payer: MEDICARE

## 2022-09-20 LAB
ALBUMIN SERPL-MCNC: 3.2 G/DL (ref 3.5–5)
ANION GAP SERPL CALC-SCNC: 9 MMOL/L (ref 5–15)
BASOPHILS # BLD: 0 K/UL (ref 0–0.1)
BASOPHILS NFR BLD: 0 % (ref 0–1)
BNP SERPL-MCNC: 3011 PG/ML
BUN SERPL-MCNC: 60 MG/DL (ref 6–20)
BUN/CREAT SERPL: 14 (ref 12–20)
CA-I BLD-MCNC: 8.4 MG/DL (ref 8.5–10.1)
CHLORIDE SERPL-SCNC: 113 MMOL/L (ref 97–108)
CO2 SERPL-SCNC: 22 MMOL/L (ref 21–32)
CREAT SERPL-MCNC: 4.18 MG/DL (ref 0.7–1.3)
CRP SERPL-MCNC: 8.63 MG/DL (ref 0–0.6)
DIFFERENTIAL METHOD BLD: ABNORMAL
EOSINOPHIL # BLD: 0.5 K/UL (ref 0–0.4)
EOSINOPHIL NFR BLD: 8 % (ref 0–7)
ERYTHROCYTE [DISTWIDTH] IN BLOOD BY AUTOMATED COUNT: 13.6 % (ref 11.5–14.5)
GLUCOSE SERPL-MCNC: 119 MG/DL (ref 65–100)
HCT VFR BLD AUTO: 31.7 % (ref 36.6–50.3)
HGB BLD-MCNC: 10.2 G/DL (ref 12.1–17)
IMM GRANULOCYTES # BLD AUTO: 0 K/UL (ref 0–0.04)
IMM GRANULOCYTES NFR BLD AUTO: 0 % (ref 0–0.5)
LYMPHOCYTES # BLD: 0.9 K/UL (ref 0.8–3.5)
LYMPHOCYTES NFR BLD: 13 % (ref 12–49)
MAGNESIUM SERPL-MCNC: 2.5 MG/DL (ref 1.6–2.4)
MCH RBC QN AUTO: 28 PG (ref 26–34)
MCHC RBC AUTO-ENTMCNC: 32.2 G/DL (ref 30–36.5)
MCV RBC AUTO: 87.1 FL (ref 80–99)
MONOCYTES # BLD: 0.5 K/UL (ref 0–1)
MONOCYTES NFR BLD: 7 % (ref 5–13)
NEUTS SEG # BLD: 4.9 K/UL (ref 1.8–8)
NEUTS SEG NFR BLD: 72 % (ref 32–75)
NRBC # BLD: 0 K/UL (ref 0–0.01)
NRBC BLD-RTO: 0 PER 100 WBC
PHOSPHATE SERPL-MCNC: 4 MG/DL (ref 2.6–4.7)
PLATELET # BLD AUTO: 186 K/UL (ref 150–400)
PMV BLD AUTO: 10.7 FL (ref 8.9–12.9)
POTASSIUM SERPL-SCNC: 3.7 MMOL/L (ref 3.5–5.1)
RBC # BLD AUTO: 3.64 M/UL (ref 4.1–5.7)
SODIUM SERPL-SCNC: 144 MMOL/L (ref 136–145)
WBC # BLD AUTO: 7 K/UL (ref 4.1–11.1)

## 2022-09-20 PROCEDURE — 85025 COMPLETE CBC W/AUTO DIFF WBC: CPT

## 2022-09-20 PROCEDURE — 74011000250 HC RX REV CODE- 250: Performed by: SURGERY

## 2022-09-20 PROCEDURE — 36430 TRANSFUSION BLD/BLD COMPNT: CPT

## 2022-09-20 PROCEDURE — 74011000250 HC RX REV CODE- 250: Performed by: ANESTHESIOLOGY

## 2022-09-20 PROCEDURE — 65610000006 HC RM INTENSIVE CARE

## 2022-09-20 PROCEDURE — 83735 ASSAY OF MAGNESIUM: CPT

## 2022-09-20 PROCEDURE — 74011000258 HC RX REV CODE- 258: Performed by: INTERNAL MEDICINE

## 2022-09-20 PROCEDURE — 80069 RENAL FUNCTION PANEL: CPT

## 2022-09-20 PROCEDURE — 86140 C-REACTIVE PROTEIN: CPT

## 2022-09-20 PROCEDURE — 74011250636 HC RX REV CODE- 250/636: Performed by: INTERNAL MEDICINE

## 2022-09-20 PROCEDURE — 36415 COLL VENOUS BLD VENIPUNCTURE: CPT

## 2022-09-20 PROCEDURE — 74011000258 HC RX REV CODE- 258: Performed by: SURGERY

## 2022-09-20 PROCEDURE — 71045 X-RAY EXAM CHEST 1 VIEW: CPT

## 2022-09-20 PROCEDURE — 30233N1 TRANSFUSION OF NONAUTOLOGOUS RED BLOOD CELLS INTO PERIPHERAL VEIN, PERCUTANEOUS APPROACH: ICD-10-PCS | Performed by: SURGERY

## 2022-09-20 PROCEDURE — 74011250636 HC RX REV CODE- 250/636: Performed by: SURGERY

## 2022-09-20 PROCEDURE — 83880 ASSAY OF NATRIURETIC PEPTIDE: CPT

## 2022-09-20 PROCEDURE — 74011000250 HC RX REV CODE- 250: Performed by: INTERNAL MEDICINE

## 2022-09-20 PROCEDURE — 51798 US URINE CAPACITY MEASURE: CPT

## 2022-09-20 PROCEDURE — P9016 RBC LEUKOCYTES REDUCED: HCPCS

## 2022-09-20 RX ADMIN — SODIUM CHLORIDE, PRESERVATIVE FREE 10 ML: 5 INJECTION INTRAVENOUS at 14:45

## 2022-09-20 RX ADMIN — SODIUM CHLORIDE, PRESERVATIVE FREE 10 ML: 5 INJECTION INTRAVENOUS at 05:47

## 2022-09-20 RX ADMIN — PIPERACILLIN AND TAZOBACTAM 3.38 G: 3; .375 INJECTION, POWDER, FOR SOLUTION INTRAVENOUS at 01:32

## 2022-09-20 RX ADMIN — DEXTROSE AND SODIUM CHLORIDE 125 ML/HR: 5; 900 INJECTION, SOLUTION INTRAVENOUS at 17:39

## 2022-09-20 RX ADMIN — METOPROLOL TARTRATE 5 MG: 5 INJECTION INTRAVENOUS at 23:27

## 2022-09-20 RX ADMIN — DEXTROSE AND SODIUM CHLORIDE 125 ML/HR: 5; 900 INJECTION, SOLUTION INTRAVENOUS at 08:53

## 2022-09-20 RX ADMIN — IRON SUCROSE 200 MG: 20 INJECTION, SOLUTION INTRAVENOUS at 08:53

## 2022-09-20 RX ADMIN — PIPERACILLIN AND TAZOBACTAM 3.38 G: 3; .375 INJECTION, POWDER, FOR SOLUTION INTRAVENOUS at 12:07

## 2022-09-20 RX ADMIN — METOPROLOL TARTRATE 5 MG: 5 INJECTION INTRAVENOUS at 01:32

## 2022-09-20 RX ADMIN — PIPERACILLIN AND TAZOBACTAM 3.38 G: 3; .375 INJECTION, POWDER, FOR SOLUTION INTRAVENOUS at 23:27

## 2022-09-20 RX ADMIN — SODIUM CHLORIDE, PRESERVATIVE FREE 10 ML: 5 INJECTION INTRAVENOUS at 01:33

## 2022-09-20 RX ADMIN — METOPROLOL TARTRATE 5 MG: 5 INJECTION INTRAVENOUS at 05:49

## 2022-09-20 RX ADMIN — SODIUM CHLORIDE, PRESERVATIVE FREE 10 ML: 5 INJECTION INTRAVENOUS at 22:44

## 2022-09-20 RX ADMIN — SODIUM CHLORIDE, PRESERVATIVE FREE 20 MG: 5 INJECTION INTRAVENOUS at 16:06

## 2022-09-20 RX ADMIN — METOPROLOL TARTRATE 5 MG: 5 INJECTION INTRAVENOUS at 17:39

## 2022-09-20 RX ADMIN — METOPROLOL TARTRATE 5 MG: 5 INJECTION INTRAVENOUS at 12:07

## 2022-09-20 NOTE — PROGRESS NOTES
POD # 5    Chief Complaint: confused      Subjective:  Mr. Stephanie Damon is a 82y.o. year old * male S/P underwent extended right hemicolectomy . Today he has no fever or chills. No abdominal pain. No new complaints. Passed flatus. & has liquid BM. No blood in stool. Received 1 unit of pRBC last night. Review of Systems:   Constitutional:  no fever,  no chills,  no sweats, No weakness, No fatigue, No decreased activity. Respiratory: No shortness of breath, No cough, No sputum production, No hemoptysis, No wheezing, No cyanosis. Cardiovascular: No chest pain, No palpitations, No bradycardia, No tachycardia, No peripheral edema, No syncope. Gastrointestinal: No nausea,  No vomiting, No diarrhea, No constipation, No heartburn, No abdominal pain. Genitourinary: No dysuria, No hematuria, No change in urine stream, No urethral discharge, No lesions. Musculoskeletal: No back pain, No neck pain, No joint pain, No muscle pain, No claudication, No decreased range of motion, No trauma. Integumentary: No rash, No pruritus, No abrasions. Neurologic: Alert and oriented X4, No abnormal balance, No headache, No confusion, No numbness, No tingling. Psychiatric: No anxiety, No depression, No rupinder. Physical Exam:     Vitals & Measurements:     Wt Readings from Last 3 Encounters:   09/09/22 47.6 kg (105 lb)   07/27/22 52.2 kg (115 lb)   08/01/22 49 kg (108 lb)     Temp Readings from Last 3 Encounters:   09/20/22 97.5 °F (36.4 °C)   09/09/22 97.7 °F (36.5 °C)   08/04/22 97.5 °F (36.4 °C)     BP Readings from Last 3 Encounters:   09/20/22 137/71   09/09/22 100/60   08/04/22 (!) 148/77     Pulse Readings from Last 3 Encounters:   09/20/22 78   09/09/22 71   08/04/22 70      Ht Readings from Last 3 Encounters:   09/20/22 5' 7.72\" (1.72 m)   09/09/22 5' 7.72\" (1.72 m)   07/27/22 5' 7\" (1.702 m)      Date 09/19/22 0700 - 09/20/22 0659 09/20/22 0700 - 09/21/22 0659   Shift 7060-6369 3600-3088 24 Hour Total 2186-6053 5892-9917 24 Hour Total   INTAKE   Blood  250 250        Volume (TRANSFUSE PACKED RBC'S)  250 250      Shift Total  250 250      OUTPUT   Urine    250  250     Urine Output (mL) ([REMOVED] Urinary Catheter 09/15/22 Wright)    250  250   Stool           Stool Occurrence(s)    1 x  1 x   Shift Total    250  250   NET  250 250 -250  -250   Weight (kg)               General: well appearing, no acute distress  Respiratory: normal chest wall expansion, CTA B, no r/r/w, no rubs  Cardiovascular: RRR, no m/r/g, Normal S1 and S2  Abdomen: Soft, non tender, non-distended, normal bowel sounds in all quadrants, no hepatosplenomegaly, no tympany. Incision scar: dressing Intact. Genitourinary: No inguinal hernia, normal external gentalia, Testis & scrotum normal, no renal angle tenderness  Musculoskeletal: normal ROM in upper and lower extremities  Integumentary: warm, dry, and pink, with no rash, purpura, or petechia  Neurological:Cranial Nerves II-XII grossly intact, No sensory or motor deficit  Psychiatric: Cooperative with normal mood, affect, and cognition    Laboratory Values:   Recent Results (from the past 24 hour(s))   CBC WITH AUTOMATED DIFF    Collection Time: 09/19/22  9:24 PM   Result Value Ref Range    WBC 6.8 4.1 - 11.1 K/uL    RBC 2.87 (L) 4.10 - 5.70 M/uL    HGB 8.1 (L) 12.1 - 17.0 g/dL    HCT 25.1 (L) 36.6 - 50.3 %    MCV 87.5 80.0 - 99.0 FL    MCH 28.2 26.0 - 34.0 PG    MCHC 32.3 30.0 - 36.5 g/dL    RDW 14.2 11.5 - 14.5 %    PLATELET 333 967 - 731 K/uL    MPV 10.8 8.9 - 12.9 FL    NRBC 0.0 0.0  WBC    ABSOLUTE NRBC 0.00 0.00 - 0.01 K/uL    NEUTROPHILS 76 (H) 32 - 75 %    LYMPHOCYTES 12 12 - 49 %    MONOCYTES 8 5 - 13 %    EOSINOPHILS 4 0 - 7 %    BASOPHILS 0 0 - 1 %    IMMATURE GRANULOCYTES 0 0 - 0.5 %    ABS. NEUTROPHILS 5.1 1.8 - 8.0 K/UL    ABS. LYMPHOCYTES 0.8 0.8 - 3.5 K/UL    ABS. MONOCYTES 0.5 0.0 - 1.0 K/UL    ABS. EOSINOPHILS 0.3 0.0 - 0.4 K/UL    ABS.  BASOPHILS 0.0 0.0 - 0.1 K/UL ABS. IMM. GRANS. 0.0 0.00 - 0.04 K/UL    DF AUTOMATED     TYPE & SCREEN    Collection Time: 09/19/22 10:32 PM   Result Value Ref Range    Crossmatch Expiration 09/22/2022,2359     ABO/Rh(D) B Positive     Antibody screen Negative     Unit number [id number]     Blood component type RC LR     Unit division 00     Status of unit [address] to transfuse     Crossmatch result Compatible    RENAL FUNCTION PANEL    Collection Time: 09/20/22  6:00 AM   Result Value Ref Range    Sodium 144 136 - 145 mmol/L    Potassium 3.7 3.5 - 5.1 mmol/L    Chloride 113 (H) 97 - 108 mmol/L    CO2 22 21 - 32 mmol/L    Anion gap 9 5 - 15 mmol/L    Glucose 119 (H) 65 - 100 mg/dL    BUN 60 (H) 6 - 20 mg/dL    Creatinine 4.18 (H) 0.70 - 1.30 mg/dL    BUN/Creatinine ratio 14 12 - 20      GFR est AA 17 (L) >60 ml/min/1.73m2    GFR est non-AA 14 (L) >60 ml/min/1.73m2    Calcium 8.4 (L) 8.5 - 10.1 mg/dL    Phosphorus 4.0 2.6 - 4.7 mg/dL    Albumin 3.2 (L) 3.5 - 5.0 g/dL   MAGNESIUM    Collection Time: 09/20/22  6:00 AM   Result Value Ref Range    Magnesium 2.5 (H) 1.6 - 2.4 mg/dL   NT-PRO BNP    Collection Time: 09/20/22  6:00 AM   Result Value Ref Range    NT pro-BNP 3,011 (H) <450 pg/mL   CBC WITH AUTOMATED DIFF    Collection Time: 09/20/22  6:00 AM   Result Value Ref Range    WBC 7.0 4.1 - 11.1 K/uL    RBC 3.64 (L) 4.10 - 5.70 M/uL    HGB 10.2 (L) 12.1 - 17.0 g/dL    HCT 31.7 (L) 36.6 - 50.3 %    MCV 87.1 80.0 - 99.0 FL    MCH 28.0 26.0 - 34.0 PG    MCHC 32.2 30.0 - 36.5 g/dL    RDW 13.6 11.5 - 14.5 %    PLATELET 764 611 - 980 K/uL    MPV 10.7 8.9 - 12.9 FL    NRBC 0.0 0.0  WBC    ABSOLUTE NRBC 0.00 0.00 - 0.01 K/uL    NEUTROPHILS 72 32 - 75 %    LYMPHOCYTES 13 12 - 49 %    MONOCYTES 7 5 - 13 %    EOSINOPHILS 8 (H) 0 - 7 %    BASOPHILS 0 0 - 1 %    IMMATURE GRANULOCYTES 0 0 - 0.5 %    ABS. NEUTROPHILS 4.9 1.8 - 8.0 K/UL    ABS. LYMPHOCYTES 0.9 0.8 - 3.5 K/UL    ABS. MONOCYTES 0.5 0.0 - 1.0 K/UL    ABS.  EOSINOPHILS 0.5 (H) 0.0 - 0.4 K/UL    ABS. BASOPHILS 0.0 0.0 - 0.1 K/UL    ABS. IMM. GRANS. 0.0 0.00 - 0.04 K/UL    DF AUTOMATED     C REACTIVE PROTEIN, QT    Collection Time: 09/20/22  6:00 AM   Result Value Ref Range    C-Reactive protein 8.63 (H) 0.00 - 0.60 mg/dL         Radiology:   XR CHEST PORT   Final Result   No significant change. XR CHEST PORT   Final Result      Unchanged, trace fluid along the horizontal fissure. Mild LEFT basilar   atelectasis. XR ABD (KUB)   Final Result   1. Unchanged few borderline dilated loops of small bowel likely representing   ileus, though difficult to exclude evolving small bowel obstruction. Continued   radiographic follow-up should be considered. XR CHEST PORT   Final Result   1. Unchanged small bilateral pleural effusions, including fluid loculated in the   right minor fissure. 2. Emphysema. XR ABD PORT  1 V   Final Result   Unremarkable bowel gas pattern. DUPLEX LOWER EXT VENOUS BILAT   Final Result      XR CHEST PORT   Final Result   Bilateral pleural effusions. XR CHEST PORT   Final Result   Enteric tube is in good position. No acute process on portable chest.         XR ABD (KUB)   Final Result   1. Satisfactory positioning of nasogastric tube         XR CHEST PORT   Final Result      Enteric tube has been removed. No evidence of pulmonary hemorrhage or   pneumothorax. XR CHEST PORT   Final Result   Enteric tube is in in the right lung base bronchus. At the time of this   interpretation, enteric tube had been removed. Assessment:  Transverse colon cancer  S/P Extended right hemicolectomy, POD 5    Plan:    Admission  Diet: full liquids. IV fluids per Nephrology  SCD  IS  Pain medications  Antibiotics  Nausea medication  Wright removed by RN. Labs in am.  Appreciate Pulmonary & Nephrology consult. 12. Plan discussed with patient and family and answered all their questions.   15. Dr. Daquan Chawla will cover for me on 9/21/22. Thank you for allowing me to participate in the care of this patient.

## 2022-09-20 NOTE — PROGRESS NOTES
[facility] [place] [facility]     Renal Daily Progress Note:     Admission Date: 9/15/2022     Subjective: Patient known from office practice. Came in hospital for left hemicolectomy. Pathology shows adenocarcinoma. Has worsening azotemia, ? Volume depletion post significant diuresis. Without any evidence of hypotension or nephrotoxic toxic drug use. Today he is alert and oriented. Conversant. Tolerating oral fluids; has flatus and BM. No nausea or vomiting. No abdominal pain. Not short of breath at rest      Review of Systems  Pertinent items are noted in HPI.     Objective:     Visit Vitals  /78 (BP 1 Location: Left upper arm)   Pulse 93   Temp 97.6 °F (36.4 °C)   Resp 25   SpO2 98%     Temp (24hrs), Av.8 °F (36.6 °C), Min:97.5 °F (36.4 °C), Max:98.7 °F (37.1 °C)        Intake/Output Summary (Last 24 hours) at 2022 1115  Last data filed at 2022 0935  Gross per 24 hour   Intake 250 ml   Output 250 ml   Net 0 ml       Current Facility-Administered Medications   Medication Dose Route Frequency    iron sucrose (VENOFER) injection 200 mg  200 mg IntraVENous Q48H    0.9% sodium chloride infusion 250 mL  250 mL IntraVENous PRN    dextrose 5% and 0.9% NaCl infusion  125 mL/hr IntraVENous CONTINUOUS    metoprolol (LOPRESSOR) injection 5 mg  5 mg IntraVENous Q6H    sodium chloride (NS) flush 5-40 mL  5-40 mL IntraVENous Q8H    sodium chloride (NS) flush 5-40 mL  5-40 mL IntraVENous PRN    famotidine (PF) (PEPCID) 20 mg in 0.9% sodium chloride 10 mL injection  20 mg IntraVENous Q24H    piperacillin-tazobactam (ZOSYN) 3.375 g in 0.9% sodium chloride (MBP/ADV) 100 mL MBP  3.375 g IntraVENous Q12H       Physical Exam:General appearance: alert, cooperative, no distress, appears stated age  Head: Normocephalic, without obvious abnormality, atraumatic, mild temporal wasting  Eyes: negative findings: anicteric sclera  Neck: supple, symmetrical, trachea midline, no adenopathy, and no JVD  Lungs: clear to auscultation bilaterally  Heart: regularly irregular rhythm, no S3 or S4  Abdomen: Surgical binder in place, bowel sounds positive, no pain on soft palpation  Extremities: no edema  Neurologic: Grossly normal    Data Review:     LABS:  Recent Labs     09/20/22  0600 09/19/22 0445 09/18/22  0445    142 140   K 3.7 4.0 4.7   * 106 105   CO2 22 27 26   BUN 60* 56* 43*   CREA 4.18* 3.62* 2.68*   CA 8.4* 8.7 8.3*   ALB 3.2* 3.6 2.6*   PHOS 4.0 5.1* 4.4   MG 2.5*  --  2.0       Recent Labs     09/20/22  0600 09/19/22 2124 09/19/22 0445   WBC 7.0 6.8 8.6   HGB 10.2* 8.1* 8.9*   HCT 31.7* 25.1* 27.2*    185 216       Recent Labs     09/19/22  1215   MITZI 47   1051 [facility]   CREAU 112.00  113.00     Pathology colon: Moderately well differentiated adenocarcinoma--Tumor invades through the muscularis propria into   pericolorectal tissue     Assessment:   Renal Specific Problems  Chronic kidney disease stage IIIb at baseline  Acute kidney injury postop- etiology not clear. May be tubular injury but no documented hypotension, IV contrast or nephrotoxic drugs. Urine Na and creatinine c/w tubular injury. Status post left hemicolectomy for adenocarcinoma colon cancer  Anemia with renal failure and significant iron deficiency        Plan:     Obtain/ Order: labs/cultures/radiology/procedures:      Therapeutic: We will replace iron with IV prep  Increase in serum creatinine postop may be response to the inflammation and not necessarily volume depletion. However, he does have a fairly significant diuresis.   Resume IV fluids (which were not given yesterday) for the time being watch for worsening shortness of breath    Advanced diet order, will increase protein intake        Leyda Yanes MD    [phone]

## 2022-09-20 NOTE — NURSE NAVIGATOR
ONCOLOGY NURSE NAVIGATOR  [facility]              VISIT TYPE:   ( x) Inpatient  ( ) Outpatient      REASON FOR VISIT:  (x ) Patient Education  ( ) Support  ( ) Other:     Visited patient; daughter at bedside. Explained my role as ONN. Provided educational and financial resources for Colorectal cancer. Discussed possible next steps. Encouraged daughter to call me after discharge if issues arise.          Lindsey ORTEGA RN  [facility] Nurse Navigator  [phone]  *Can also be reached via [employer]

## 2022-09-20 NOTE — PROGRESS NOTES
POD # 4    Chief Complaint: confused      Subjective:  Mr. Claude Boeck is a 82y.o. year old * male S/P underwent extended right hemicolectomy . Today he has no fever or chills. No abdominal pain. Mor appropriate today. No new complaints. Passed flatus. Still has some bloody bm. Review of Systems:   Constitutional:  no fever,  no chills,  no sweats, No weakness, No fatigue, No decreased activity. Respiratory: No shortness of breath, No cough, No sputum production, No hemoptysis, No wheezing, No cyanosis. Cardiovascular: No chest pain, No palpitations, No bradycardia, No tachycardia, No peripheral edema, No syncope. Gastrointestinal: No nausea,  No vomiting, No diarrhea, No constipation, No heartburn, No abdominal pain. Genitourinary: No dysuria, No hematuria, No change in urine stream, No urethral discharge, No lesions. Musculoskeletal: No back pain, No neck pain, No joint pain, No muscle pain, No claudication, No decreased range of motion, No trauma. Integumentary: No rash, No pruritus, No abrasions. Neurologic: Alert and oriented X4, No abnormal balance, No headache, No confusion, No numbness, No tingling. Psychiatric: No anxiety, No depression, No rupinder. Physical Exam:     Vitals & Measurements:     Wt Readings from Last 3 Encounters:   09/09/22 47.6 kg (105 lb)   07/27/22 52.2 kg (115 lb)   08/01/22 49 kg (108 lb)     Temp Readings from Last 3 Encounters:   09/19/22 97.7 °F (36.5 °C)   09/09/22 97.7 °F (36.5 °C)   08/04/22 97.5 °F (36.4 °C)     BP Readings from Last 3 Encounters:   09/19/22 111/78   09/09/22 100/60   08/04/22 (!) 148/77     Pulse Readings from Last 3 Encounters:   09/19/22 89   09/09/22 71   08/04/22 70      Ht Readings from Last 3 Encounters:   09/09/22 5' 7.72\" (1.72 m)   07/27/22 5' 7\" (1.702 m)   08/01/22 5' 7\" (1.702 m)      Date 09/18/22 1900 - 09/19/22 0659 09/19/22 0700 - 09/20/22 0659   Shift 5587-2963 24 Hour Total 9720-1739 1080-5293 24 Hour Total INTAKE   I.V.  146.7        Volume (dextrose 5% - 0.45% NaCl with KCl 20 mEq/L infusion)  46.7        Volume (albumin human 25% (BUMINATE) solution 25 g)  100      Shift Total  146.7      OUTPUT   Urine 600 2225        Urine Output (mL) (Urinary Catheter 09/15/22 Wright) 600 2225      Stool          Stool Occurrence(s) 5 x 5 x      Shift Total 600 2225      NET -600 -2078. 3      Weight (kg)              General: well appearing, no acute distress  Respiratory: normal chest wall expansion, CTA B, no r/r/w, no rubs  Cardiovascular: RRR, no m/r/g, Normal S1 and S2  Abdomen: Soft, non tender, non-distended, normal bowel sounds in all quadrants, no hepatosplenomegaly, no tympany. Incision scar: dressing Intact.   Genitourinary: No inguinal hernia, normal external gentalia, Testis & scrotum normal, no renal angle tenderness  Musculoskeletal: normal ROM in upper and lower extremities  Integumentary: warm, dry, and pink, with no rash, purpura, or petechia  Neurological:Cranial Nerves II-XII grossly intact, No sensory or motor deficit  Psychiatric: Cooperative with normal mood, affect, and cognition    Laboratory Values:   Recent Results (from the past 24 hour(s))   HGB & HCT    Collection Time: 09/19/22 12:18 AM   Result Value Ref Range    HGB 8.6 (L) 12.1 - 17.0 g/dL    HCT 26.8 (L) 36.6 - 50.3 %   RENAL FUNCTION PANEL    Collection Time: 09/19/22  4:45 AM   Result Value Ref Range    Sodium 142 136 - 145 mmol/L    Potassium 4.0 3.5 - 5.1 mmol/L    Chloride 106 97 - 108 mmol/L    CO2 27 21 - 32 mmol/L    Anion gap 9 5 - 15 mmol/L    Glucose 113 (H) 65 - 100 mg/dL    BUN 56 (H) 6 - 20 mg/dL    Creatinine 3.62 (H) 0.70 - 1.30 mg/dL    BUN/Creatinine ratio 15 12 - 20      GFR est AA 20 (L) >60 ml/min/1.73m2    GFR est non-AA 16 (L) >60 ml/min/1.73m2    Calcium 8.7 8.5 - 10.1 mg/dL    Phosphorus 5.1 (H) 2.6 - 4.7 mg/dL    Albumin 3.6 3.5 - 5.0 g/dL   CBC WITH AUTOMATED DIFF    Collection Time: 09/19/22  4:45 AM   Result Value Ref Range    WBC 8.6 4.1 - 11.1 K/uL    RBC 3.11 (L) 4.10 - 5.70 M/uL    HGB 8.9 (L) 12.1 - 17.0 g/dL    HCT 27.2 (L) 36.6 - 50.3 %    MCV 87.5 80.0 - 99.0 FL    MCH 28.6 26.0 - 34.0 PG    MCHC 32.7 30.0 - 36.5 g/dL    RDW 14.1 11.5 - 14.5 %    PLATELET 826 814 - 858 K/uL    MPV 10.5 8.9 - 12.9 FL    NRBC 0.0 0.0  WBC    ABSOLUTE NRBC 0.00 0.00 - 0.01 K/uL    NEUTROPHILS 80 (H) 32 - 75 %    LYMPHOCYTES 12 12 - 49 %    MONOCYTES 5 5 - 13 %    EOSINOPHILS 3 0 - 7 %    BASOPHILS 0 0 - 1 %    IMMATURE GRANULOCYTES 0 0 - 0.5 %    ABS. NEUTROPHILS 6.8 1.8 - 8.0 K/UL    ABS. LYMPHOCYTES 1.1 0.8 - 3.5 K/UL    ABS. MONOCYTES 0.4 0.0 - 1.0 K/UL    ABS. EOSINOPHILS 0.3 0.0 - 0.4 K/UL    ABS. BASOPHILS 0.0 0.0 - 0.1 K/UL    ABS. IMM. GRANS. 0.0 0.00 - 0.04 K/UL    DF AUTOMATED     C REACTIVE PROTEIN, QT    Collection Time: 09/19/22  4:45 AM   Result Value Ref Range    C-Reactive protein 12.00 (H) 0.00 - 0.60 mg/dL   POTASSIUM, UR, RANDOM    Collection Time: 09/19/22 12:15 PM   Result Value Ref Range    Potassium urine, random 41 mmol/L   PROTEIN/CREATININE RATIO, URINE    Collection Time: 09/19/22 12:15 PM   Result Value Ref Range    Protein, urine random 126 (H) 0.0 - 11.9 mg/dL    Creatinine, urine 112.00 mg/dL    Protein/Creat. urine Ratio 1.1     CREATININE, UR, RANDOM    Collection Time: 09/19/22 12:15 PM   Result Value Ref Range    Creatinine, urine 113.00 mg/dL   SODIUM, UR, RANDOM    Collection Time: 09/19/22 12:15 PM   Result Value Ref Range    Sodium,urine random 54 mmol/L   PROTEIN URINE, RANDOM    Collection Time: 09/19/22 12:15 PM   Result Value Ref Range    Protein, urine random 123 (H) 0.0 - 11.9 mg/dL         Radiology:   XR CHEST PORT   Final Result      Unchanged, trace fluid along the horizontal fissure. Mild LEFT basilar   atelectasis. XR ABD (KUB)   Final Result   1.  Unchanged few borderline dilated loops of small bowel likely representing   ileus, though difficult to exclude evolving small bowel obstruction. Continued   radiographic follow-up should be considered. XR CHEST PORT   Final Result   1. Unchanged small bilateral pleural effusions, including fluid loculated in the   right minor fissure. 2. Emphysema. XR ABD PORT  1 V   Final Result   Unremarkable bowel gas pattern. DUPLEX LOWER EXT VENOUS BILAT   Final Result      XR CHEST PORT   Final Result   Bilateral pleural effusions. XR CHEST PORT   Final Result   Enteric tube is in good position. No acute process on portable chest.         XR ABD (KUB)   Final Result   1. Satisfactory positioning of nasogastric tube         XR CHEST PORT   Final Result      Enteric tube has been removed. No evidence of pulmonary hemorrhage or   pneumothorax. XR CHEST PORT   Final Result   Enteric tube is in in the right lung base bronchus. At the time of this   interpretation, enteric tube had been removed. XR CHEST PORT    (Results Pending)         Assessment:  Transverse colon cancer  S/P Extended right hemicolectomy, POD 4    Plan:    Admission  Diet: start clears  IV fluids per Nephrology  SCD  IS  Pain medications  Antibiotics  Nausea medication  Wright  Labs in am.  Appreciate Pulmonary & Nephrology consult. 12. Plan discussed with patient and family and answered all their questions. Thank you for allowing me to participate in the care of this patient.

## 2022-09-20 NOTE — PROGRESS NOTES
Consult  Pulmonary, Critical Care    Name: Adalberto Rojo MRN: [id number]   : 1935 Hospital: [facility]   Date: 2022  Admission date: 9/15/2022 Hospital Day: 6       Subjective/Interval History:   Seen in the ICU currently on nasal high flow oxygen. Patient underwent exploratory laparotomy 9/15 with right hemicolectomy for colon cancer. He has been on IV fluids since that time. He is normally on metoprolol as an outpatient. Yesterday he developed tachycardia and during the night hypoxia and placed on nasal high flow oxygen. Chest x-ray shows bilateral pleural effusions with mild congestion. At the current time he denies any discomfort.  now on nasal oxygen 2 L saturation 100%. Mildly confused   more awake today carries on a simple conversation. He is passing gas and had several bloody bowel movements during the night. Despite no Lasix yesterday he had over 2 L of urine output and creatinine has worsened further   sitting up in the chair feels much better states that he ate without difficulty. Input and output not recorded yesterday it appears that his IV fluids were not increased.   Have talked with nurse and fluids will be increased today    Patient Active Problem List   Diagnosis Code    Paresthesia and pain of right extremity M79.609, R20.2    Acute CVA (cerebrovascular accident) ([address] I63.9    Hypercholesteremia E78.00    Accelerated hypertension I10    CVA (cerebral vascular accident) ([address] I63.9    Colon cancer ([facility] C18.9    Status post surgery Z98.890       IMPRESSION:   Acute hypoxic respiratory failure now on room air  Pulmonary edema resolved  Bilateral pleural effusion resolved  Right midlung density questionable mucous plugging  Diastolic left ventricular dysfunction  Anemia hemoglobin improved after transfusion  Acute kidney injury IV fluids did not appear to have been increased yesterday we will increase today  Chronic kidney disease  Sinus tachycardia controlled with IV Lopressor  Colon cancer status post exploratory laparotomy with right hemicolectomy  There is no height or weight on file to calculate BMI. RECOMMENDATIONS/PLAN:   Fattening continues to worsen although IV fluids were not increased as ordered yesterday. We will increase at this time. We will switch Lopressor to his normal oral dose  Pleural effusions have resolved  Duplex scan of the legs no DVT             Subjective/Initial History:   I have reviewed the flowsheet and previous days notes. I was asked by Kiki Freedman MD to see Laura Ards a 82 y.o.  male  in consultation for a chief complaint of hypoxic respiratory failure pulmonary edema bilateral pleural effusions          Patient PCP: Nancy Montana  PMH:  has a past medical history of Cancer ([address] Chronic kidney disease, Chronic obstructive pulmonary disease ([address] Gastrointestinal disorder, Heart attack ([address] Hyperlipidemia, Hypertension, Psychiatric disorder, and Stroke ([address] PSH:   has a past surgical history that includes colonoscopy (N/A, 08/04/2022); pr abdomen surgery proc unlisted; and hx heent (Bilateral). FHX: family history includes Cancer in his father and mother. SHX:  reports that he quit smoking about 4 years ago. His smoking use included cigarettes. He has never used smokeless tobacco. He reports that he does not drink alcohol and does not use drugs.     Systemic review: He seems very mildly confused but seems adequate for history  General no significant problem prior to surgery denies any chronic fever chills or sweats ankle edema chest pain or diaphoresis  Eyes no double vision or momentary blindness  ENT no nasal congestion drainage or facial pain  Musculoskeletal no swollen tender joints  Endocrinologic no polyuria polydipsia  Neurologic no seizures or syncope  Gastrointestinal no nausea or vomiting NG tube is in place at the current time  Genitourinary no pain or discomfort on urination is followed by Dr. Ina Yarbrough for chronic kidney disease  Cardiovascular states he has a heart doctor does not remember his name not sure what he sees them for he is normally on Norvasc and metoprolol  Respiratory stop smoking 10 or 15 years ago denies any history of asthma emphysema is not on any inhalers although his med list mentions albuterol again that may be some degree of confusion on his part    No Known Allergies   MEDS:   Current Facility-Administered Medications   Medication    iron sucrose (VENOFER) injection 200 mg    0.9% sodium chloride infusion 250 mL    dextrose 5% and 0.9% NaCl infusion    metoprolol (LOPRESSOR) injection 5 mg    sodium chloride (NS) flush 5-40 mL    sodium chloride (NS) flush 5-40 mL    famotidine (PF) (PEPCID) 20 mg in 0.9% sodium chloride 10 mL injection    piperacillin-tazobactam (ZOSYN) 3.375 g in 0.9% sodium chloride (MBP/ADV) 100 mL MBP        Current Facility-Administered Medications:     iron sucrose (VENOFER) injection 200 mg, 200 mg, IntraVENous, Q48H, Jo Lane MD, 200 mg at 09/20/22 0853    0.9% sodium chloride infusion 250 mL, 250 mL, IntraVENous, PRN, Sergio Ruffin MD    dextrose 5% and 0.9% NaCl infusion, 125 mL/hr, IntraVENous, CONTINUOUS, Angela Bowles MD, Last Rate: 125 mL/hr at 09/20/22 0853, 125 mL/hr at 09/20/22 0853    metoprolol (LOPRESSOR) injection 5 mg, 5 mg, IntraVENous, Q6H, Angela Bowles MD, 5 mg at 09/20/22 0549    sodium chloride (NS) flush 5-40 mL, 5-40 mL, IntraVENous, Q8H, Kayli MCCRARY MD, 10 mL at 09/20/22 0547    sodium chloride (NS) flush 5-40 mL, 5-40 mL, IntraVENous, PRN, Lisa Cheney MD    famotidine (PF) (PEPCID) 20 mg in 0.9% sodium chloride 10 mL injection, 20 mg, IntraVENous, Q24H, Sergio Ruffin MD, 20 mg at 09/19/22 1819    piperacillin-tazobactam (ZOSYN) 3.375 g in 0.9% sodium chloride (MBP/ADV) 100 mL MBP, 3.375 g, IntraVENous, Q12H, Emanuel Ruffin MD, Last Rate: 25 mL/hr at 22 013, 3.375 g at 22      Objective:     Vital Signs: Telemetry:    Rapid sinus rhythm Intake/Output:   Visit Vitals  /78 (BP 1 Location: Left upper arm)   Pulse 93   Temp 97.6 °F (36.4 °C)   Resp 25   SpO2 98%       Temp (24hrs), Av.8 °F (36.6 °C), Min:97.3 °F (36.3 °C), Max:98.7 °F (37.1 °C)        O2 Device: None (Room air) O2 Flow Rate (L/min): 2 l/min         There is no height or weight on file to calculate BMI. Wt Readings from Last 4 Encounters:   22 47.6 kg (105 lb)   22 52.2 kg (115 lb)   22 49 kg (108 lb)   22 49.9 kg (110 lb)          Intake/Output Summary (Last 24 hours) at 2022 1024  Last data filed at 2022 0935  Gross per 24 hour   Intake 250 ml   Output 250 ml   Net 0 ml         Last shift:      701 - 1900  In: -   Out: 250 [Urine:250]  Last 3 shifts: 1901 -  07  In: 250   Out: 600 [Urine:600]       Hemodynamics:    CO:    CI:    CVP:    SVR:   PAP Systolic:    PAP Diastolic:    PVR:    MF51:       Ventilator Settings:      Mode Rate TV Press PEEP FiO2 PIP Min. Vent               28 %            Physical Exam:    General:  male; sitting up in the chair awake alert rations nonlabored on room air  HEENT: NCAT, normal oral mucosa  Eyes: anicteric; conjunctiva clear extraocular movements intact  Neck: no nodes, no JVD no accessory MM use. Chest: no deformity,   Cardiac: Regular rate and rhythm  Lungs: Clear anteriorly and laterally and upper lungs posteriorly with diminished in the bases  Abd: Abdominal bandaging in place nontender normal bowel sounds  Ext: no edema; no joint swelling;  No clubbing  : clear urine  Neuro: Awake alert oriented speech is clear moves all 4 extremities  Psych- no agitation, oriented to person; some mild memory impairment mildly confused  Skin: warm, dry, no cyanosis;   Pulses: Brachial and radial pulses intact  Capillary: Normal capillary refill      Labs:    Recent Labs     09/20/22  0600 09/19/22  2124 09/19/22  0445   WBC 7.0 6.8 8.6   HGB 10.2* 8.1* 8.9*    185 216       Recent Labs     09/20/22  0600 09/19/22  0445 09/18/22  0445    142 140   K 3.7 4.0 4.7   * 106 105   CO2 22 27 26   * 113* 63*   BUN 60* 56* 43*   CREA 4.18* 3.62* 2.68*   CA 8.4* 8.7 8.3*   MG 2.5*  --  2.0   PHOS 4.0 5.1* 4.4   ALB 3.2* 3.6 2.6*         9/20 room air oxygen saturation 98%   nasal MRSA smear negative  4/2/2022 Echo ejection fraction 12% diastolic dysfunction multiple wall motion abnormalities and RVSP 34  Imaging:  I have personally reviewed the patients radiographs and have reviewed the reports:    CXR Results  (Last 48 hours)                 09/20/22 0234  XR CHEST PORT Final result    Impression:  No significant change. Narrative:  INDICATION: Pleural effusion       EXAMINATION:  AP CHEST, PORTABLE       COMPARISON: 9/19/2022       FINDINGS: Single AP portable view of the chest demonstrates no change in   position of the lines and tubes. The cardiomediastinal silhouette is unchanged. Lungs are hyperinflated with persistent right infrahilar/midlung linear opacity   which again may represent pleural fluid or airspace disease. No pneumothorax or   pulmonary edema. 09/19/22 0321  XR CHEST PORT Final result    Impression:      Unchanged, trace fluid along the horizontal fissure. Mild LEFT basilar   atelectasis. Narrative:  EXAM: XR CHEST PORT       DATE: 9/19/2022 3:21 AM       INDICATION: Pleural effusions       COMPARISON: Chest radiograph September 18, 2022       FINDINGS: AP portable chest radiograph. The lungs are hyperinflated. Heart size   is normal. No new consolidation is seen. Trace pleural fluid is noted along the   horizontal fissure and at the LEFT base. There is improving aeration of the LEFT   days with mild discoid atelectasis. No pneumothorax is seen.                  Results from Hospital Encounter encounter on 09/15/22    XR CHEST PORT    Narrative  INDICATION: Pleural effusion    EXAMINATION:  AP CHEST, PORTABLE    COMPARISON: 9/19/2022    FINDINGS: Single AP portable view of the chest demonstrates no change in  position of the lines and tubes. The cardiomediastinal silhouette is unchanged. Lungs are hyperinflated with persistent right infrahilar/midlung linear opacity  which again may represent pleural fluid or airspace disease. No pneumothorax or  pulmonary edema. Impression  No significant change. XR CHEST PORT    Narrative  EXAM: XR CHEST PORT    DATE: 9/19/2022 3:21 AM    INDICATION: Pleural effusions    COMPARISON: Chest radiograph September 18, 2022    FINDINGS: AP portable chest radiograph. The lungs are hyperinflated. Heart size  is normal. No new consolidation is seen. Trace pleural fluid is noted along the  horizontal fissure and at the LEFT base. There is improving aeration of the LEFT  days with mild discoid atelectasis. No pneumothorax is seen. Impression  Unchanged, trace fluid along the horizontal fissure. Mild LEFT basilar  atelectasis. XR ABD (KUB)    Narrative  EXAM:  XR ABD (KUB)    INDICATION:   ileus    COMPARISON: Abdominal radiograph 9/17/2022. FINDINGS: Single view the abdomen was obtained. There are a few borderline  dilated loops of small bowel in the left abdomen measuring up to 3.0 cm,  unchanged. Catheter noted in the pelvis. Skin staples noted. Degenerative  changes in the lumbar spine. Impression  1. Unchanged few borderline dilated loops of small bowel likely representing  ileus, though difficult to exclude evolving small bowel obstruction. Continued  radiographic follow-up should be considered. Results from [place] encounter on 08/19/22    CT ABD PELV WO CONT    Narrative  EXAM: CT ABD PELV WO CONT    INDICATION: Benign neoplasm of colon. Gastritis. Blood in stool. COMPARISON: None    IV CONTRAST: None.     ORAL CONTRAST: Oral contrast was administered to better evaluate the bowel. TECHNIQUE:  Thin axial images were obtained through the abdomen and pelvis. Coronal and  sagittal reformats were generated. CT dose reduction was achieved through use of  a standardized protocol tailored for this examination and automatic exposure  control for dose modulation. The absence of intravenous contrast material reduces the sensitivity for  evaluation of the vasculature and solid organs. FINDINGS:  LOWER THORAX: Emphysematous changes at the lung bases. Trace left pleural fluid. LIVER: Scattered hepatic cysts. BILIARY TREE: Gallbladder is within normal limits. CBD is not dilated. SPLEEN: within normal limits. PANCREAS: No focal abnormality. ADRENALS: 2.8 x 1.7 cm solid right adrenal lesion, incompletely characterized on  the basis of this study. Recommend contrast-enhanced imaging for further  evaluation. KIDNEYS/URETERS: Simple bilateral renal cysts not requiring further follow-up. No hydronephrosis or stones. STOMACH: Unremarkable. SMALL BOWEL: No dilatation or wall thickening. COLON: Focal wall thickening of the mid transverse colon (201:75) may be due to  peristalsis, however an underlying lesion cannot be excluded. APPENDIX: Normal  PERITONEUM: No ascites or pneumoperitoneum. RETROPERITONEUM: 3.1 x 3.1 cm infrarenal abdominal aortic aneurysm. REPRODUCTIVE ORGANS: Within normal limits. URINARY BLADDER: No mass or calculus. BONES: Degenerative changes of the lumbar spine. No fracture or aggressive  osseous lesion. ABDOMINAL WALL: No mass or hernia. ADDITIONAL COMMENTS: N/A    Impression  1. Focal wall thickening of the mid transverse colon might be due to  peristalsis but an underlying lesion cannot be excluded. Recommend correlation  with colonoscopy. 2.  Indeterminant right adrenal mass measuring 2.8 x 1.7 cm.  3.  Hepatic and renal cysts. 4.  Emphysematous changes at the lung bases.     Contrast-enhanced multiphasic CT of the abdomen is recommended for further  characterization of the right adrenal lesion. Discussion: Acute hypoxic respiratory failure with chest x-ray showing bilateral pleural effusions mild congestion. Previous echo shows diastolic dysfunction. He normally is on metoprolol at home currently has sinus tachycardia. We will start IV Lopressor to control heart rate will give IV Lasix follow renal function. Preop hemoglobin was 10 currently 8 we will repeat later today to make sure not having active bleeding. 9/18 oxygenation improved chest x-ray minimal to no improvement. Good diuresis yesterday over 4 L but creatinine has risen. Will give Lasix today we will give 2 doses of albumin BNP remains elevated. We will give small amount IV fluids follow chest x-ray in a.m.  9/19 oxygenation has improved chest x-ray has slightly diminished effusion although some persists. Lasix held yesterday due to rising creatinine despite this had diuresis over 2 L with resultant worsening creatinine. Will give IV fluids today continue to hold Lasix until creatinine improving. Question if can start oral nutrition today  9/20 awake alert chest x-ray shows resolution of effusions does have right midlung density questionable mucous plug or aspiration pneumonia no worsening. Did not have his IV fluids increased yesterday creatinine worsening have spoken with nurse and fluids to be increased we will continue to follow renal function  Time of care 30 minutes           Thank you for allowing us to participate in the care of this patient.   We will follow along with you     Gisella Moore MD

## 2022-09-20 NOTE — PROGRESS NOTES
Met w/patient at bedside. Informed patient of therapy recommendation for IRF. Patient in agreement w/discharge recommendation. Patient gave verbal consent for writer to send referral to [facility] ([place]). Referral sent pending acceptance.           Johnny Magaña, TITI

## 2022-09-20 NOTE — PROGRESS NOTES
PT tx attempted at 1019am however pt seated up in chair and denies further mobility and LE therex at this time stating he did not feel well today. Will continue to follow patient and attempt PT at a later time. Thank you.

## 2022-09-20 NOTE — PROGRESS NOTES
Comprehensive Nutrition Assessment    Type and Reason for Visit: (P) Positive nutrition screen (NPO/CL x5 days, BMI)    Nutrition Recommendations/Plan:   Advance diet as tolerated/medically able  Once diet is advanced, rec ensure HP 3x/day (vanilla)  Obtain updated measured weight as able  Monitor and record PO intakes, supplement acceptance, and Bms in I/Os     Malnutrition Assessment:  Malnutrition Status:  Severe malnutrition (09/20/22 1125)    Context:  Acute illness     Findings of the 6 clinical characteristics of malnutrition:   Energy Intake:  50% or less of est energy requirements for 5 or more days (NPO/clears x5 days)  Weight Loss:  Greater than 7.5% over 3 months     Body Fat Loss: Moderate body fat loss, Orbital, Triceps, Fat overlying ribs   Muscle Mass Loss: Moderate muscle mass loss, Temples (temporalis), Scapula (trapezius), Clavicles (pectoralis & deltoids), Thigh (quadriceps)  Fluid Accumulation:  No significant fluid accumulation,     Strength:  Not performed     Nutrition Assessment:    (P) Admitted for colon cancer. Underwent ex lap with right hemicolectomy. NGT placed for suctioning removed 9/17. 9/19 upgraded from NPO to clears, tolerating well. +passing flatus, recent BM. Pt endorses enjoying boost PTA, agreeable to ensure, enjoys vanilla. Noted significant weight loss x3 months - r/t colon cancer. Labs: Na 144, K 3.7, BUN 60, Creat 4.18, Gluc 119, Alb 3.2. Meds: D5%/0.9%NaCl, famotidine, iron sucrose. Nutrition Related Findings:    (P) Muscle/fat wasting per NFPE. No n/v, d/c, or problems chewing/swallowing. No edema. BM 9/20. Wound Type: (P) Surgical incision    Current Nutrition Intake & Therapies:  Average Meal Intake: (P) 51-75%  Average Supplement Intake: (P) None ordered  ADULT DIET Clear Liquid    Anthropometric Measures:  Height: (P) 5' 7.72\" (172 cm)  Ideal Body Weight (IBW): (P) 152 lbs ((P) 69 kg)  Current Body Wt:  (P) 47.6 kg (104 lb 15 oz), (P) 69 % IBW.  (P) Not specified  Current BMI (kg/m2): (P) 16.1  BMI Category: (P) Underweight (BMI less than 22) age over 72    Estimated Daily Nutrient Needs:  Energy Requirements Based On: (P) Kcal/kg  Weight Used for Energy Requirements: (P) Current  Energy (kcal/day): (P) 1666kcal (35kcal/kg)  Weight Used for Protein Requirements: (P) Current  Protein (g/day): (P) 71g (1.5g/kg)  Method Used for Fluid Requirements: (P) 1 ml/kcal  Fluid (ml/day): (P) 1666mL    Nutrition Diagnosis:   (P) Severe malnutrition, In context of acute illness or injury related to (P) catabolic illness, inadequate protein-energy intake (colon ca) as evidenced by (P) Criteria as identified in malnutrition assessment    Nutrition Interventions:   Food and/or Nutrient Delivery: (P) Modify current diet, Start oral nutrition supplement (advance as tolerated)  Nutrition Education/Counseling: (P) Education not indicated  Coordination of Nutrition Care: (P) Continue to monitor while inpatient    Goals:  Goals: (P) Meet at least 75% of estimated needs, by next RD assessment    Nutrition Monitoring and Evaluation:   Behavioral-Environmental Outcomes: (P) None identified  Food/Nutrient Intake Outcomes: (P) Diet advancement/tolerance, Food and nutrient intake, Supplement intake  Physical Signs/Symptoms Outcomes: (P) GI status, Meal time behavior, Nutrition focused physical findings, Weight    Discharge Planning:    (P) Too soon to determine    No Qualia, RD  Contact: [phone]

## 2022-09-20 NOTE — ROUTINE PROCESS
Transfusion placed not administered because Hgb is 8.1g/dl. 0200: Notified Dr Monet Mederos about blood bank requiring pathologist authorization before transfusing. He insisted for transfusion to be done now. Blood bank notified and tech said it can be released that she will call the pathologist.    2938: Lab called saying pathologist insist that blood can not be released based on protocol. Dr Monet Mederos notified. Blood finally send and was administered.

## 2022-09-21 ENCOUNTER — APPOINTMENT (OUTPATIENT)
Dept: NON INVASIVE DIAGNOSTICS | Age: 87
DRG: 329 | End: 2022-09-21
Attending: COLON & RECTAL SURGERY
Payer: MEDICARE

## 2022-09-21 ENCOUNTER — APPOINTMENT (OUTPATIENT)
Dept: GENERAL RADIOLOGY | Age: 87
DRG: 329 | End: 2022-09-21
Attending: INTERNAL MEDICINE
Payer: MEDICARE

## 2022-09-21 LAB
ABO + RH BLD: NORMAL
ALBUMIN SERPL-MCNC: 2.8 G/DL (ref 3.5–5)
ANION GAP SERPL CALC-SCNC: 10 MMOL/L (ref 5–15)
BASOPHILS # BLD: 0 K/UL (ref 0–0.1)
BASOPHILS NFR BLD: 0 % (ref 0–1)
BLD PROD TYP BPU: NORMAL
BLOOD GROUP ANTIBODIES SERPL: NEGATIVE
BNP SERPL-MCNC: 2512 PG/ML
BPU ID: NORMAL
BUN SERPL-MCNC: 62 MG/DL (ref 6–20)
BUN/CREAT SERPL: 15 (ref 12–20)
CA-I BLD-MCNC: 8 MG/DL (ref 8.5–10.1)
CHLORIDE SERPL-SCNC: 118 MMOL/L (ref 97–108)
CO2 SERPL-SCNC: 18 MMOL/L (ref 21–32)
CREAT SERPL-MCNC: 4.13 MG/DL (ref 0.7–1.3)
CROSSMATCH RESULT,%XM: NORMAL
DIFFERENTIAL METHOD BLD: ABNORMAL
EOSINOPHIL # BLD: 0.7 K/UL (ref 0–0.4)
EOSINOPHIL NFR BLD: 11 % (ref 0–7)
ERYTHROCYTE [DISTWIDTH] IN BLOOD BY AUTOMATED COUNT: 14.6 % (ref 11.5–14.5)
GLUCOSE SERPL-MCNC: 123 MG/DL (ref 65–100)
HCT VFR BLD AUTO: 30.7 % (ref 36.6–50.3)
HGB BLD-MCNC: 9.8 G/DL (ref 12.1–17)
IMM GRANULOCYTES # BLD AUTO: 0 K/UL (ref 0–0.04)
IMM GRANULOCYTES NFR BLD AUTO: 1 % (ref 0–0.5)
LYMPHOCYTES # BLD: 0.7 K/UL (ref 0.8–3.5)
LYMPHOCYTES NFR BLD: 13 % (ref 12–49)
MAGNESIUM SERPL-MCNC: 2.3 MG/DL (ref 1.6–2.4)
MCH RBC QN AUTO: 28.2 PG (ref 26–34)
MCHC RBC AUTO-ENTMCNC: 31.9 G/DL (ref 30–36.5)
MCV RBC AUTO: 88.2 FL (ref 80–99)
MONOCYTES # BLD: 0.6 K/UL (ref 0–1)
MONOCYTES NFR BLD: 10 % (ref 5–13)
NEUTS SEG # BLD: 3.8 K/UL (ref 1.8–8)
NEUTS SEG NFR BLD: 65 % (ref 32–75)
NRBC # BLD: 0 K/UL (ref 0–0.01)
NRBC BLD-RTO: 0 PER 100 WBC
PHOSPHATE SERPL-MCNC: 3.8 MG/DL (ref 2.6–4.7)
PLATELET # BLD AUTO: 152 K/UL (ref 150–400)
PMV BLD AUTO: 10.8 FL (ref 8.9–12.9)
POTASSIUM SERPL-SCNC: 3.9 MMOL/L (ref 3.5–5.1)
RBC # BLD AUTO: 3.48 M/UL (ref 4.1–5.7)
SODIUM SERPL-SCNC: 146 MMOL/L (ref 136–145)
SPECIMEN EXP DATE BLD: NORMAL
STATUS OF UNIT,%ST: NORMAL
TRANSFUSION STATUS PATIENT QL: NORMAL
UNIT DIVISION, %UDIV: 0
WBC # BLD AUTO: 5.8 K/UL (ref 4.1–11.1)

## 2022-09-21 PROCEDURE — 97116 GAIT TRAINING THERAPY: CPT

## 2022-09-21 PROCEDURE — 71045 X-RAY EXAM CHEST 1 VIEW: CPT

## 2022-09-21 PROCEDURE — 94640 AIRWAY INHALATION TREATMENT: CPT

## 2022-09-21 PROCEDURE — 74011250637 HC RX REV CODE- 250/637: Performed by: SURGERY

## 2022-09-21 PROCEDURE — 97110 THERAPEUTIC EXERCISES: CPT

## 2022-09-21 PROCEDURE — 74011000258 HC RX REV CODE- 258: Performed by: SURGERY

## 2022-09-21 PROCEDURE — 99232 SBSQ HOSP IP/OBS MODERATE 35: CPT | Performed by: COLON & RECTAL SURGERY

## 2022-09-21 PROCEDURE — 74011000250 HC RX REV CODE- 250: Performed by: SURGERY

## 2022-09-21 PROCEDURE — 74011250636 HC RX REV CODE- 250/636: Performed by: SURGERY

## 2022-09-21 PROCEDURE — 74011000250 HC RX REV CODE- 250: Performed by: ANESTHESIOLOGY

## 2022-09-21 PROCEDURE — 36415 COLL VENOUS BLD VENIPUNCTURE: CPT

## 2022-09-21 PROCEDURE — 74011000250 HC RX REV CODE- 250: Performed by: INTERNAL MEDICINE

## 2022-09-21 PROCEDURE — 93971 EXTREMITY STUDY: CPT

## 2022-09-21 PROCEDURE — 65270000029 HC RM PRIVATE

## 2022-09-21 PROCEDURE — 85025 COMPLETE CBC W/AUTO DIFF WBC: CPT

## 2022-09-21 PROCEDURE — 80069 RENAL FUNCTION PANEL: CPT

## 2022-09-21 PROCEDURE — 83735 ASSAY OF MAGNESIUM: CPT

## 2022-09-21 PROCEDURE — 83880 ASSAY OF NATRIURETIC PEPTIDE: CPT

## 2022-09-21 PROCEDURE — 74011000258 HC RX REV CODE- 258: Performed by: INTERNAL MEDICINE

## 2022-09-21 RX ORDER — ALBUTEROL SULFATE 90 UG/1
2 AEROSOL, METERED RESPIRATORY (INHALATION)
Status: DISCONTINUED | OUTPATIENT
Start: 2022-09-21 | End: 2022-09-23 | Stop reason: HOSPADM

## 2022-09-21 RX ORDER — SODIUM CHLORIDE 450 MG/100ML
50 INJECTION, SOLUTION INTRAVENOUS CONTINUOUS
Status: DISCONTINUED | OUTPATIENT
Start: 2022-09-21 | End: 2022-09-23

## 2022-09-21 RX ADMIN — PIPERACILLIN AND TAZOBACTAM 3.38 G: 3; .375 INJECTION, POWDER, FOR SOLUTION INTRAVENOUS at 12:11

## 2022-09-21 RX ADMIN — DEXTROSE AND SODIUM CHLORIDE 125 ML/HR: 5; 900 INJECTION, SOLUTION INTRAVENOUS at 00:00

## 2022-09-21 RX ADMIN — SODIUM CHLORIDE 50 ML/HR: 4.5 INJECTION, SOLUTION INTRAVENOUS at 12:00

## 2022-09-21 RX ADMIN — SODIUM CHLORIDE, PRESERVATIVE FREE 20 MG: 5 INJECTION INTRAVENOUS at 16:27

## 2022-09-21 RX ADMIN — SODIUM CHLORIDE, PRESERVATIVE FREE 10 ML: 5 INJECTION INTRAVENOUS at 05:29

## 2022-09-21 RX ADMIN — SODIUM CHLORIDE, PRESERVATIVE FREE 10 ML: 5 INJECTION INTRAVENOUS at 13:08

## 2022-09-21 RX ADMIN — ALBUTEROL SULFATE 2 PUFF: 108 INHALANT RESPIRATORY (INHALATION) at 21:25

## 2022-09-21 RX ADMIN — METOPROLOL TARTRATE 5 MG: 5 INJECTION INTRAVENOUS at 17:23

## 2022-09-21 RX ADMIN — METOPROLOL TARTRATE 5 MG: 5 INJECTION INTRAVENOUS at 12:01

## 2022-09-21 RX ADMIN — METOPROLOL TARTRATE 5 MG: 5 INJECTION INTRAVENOUS at 05:29

## 2022-09-21 NOTE — PROGRESS NOTES
[facility]     Renal Daily Progress Note:     Admission Date: 9/15/2022     Subjective: Patient POD # 6 after left hemicolectomy. Pathology shows adenocarcinoma. Had worsening azotemia probably related to intravascular volume depletion post op and significant diuresis. Serum creatinine has plateaued. Without any evidence of hypotension or nephrotoxic toxic drug use. Today he is alert and oriented. Conversant. Tolerating oral fluids; has flatus and BM. No nausea or vomiting. No abdominal pain. Not short of breath at rest      Review of Systems  Pertinent items are noted in HPI.     Objective:     Visit Vitals  BP (!) 141/71   Pulse 81   Temp 98 °F (36.7 °C)   Resp 17   Ht 5' 7.72\" (1.72 m)   SpO2 99%   BMI 16.10 kg/m²     Temp (24hrs), Av.1 °F (36.7 °C), Min:97.7 °F (36.5 °C), Max:98.5 °F (36.9 °C)        Intake/Output Summary (Last 24 hours) at 2022 1117  Last data filed at 2022 0800  Gross per 24 hour   Intake 3100 ml   Output 850 ml   Net 2250 ml       Current Facility-Administered Medications   Medication Dose Route Frequency    iron sucrose (VENOFER) injection 200 mg  200 mg IntraVENous Q48H    0.9% sodium chloride infusion 250 mL  250 mL IntraVENous PRN    dextrose 5% and 0.9% NaCl infusion  125 mL/hr IntraVENous CONTINUOUS    metoprolol (LOPRESSOR) injection 5 mg  5 mg IntraVENous Q6H    sodium chloride (NS) flush 5-40 mL  5-40 mL IntraVENous Q8H    sodium chloride (NS) flush 5-40 mL  5-40 mL IntraVENous PRN    famotidine (PF) (PEPCID) 20 mg in 0.9% sodium chloride 10 mL injection  20 mg IntraVENous Q24H    piperacillin-tazobactam (ZOSYN) 3.375 g in 0.9% sodium chloride (MBP/ADV) 100 mL MBP  3.375 g IntraVENous Q12H       Physical Exam:General appearance: alert, cooperative, no distress, appears stated age  Head: Normocephalic, without obvious abnormality, atraumatic, mild temporal wasting  Eyes: negative findings: anicteric sclera  Neck: supple, symmetrical, trachea midline, no adenopathy, and no JVD  Lungs: clear to auscultation bilaterally  Heart: regularly irregular rhythm, no S3 or S4  Abdomen: Surgical binder in place, bowel sounds positive, no pain on soft palpation  Extremities: no edema  Neurologic: Grossly normal    Data Review:     LABS:  Recent Labs     09/21/22  0455 09/20/22  0600 09/19/22  0445   * 144 142   K 3.9 3.7 4.0   * 113* 106   CO2 18* 22 27   BUN 62* 60* 56*   CREA 4.13* 4.18* 3.62*   CA 8.0* 8.4* 8.7   ALB 2.8* 3.2* 3.6   PHOS 3.8 4.0 5.1*   MG 2.3 2.5*  --        Recent Labs     09/21/22  0455 09/20/22  0600 09/19/22  2124   WBC 5.8 7.0 6.8   HGB 9.8* 10.2* 8.1*   HCT 30.7* 31.7* 25.1*    186 185       Recent Labs     09/19/22  1215   MITZI 47   [facility]   CREAU 112.00  113.00       Pathology colon: Moderately well differentiated adenocarcinoma--Tumor invades through the muscularis propria into   pericolorectal tissue     Assessment:   Renal Specific Problems  Chronic kidney disease stage IIIb at baseline  Acute kidney injury postop- etiology not clear. May be tubular injury but no documented hypotension, IV contrast or nephrotoxic drugs. Urine Na and creatinine c/w tubular injury. Status post left hemicolectomy for adenocarcinoma colon cancer  Anemia with renal failure and significant iron deficiency        Plan:     Obtain/ Order: labs/cultures/radiology/procedures:      Therapeutic:    Change IVF to 0.45NS at 50 cc/hr  Add protein supplement  Replacing iron with IV prep    Increase in serum creatinine postop may be response to the inflammation and not necessarily volume depletion. However, he did have a fairly significant diuresis.       Advanced diet order, will increase protein intake        Luke Calabrese MD    [phone]

## 2022-09-21 NOTE — PROGRESS NOTES
PHYSICAL THERAPY TREATMENT  Patient: Rosalie Sequeira (73 y.o. male)  Date: 9/21/2022  Diagnosis: Colon cancer ([facility] [C18.9]  Status post surgery [Z98.890] <principal problem not specified>  Procedure(s) (LRB):  EXPLORATORY LAPAROTOMY, LEFT ELIZABETH COLECTOMY (Bilateral) 6 Days Post-Op  Precautions:    Chart, physical therapy assessment, plan of care and goals were reviewed. ASSESSMENT  Patient continues with skilled PT services and is progressing towards goals. Patient in bed upon arrival agreeable to PT session. Patient was able to progress bed mob to mod I, transfers SBA, and ambulation CGA. Patient amb with narrow RAYNE with cues for safety at times. Patient amb with RW approx 20 ft in the room, limited by lines and leads. Improved mobility noted overall and vitals stable, WFL. Patient SpO2 was 90% with therex with no SOB and otherwise in the upper 90s on RA and /60. Will cont to progress towards goals, rec d/c to HHPT with family care if enough support available, otherwise patient may benefit from IRF. Current Level of Function Impacting Discharge (mobility/balance): weakness, slow mobility, CGA gait    Other factors to consider for discharge: safety, assist at home, PLOF         PLAN :  Patient continues to benefit from skilled intervention to address the above impairments. Continue treatment per established plan of care. to address goals. Recommendation for discharge: (in order for the patient to meet his/her long term goals)  HHPT with 24/7 family care vs IRF pending assist at home    This discharge recommendation:  Has been made in collaboration with the attending provider and/or case management    IF patient discharges home will need the following DME: to be determined (TBD) pt reports having RW at home       SUBJECTIVE:   Patient stated I feel good.     OBJECTIVE DATA SUMMARY:   Critical Behavior:  Neurologic State: Alert  Orientation Level: Oriented X4  Cognition: Appropriate decision making, Follows commands     Functional Mobility Training:  Bed Mobility:  Rolling: Modified independent  Supine to Sit: Modified independent  Scooting: Modified independent    Transfers:  Sit to Stand: Stand-by assistance  Stand to Sit: Stand-by assistance    Balance:  Sitting: Intact; Without support  Standing: Intact; With support (with RW)  Ambulation/Gait Training:  Distance (ft): 20 Feet (ft)  Assistive Device: Gait belt;Walker, rolling  Ambulation - Level of Assistance: Contact guard assistance    Base of Support: Narrowed  Speed/Melia: Slow      Therapeutic Exercises:   10x marches, LAQ, AP seated EOB    Pain Ratin/10    Activity Tolerance:    Good  Please refer to the flowsheet for vital signs taken during this treatment. After treatment patient left in no apparent distress:    Sitting in chair and Call bell within reach    COMMUNICATION/COLLABORATION:   The patients plan of care was discussed with: Registered nurse. Deepa Nixon   Time Calculation: 23 mins         Problem: Mobility Impaired (Adult and Pediatric)  Goal: *Acute Goals and Plan of Care (Insert Text)  Description: Pt stated goal: to go home  Pt will be I with LE HEP in 7 days. Pt will perform bed mobility with mod I in 7 days. Pt will perform transfers with mod I in 7 days. Pt will amb  feet with LRAD safely with mod I in 7 days.        Outcome: Progressing Towards Goal

## 2022-09-21 NOTE — PROGRESS NOTES
Consult  Pulmonary, Critical Care    Name: Yonatan Smith MRN: [id number]   : 1935 Hospital: [facility]   Date: 2022  Admission date: 9/15/2022 Hospital Day: 7       Subjective/Interval History:   Seen in the ICU currently on nasal high flow oxygen. Patient underwent exploratory laparotomy 9/15 with right hemicolectomy for colon cancer. He has been on IV fluids since that time. He is normally on metoprolol as an outpatient. Yesterday he developed tachycardia and during the night hypoxia and placed on nasal high flow oxygen. Chest x-ray shows bilateral pleural effusions with mild congestion. At the current time he denies any discomfort.  now on nasal oxygen 2 L saturation 100%. Mildly confused   more awake today carries on a simple conversation. He is passing gas and had several bloody bowel movements during the night. Despite no Lasix yesterday he had over 2 L of urine output and creatinine has worsened further   sitting up in the chair feels much better states that he ate without difficulty. Input and output not recorded yesterday it appears that his IV fluids were not increased.   Have talked with nurse and fluids will be increased today    Patient Active Problem List   Diagnosis Code    Paresthesia and pain of right extremity M79.609, R20.2    Acute CVA (cerebrovascular accident) ([address] I63.9    Hypercholesteremia E78.00    Accelerated hypertension I10    CVA (cerebral vascular accident) ([address] I63.9    Colon cancer ([facility] C18.9    Status post surgery Z98.890       IMPRESSION:   Acute hypoxic respiratory failure now on room air  Pulmonary edema resolved  Bilateral pleural effusion resolved  Right midlung density questionable mucous plugging  Diastolic left ventricular dysfunction  Anemia hemoglobin improved after transfusion  Acute kidney injury on IV fluids  Chronic kidney disease  Sinus tachycardia controlled with IV Lopressor  Colon cancer status post exploratory laparotomy with right hemicolectomy  Body mass index is 16.1 kg/m². RECOMMENDATIONS/PLAN:   Fattening continues to worsen although IV fluids were not increased as ordered yesterday. We will increase at this time. We will switch Lopressor to his normal oral dose  Pleural effusions have resolved  Duplex scan of the legs no DVT         Subjective/Initial History:   I have reviewed the flowsheet and previous days notes. I was asked by Bernie Churchill MD to see Yonatan Smith a 82 y.o.  male  in consultation for a chief complaint of hypoxic respiratory failure pulmonary edema bilateral pleural effusions          Patient PCP: Joshua Dougherty  PMH:  has a past medical history of Cancer ([address] Chronic kidney disease, Chronic obstructive pulmonary disease ([address] Gastrointestinal disorder, Heart attack ([address] Hyperlipidemia, Hypertension, Psychiatric disorder, and Stroke ([address] PSH:   has a past surgical history that includes colonoscopy (N/A, 08/04/2022); pr abdomen surgery proc unlisted; and hx heent (Bilateral). FHX: family history includes Cancer in his father and mother. SHX:  reports that he quit smoking about 4 years ago. His smoking use included cigarettes. He has never used smokeless tobacco. He reports that he does not drink alcohol and does not use drugs.     Systemic review: He seems very mildly confused but seems adequate for history  General no significant problem prior to surgery denies any chronic fever chills or sweats ankle edema chest pain or diaphoresis  Eyes no double vision or momentary blindness  ENT no nasal congestion drainage or facial pain  Musculoskeletal no swollen tender joints  Endocrinologic no polyuria polydipsia  Neurologic no seizures or syncope  Gastrointestinal no nausea or vomiting NG tube is in place at the current time  Genitourinary no pain or discomfort on urination is followed by Dr. Sheila Scott for chronic kidney disease  Cardiovascular states he has a heart doctor does not remember his name not sure what he sees them for he is normally on Norvasc and metoprolol  Respiratory stop smoking 10 or 15 years ago denies any history of asthma emphysema is not on any inhalers although his med list mentions albuterol again that may be some degree of confusion on his part    No Known Allergies   MEDS:   Current Facility-Administered Medications   Medication    iron sucrose (VENOFER) injection 200 mg    0.9% sodium chloride infusion 250 mL    dextrose 5% and 0.9% NaCl infusion    metoprolol (LOPRESSOR) injection 5 mg    sodium chloride (NS) flush 5-40 mL    sodium chloride (NS) flush 5-40 mL    famotidine (PF) (PEPCID) 20 mg in 0.9% sodium chloride 10 mL injection    piperacillin-tazobactam (ZOSYN) 3.375 g in 0.9% sodium chloride (MBP/ADV) 100 mL MBP        Current Facility-Administered Medications:     iron sucrose (VENOFER) injection 200 mg, 200 mg, IntraVENous, Q48H, Aquilino Patel MD, 200 mg at 09/20/22 0853    0.9% sodium chloride infusion 250 mL, 250 mL, IntraVENous, PRN, Sergio Ruffin MD    dextrose 5% and 0.9% NaCl infusion, 125 mL/hr, IntraVENous, CONTINUOUS, Annie Huntley MD, Last Rate: 125 mL/hr at 09/21/22 0000, 125 mL/hr at 09/21/22 0000    metoprolol (LOPRESSOR) injection 5 mg, 5 mg, IntraVENous, Q6H, Annie Huntley MD, 5 mg at 09/21/22 0529    sodium chloride (NS) flush 5-40 mL, 5-40 mL, IntraVENous, Q8H, Italia MCCRARY MD, 10 mL at 09/21/22 0529    sodium chloride (NS) flush 5-40 mL, 5-40 mL, IntraVENous, PRN, Italia MCCRARY MD    famotidine (PF) (PEPCID) 20 mg in 0.9% sodium chloride 10 mL injection, 20 mg, IntraVENous, Q24H, Sergio Ruffin MD, 20 mg at 09/20/22 1606    piperacillin-tazobactam (ZOSYN) 3.375 g in 0.9% sodium chloride (MBP/ADV) 100 mL MBP, 3.375 g, IntraVENous, Q12H, Sergio Ruffin MD, Last Rate: 25 mL/hr at 09/20/22 2327, 3.375 g at 09/20/22 2327      Objective:     Vital Signs: Telemetry: Rapid sinus rhythm Intake/Output:   Visit Vitals  BP (!) 141/71   Pulse (!) 53   Temp 98 °F (36.7 °C)   Resp 18   Ht 5' 7.72\" (1.72 m)   SpO2 96%   BMI 16.10 kg/m²       Temp (24hrs), Av °F (36.7 °C), Min:97.5 °F (36.4 °C), Max:98.5 °F (36.9 °C)        O2 Device: None (Room air) O2 Flow Rate (L/min): 2 l/min         Body mass index is 16.1 kg/m². Wt Readings from Last 4 Encounters:   22 47.6 kg (105 lb)   22 52.2 kg (115 lb)   22 49 kg (108 lb)   22 49.9 kg (110 lb)          Intake/Output Summary (Last 24 hours) at 2022 0814  Last data filed at 2022 0600  Gross per 24 hour   Intake 3100 ml   Output 900 ml   Net 2200 ml         Last shift:      No intake/output data recorded. Last 3 shifts:  1901 -  0700  In: 3900 [P.O.:2050; I.V.:1600]  Out: 900 [Urine:600; Drains:300]       Hemodynamics:    CO:    CI:    CVP:    SVR:   PAP Systolic:    PAP Diastolic:    PVR:    DR37:       Ventilator Settings:      Mode Rate TV Press PEEP FiO2 PIP Min. Vent               28 %            Physical Exam:    General:  male; sitting up in the chair awake alert rations nonlabored on room air  HEENT: NCAT, normal oral mucosa  Eyes: anicteric; conjunctiva clear extraocular movements intact  Neck: no nodes, no JVD no accessory MM use. Chest: no deformity,   Cardiac: Regular rate and rhythm  Lungs: Clear anteriorly and laterally and upper lungs posteriorly with diminished in the bases  Abd: Abdominal bandaging in place nontender normal bowel sounds  Ext: no edema; no joint swelling;  No clubbing  : clear urine  Neuro: Awake alert oriented speech is clear moves all 4 extremities  Psych- no agitation, oriented to person; some mild memory impairment mildly confused  Skin: warm, dry, no cyanosis;   Pulses: Brachial and radial pulses intact  Capillary: Normal capillary refill      Labs:    Recent Labs     22  0455 22  0600 22  2124   WBC 5.8 7.0 6.8   HGB 9.8* 10.2* 8.1*    186 185       Recent Labs     09/21/22  0455 09/20/22  0600 09/19/22  0445   * 144 142   K 3.9 3.7 4.0   * 113* 106   CO2 18* 22 27   * 119* 113*   BUN 62* 60* 56*   CREA 4.13* 4.18* 3.62*   CA 8.0* 8.4* 8.7   MG 2.3 2.5*  --    PHOS 3.8 4.0 5.1*   ALB 2.8* 3.2* 3.6       No results for input(s): PH, PCO2, PO2, HCO3, FIO2 in the last 72 hours. 9/20 room air oxygen saturation 98%   nasal MRSA smear negative  4/2/2022 Echo ejection fraction 89% diastolic dysfunction multiple wall motion abnormalities and RVSP 34  Imaging:  I have personally reviewed the patients radiographs and have reviewed the reports:    CXR Results  (Last 48 hours)                 09/20/22 0234  XR CHEST PORT Final result    Impression:  No significant change. Narrative:  INDICATION: Pleural effusion       EXAMINATION:  AP CHEST, PORTABLE       COMPARISON: 9/19/2022       FINDINGS: Single AP portable view of the chest demonstrates no change in   position of the lines and tubes. The cardiomediastinal silhouette is unchanged. Lungs are hyperinflated with persistent right infrahilar/midlung linear opacity   which again may represent pleural fluid or airspace disease. No pneumothorax or   pulmonary edema. Results from Hospital Encounter encounter on 09/15/22    XR CHEST PORT    Narrative  INDICATION: Pleural effusion    EXAMINATION:  AP CHEST, PORTABLE    COMPARISON: 9/19/2022    FINDINGS: Single AP portable view of the chest demonstrates no change in  position of the lines and tubes. The cardiomediastinal silhouette is unchanged. Lungs are hyperinflated with persistent right infrahilar/midlung linear opacity  which again may represent pleural fluid or airspace disease. No pneumothorax or  pulmonary edema. Impression  No significant change.       XR CHEST PORT    Narrative  EXAM: XR CHEST PORT    DATE: 9/19/2022 3:21 AM    INDICATION: Pleural effusions    COMPARISON: Chest radiograph September 18, 2022    FINDINGS: AP portable chest radiograph. The lungs are hyperinflated. Heart size  is normal. No new consolidation is seen. Trace pleural fluid is noted along the  horizontal fissure and at the LEFT base. There is improving aeration of the LEFT  days with mild discoid atelectasis. No pneumothorax is seen. Impression  Unchanged, trace fluid along the horizontal fissure. Mild LEFT basilar  atelectasis. XR ABD (KUB)    Narrative  EXAM:  XR ABD (KUB)    INDICATION:   ileus    COMPARISON: Abdominal radiograph 9/17/2022. FINDINGS: Single view the abdomen was obtained. There are a few borderline  dilated loops of small bowel in the left abdomen measuring up to 3.0 cm,  unchanged. Catheter noted in the pelvis. Skin staples noted. Degenerative  changes in the lumbar spine. Impression  1. Unchanged few borderline dilated loops of small bowel likely representing  ileus, though difficult to exclude evolving small bowel obstruction. Continued  radiographic follow-up should be considered. Results from [place] encounter on 08/19/22    CT ABD PELV WO CONT    Narrative  EXAM: CT ABD PELV WO CONT    INDICATION: Benign neoplasm of colon. Gastritis. Blood in stool. COMPARISON: None    IV CONTRAST: None. ORAL CONTRAST: Oral contrast was administered to better evaluate the bowel. TECHNIQUE:  Thin axial images were obtained through the abdomen and pelvis. Coronal and  sagittal reformats were generated. CT dose reduction was achieved through use of  a standardized protocol tailored for this examination and automatic exposure  control for dose modulation. The absence of intravenous contrast material reduces the sensitivity for  evaluation of the vasculature and solid organs. FINDINGS:  LOWER THORAX: Emphysematous changes at the lung bases. Trace left pleural fluid. LIVER: Scattered hepatic cysts. BILIARY TREE: Gallbladder is within normal limits.  CBD is not dilated. SPLEEN: within normal limits. PANCREAS: No focal abnormality. ADRENALS: 2.8 x 1.7 cm solid right adrenal lesion, incompletely characterized on  the basis of this study. Recommend contrast-enhanced imaging for further  evaluation. KIDNEYS/URETERS: Simple bilateral renal cysts not requiring further follow-up. No hydronephrosis or stones. STOMACH: Unremarkable. SMALL BOWEL: No dilatation or wall thickening. COLON: Focal wall thickening of the mid transverse colon (201:75) may be due to  peristalsis, however an underlying lesion cannot be excluded. APPENDIX: Normal  PERITONEUM: No ascites or pneumoperitoneum. RETROPERITONEUM: 3.1 x 3.1 cm infrarenal abdominal aortic aneurysm. REPRODUCTIVE ORGANS: Within normal limits. URINARY BLADDER: No mass or calculus. BONES: Degenerative changes of the lumbar spine. No fracture or aggressive  osseous lesion. ABDOMINAL WALL: No mass or hernia. ADDITIONAL COMMENTS: N/A    Impression  1. Focal wall thickening of the mid transverse colon might be due to  peristalsis but an underlying lesion cannot be excluded. Recommend correlation  with colonoscopy. 2.  Indeterminant right adrenal mass measuring 2.8 x 1.7 cm.  3.  Hepatic and renal cysts. 4.  Emphysematous changes at the lung bases. Contrast-enhanced multiphasic CT of the abdomen is recommended for further  characterization of the right adrenal lesion. Discussion: Acute hypoxic respiratory failure with chest x-ray showing bilateral pleural effusions mild congestion. Previous echo shows diastolic dysfunction. He normally is on metoprolol at home currently has sinus tachycardia. We will start IV Lopressor to control heart rate will give IV Lasix follow renal function. Preop hemoglobin was 10 currently 8 we will repeat later today to make sure not having active bleeding. 9/18 oxygenation improved chest x-ray minimal to no improvement. Good diuresis yesterday over 4 L but creatinine has risen.   Will give Lasix today we will give 2 doses of albumin BNP remains elevated. We will give small amount IV fluids follow chest x-ray in a.m.  9/19 oxygenation has improved chest x-ray has slightly diminished effusion although some persists. Lasix held yesterday due to rising creatinine despite this had diuresis over 2 L with resultant worsening creatinine. Will give IV fluids today continue to hold Lasix until creatinine improving. Question if can start oral nutrition today  9/20 awake alert chest x-ray shows resolution of effusions does have right midlung density questionable mucous plug or aspiration pneumonia no worsening. Did not have his IV fluids increased yesterday creatinine worsening have spoken with nurse and fluids to be increased we will continue to follow renal function  9/21 no significant change in renal function we will continue with IV fluid  Time of care 30 minutes           Thank you for allowing us to participate in the care of this patient.   We will follow along with you     Tamy Coats MD

## 2022-09-21 NOTE — PROGRESS NOTES
CM reviewed chart. Per IDR, plans to transfer out to surgical floor once bed is available. Once medically table plan is for IRF at [facility] and rehab. Per liaison, they will be ready to take her when she is medically ready.

## 2022-09-21 NOTE — PROGRESS NOTES
Progress Note    Patient: Raymond Pickens MRN: [id number]  SSN: [id number]    YOB: 1935  Age: 82 y.o. Sex: male      Admit Date: 9/15/2022    LOS: 6 days     Subjective:   Postoperative day 6 status post extended right hemicolectomy for adenocarcinoma of the colon  Patient seen in bed  No complaints  Tolerating full liquid diet    Objective:     Vitals:    09/21/22 0500 09/21/22 0530 09/21/22 0600 09/21/22 0700   BP:  (!) 157/69 (!) 141/71    Pulse: 77 79 (!) 53    Resp: 17 19 18    Temp:    98 °F (36.7 °C)   SpO2: 96% 97% 96%    Height:            Intake and Output:  Current Shift: No intake/output data recorded. Last three shifts: 09/19 1901 - 09/21 0700  In: 3900 [P.O.:2050;  I.V.:1600]  Out: 900 [Urine:600; Drains:300]    Review of Systems:  ROS     Physical Exam:   Abdomen is soft, appropriately tender surgical site, nondistended, dressing clean and intact    Lab/Data Review:  Recent Results (from the past 24 hour(s))   RENAL FUNCTION PANEL    Collection Time: 09/21/22  4:55 AM   Result Value Ref Range    Sodium 146 (H) 136 - 145 mmol/L    Potassium 3.9 3.5 - 5.1 mmol/L    Chloride 118 (H) 97 - 108 mmol/L    CO2 18 (L) 21 - 32 mmol/L    Anion gap 10 5 - 15 mmol/L    Glucose 123 (H) 65 - 100 mg/dL    BUN 62 (H) 6 - 20 mg/dL    Creatinine 4.13 (H) 0.70 - 1.30 mg/dL    BUN/Creatinine ratio 15 12 - 20      GFR est AA 17 (L) >60 ml/min/1.73m2    GFR est non-AA 14 (L) >60 ml/min/1.73m2    Calcium 8.0 (L) 8.5 - 10.1 mg/dL    Phosphorus 3.8 2.6 - 4.7 mg/dL    Albumin 2.8 (L) 3.5 - 5.0 g/dL   MAGNESIUM    Collection Time: 09/21/22  4:55 AM   Result Value Ref Range    Magnesium 2.3 1.6 - 2.4 mg/dL   NT-PRO BNP    Collection Time: 09/21/22  4:55 AM   Result Value Ref Range    NT pro-BNP 2,512 (H) <450 pg/mL   CBC WITH AUTOMATED DIFF    Collection Time: 09/21/22  4:55 AM   Result Value Ref Range    WBC 5.8 4.1 - 11.1 K/uL    RBC 3.48 (L) 4.10 - 5.70 M/uL    HGB 9.8 (L) 12.1 - 17.0 g/dL    HCT 30.7 (L) 36.6 - 50.3 %    MCV 88.2 80.0 - 99.0 FL    MCH 28.2 26.0 - 34.0 PG    MCHC 31.9 30.0 - 36.5 g/dL    RDW 14.6 (H) 11.5 - 14.5 %    PLATELET 512 027 - 542 K/uL    MPV 10.8 8.9 - 12.9 FL    NRBC 0.0 0.0  WBC    ABSOLUTE NRBC 0.00 0.00 - 0.01 K/uL    NEUTROPHILS 65 32 - 75 %    LYMPHOCYTES 13 12 - 49 %    MONOCYTES 10 5 - 13 %    EOSINOPHILS 11 (H) 0 - 7 %    BASOPHILS 0 0 - 1 %    IMMATURE GRANULOCYTES 1 (H) 0 - 0.5 %    ABS. NEUTROPHILS 3.8 1.8 - 8.0 K/UL    ABS. LYMPHOCYTES 0.7 (L) 0.8 - 3.5 K/UL    ABS. MONOCYTES 0.6 0.0 - 1.0 K/UL    ABS. EOSINOPHILS 0.7 (H) 0.0 - 0.4 K/UL    ABS. BASOPHILS 0.0 0.0 - 0.1 K/UL    ABS. IMM. GRANS. 0.0 0.00 - 0.04 K/UL    DF AUTOMATED            Assessment:     Active Problems:    Colon cancer ([facility] (9/15/2022)      Status post surgery (9/15/2022)        Plan:   Overall doing well  Creatinine stable 4.13, JULIENNE on setting of CKD.   Nephrology following, urine output adequate  Low continue full liquid diet  Patient is having loose bowel movements not unexpected given surgical procedure  Patient may transfer to floor with telemetry    Signed By: Conley Kanner, MD     September 21, 2022

## 2022-09-22 PROCEDURE — 65270000029 HC RM PRIVATE

## 2022-09-22 PROCEDURE — 74011250636 HC RX REV CODE- 250/636: Performed by: INTERNAL MEDICINE

## 2022-09-22 PROCEDURE — 74011000250 HC RX REV CODE- 250: Performed by: INTERNAL MEDICINE

## 2022-09-22 PROCEDURE — 74011000250 HC RX REV CODE- 250: Performed by: SURGERY

## 2022-09-22 PROCEDURE — 74011000258 HC RX REV CODE- 258: Performed by: SURGERY

## 2022-09-22 PROCEDURE — 97530 THERAPEUTIC ACTIVITIES: CPT

## 2022-09-22 PROCEDURE — 74011250636 HC RX REV CODE- 250/636: Performed by: SURGERY

## 2022-09-22 PROCEDURE — 74011250637 HC RX REV CODE- 250/637: Performed by: SURGERY

## 2022-09-22 PROCEDURE — 51798 US URINE CAPACITY MEASURE: CPT

## 2022-09-22 PROCEDURE — 94640 AIRWAY INHALATION TREATMENT: CPT

## 2022-09-22 RX ORDER — ALBUTEROL SULFATE 90 UG/1
2 AEROSOL, METERED RESPIRATORY (INHALATION)
Status: DISCONTINUED | OUTPATIENT
Start: 2022-09-23 | End: 2022-09-23 | Stop reason: HOSPADM

## 2022-09-22 RX ORDER — CHOLESTYRAMINE 4 G/4.8G
4 POWDER, FOR SUSPENSION ORAL 2 TIMES DAILY WITH MEALS
Status: DISCONTINUED | OUTPATIENT
Start: 2022-09-23 | End: 2022-09-23 | Stop reason: HOSPADM

## 2022-09-22 RX ORDER — ALBUTEROL SULFATE 90 UG/1
2 AEROSOL, METERED RESPIRATORY (INHALATION)
Status: DISCONTINUED | OUTPATIENT
Start: 2022-09-23 | End: 2022-09-22

## 2022-09-22 RX ADMIN — METOPROLOL TARTRATE 5 MG: 5 INJECTION INTRAVENOUS at 13:22

## 2022-09-22 RX ADMIN — SODIUM CHLORIDE, PRESERVATIVE FREE 20 MG: 5 INJECTION INTRAVENOUS at 17:58

## 2022-09-22 RX ADMIN — METOPROLOL TARTRATE 5 MG: 5 INJECTION INTRAVENOUS at 17:58

## 2022-09-22 RX ADMIN — ALBUTEROL SULFATE 2 PUFF: 108 INHALANT RESPIRATORY (INHALATION) at 04:08

## 2022-09-22 RX ADMIN — PIPERACILLIN AND TAZOBACTAM 3.38 G: 3; .375 INJECTION, POWDER, FOR SOLUTION INTRAVENOUS at 13:19

## 2022-09-22 RX ADMIN — METOPROLOL TARTRATE 5 MG: 5 INJECTION INTRAVENOUS at 06:01

## 2022-09-22 RX ADMIN — METOPROLOL TARTRATE 5 MG: 5 INJECTION INTRAVENOUS at 00:11

## 2022-09-22 RX ADMIN — PIPERACILLIN AND TAZOBACTAM 3.38 G: 3; .375 INJECTION, POWDER, FOR SOLUTION INTRAVENOUS at 00:10

## 2022-09-22 RX ADMIN — IRON SUCROSE 200 MG: 20 INJECTION, SOLUTION INTRAVENOUS at 09:14

## 2022-09-22 RX ADMIN — ALBUTEROL SULFATE 2 PUFF: 108 INHALANT RESPIRATORY (INHALATION) at 12:14

## 2022-09-22 RX ADMIN — ALBUTEROL SULFATE 2 PUFF: 108 INHALANT RESPIRATORY (INHALATION) at 21:09

## 2022-09-22 NOTE — PROGRESS NOTES
Problem: Falls - Risk of  Goal: *Absence of Falls  Description: Document Nicanor Johanny Fall Risk and appropriate interventions in the flowsheet.   Outcome: Progressing Towards Goal  Note: Fall Risk Interventions:  Mobility Interventions: Bed/chair exit alarm, Patient to call before getting OOB         Medication Interventions: Bed/chair exit alarm, Patient to call before getting OOB, Teach patient to arise slowly    Elimination Interventions: Bed/chair exit alarm, Call light in reach, Toileting schedule/hourly rounds, Patient to call for help with toileting needs

## 2022-09-22 NOTE — PROGRESS NOTES
Rn discontinued flexi seal per order from Selena Kaiser MD. Pt cannot be discharged to [facility] with flexiseal in place or with active diarrhea.  Will continue to monitor

## 2022-09-22 NOTE — PROGRESS NOTES
CM notified Garrick Fink from [facility] that patient will be cleared to [place] today. Garrick Fink is going to check into bed availability.     ---------------------    500 Terri Demarco with [facility] informed CM that they cannot accept patient with a flexi-seal and Dr. Marge Irvin from [facility] would like to see patients diarrhea improve before accepting. CM notified charge nurse and primary nurse.

## 2022-09-22 NOTE — PROGRESS NOTES
TRANSFER - OUT REPORT:    Verbal report given to [facility] BAKARI RN(name) on Sheila Contreras  being transferred to [facility] Room 507(unit) for routine progression of care       Report consisted of patients Situation, Background, Assessment and   Recommendations(SBAR). Information from the following report(s) SBAR was reviewed with the receiving nurse. Lines:   Peripheral IV 09/19/22 Right;Upper Cephalic (Active)   Site Assessment Clean, dry, & intact 09/21/22 1901   Phlebitis Assessment 0 09/21/22 1901   Infiltration Assessment 0 09/21/22 1901   Dressing Status Clean, dry, & intact 09/21/22 1901   Dressing Type Transparent 09/21/22 1901   Hub Color/Line Status Patent; Flushed;Capped 09/21/22 1901   Action Taken Open ports on tubing capped 09/20/22 1544   Alcohol Cap Used Yes 09/21/22 1901       Peripheral IV 09/21/22 Anterior;Left;Proximal Forearm (Active)   Site Assessment Clean, dry, & intact 09/21/22 1901   Phlebitis Assessment 0 09/21/22 1901   Infiltration Assessment 0 09/21/22 1901   Dressing Status Clean, dry, & intact 09/21/22 1901   Dressing Type Transparent 09/21/22 1901   Hub Color/Line Status Patent; Flushed; Infusing 09/21/22 1901   Alcohol Cap Used Yes 09/21/22 1542       Extended Dwell Peripheral IV (Active)   Criteria for Appropriate Use Limited/no vessel suitable for conventional peripheral access 09/21/22 0900   Site Assessment Clean, dry, & intact 09/21/22 0900   Phlebitis Assessment 0 09/21/22 0900   Infiltration Assessment 0 09/21/22 0900   External Catheter Length (cm) 0 centimeters 09/18/22 1918   Line Status Capped 09/21/22 0900   Line Care Cap changed 09/19/22 0701   Alcohol Cap Used Yes 09/21/22 0900   Date of Last Dressing Change 09/18/22 09/19/22 0701   Dressing Type Disk with Chlorhexadine gluconate (CHG); Transparent 09/21/22 0800   Dressing Status Clean, dry, & intact 09/21/22 0900   Dressing Intervention New dressing 09/19/22 0701        Opportunity for questions and clarification was provided.       Patient transported with:   Monitor  Registered Nurse  Tech

## 2022-09-22 NOTE — PROGRESS NOTES
Consult  Pulmonary, Critical Care    Name: Elida Lowry MRN: [id number]   : 1935 Hospital: [facility]   Date: 2022  Admission date: 9/15/2022 Hospital Day: 8       Subjective/Interval History:   Seen in the ICU currently on nasal high flow oxygen. Patient underwent exploratory laparotomy 9/15 with right hemicolectomy for colon cancer. He has been on IV fluids since that time. He is normally on metoprolol as an outpatient. Yesterday he developed tachycardia and during the night hypoxia and placed on nasal high flow oxygen. Chest x-ray shows bilateral pleural effusions with mild congestion. At the current time he denies any discomfort.  now on nasal oxygen 2 L saturation 100%. Mildly confused   more awake today carries on a simple conversation. He is passing gas and had several bloody bowel movements during the night. Despite no Lasix yesterday he had over 2 L of urine output and creatinine has worsened further   sitting up in the chair feels much better states that he ate without difficulty. Input and output not recorded yesterday it appears that his IV fluids were not increased.   Have talked with nurse and fluids will be increased today    Patient Active Problem List   Diagnosis Code    Paresthesia and pain of right extremity M79.609, R20.2    Acute CVA (cerebrovascular accident) ([address] I63.9    Hypercholesteremia E78.00    Accelerated hypertension I10    CVA (cerebral vascular accident) ([address] I63.9    Colon cancer ([address] C18.9    Status post surgery Z98.890       IMPRESSION:   Acute hypoxic respiratory failure now on room air  Pulmonary edema resolved  Bilateral pleural effusion resolved  Right midlung density questionable mucous plugging  Diastolic left ventricular dysfunction  Anemia hemoglobin improved after transfusion  Acute kidney injury on IV fluids  Chronic kidney disease  Sinus tachycardia controlled with IV Lopressor  Colon cancer status post exploratory laparotomy with right hemicolectomy  Body mass index is 16.1 kg/m². RECOMMENDATIONS/PLAN:   Fattening continues to worsen although IV fluids were not increased as ordered yesterday. We will increase at this time. We will switch Lopressor to his normal oral dose  Pleural effusions have resolved  Duplex scan of the legs no DVT         Subjective/Initial History:   I have reviewed the flowsheet and previous days notes. I was asked by Bernie Churchill MD to see Yonatan Smith a 82 y.o.  male  in consultation for a chief complaint of hypoxic respiratory failure pulmonary edema bilateral pleural effusions          Patient PCP: Joshua Dougherty  PMH:  has a past medical history of Cancer ([address] Chronic kidney disease, Chronic obstructive pulmonary disease ([address] Gastrointestinal disorder, Heart attack ([address] Hyperlipidemia, Hypertension, Psychiatric disorder, and Stroke ([address] PSH:   has a past surgical history that includes colonoscopy (N/A, 08/04/2022); pr abdomen surgery proc unlisted; and hx heent (Bilateral). FHX: family history includes Cancer in his father and mother. SHX:  reports that he quit smoking about 5 years ago. His smoking use included cigarettes. He has never used smokeless tobacco. He reports that he does not drink alcohol and does not use drugs.     Systemic review: He seems very mildly confused but seems adequate for history  General no significant problem prior to surgery denies any chronic fever chills or sweats ankle edema chest pain or diaphoresis  Eyes no double vision or momentary blindness  ENT no nasal congestion drainage or facial pain  Musculoskeletal no swollen tender joints  Endocrinologic no polyuria polydipsia  Neurologic no seizures or syncope  Gastrointestinal no nausea or vomiting NG tube is in place at the current time  Genitourinary no pain or discomfort on urination is followed by Dr. Sheila Scott for chronic kidney disease  Cardiovascular states he has a heart doctor does not remember his name not sure what he sees them for he is normally on Norvasc and metoprolol  Respiratory stop smoking 10 or 15 years ago denies any history of asthma emphysema is not on any inhalers although his med list mentions albuterol again that may be some degree of confusion on his part    No Known Allergies   MEDS:   Current Facility-Administered Medications   Medication    0.45% sodium chloride infusion    albuterol (PROVENTIL HFA, VENTOLIN HFA, PROAIR HFA) inhaler 2 Puff    iron sucrose (VENOFER) injection 200 mg    metoprolol (LOPRESSOR) injection 5 mg    famotidine (PF) (PEPCID) 20 mg in 0.9% sodium chloride 10 mL injection    piperacillin-tazobactam (ZOSYN) 3.375 g in 0.9% sodium chloride (MBP/ADV) 100 mL MBP        Current Facility-Administered Medications:     0.45% sodium chloride infusion, 50 mL/hr, IntraVENous, CONTINUOUS, Aquilino Patel MD, Last Rate: 50 mL/hr at 09/21/22 1200, 50 mL/hr at 09/21/22 1200    albuterol (PROVENTIL HFA, VENTOLIN HFA, PROAIR HFA) inhaler 2 Puff, 2 Puff, Inhalation, Q6H PRN, Vitaliy Ruffin MD, 2 Puff at 09/22/22 0408    iron sucrose (VENOFER) injection 200 mg, 200 mg, IntraVENous, Q48H, Aquilino Patel MD, 200 mg at 09/22/22 0914    metoprolol (LOPRESSOR) injection 5 mg, 5 mg, IntraVENous, Q6H, Annie Huntley MD, 5 mg at 09/22/22 0601    famotidine (PF) (PEPCID) 20 mg in 0.9% sodium chloride 10 mL injection, 20 mg, IntraVENous, Q24H, Sergio Ruffin MD, 20 mg at 09/21/22 1627    piperacillin-tazobactam (ZOSYN) 3.375 g in 0.9% sodium chloride (MBP/ADV) 100 mL MBP, 3.375 g, IntraVENous, Q12H, Sergio Ruffin MD, Last Rate: 25 mL/hr at 09/22/22 0010, 3.375 g at 09/22/22 0010      Objective:     Vital Signs: Telemetry:    Rapid sinus rhythm Intake/Output:   Visit Vitals  BP (!) 152/73 (BP 1 Location: Left upper arm, BP Patient Position: Semi fowlers)   Pulse 82   Temp 97.7 °F (36.5 °C)   Resp 16   Ht 5' 7.72\" (1.72 m)   SpO2 97%   BMI 16.10 kg/m²       Temp (24hrs), Av.8 °F (36.6 °C), Min:97.4 °F (36.3 °C), Max:98.2 °F (36.8 °C)        O2 Device: None (Room air) O2 Flow Rate (L/min): 3 l/min         Body mass index is 16.1 kg/m². Wt Readings from Last 4 Encounters:   22 47.6 kg (105 lb)   22 52.2 kg (115 lb)   22 49 kg (108 lb)   22 49.9 kg (110 lb)          Intake/Output Summary (Last 24 hours) at 2022 0918  Last data filed at 2022 0733  Gross per 24 hour   Intake 870 ml   Output 1200 ml   Net -330 ml         Last shift:       07 -  190  In: 650 [I.V.:650]  Out: -   Last 3 shifts: 1901 -  0700  In: 5 [P.O.:420]  Out: 2350 [Urine:1000; Drains:1350]       Hemodynamics:    CO:    CI:    CVP:    SVR:   PAP Systolic:    PAP Diastolic:    PVR:    QQ23:       Ventilator Settings:      Mode Rate TV Press PEEP FiO2 PIP Min. Vent               21 %            Physical Exam:    General:  male; sitting up in the chair awake alert rations nonlabored on room air  HEENT: NCAT, normal oral mucosa  Eyes: anicteric; conjunctiva clear extraocular movements intact  Neck: no nodes, no JVD no accessory MM use. Chest: no deformity,   Cardiac: Regular rate and rhythm  Lungs: Clear anteriorly and laterally and upper lungs posteriorly with diminished in the bases  Abd: Abdominal bandaging in place nontender normal bowel sounds  Ext: no edema; no joint swelling;  No clubbing  : clear urine  Neuro: Awake alert oriented speech is clear moves all 4 extremities  Psych- no agitation, oriented to person; some mild memory impairment mildly confused  Skin: warm, dry, no cyanosis;   Pulses: Brachial and radial pulses intact  Capillary: Normal capillary refill      Labs:    Recent Labs     22  0455 22  0600 22  2124   WBC 5.8 7.0 6.8   HGB 9.8* 10.2* 8.1*    186 185       Recent Labs     22  0455 22  0600   * 144   K 3.9 3.7   * 113*   CO2 18* 22   * 119*   BUN 62* 60*   CREA 4.13* 4.18*   CA 8.0* 8.4*   MG 2.3 2.5*   PHOS 3.8 4.0   ALB 2.8* 3.2*       No results for input(s): PH, PCO2, PO2, HCO3, FIO2 in the last 72 hours. 9/20 room air oxygen saturation 98%   nasal MRSA smear negative  4/2/2022 Echo ejection fraction 26% diastolic dysfunction multiple wall motion abnormalities and RVSP 34  Imaging:  I have personally reviewed the patients radiographs and have reviewed the reports:    CXR Results  (Last 48 hours)                 09/21/22 1555  XR CHEST PORT Final result    Impression:  1. Emphysema. 2. Right lung persistent density, nonspecific. Narrative:  EXAM: Portable CXR. 1555 hours. COMPARISON: 9/20/2022         INDICATION: shortness of breath, increased resp effort       FINDINGS:   Lungs are emphysematous. There is persistent nonspecific density in the right infrahilar lung. Heart size is normal. There is no pulmonary edema, pneumothorax, midline shift   or pleural effusion. Results from [place] encounter on 09/15/22    XR CHEST PORT    Narrative  EXAM: Portable CXR. 1555 hours. COMPARISON: 9/20/2022    INDICATION: shortness of breath, increased resp effort    FINDINGS:  Lungs are emphysematous. There is persistent nonspecific density in the right infrahilar lung. Heart size is normal. There is no pulmonary edema, pneumothorax, midline shift  or pleural effusion. Impression  1. Emphysema. 2. Right lung persistent density, nonspecific. XR CHEST PORT    Narrative  INDICATION: Pleural effusion    EXAMINATION:  AP CHEST, PORTABLE    COMPARISON: 9/19/2022    FINDINGS: Single AP portable view of the chest demonstrates no change in  position of the lines and tubes. The cardiomediastinal silhouette is unchanged. Lungs are hyperinflated with persistent right infrahilar/midlung linear opacity  which again may represent pleural fluid or airspace disease.  No pneumothorax or  pulmonary edema. Impression  No significant change. XR CHEST PORT    Narrative  EXAM: XR CHEST PORT    DATE: 9/19/2022 3:21 AM    INDICATION: Pleural effusions    COMPARISON: Chest radiograph September 18, 2022    FINDINGS: AP portable chest radiograph. The lungs are hyperinflated. Heart size  is normal. No new consolidation is seen. Trace pleural fluid is noted along the  horizontal fissure and at the LEFT base. There is improving aeration of the LEFT  days with mild discoid atelectasis. No pneumothorax is seen. Impression  Unchanged, trace fluid along the horizontal fissure. Mild LEFT basilar  atelectasis. Results from [place] encounter on 08/19/22    CT ABD PELV WO CONT    Narrative  EXAM: CT ABD PELV WO CONT    INDICATION: Benign neoplasm of colon. Gastritis. Blood in stool. COMPARISON: None    IV CONTRAST: None. ORAL CONTRAST: Oral contrast was administered to better evaluate the bowel. TECHNIQUE:  Thin axial images were obtained through the abdomen and pelvis. Coronal and  sagittal reformats were generated. CT dose reduction was achieved through use of  a standardized protocol tailored for this examination and automatic exposure  control for dose modulation. The absence of intravenous contrast material reduces the sensitivity for  evaluation of the vasculature and solid organs. FINDINGS:  LOWER THORAX: Emphysematous changes at the lung bases. Trace left pleural fluid. LIVER: Scattered hepatic cysts. BILIARY TREE: Gallbladder is within normal limits. CBD is not dilated. SPLEEN: within normal limits. PANCREAS: No focal abnormality. ADRENALS: 2.8 x 1.7 cm solid right adrenal lesion, incompletely characterized on  the basis of this study. Recommend contrast-enhanced imaging for further  evaluation. KIDNEYS/URETERS: Simple bilateral renal cysts not requiring further follow-up. No hydronephrosis or stones. STOMACH: Unremarkable.   SMALL BOWEL: No dilatation or wall thickening. COLON: Focal wall thickening of the mid transverse colon (201:75) may be due to  peristalsis, however an underlying lesion cannot be excluded. APPENDIX: Normal  PERITONEUM: No ascites or pneumoperitoneum. RETROPERITONEUM: 3.1 x 3.1 cm infrarenal abdominal aortic aneurysm. REPRODUCTIVE ORGANS: Within normal limits. URINARY BLADDER: No mass or calculus. BONES: Degenerative changes of the lumbar spine. No fracture or aggressive  osseous lesion. ABDOMINAL WALL: No mass or hernia. ADDITIONAL COMMENTS: N/A    Impression  1. Focal wall thickening of the mid transverse colon might be due to  peristalsis but an underlying lesion cannot be excluded. Recommend correlation  with colonoscopy. 2.  Indeterminant right adrenal mass measuring 2.8 x 1.7 cm.  3.  Hepatic and renal cysts. 4.  Emphysematous changes at the lung bases. Contrast-enhanced multiphasic CT of the abdomen is recommended for further  characterization of the right adrenal lesion. Discussion: Acute hypoxic respiratory failure with chest x-ray showing bilateral pleural effusions mild congestion. Previous echo shows diastolic dysfunction. He normally is on metoprolol at home currently has sinus tachycardia. We will start IV Lopressor to control heart rate will give IV Lasix follow renal function. Preop hemoglobin was 10 currently 8 we will repeat later today to make sure not having active bleeding. 9/18 oxygenation improved chest x-ray minimal to no improvement. Good diuresis yesterday over 4 L but creatinine has risen. Will give Lasix today we will give 2 doses of albumin BNP remains elevated. We will give small amount IV fluids follow chest x-ray in a.m.  9/19 oxygenation has improved chest x-ray has slightly diminished effusion although some persists. Lasix held yesterday due to rising creatinine despite this had diuresis over 2 L with resultant worsening creatinine.   Will give IV fluids today continue to hold Lasix until creatinine improving. Question if can start oral nutrition today  9/20 awake alert chest x-ray shows resolution of effusions does have right midlung density questionable mucous plug or aspiration pneumonia no worsening. Did not have his IV fluids increased yesterday creatinine worsening have spoken with nurse and fluids to be increased we will continue to follow renal function  9/21 no significant change in renal function we will continue with IV fluid             Thank you for allowing us to participate in the care of this patient.   We will follow along with you     Asif Carrasco MD

## 2022-09-22 NOTE — PROGRESS NOTES
[facility] KIDNEY     Renal Daily Progress Note:     Admission Date: 9/15/2022     Subjective: Patient POD # 7 after left hemicolectomy. Pathology shows adenocarcinoma. Had worsening azotemia probably related to intravascular volume depletion post op and significant diuresis. Serum creatinine has plateaued. Without any evidence of hypotension or nephrotoxic toxic drug use. Today he is alert and oriented. Conversant. Tolerating diet; has flatus and BM. No nausea or vomiting. No abdominal pain. Not short of breath at rest      Review of Systems  Pertinent items are noted in HPI.     Objective:     Visit Vitals  BP (!) 152/73 (BP 1 Location: Left upper arm, BP Patient Position: Semi fowlers)   Pulse 82   Temp 97.7 °F (36.5 °C)   Resp 16   Ht 5' 7.72\" (1.72 m)   SpO2 95%   BMI 16.10 kg/m²     Temp (24hrs), Av °F (36.7 °C), Min:97.7 °F (36.5 °C), Max:98.2 °F (36.8 °C)        Intake/Output Summary (Last 24 hours) at 2022 1229  Last data filed at 2022 9661  Gross per 24 hour   Intake 870 ml   Output 750 ml   Net 120 ml       Current Facility-Administered Medications   Medication Dose Route Frequency    0.45% sodium chloride infusion  50 mL/hr IntraVENous CONTINUOUS    albuterol (PROVENTIL HFA, VENTOLIN HFA, PROAIR HFA) inhaler 2 Puff  2 Puff Inhalation Q6H PRN    iron sucrose (VENOFER) injection 200 mg  200 mg IntraVENous Q48H    metoprolol (LOPRESSOR) injection 5 mg  5 mg IntraVENous Q6H    famotidine (PF) (PEPCID) 20 mg in 0.9% sodium chloride 10 mL injection  20 mg IntraVENous Q24H    piperacillin-tazobactam (ZOSYN) 3.375 g in 0.9% sodium chloride (MBP/ADV) 100 mL MBP  3.375 g IntraVENous Q12H       Physical Exam:General appearance: alert, cooperative, no distress, appears stated age  Head: Normocephalic, without obvious abnormality, atraumatic, mild temporal wasting  Eyes: negative findings: anicteric sclera  Neck: supple, symmetrical, trachea midline, no adenopathy, and no JVD  Lungs: clear to auscultation bilaterally  Heart: regularly irregular rhythm, no S3 or S4  Abdomen: Surgical binder in place, bowel sounds positive, no pain on soft palpation  Extremities: no edema  Neurologic: Grossly normal    Data Review:     LABS:  Recent Labs     09/21/22  0455 09/20/22  0600   * 144   K 3.9 3.7   * 113*   CO2 18* 22   BUN 62* 60*   CREA 4.13* 4.18*   CA 8.0* 8.4*   ALB 2.8* 3.2*   PHOS 3.8 4.0   MG 2.3 2.5*       Recent Labs     09/21/22  0455 09/20/22  0600 09/19/22  2124   WBC 5.8 7.0 6.8   HGB 9.8* 10.2* 8.1*   HCT 30.7* 31.7* 25.1*    186 185       No results for input(s): MITZI, KU, CLU, CREAU in the last 72 hours. 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 May be tubular injury but no documented hypotension, IV contrast or nephrotoxic drugs. Urine Na and creatinine c/w tubular injury. Status post left hemicolectomy for adenocarcinoma colon cancer  Anemia with renal failure and significant iron deficiency        Plan:     Obtain/ Order: labs/cultures/radiology/procedures: Renal function profile in a.m. Therapeutic:    Change IVF to 0.45NS at 50 cc/hr but can discontinue once he has adequate oral intake. He notes he is awaiting for his dentures to be returned from home so he can eat regular food  Add protein supplement  Replacing iron with IV prep    Increase in serum creatinine postop may be response to the inflammation and not necessarily volume depletion. However, he did have a fairly significant diuresis.       Advanced diet order, will increase protein intake    Okay to [facility] to [facility] from my standpoint    Catie Quach MD    [phone]
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
## 2022-09-22 NOTE — PROGRESS NOTES
Received patient from icu wheezing noted on assessment,dr Matthew informed and said I call respiratory same called and assessed. the doctor ordered MDI Q6/PRN. same ordered.

## 2022-09-22 NOTE — PROGRESS NOTES
PHYSICAL THERAPY TREATMENT  Patient: Chela Montiel (8 y.o. male)  Date: 9/22/2022  Diagnosis: [facility] [C18.9]  Status post surgery [Z98.890] <principal problem not specified>  Procedure(s) (LRB):  EXPLORATORY LAPAROTOMY, LEFT ELIZABETH COLECTOMY (Bilateral) 7 Days Post-Op  Precautions:    Chart, physical therapy assessment, plan of care and goals were reviewed. ASSESSMENT  Patient continues with skilled PT services and is progressing towards goals. Patient supine in bed upon approach and agreed to therapy session today. Patient was on RA entire session with patient starting off at 95% O2 at start of session. Patient then was mod I for bed mobility, supine>sit and scooting to EOB. Patient then was SBA for STS to RW where patient found to have leaking flexi seal. Patient then was SBA for standing at [address] [place] [address] for 5-7 minutes while therapist and PCT changed bedding and pads, switched out patients soiled gown and juan care. Patient then ambulated 5 feet with RW to bedside chair at [facility] SBA for STS> into chair. Patient then performed second STS to RW at [facility] and ambulated 20 feet inside room at [place] [address] demonstrating slow but steady step through gait pattern with no LOB or knee buckling noted. . Patient then returned to seated in bedside chair where post ambulation O2 was taken at 78% but zuri to 92% within less than 1 minute with patient also reporting that he did not feel any SOB or lightheaded/dizziness. Patient then left seated in recliner chair with call bell within reach and all needs meet. Patients nurse given report on how patient performed during session. Current Level of Function Impacting Discharge (mobility/balance): impaired OOB balance, general weakness, poor activity tolerance    Other factors to consider for discharge: PLOF, assistance at home, level of deficits, acute medical state         PLAN :  Patient continues to benefit from skilled intervention to address the above impairments.   Continue treatment per established plan of care. to address goals. Recommendation for discharge: (in order for the patient to meet his/her long term goals)  Inpatient Rehabilitation Facility vs HHPT pending progress    This discharge recommendation:  Has been made in collaboration with the attending provider and/or case management    IF patient discharges home will need the following DME: rolling walker       SUBJECTIVE:   Patient stated I don't feel SOB at all .     OBJECTIVE DATA SUMMARY:   Critical Behavior:  Neurologic State: Alert  Orientation Level: Oriented X4  Cognition: Follows commands     Functional Mobility Training:  Bed Mobility:  Rolling: Modified independent  Supine to Sit: Modified independent  Scooting: Modified independent  Transfers:  Sit to Stand: Stand-by assistance  Stand to Sit: Stand-by assistance  Balance:  Sitting: Intact; Without support  Standing: Intact; With support  Ambulation/Gait Training:  Distance (ft): 25 Feet (ft)  Assistive Device: Gait belt;Walker, rolling  Ambulation - Level of Assistance: Contact guard assistance  Base of Support: Narrowed  Speed/Melia: Slow  Pain Rating:  No pain reported during session    Activity Tolerance:   Bed locked and in lowest position Fair and requires rest breaks  Please refer to the flowsheet for vital signs taken during this treatment. After treatment patient left in no apparent distress:   Bed returned to lowest position, Sitting in chair and Call bell within reach    COMMUNICATION/COLLABORATION:   The patients plan of care was discussed with: Registered nurse. Problem: Mobility Impaired (Adult and Pediatric)  Goal: *Acute Goals and Plan of Care (Insert Text)  Description: Pt stated goal: to go home  Pt will be I with LE HEP in 7 days. Pt will perform bed mobility with mod I in 7 days. Pt will perform transfers with mod I in 7 days. Pt will amb  feet with LRAD safely with mod I in 7 days.        Outcome: Progressing Towards Goal       Naoma Arrow, PTA   Time Calculation: 51 mins

## 2022-09-22 NOTE — PROGRESS NOTES
Spiritual Care Assessment/Progress Note  [facility]      NAME: Adalberto Rojo      MRN: [id number]  AGE: 82 y.o. SEX: male  Congregation Affiliation: Sabianist   Language: English     9/22/2022     Total Time (in minutes): 10     Spiritual Assessment begun in [facility] through conversation with:         [x]Patient        [] Family    [] Friend(s)        Reason for Consult: Initial visit     Spiritual beliefs: (Please include comment if needed)     [] Identifies with a trinidad tradition:         [] Supported by a trinidad community:            [] Claims no spiritual orientation:           [] Seeking spiritual identity:                [] Adheres to an individual form of spirituality:           [x] Not able to assess:                           Identified resources for coping:      [] Prayer                               [] Music                  [] Guided Imagery     [] Family/friends                 [] Pet visits     [] Devotional reading                         [x] Unknown     [] Other:                                             Interventions offered during this visit: (See comments for more details)    Patient Interventions: Initial visit, Other (comment) (Silent support)           Plan of Care:     [] Support spiritual and/or cultural needs    [] Support AMD and/or advance care planning process      [] Support grieving process   [] Coordinate Rites and/or Rituals    [] Coordination with community clergy   [] No spiritual needs identified at this time   [] Detailed Plan of Care below (See Comments)  [] Make referral to Music Therapy  [] Make referral to Pet Therapy     [] Make referral to Addiction services  [] Make referral to [facility]  [] Make referral to Spiritual Care Partner  [] No future visits requested        [x] Contact Spiritual Care for further referrals     Comments:  Visit attempted for patient in the [address] [place] [address] for initial assessment.   Medical staff was providing care for patient during the time of the visit. No visitors were present in the room. Provided silent support. Contact chaplains for further spiritual care and support. Chaplain Surya Gonzalez M.Div.    can be reached by calling the  at [facility]  [phone]

## 2022-09-23 VITALS
OXYGEN SATURATION: 98 % | HEART RATE: 85 BPM | HEIGHT: 68 IN | SYSTOLIC BLOOD PRESSURE: 150 MMHG | TEMPERATURE: 97.7 F | RESPIRATION RATE: 18 BRPM | BODY MASS INDEX: 16.1 KG/M2 | DIASTOLIC BLOOD PRESSURE: 66 MMHG

## 2022-09-23 LAB
ALBUMIN SERPL-MCNC: 2.9 G/DL (ref 3.5–5)
ANION GAP SERPL CALC-SCNC: 9 MMOL/L (ref 5–15)
BUN SERPL-MCNC: 57 MG/DL (ref 6–20)
BUN/CREAT SERPL: 15 (ref 12–20)
CA-I BLD-MCNC: 8.7 MG/DL (ref 8.5–10.1)
CHLORIDE SERPL-SCNC: 121 MMOL/L (ref 97–108)
CO2 SERPL-SCNC: 18 MMOL/L (ref 21–32)
CREAT SERPL-MCNC: 3.88 MG/DL (ref 0.7–1.3)
GLUCOSE SERPL-MCNC: 100 MG/DL (ref 65–100)
MAGNESIUM SERPL-MCNC: 2.2 MG/DL (ref 1.6–2.4)
PHOSPHATE SERPL-MCNC: 3.7 MG/DL (ref 2.6–4.7)
POTASSIUM SERPL-SCNC: 4.2 MMOL/L (ref 3.5–5.1)
SODIUM SERPL-SCNC: 148 MMOL/L (ref 136–145)

## 2022-09-23 PROCEDURE — 80069 RENAL FUNCTION PANEL: CPT

## 2022-09-23 PROCEDURE — 74011250637 HC RX REV CODE- 250/637: Performed by: SURGERY

## 2022-09-23 PROCEDURE — 74011250636 HC RX REV CODE- 250/636: Performed by: SURGERY

## 2022-09-23 PROCEDURE — 94640 AIRWAY INHALATION TREATMENT: CPT

## 2022-09-23 PROCEDURE — 36415 COLL VENOUS BLD VENIPUNCTURE: CPT

## 2022-09-23 PROCEDURE — 83735 ASSAY OF MAGNESIUM: CPT

## 2022-09-23 PROCEDURE — 74011250636 HC RX REV CODE- 250/636: Performed by: INTERNAL MEDICINE

## 2022-09-23 PROCEDURE — 74011000258 HC RX REV CODE- 258: Performed by: SURGERY

## 2022-09-23 PROCEDURE — 74011000250 HC RX REV CODE- 250: Performed by: INTERNAL MEDICINE

## 2022-09-23 RX ORDER — DEXTROSE MONOHYDRATE 50 MG/ML
50 INJECTION, SOLUTION INTRAVENOUS CONTINUOUS
Status: DISCONTINUED | OUTPATIENT
Start: 2022-09-23 | End: 2022-09-23 | Stop reason: HOSPADM

## 2022-09-23 RX ORDER — CHOLESTYRAMINE 4 G/4.8G
4 POWDER, FOR SUSPENSION ORAL
Status: COMPLETED | OUTPATIENT
Start: 2022-09-23 | End: 2022-09-23

## 2022-09-23 RX ADMIN — CHOLESTYRAMINE 4 G: 4 POWDER, FOR SUSPENSION ORAL at 09:40

## 2022-09-23 RX ADMIN — METOPROLOL TARTRATE 5 MG: 5 INJECTION INTRAVENOUS at 00:14

## 2022-09-23 RX ADMIN — ALBUTEROL SULFATE 2 PUFF: 108 INHALANT RESPIRATORY (INHALATION) at 07:36

## 2022-09-23 RX ADMIN — METOPROLOL TARTRATE 5 MG: 5 INJECTION INTRAVENOUS at 11:52

## 2022-09-23 RX ADMIN — ALBUTEROL SULFATE 2 PUFF: 108 INHALANT RESPIRATORY (INHALATION) at 13:57

## 2022-09-23 RX ADMIN — CHOLESTYRAMINE 4 G: 4 POWDER, FOR SUSPENSION ORAL at 16:56

## 2022-09-23 RX ADMIN — ALBUTEROL SULFATE 2 PUFF: 108 INHALANT RESPIRATORY (INHALATION) at 02:11

## 2022-09-23 RX ADMIN — CHOLESTYRAMINE 4 G: 4 POWDER, FOR SUSPENSION ORAL at 00:47

## 2022-09-23 RX ADMIN — METOPROLOL TARTRATE 5 MG: 5 INJECTION INTRAVENOUS at 05:43

## 2022-09-23 RX ADMIN — DEXTROSE MONOHYDRATE 50 ML/HR: 50 INJECTION, SOLUTION INTRAVENOUS at 13:00

## 2022-09-23 RX ADMIN — PIPERACILLIN AND TAZOBACTAM 3.38 G: 3; .375 INJECTION, POWDER, FOR SOLUTION INTRAVENOUS at 00:16

## 2022-09-23 RX ADMIN — PIPERACILLIN AND TAZOBACTAM 3.38 G: 3; .375 INJECTION, POWDER, FOR SOLUTION INTRAVENOUS at 11:52

## 2022-09-23 NOTE — PROGRESS NOTES
[facility] can accept today if there has been an improvement in patients diarrhea after removal of flexi-seal yesterday. Liaison will visit patient and speak to nurse today to assess.

## 2022-09-23 NOTE — PROGRESS NOTES
Consult  Pulmonary, Critical Care    Name: Soraya Boyle MRN: [id number]   : 1935 Hospital: [facility]   Date: 2022  Admission date: 9/15/2022 Hospital Day: 9       Subjective/Interval History:   Seen in the ICU currently on nasal high flow oxygen. Patient underwent exploratory laparotomy 9/15 with right hemicolectomy for colon cancer. He has been on IV fluids since that time. He is normally on metoprolol as an outpatient. Yesterday he developed tachycardia and during the night hypoxia and placed on nasal high flow oxygen. Chest x-ray shows bilateral pleural effusions with mild congestion. At the current time he denies any discomfort.  now on nasal oxygen 2 L saturation 100%. Mildly confused   more awake today carries on a simple conversation. He is passing gas and had several bloody bowel movements during the night. Despite no Lasix yesterday he had over 2 L of urine output and creatinine has worsened further   sitting up in the chair feels much better states that he ate without difficulty. Input and output not recorded yesterday it appears that his IV fluids were not increased.   Have talked with nurse and fluids will be increased today    Patient Active Problem List   Diagnosis Code    Paresthesia and pain of right extremity M79.609, R20.2    Acute CVA (cerebrovascular accident) ([address] I63.9    Hypercholesteremia E78.00    Accelerated hypertension I10    CVA (cerebral vascular accident) ([address] I63.9    Colon cancer ([address] C18.9    Status post surgery Z98.890       IMPRESSION:   Acute hypoxic respiratory failure now on room air  Pulmonary edema resolved  Bilateral pleural effusion resolved  Right midlung density questionable mucous plugging  Diastolic left ventricular dysfunction  Anemia hemoglobin improved after transfusion  Acute kidney injury on IV fluids  Chronic kidney disease  Sinus tachycardia controlled with IV Lopressor  Colon cancer status post exploratory laparotomy with right hemicolectomy  Body mass index is 16.1 kg/m². RECOMMENDATIONS/PLAN:   Fattening continues to worsen although IV fluids were not increased as ordered yesterday. We will increase at this time. We will switch Lopressor to his normal oral dose  Pleural effusions have resolved  Duplex scan of the legs no DVT         Subjective/Initial History:   I have reviewed the flowsheet and previous days notes. I was asked by Edilberto Collado MD to see Blaze Tony a 82 y.o.  male  in consultation for a chief complaint of hypoxic respiratory failure pulmonary edema bilateral pleural effusions          Patient PCP: Krystyna Tripp  PMH:  has a past medical history of Cancer ([address] Chronic kidney disease, Chronic obstructive pulmonary disease ([address] Gastrointestinal disorder, Heart attack ([address] Hyperlipidemia, Hypertension, Psychiatric disorder, and Stroke ([address] PSH:   has a past surgical history that includes colonoscopy (N/A, 08/04/2022); pr abdomen surgery proc unlisted; and hx heent (Bilateral). FHX: family history includes Cancer in his father and mother. SHX:  reports that he quit smoking about 5 years ago. His smoking use included cigarettes. He has never used smokeless tobacco. He reports that he does not drink alcohol and does not use drugs.     Systemic review: He seems very mildly confused but seems adequate for history  General no significant problem prior to surgery denies any chronic fever chills or sweats ankle edema chest pain or diaphoresis  Eyes no double vision or momentary blindness  ENT no nasal congestion drainage or facial pain  Musculoskeletal no swollen tender joints  Endocrinologic no polyuria polydipsia  Neurologic no seizures or syncope  Gastrointestinal no nausea or vomiting NG tube is in place at the current time  Genitourinary no pain or discomfort on urination is followed by Dr. Smith Neri for chronic kidney disease  Cardiovascular states he has a heart doctor does not remember his name not sure what he sees them for he is normally on Norvasc and metoprolol  Respiratory stop smoking 10 or 15 years ago denies any history of asthma emphysema is not on any inhalers although his med list mentions albuterol again that may be some degree of confusion on his part    No Known Allergies   MEDS:   Current Facility-Administered Medications   Medication    cholestyramine-aspartame (QUESTRAN LIGHT) packet 4 g    albuterol (PROVENTIL HFA, VENTOLIN HFA, PROAIR HFA) inhaler 2 Puff    0.45% sodium chloride infusion    albuterol (PROVENTIL HFA, VENTOLIN HFA, PROAIR HFA) inhaler 2 Puff    iron sucrose (VENOFER) injection 200 mg    metoprolol (LOPRESSOR) injection 5 mg    famotidine (PF) (PEPCID) 20 mg in 0.9% sodium chloride 10 mL injection    piperacillin-tazobactam (ZOSYN) 3.375 g in 0.9% sodium chloride (MBP/ADV) 100 mL MBP        Current Facility-Administered Medications:     cholestyramine-aspartame (QUESTRAN LIGHT) packet 4 g, 4 g, Oral, BID WITH MEALS, Sergio Ruffin MD, 4 g at 09/23/22 0940    albuterol (PROVENTIL HFA, VENTOLIN HFA, PROAIR HFA) inhaler 2 Puff, 2 Puff, Inhalation, TID RT, Sergio Ruffin MD    0.45% sodium chloride infusion, 50 mL/hr, IntraVENous, CONTINUOUS, Isaías Isaac MD, Last Rate: 50 mL/hr at 09/21/22 1200, 50 mL/hr at 09/21/22 1200    albuterol (PROVENTIL HFA, VENTOLIN HFA, PROAIR HFA) inhaler 2 Puff, 2 Puff, Inhalation, Q6H PRN, Temitope Ruffin MD, 2 Puff at 09/23/22 0736    iron sucrose (VENOFER) injection 200 mg, 200 mg, IntraVENous, Q48H, Isaías Isaac MD, 200 mg at 09/22/22 0914    metoprolol (LOPRESSOR) injection 5 mg, 5 mg, IntraVENous, Q6H, Rom Maldonado MD, 5 mg at 09/23/22 0543    famotidine (PF) (PEPCID) 20 mg in 0.9% sodium chloride 10 mL injection, 20 mg, IntraVENous, Q24H, Sergio Ruffin MD, 20 mg at 09/22/22 1758    piperacillin-tazobactam (ZOSYN) 3.375 g in 0.9% sodium chloride (MBP/ADV) 100 mL MBP, 3.375 g, IntraVENous, Q12H, Lorenzo Ruffin MD, Last Rate: 25 mL/hr at 22 0016, 3.375 g at 22 0016      Objective:     Vital Signs: Telemetry:    Rapid sinus rhythm Intake/Output:   Visit Vitals  BP (!) 162/77 (BP 1 Location: Left lower arm, BP Patient Position: At rest)   Pulse 82   Temp 97.1 °F (36.2 °C)   Resp 18   Ht 5' 7.72\" (1.72 m)   SpO2 97%   BMI 16.10 kg/m²       Temp (24hrs), Av.8 °F (36.6 °C), Min:97.1 °F (36.2 °C), Max:98.3 °F (36.8 °C)        O2 Device: None (Room air) O2 Flow Rate (L/min): 3 l/min         Body mass index is 16.1 kg/m². Wt Readings from Last 4 Encounters:   22 47.6 kg (105 lb)   22 52.2 kg (115 lb)   22 49 kg (108 lb)   22 49.9 kg (110 lb)          Intake/Output Summary (Last 24 hours) at 2022 1001  Last data filed at 2022 0641  Gross per 24 hour   Intake 600 ml   Output 600 ml   Net 0 ml         Last shift:      No intake/output data recorded. Last 3 shifts:  1901 -  0700  In: 1250 [I.V.:1250]  Out: 1225 [Urine:600; Drains:625]       Hemodynamics:    CO:    CI:    CVP:    SVR:   PAP Systolic:    PAP Diastolic:    PVR:    OO75:       Ventilator Settings:      Mode Rate TV Press PEEP FiO2 PIP Min. Vent               21 %            Physical Exam:    General:  male; sitting up in the chair awake alert rations nonlabored on room air  HEENT: NCAT, normal oral mucosa  Eyes: anicteric; conjunctiva clear extraocular movements intact  Neck: no nodes, no JVD no accessory MM use. Chest: no deformity,   Cardiac: Regular rate and rhythm  Lungs: Clear anteriorly and laterally and upper lungs posteriorly with diminished in the bases  Abd: Abdominal bandaging in place nontender normal bowel sounds  Ext: no edema; no joint swelling;  No clubbing  : clear urine  Neuro: Awake alert oriented speech is clear moves all 4 extremities  Psych- no agitation, oriented to person; some mild memory impairment mildly confused  Skin: warm, dry, no cyanosis;   Pulses: Brachial and radial pulses intact  Capillary: Normal capillary refill      Labs:    Recent Labs     09/21/22  0455   WBC 5.8   HGB 9.8*          Recent Labs     09/21/22  0455   *   K 3.9   *   CO2 18*   *   BUN 62*   CREA 4.13*   CA 8.0*   MG 2.3   PHOS 3.8   ALB 2.8*       No results for input(s): PH, PCO2, PO2, HCO3, FIO2 in the last 72 hours. 9/20 room air oxygen saturation 98%   nasal MRSA smear negative  4/2/2022 Echo ejection fraction 81% diastolic dysfunction multiple wall motion abnormalities and RVSP 34  Imaging:  I have personally reviewed the patients radiographs and have reviewed the reports:    CXR Results  (Last 48 hours)                 09/21/22 1555  XR CHEST PORT Final result    Impression:  1. Emphysema. 2. Right lung persistent density, nonspecific. Narrative:  EXAM: Portable CXR. 1555 hours. COMPARISON: 9/20/2022         INDICATION: shortness of breath, increased resp effort       FINDINGS:   Lungs are emphysematous. There is persistent nonspecific density in the right infrahilar lung. Heart size is normal. There is no pulmonary edema, pneumothorax, midline shift   or pleural effusion. Results from [place] encounter on 09/15/22    XR CHEST PORT    Narrative  EXAM: Portable CXR. 1555 hours. COMPARISON: 9/20/2022    INDICATION: shortness of breath, increased resp effort    FINDINGS:  Lungs are emphysematous. There is persistent nonspecific density in the right infrahilar lung. Heart size is normal. There is no pulmonary edema, pneumothorax, midline shift  or pleural effusion. Impression  1. Emphysema. 2. Right lung persistent density, nonspecific.       XR CHEST PORT    Narrative  INDICATION: Pleural effusion    EXAMINATION:  AP CHEST, PORTABLE    COMPARISON: 9/19/2022    FINDINGS: Single AP portable view of the chest demonstrates no change in  position of the lines and tubes. The cardiomediastinal silhouette is unchanged. Lungs are hyperinflated with persistent right infrahilar/midlung linear opacity  which again may represent pleural fluid or airspace disease. No pneumothorax or  pulmonary edema. Impression  No significant change. XR CHEST PORT    Narrative  EXAM: XR CHEST PORT    DATE: 9/19/2022 3:21 AM    INDICATION: Pleural effusions    COMPARISON: Chest radiograph September 18, 2022    FINDINGS: AP portable chest radiograph. The lungs are hyperinflated. Heart size  is normal. No new consolidation is seen. Trace pleural fluid is noted along the  horizontal fissure and at the LEFT base. There is improving aeration of the LEFT  days with mild discoid atelectasis. No pneumothorax is seen. Impression  Unchanged, trace fluid along the horizontal fissure. Mild LEFT basilar  atelectasis. Results from [place] encounter on 08/19/22    CT ABD PELV WO CONT    Narrative  EXAM: CT ABD PELV WO CONT    INDICATION: Benign neoplasm of colon. Gastritis. Blood in stool. COMPARISON: None    IV CONTRAST: None. ORAL CONTRAST: Oral contrast was administered to better evaluate the bowel. TECHNIQUE:  Thin axial images were obtained through the abdomen and pelvis. Coronal and  sagittal reformats were generated. CT dose reduction was achieved through use of  a standardized protocol tailored for this examination and automatic exposure  control for dose modulation. The absence of intravenous contrast material reduces the sensitivity for  evaluation of the vasculature and solid organs. FINDINGS:  LOWER THORAX: Emphysematous changes at the lung bases. Trace left pleural fluid. LIVER: Scattered hepatic cysts. BILIARY TREE: Gallbladder is within normal limits. CBD is not dilated. SPLEEN: within normal limits. PANCREAS: No focal abnormality.   ADRENALS: 2.8 x 1.7 cm solid right adrenal lesion, incompletely characterized on  the basis of this study. Recommend contrast-enhanced imaging for further  evaluation. KIDNEYS/URETERS: Simple bilateral renal cysts not requiring further follow-up. No hydronephrosis or stones. STOMACH: Unremarkable. SMALL BOWEL: No dilatation or wall thickening. COLON: Focal wall thickening of the mid transverse colon (201:75) may be due to  peristalsis, however an underlying lesion cannot be excluded. APPENDIX: Normal  PERITONEUM: No ascites or pneumoperitoneum. RETROPERITONEUM: 3.1 x 3.1 cm infrarenal abdominal aortic aneurysm. REPRODUCTIVE ORGANS: Within normal limits. URINARY BLADDER: No mass or calculus. BONES: Degenerative changes of the lumbar spine. No fracture or aggressive  osseous lesion. ABDOMINAL WALL: No mass or hernia. ADDITIONAL COMMENTS: N/A    Impression  1. Focal wall thickening of the mid transverse colon might be due to  peristalsis but an underlying lesion cannot be excluded. Recommend correlation  with colonoscopy. 2.  Indeterminant right adrenal mass measuring 2.8 x 1.7 cm.  3.  Hepatic and renal cysts. 4.  Emphysematous changes at the lung bases. Contrast-enhanced multiphasic CT of the abdomen is recommended for further  characterization of the right adrenal lesion. Discussion: Acute hypoxic respiratory failure with chest x-ray showing bilateral pleural effusions mild congestion. Previous echo shows diastolic dysfunction. He normally is on metoprolol at home currently has sinus tachycardia. We will start IV Lopressor to control heart rate will give IV Lasix follow renal function. Preop hemoglobin was 10 currently 8 we will repeat later today to make sure not having active bleeding. 9/18 oxygenation improved chest x-ray minimal to no improvement. Good diuresis yesterday over 4 L but creatinine has risen. Will give Lasix today we will give 2 doses of albumin BNP remains elevated.   We will give small amount IV fluids follow chest x-ray in a.m.  9/19 oxygenation has improved chest x-ray has slightly diminished effusion although some persists. Lasix held yesterday due to rising creatinine despite this had diuresis over 2 L with resultant worsening creatinine. Will give IV fluids today continue to hold Lasix until creatinine improving. Question if can start oral nutrition today  9/20 awake alert chest x-ray shows resolution of effusions does have right midlung density questionable mucous plug or aspiration pneumonia no worsening. Did not have his IV fluids increased yesterday creatinine worsening have spoken with nurse and fluids to be increased we will continue to follow renal function  9/21 no significant change in renal function we will continue with IV fluid  9/23 okay to discharge             Thank you for allowing us to participate in the care of this patient.   We will follow along with you     Razia Wilks MD

## 2022-09-23 NOTE — PROGRESS NOTES
Report called to Tamar RN at [facility], transport to be here at 1800    Discharge plan of care/case management plan validated with provider discharge order

## 2022-09-23 NOTE — PROGRESS NOTES
Patient is accepted to go to [facility] today at 6pm. Nurse can call report to [phone] and room assignment will be given out during the call. CM notified patient of acceptance and DC today. CM explained to patient that he would require transportation as insurance will not cover it. Patient asked if he could call his daughter, Layne Hernandez. CM called Layne Hernandez @ [phone] to notify of the above. CM informed daughter that Medicare only covers stretcher transport and they will not cover the cost if the patient can sit in a chair and has no qualifying bed bound diagnosis. CM stated that family can provide transportation for patient or it will need to be paid for out-of-pocket. Daughter asks for estimate for wheelchair transport. CM called [facility] @ [phone] and spoke with Alisha Sierra. Quote given for $107.48. CM called Layne Hernandez back to notify and she states that she is going to pay the out-of-pocket cost for wheelchair transport. CM called [facility] and confirmed that transportation has been paid for. Transport time: 6pm    Discharge plan of care/case management plan validated with provider discharge order.

## 2022-09-23 NOTE — PROGRESS NOTES
Problem: Falls - Risk of  Goal: *Absence of Falls  Description: Document Joann Lewis Fall Risk and appropriate interventions in the flowsheet.   Outcome: Progressing Towards Goal  Note: Fall Risk Interventions:  Mobility Interventions: Bed/chair exit alarm         Medication Interventions: Bed/chair exit alarm    Elimination Interventions: Bed/chair exit alarm

## 2022-09-23 NOTE — PROGRESS NOTES
[facility] [place] [facility]     Renal Daily Progress Note:     Admission Date: 9/15/2022     Subjective: Patient POD # 8 after left hemicolectomy. Pathology shows adenocarcinoma. Had worsening azotemia probably related to intravascular volume depletion post op and significant diuresis. Serum creatinine has plateaued. Without any evidence of hypotension or nephrotoxic toxic drug use.       patient seen and examined, feeling ok, has no complaint. Denies N/V/SOB. Serum Na is increasing, cr plateaued    Review of Systems  Pertinent items are noted in HPI.     Objective:     Visit Vitals  BP (!) 162/77 (BP 1 Location: Left lower arm, BP Patient Position: At rest)   Pulse 82   Temp 97.1 °F (36.2 °C)   Resp 18   Ht 5' 7.72\" (1.72 m)   SpO2 97%   BMI 16.10 kg/m²     Temp (24hrs), Av.8 °F (36.6 °C), Min:97.1 °F (36.2 °C), Max:98.3 °F (36.8 °C)        Intake/Output Summary (Last 24 hours) at 2022 1157  Last data filed at 2022 0900  Gross per 24 hour   Intake 600 ml   Output 601 ml   Net -1 ml       Current Facility-Administered Medications   Medication Dose Route Frequency    cholestyramine-aspartame (QUESTRAN LIGHT) packet 4 g  4 g Oral BID WITH MEALS    albuterol (PROVENTIL HFA, VENTOLIN HFA, PROAIR HFA) inhaler 2 Puff  2 Puff Inhalation TID RT    0.45% sodium chloride infusion  50 mL/hr IntraVENous CONTINUOUS    albuterol (PROVENTIL HFA, VENTOLIN HFA, PROAIR HFA) inhaler 2 Puff  2 Puff Inhalation Q6H PRN    iron sucrose (VENOFER) injection 200 mg  200 mg IntraVENous Q48H    metoprolol (LOPRESSOR) injection 5 mg  5 mg IntraVENous Q6H    famotidine (PF) (PEPCID) 20 mg in 0.9% sodium chloride 10 mL injection  20 mg IntraVENous Q24H    piperacillin-tazobactam (ZOSYN) 3.375 g in 0.9% sodium chloride (MBP/ADV) 100 mL MBP  3.375 g IntraVENous Q12H       Physical Exam:General appearance: alert, cooperative, no distress, appears stated age  Head: Normocephalic, without obvious abnormality, atraumatic, mild temporal wasting  Eyes: negative findings: anicteric sclera  Neck: supple, symmetrical, trachea midline, no adenopathy, and no JVD  Lungs: clear to auscultation bilaterally  Heart: regularly irregular rhythm, no S3 or S4  Abdomen: Surgical binder in place, bowel sounds positive, no pain on soft palpation  Extremities: no edema  Neurologic: Grossly normal    Data Review:     LABS:  Recent Labs     09/23/22  0914 09/21/22  0455   * 146*   K 4.2 3.9   * 118*   CO2 18* 18*   BUN 57* 62*   CREA 3.88* 4.13*   CA 8.7 8.0*   ALB 2.9* 2.8*   PHOS 3.7 3.8   MG 2.2 2.3       Recent Labs     09/21/22  0455   WBC 5.8   HGB 9.8*   HCT 30.7*          No results for input(s): MITZI, KU, CLU, CREAU in the last 72 hours. 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 May be tubular injury but no documented hypotension, IV contrast or nephrotoxic drugs. Urine Na and creatinine c/w tubular injury. Hypernatremia    Status post left hemicolectomy for adenocarcinoma colon cancer  Anemia with renal failure and significant iron deficiency        Plan:     Obtain/ Order: labs/cultures/radiology/procedures: Renal function profile in a.m. Therapeutic:    Change IVF to D5w  at 50 cc/hr due to hypernatremia  Discontinue once he has adequate fluid  intake. Encourage increase water intake  protein supplement    C/w IV iron    Increase in serum creatinine postop may be response to the inflammation and not necessarily volume depletion. However, he did have a fairly significant diuresis.       Advanced diet order, will increase protein intake    Okay to discharge to [facility] from renal standpoint    Aletha Donaldson MD    [phone]
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
## 2022-09-23 NOTE — PROGRESS NOTES
Comprehensive Nutrition Assessment    Type and Reason for Visit: Reassess (Interim)    Nutrition Recommendations/Plan:   Continue diet, advance per SLP rec's or as clinically appropriate. Continue ONS- Ensure HP x3/d. Please monitor and document as % PO and ONS intake in I/Os. Malnutrition Assessment:  Malnutrition Status:  Severe malnutrition (09/20/22 1125)    Context:  Acute illness       Nutrition Assessment:    Admitted for colon cancer. Underwent ex lap with right hemicolectomy. NGT placed for suctioning removed 9/17. 9/19 upgraded from NPO to clears, tolerating well. +passing flatus, recent BM. Pt endorses enjoying boost PTA, agreeable to ensure, enjoys vanilla. Noted significant weight loss x3 months - r/t colon cancer. (9/23) Good appetite and PO intake continues- >50% of meals w/ fair ONS acceptance. Continue diet  and ONS. Labs: Na 148, Cl 121, BUN 57, Cr 3.88, GFR 18. Meds: Venofer, pepcid, lopressor, Questran, zosyn. Nutrition Related Findings:    No N/V/ reported; loose BMs endorsed as improving. Noted on MM5/Thin Liq diet; h/o SLP rec's for EC7 in previous admission. Last BM 9/22. No edema. Wound Type: Surgical incision    Current Nutrition Intake & Therapies:  Average Meal Intake: 51-75%  Average Supplement Intake: 51-75%  ADULT ORAL NUTRITION SUPPLEMENT Breakfast, Dinner, Lunch; Low Calorie/High Protein  ADULT DIET Dysphagia - Minced & Moist; GI [place] (GERD/Peptic Ulcer); Low Fiber    Anthropometric Measures:  Height: 5' 7.72\" (172 cm)  Ideal Body Weight (IBW): 152 lbs (69 kg)  Current Body Wt:  47.6 kg (104 lb 15 oz), 69 % IBW.  Not specified  Current BMI (kg/m2): 16.1  BMI Category: Underweight (BMI less than 22) age over 72    Estimated Daily Nutrient Needs:  Energy Requirements Based On: Kcal/kg  Weight Used for Energy Requirements: Current  Energy (kcal/day): 1666kcal (35kcal/kg)  Weight Used for Protein Requirements: Current  Protein (g/day): 71g (1.5g/kg)  Method Used for Fluid Requirements: 1 ml/kcal  Fluid (ml/day): 1666mL    Nutrition Diagnosis:   Severe malnutrition, In context of acute illness or injury related to catabolic illness, inadequate protein-energy intake (colon ca) as evidenced by Criteria as identified in malnutrition assessment    Nutrition Interventions:   Food and/or Nutrient Delivery: Continue current diet, Continue oral nutrition supplement  Nutrition Education/Counseling: Education not indicated  Coordination of Nutrition Care: Continue to monitor while inpatient  Plan of Care discussed with: RN    Goals:  Previous Goal Met: Progressing toward goal(s)  Goals: Meet at least 75% of estimated needs, PO intake 50% or greater, within 7 days    Nutrition Monitoring and Evaluation:   Behavioral-Environmental Outcomes: None identified  Food/Nutrient Intake Outcomes: Diet advancement/tolerance, Food and nutrient intake, Supplement intake  Physical Signs/Symptoms Outcomes: Biochemical data, Chewing or swallowing, Meal time behavior, Weight, Diarrhea    Discharge Planning:    No discharge needs at this time    Randy Weaver RD  Contact: ext. [phone].

## 2022-09-26 NOTE — PROGRESS NOTES
Physician Progress Note      PATIENT:               Byron Meyer  CSN #:                  [id number]  :                       1935  ADMIT DATE:       9/15/2022 10:55 AM  DISCH DATE:        2022 5:59 PM  RESPONDING  PROVIDER #:        Rey Madison MD          QUERY TEXT:    Pt admitted with colon cancer. Pt noted to have JULIENNE. If possible, please document in the progress notes and discharge summary if you are evaluating and/or treating any of the following: The medical record reflects the following:  Risk Factors: 82year old male, 9/15 Exploratory Laparotomy extended right xiang-colectomy, CKD  Clinical Indicators:  Nephrology consult - established history of stage IIIb chronic kidney disease. His serum creatinine is just above his usual range. The clinical picture seems consistent with decompensated heart failure. This can contribute to a decrement in GFR and acute kidney injury. ATN is possible especially in the setting of recent surgery.  Pulmonology PN - Acute kidney injury despite no Lasix he had diuresis of over 2 L.   We will replace IV today   Nephrology PN - Acute kidney injury postop  Creatinine: 1.87-2.68-3.62-4.18  Treatment: IVF started , IV Lasix 40mg Once, paredes placed     Defined by Kidney Disease Improving Global Outcomes (KDIGO) clinical practice guideline for acute kidney injury:  -Increase in SCr by greater than or equal to 0.3 mg/dl within 48?hours; or  -Increase or decrease in SCr to greater than or equal to 1.5 times baseline, which is known or presumed to have occurred within the prior 7 days; or  -Urine volume < 0.5ml/kg/h for 6 hours      Please email [email or web address] with any questions  Options provided:  -- Acute kidney failure  -- Acute kidney failure with acute tubular necrosis  -- Acute kidney injury  -- Other - I will add my own diagnosis  -- Disagree - Not applicable / Not valid  -- Disagree - Clinically unable to determine / Unknown  -- Refer to Clinical Documentation Reviewer    PROVIDER RESPONSE TEXT:    This patient is in Acute kidney failure. Query created by:  Rolf Monsalve on 9/20/2022 10:46 AM      Electronically signed by:  Kaia Lizama MD 9/26/2022 9:13 AM

## 2022-09-27 ENCOUNTER — HOSPITAL ENCOUNTER (OUTPATIENT)
Dept: LAB | Age: 87
Discharge: HOME | End: 2022-09-27

## 2022-09-27 LAB
ANION GAP SERPL CALC-SCNC: 5 MMOL/L (ref 5–15)
BUN SERPL-MCNC: 46 MG/DL (ref 6–20)
BUN/CREAT SERPL: 14 (ref 12–20)
CA-I BLD-MCNC: 9.3 MG/DL (ref 8.5–10.1)
CHLORIDE SERPL-SCNC: 124 MMOL/L (ref 97–108)
CO2 SERPL-SCNC: 18 MMOL/L (ref 21–32)
CREAT SERPL-MCNC: 3.22 MG/DL (ref 0.7–1.3)
GLUCOSE SERPL-MCNC: 91 MG/DL (ref 65–100)
POTASSIUM SERPL-SCNC: 5 MMOL/L (ref 3.5–5.1)
SODIUM SERPL-SCNC: 147 MMOL/L (ref 136–145)

## 2022-09-27 PROCEDURE — 36415 COLL VENOUS BLD VENIPUNCTURE: CPT

## 2022-09-27 PROCEDURE — 80048 BASIC METABOLIC PNL TOTAL CA: CPT

## 2022-09-28 ENCOUNTER — HOSPITAL ENCOUNTER (OUTPATIENT)
Dept: LAB | Age: 87
Discharge: HOME | End: 2022-09-28

## 2022-09-28 LAB
ANION GAP SERPL CALC-SCNC: 4 MMOL/L (ref 5–15)
BUN SERPL-MCNC: 44 MG/DL (ref 6–20)
BUN/CREAT SERPL: 14 (ref 12–20)
CA-I BLD-MCNC: 9.3 MG/DL (ref 8.5–10.1)
CHLORIDE SERPL-SCNC: 122 MMOL/L (ref 97–108)
CO2 SERPL-SCNC: 20 MMOL/L (ref 21–32)
CREAT SERPL-MCNC: 3.08 MG/DL (ref 0.7–1.3)
GLUCOSE SERPL-MCNC: 90 MG/DL (ref 65–100)
POTASSIUM SERPL-SCNC: 4.6 MMOL/L (ref 3.5–5.1)
SODIUM SERPL-SCNC: 146 MMOL/L (ref 136–145)

## 2022-09-28 PROCEDURE — 80048 BASIC METABOLIC PNL TOTAL CA: CPT

## 2022-10-02 NOTE — PROGRESS NOTES
Physician Progress Note      PATIENT:               Alessandro Lopez  CSN #:                  [id number]  :                       1935  ADMIT DATE:       9/15/2022 10:55 AM  DISCH DATE:        2022 5:59 PM  RESPONDING  PROVIDER #:        Kevin Santana MD          QUERY TEXT:    Pt admitted with colon cancer and has CHF documented. If possible, please document in progress notes and discharge summary further specificity regarding the type and acuity of CHF:    The medical record reflects the following:  Risk Factors: 82year old female, bilateral pleural effusions, mild congestion  Clinical Indicators:  Pulmonary PN - Diastolic left ventricular dysfunction  BNP: 1,897 - 4,790   CXR - Unchanged small bilateral pleural effusions, including fluid loculated in the  right minor fissure. 2. Emphysema. Treatment: IV Lasix 40mg    Please email [email or web address] with any questions  Options provided:  -- Acute on Chronic Systolic CHF/HFrEF  -- Acute on Chronic Diastolic CHF/HFpEF  -- Acute on Chronic Systolic and Diastolic CHF  -- Acute Systolic CHF/HFrEF  -- Acute Diastolic CHF/HFpEF  -- Acute Systolic and Diastolic CHF  -- Chronic Systolic CHF/HFrEF  -- Chronic Diastolic CHF/HFpEF  -- Chronic Systolic and Diastolic CHF  -- Other - I will add my own diagnosis  -- Disagree - Not applicable / Not valid  -- Disagree - Clinically unable to determine / Unknown  -- Refer to Clinical Documentation Reviewer    PROVIDER RESPONSE TEXT:    Provider is clinically unable to determine a response to this query. Query created by: Penelope Villagran on 2022 5:32 AM      QUERY TEXT:    Pt admitted with colon cancer. Noted documentation of severe malnutrition in  Dietician consult note.   If possible, please document in progress notes and discharge summary:      The medical record reflects the following:  Risk Factors: 82year old male, colon cancer s/p right hemicolectomy, weight loss, BMI 16.1, Albumin 2.8  Clinical Indicators: 9/20 Dietician consult - Energy Intake:  50% or less of est energy requirements for 5 or more days (NPO/clears x5 days)  Weight Loss:  Greater than 7.5% over 3 months  Body Fat Loss: Moderate body fat loss, Orbital, Triceps, Fat overlying ribs  Muscle Mass Loss: Moderate muscle mass loss, Temples (temporalis), Scapula (trapezius), Clavicles (pectoralis & deltoids), Thigh (quadriceps)  Severe malnutrition, In context of acute illness or injury related to (P) catabolic illness, inadequate protein-energy intake (colon ca) as evidenced by (P) Criteria as identified in malnutrition assessment  Treatment: 1. Advance diet as tolerated/medically able  2. Once diet is advanced, rec ensure HP 3x/day (vanilla)  3. Obtain updated measured weight as able  4. Monitor and record PO intakes, supplement acceptance, and Bms in I/Os      Please email [email or web address] with any questions  Options provided:  -- severe malnutrition confirmed present on admission  -- severe malnutrition ruled out  -- Other - I will add my own diagnosis  -- Disagree - Not applicable / Not valid  -- Disagree - Clinically unable to determine / Unknown  -- Refer to Clinical Documentation Reviewer    PROVIDER RESPONSE TEXT:    Provider disagreed with this query. Query created by:  Tiffany Alanis on 9/29/2022 5:33 AM      Electronically signed by:  Lanre Rizvi MD 10/2/2022 2:16 AM

## 2022-10-04 ENCOUNTER — HOSPITAL ENCOUNTER (OUTPATIENT)
Dept: LAB | Age: 87
Discharge: HOME | End: 2022-10-04

## 2022-10-04 ENCOUNTER — TELEPHONE (OUTPATIENT)
Dept: CASE MANAGEMENT | Age: 87
End: 2022-10-04

## 2022-10-04 LAB
ANION GAP SERPL CALC-SCNC: 5 MMOL/L (ref 5–15)
BASOPHILS # BLD: 0 K/UL (ref 0–0.1)
BASOPHILS NFR BLD: 1 % (ref 0–1)
BUN SERPL-MCNC: 29 MG/DL (ref 6–20)
BUN/CREAT SERPL: 12 (ref 12–20)
CA-I BLD-MCNC: 8.7 MG/DL (ref 8.5–10.1)
CHLORIDE SERPL-SCNC: 116 MMOL/L (ref 97–108)
CO2 SERPL-SCNC: 23 MMOL/L (ref 21–32)
CREAT SERPL-MCNC: 2.4 MG/DL (ref 0.7–1.3)
DIFFERENTIAL METHOD BLD: ABNORMAL
EOSINOPHIL # BLD: 0.3 K/UL (ref 0–0.4)
EOSINOPHIL NFR BLD: 7 % (ref 0–7)
ERYTHROCYTE [DISTWIDTH] IN BLOOD BY AUTOMATED COUNT: 15.7 % (ref 11.5–14.5)
GLUCOSE SERPL-MCNC: 76 MG/DL (ref 65–100)
HCT VFR BLD AUTO: 29.5 % (ref 36.6–50.3)
HGB BLD-MCNC: 9 G/DL (ref 12.1–17)
IMM GRANULOCYTES # BLD AUTO: 0 K/UL (ref 0–0.04)
IMM GRANULOCYTES NFR BLD AUTO: 0 % (ref 0–0.5)
LYMPHOCYTES # BLD: 1.8 K/UL (ref 0.8–3.5)
LYMPHOCYTES NFR BLD: 40 % (ref 12–49)
MCH RBC QN AUTO: 28.1 PG (ref 26–34)
MCHC RBC AUTO-ENTMCNC: 30.5 G/DL (ref 30–36.5)
MCV RBC AUTO: 92.2 FL (ref 80–99)
MONOCYTES # BLD: 0.4 K/UL (ref 0–1)
MONOCYTES NFR BLD: 9 % (ref 5–13)
NEUTS SEG # BLD: 2 K/UL (ref 1.8–8)
NEUTS SEG NFR BLD: 43 % (ref 32–75)
NRBC # BLD: 0 K/UL (ref 0–0.01)
NRBC BLD-RTO: 0 PER 100 WBC
PLATELET # BLD AUTO: 286 K/UL (ref 150–400)
PMV BLD AUTO: 11.1 FL (ref 8.9–12.9)
POTASSIUM SERPL-SCNC: 5.1 MMOL/L (ref 3.5–5.1)
RBC # BLD AUTO: 3.2 M/UL (ref 4.1–5.7)
SODIUM SERPL-SCNC: 144 MMOL/L (ref 136–145)
WBC # BLD AUTO: 4.6 K/UL (ref 4.1–11.1)

## 2022-10-04 PROCEDURE — 85025 COMPLETE CBC W/AUTO DIFF WBC: CPT

## 2022-10-04 PROCEDURE — 80048 BASIC METABOLIC PNL TOTAL CA: CPT

## 2022-10-19 ENCOUNTER — TELEPHONE (OUTPATIENT)
Dept: CASE MANAGEMENT | Age: 87
End: 2022-10-19

## 2022-10-30 ENCOUNTER — APPOINTMENT (OUTPATIENT)
Dept: GENERAL RADIOLOGY | Age: 87
End: 2022-10-30
Attending: STUDENT IN AN ORGANIZED HEALTH CARE EDUCATION/TRAINING PROGRAM
Payer: MEDICARE

## 2022-10-30 ENCOUNTER — HOSPITAL ENCOUNTER (EMERGENCY)
Age: 87
Discharge: HOME | End: 2022-10-30
Attending: STUDENT IN AN ORGANIZED HEALTH CARE EDUCATION/TRAINING PROGRAM
Payer: MEDICARE

## 2022-10-30 VITALS
SYSTOLIC BLOOD PRESSURE: 195 MMHG | OXYGEN SATURATION: 100 % | HEIGHT: 67 IN | RESPIRATION RATE: 14 BRPM | TEMPERATURE: 98.1 F | HEART RATE: 74 BPM | BODY MASS INDEX: 17.58 KG/M2 | DIASTOLIC BLOOD PRESSURE: 98 MMHG | WEIGHT: 112 LBS

## 2022-10-30 DIAGNOSIS — I10 HYPERTENSION, UNSPECIFIED TYPE: Primary | ICD-10-CM

## 2022-10-30 LAB
ALBUMIN SERPL-MCNC: 3.1 G/DL (ref 3.5–5)
ALBUMIN/GLOB SERPL: 0.9 {RATIO} (ref 1.1–2.2)
ALP SERPL-CCNC: 95 U/L (ref 45–117)
ALT SERPL-CCNC: 15 U/L (ref 12–78)
ANION GAP SERPL CALC-SCNC: 4 MMOL/L (ref 5–15)
AST SERPL W P-5'-P-CCNC: 22 U/L (ref 15–37)
BASOPHILS # BLD: 0 K/UL (ref 0–0.1)
BASOPHILS NFR BLD: 1 % (ref 0–1)
BILIRUB SERPL-MCNC: 1 MG/DL (ref 0.2–1)
BUN SERPL-MCNC: 24 MG/DL (ref 6–20)
BUN/CREAT SERPL: 13 (ref 12–20)
CA-I BLD-MCNC: 8.8 MG/DL (ref 8.5–10.1)
CHLORIDE SERPL-SCNC: 108 MMOL/L (ref 97–108)
CO2 SERPL-SCNC: 31 MMOL/L (ref 21–32)
CREAT SERPL-MCNC: 1.88 MG/DL (ref 0.7–1.3)
DIFFERENTIAL METHOD BLD: ABNORMAL
EOSINOPHIL # BLD: 0.3 K/UL (ref 0–0.4)
EOSINOPHIL NFR BLD: 6 % (ref 0–7)
ERYTHROCYTE [DISTWIDTH] IN BLOOD BY AUTOMATED COUNT: 15.2 % (ref 11.5–14.5)
GLOBULIN SER CALC-MCNC: 3.4 G/DL (ref 2–4)
GLUCOSE SERPL-MCNC: 89 MG/DL (ref 65–100)
HCT VFR BLD AUTO: 32.2 % (ref 36.6–50.3)
HGB BLD-MCNC: 9.8 G/DL (ref 12.1–17)
IMM GRANULOCYTES # BLD AUTO: 0 K/UL (ref 0–0.04)
IMM GRANULOCYTES NFR BLD AUTO: 0 % (ref 0–0.5)
LYMPHOCYTES # BLD: 1.8 K/UL (ref 0.8–3.5)
LYMPHOCYTES NFR BLD: 32 % (ref 12–49)
MCH RBC QN AUTO: 28.1 PG (ref 26–34)
MCHC RBC AUTO-ENTMCNC: 30.4 G/DL (ref 30–36.5)
MCV RBC AUTO: 92.3 FL (ref 80–99)
MONOCYTES # BLD: 0.5 K/UL (ref 0–1)
MONOCYTES NFR BLD: 9 % (ref 5–13)
NEUTS SEG # BLD: 2.9 K/UL (ref 1.8–8)
NEUTS SEG NFR BLD: 52 % (ref 32–75)
NRBC # BLD: 0 K/UL (ref 0–0.01)
NRBC BLD-RTO: 0 PER 100 WBC
PLATELET # BLD AUTO: 171 K/UL (ref 150–400)
PMV BLD AUTO: 11 FL (ref 8.9–12.9)
POTASSIUM SERPL-SCNC: 3.9 MMOL/L (ref 3.5–5.1)
PROT SERPL-MCNC: 6.5 G/DL (ref 6.4–8.2)
RBC # BLD AUTO: 3.49 M/UL (ref 4.1–5.7)
SODIUM SERPL-SCNC: 143 MMOL/L (ref 136–145)
TROPONIN-HIGH SENSITIVITY: 22 NG/L (ref 0–76)
TROPONIN-HIGH SENSITIVITY: 22 NG/L (ref 0–76)
WBC # BLD AUTO: 5.6 K/UL (ref 4.1–11.1)

## 2022-10-30 PROCEDURE — 93005 ELECTROCARDIOGRAM TRACING: CPT

## 2022-10-30 PROCEDURE — 85025 COMPLETE CBC W/AUTO DIFF WBC: CPT

## 2022-10-30 PROCEDURE — 74011250637 HC RX REV CODE- 250/637: Performed by: STUDENT IN AN ORGANIZED HEALTH CARE EDUCATION/TRAINING PROGRAM

## 2022-10-30 PROCEDURE — 99285 EMERGENCY DEPT VISIT HI MDM: CPT

## 2022-10-30 PROCEDURE — 71045 X-RAY EXAM CHEST 1 VIEW: CPT

## 2022-10-30 PROCEDURE — 36415 COLL VENOUS BLD VENIPUNCTURE: CPT

## 2022-10-30 PROCEDURE — 84484 ASSAY OF TROPONIN QUANT: CPT

## 2022-10-30 PROCEDURE — 80053 COMPREHEN METABOLIC PANEL: CPT

## 2022-10-30 RX ORDER — AMLODIPINE BESYLATE 5 MG/1
2.5 TABLET ORAL ONCE
Status: COMPLETED | OUTPATIENT
Start: 2022-10-30 | End: 2022-10-30

## 2022-10-30 RX ORDER — AMLODIPINE BESYLATE 2.5 MG/1
2.5 TABLET ORAL DAILY
Qty: 14 TABLET | Refills: 0 | Status: SHIPPED | OUTPATIENT
Start: 2022-10-30 | End: 2022-11-13

## 2022-10-30 RX ADMIN — AMLODIPINE BESYLATE 2.5 MG: 5 TABLET ORAL at 13:05

## 2022-10-30 RX ADMIN — AMLODIPINE BESYLATE 2.5 MG: 5 TABLET ORAL at 10:14

## 2022-10-30 NOTE — ED TRIAGE NOTES
Pt brought in by family member for HTN (SBP>200), reports BP elevated since Thursday. Denies CP, SoB. Reports blurred vision.
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
## 2022-10-30 NOTE — DISCHARGE INSTRUCTIONS
Thank you! Thank you for allowing me to care for you in the emergency department. I sincerely hope that you are satisfied with your visit today. It is my goal to provide you with excellent care. Below you will find a list of your labs and imaging from your visit today if applicable. Should you have any questions regarding these results please do not hesitate to call the emergency department. Please review e|tab for a more detailed result list since the below list may not be comprehensive. Instructions on how to sign up to e|tab should be provided in this packet. Labs -     Recent Results (from the past 12 hour(s))   CBC WITH AUTOMATED DIFF    Collection Time: 10/30/22  9:23 AM   Result Value Ref Range    WBC 5.6 4.1 - 11.1 K/uL    RBC 3.49 (L) 4.10 - 5.70 M/uL    HGB 9.8 (L) 12.1 - 17.0 g/dL    HCT 32.2 (L) 36.6 - 50.3 %    MCV 92.3 80.0 - 99.0 FL    MCH 28.1 26.0 - 34.0 PG    MCHC 30.4 30.0 - 36.5 g/dL    RDW 15.2 (H) 11.5 - 14.5 %    PLATELET 474 726 - 893 K/uL    MPV 11.0 8.9 - 12.9 FL    NRBC 0.0 0.0  WBC    ABSOLUTE NRBC 0.00 0.00 - 0.01 K/uL    NEUTROPHILS 52 32 - 75 %    LYMPHOCYTES 32 12 - 49 %    MONOCYTES 9 5 - 13 %    EOSINOPHILS 6 0 - 7 %    BASOPHILS 1 0 - 1 %    IMMATURE GRANULOCYTES 0 0 - 0.5 %    ABS. NEUTROPHILS 2.9 1.8 - 8.0 K/UL    ABS. LYMPHOCYTES 1.8 0.8 - 3.5 K/UL    ABS. MONOCYTES 0.5 0.0 - 1.0 K/UL    ABS. EOSINOPHILS 0.3 0.0 - 0.4 K/UL    ABS. BASOPHILS 0.0 0.0 - 0.1 K/UL    ABS. IMM.  GRANS. 0.0 0.00 - 0.04 K/UL    DF AUTOMATED     METABOLIC PANEL, COMPREHENSIVE    Collection Time: 10/30/22  9:23 AM   Result Value Ref Range    Sodium 143 136 - 145 mmol/L    Potassium 3.9 3.5 - 5.1 mmol/L    Chloride 108 97 - 108 mmol/L    CO2 31 21 - 32 mmol/L    Anion gap 4 (L) 5 - 15 mmol/L    Glucose 89 65 - 100 mg/dL    BUN 24 (H) 6 - 20 mg/dL    Creatinine 1.88 (H) 0.70 - 1.30 mg/dL    BUN/Creatinine ratio 13 12 - 20      eGFR 35 (L) >60 ml/min/1.73m2    Calcium 8.8 8.5 - 10.1 mg/dL    Bilirubin, total 1.0 0.2 - 1.0 mg/dL    AST (SGOT) 22 15 - 37 U/L    ALT (SGPT) 15 12 - 78 U/L    Alk. phosphatase 95 45 - 117 U/L    Protein, total 6.5 6.4 - 8.2 g/dL    Albumin 3.1 (L) 3.5 - 5.0 g/dL    Globulin 3.4 2.0 - 4.0 g/dL    A-G Ratio 0.9 (L) 1.1 - 2.2     TROPONIN-HIGH SENSITIVITY    Collection Time: 10/30/22  9:23 AM   Result Value Ref Range    Troponin-High Sensitivity 22 0 - 76 ng/L   TROPONIN-HIGH SENSITIVITY    Collection Time: 10/30/22 10:57 AM   Result Value Ref Range    Troponin-High Sensitivity 22 0 - 76 ng/L       Radiologic Studies -   XR CHEST PORT   Final Result   No evidence of acute cardiopulmonary process. Pulmonary   hyperinflation compatible with emphysema. Chronic right basilar pulmonary   nodule, not significantly changed. CT Results  (Last 48 hours)      None          CXR Results  (Last 48 hours)                 10/30/22 0940  XR CHEST PORT Final result    Impression:  No evidence of acute cardiopulmonary process. Pulmonary   hyperinflation compatible with emphysema. Chronic right basilar pulmonary   nodule, not significantly changed. Narrative:  INDICATION:  chest pain        COMPARISON: September 21       FINDINGS: Single AP portable view of the chest obtained at 940 demonstrates a   stable cardiomediastinal silhouette. The lungs are hyperinflated. There is a   rounded opacity at the right lung base, not significantly changed. There is no   acute airspace disease. No osseous abnormalities are seen. If you feel that you have not received excellent quality care or timely care, please ask to speak to the nurse manager. Please choose us in the future for your continued health care needs. ------------------------------------------------------------------------------------------------------------  The exam and treatment you received in the Emergency Department were for an urgent problem and are not intended as complete care.  It is important that you follow-up with a doctor, nurse practitioner, or physician assistant to:  (1) confirm your diagnosis,  (2) re-evaluation of changes in your illness and treatment, and  (3) for ongoing care. If your symptoms become worse or you do not improve as expected and you are unable to reach your usual health care provider, you should return to the Emergency Department. We are available 24 hours a day. Please take your discharge instructions with you when you go to your follow-up appointment. If a prescription has been provided, please have it filled as soon as possible to prevent a delay in treatment. Read the entire medication instruction sheet provided to you by the pharmacy. If you have any questions or reservations about taking the medication due to side effects or interactions with other medications, please call your primary care physician or contact the ER to speak with the charge nurse. Make an appointment with your family doctor or the physician you were referred to for follow-up of this visit as instructed on your discharge paperwork, as this is a mandatory follow-up. Return to the ER if you are unable to be seen or if you are unable to be seen in a timely manner. If you have any problem arranging the follow-up visit, contact the Emergency Department immediately.

## 2022-10-31 LAB
ATRIAL RATE: 76 BPM
CALCULATED P AXIS, ECG09: 84 DEGREES
CALCULATED R AXIS, ECG10: -63 DEGREES
CALCULATED T AXIS, ECG11: 74 DEGREES
DIAGNOSIS, 93000: NORMAL
P-R INTERVAL, ECG05: 144 MS
Q-T INTERVAL, ECG07: 394 MS
QRS DURATION, ECG06: 78 MS
QTC CALCULATION (BEZET), ECG08: 443 MS
VENTRICULAR RATE, ECG03: 76 BPM

## 2022-12-08 ENCOUNTER — HOSPITAL ENCOUNTER (EMERGENCY)
Age: 87
Discharge: HOME | End: 2022-12-08
Attending: EMERGENCY MEDICINE
Payer: MEDICARE

## 2022-12-08 ENCOUNTER — APPOINTMENT (OUTPATIENT)
Dept: CT IMAGING | Age: 87
End: 2022-12-08
Attending: EMERGENCY MEDICINE
Payer: MEDICARE

## 2022-12-08 VITALS
WEIGHT: 112 LBS | DIASTOLIC BLOOD PRESSURE: 82 MMHG | OXYGEN SATURATION: 100 % | BODY MASS INDEX: 17.58 KG/M2 | SYSTOLIC BLOOD PRESSURE: 169 MMHG | HEIGHT: 67 IN | TEMPERATURE: 98.2 F | HEART RATE: 68 BPM | RESPIRATION RATE: 20 BRPM

## 2022-12-08 DIAGNOSIS — R10.32 ABDOMINAL PAIN, LLQ (LEFT LOWER QUADRANT): ICD-10-CM

## 2022-12-08 DIAGNOSIS — I71.40 ABDOMINAL AORTIC ANEURYSM (AAA) WITHOUT RUPTURE, UNSPECIFIED PART: Primary | ICD-10-CM

## 2022-12-08 LAB
ALBUMIN SERPL-MCNC: 2.5 G/DL (ref 3.5–5)
ALBUMIN/GLOB SERPL: 0.7 {RATIO} (ref 1.1–2.2)
ALP SERPL-CCNC: 90 U/L (ref 45–117)
ALT SERPL-CCNC: 14 U/L (ref 12–78)
ANION GAP SERPL CALC-SCNC: 5 MMOL/L (ref 5–15)
APPEARANCE UR: CLEAR
AST SERPL W P-5'-P-CCNC: 18 U/L (ref 15–37)
BACTERIA URNS QL MICRO: NEGATIVE /HPF
BILIRUB SERPL-MCNC: 0.5 MG/DL (ref 0.2–1)
BILIRUB UR QL: NEGATIVE
BUN SERPL-MCNC: 24 MG/DL (ref 6–20)
BUN/CREAT SERPL: 11 (ref 12–20)
CA-I BLD-MCNC: 8.4 MG/DL (ref 8.5–10.1)
CHLORIDE SERPL-SCNC: 111 MMOL/L (ref 97–108)
CO2 SERPL-SCNC: 29 MMOL/L (ref 21–32)
COLOR UR: YELLOW
CREAT SERPL-MCNC: 2.18 MG/DL (ref 0.7–1.3)
ERYTHROCYTE [DISTWIDTH] IN BLOOD BY AUTOMATED COUNT: 14.2 % (ref 11.5–14.5)
GLOBULIN SER CALC-MCNC: 3.5 G/DL (ref 2–4)
GLUCOSE SERPL-MCNC: 119 MG/DL (ref 65–100)
GLUCOSE UR STRIP.AUTO-MCNC: NORMAL MG/DL
HCT VFR BLD AUTO: 28.1 % (ref 36.6–50.3)
HGB BLD-MCNC: 8.7 G/DL (ref 12.1–17)
HGB UR QL STRIP: 50
HYALINE CASTS URNS QL MICRO: ABNORMAL /LPF (ref 0–5)
KETONES UR QL STRIP.AUTO: NEGATIVE MG/DL
LEUKOCYTE ESTERASE UR QL STRIP.AUTO: NEGATIVE
MCH RBC QN AUTO: 28.3 PG (ref 26–34)
MCHC RBC AUTO-ENTMCNC: 31 G/DL (ref 30–36.5)
MCV RBC AUTO: 91.5 FL (ref 80–99)
MUCOUS THREADS URNS QL MICRO: ABNORMAL /LPF
NITRITE UR QL STRIP.AUTO: NEGATIVE
NRBC # BLD: 0 K/UL (ref 0–0.01)
NRBC BLD-RTO: 0 PER 100 WBC
PH UR STRIP: 5 [PH] (ref 5–8)
PLATELET # BLD AUTO: 224 K/UL (ref 150–400)
PMV BLD AUTO: 10.4 FL (ref 8.9–12.9)
POTASSIUM SERPL-SCNC: 3.8 MMOL/L (ref 3.5–5.1)
PROT SERPL-MCNC: 6 G/DL (ref 6.4–8.2)
PROT UR STRIP-MCNC: 500 MG/DL
RBC # BLD AUTO: 3.07 M/UL (ref 4.1–5.7)
RBC #/AREA URNS HPF: ABNORMAL /HPF (ref 0–5)
SODIUM SERPL-SCNC: 145 MMOL/L (ref 136–145)
SP GR UR REFRACTOMETRY: 1.02 (ref 1–1.03)
SP GR UR REFRACTOMETRY: 1.02 (ref 1–1.03)
UROBILINOGEN UR QL STRIP.AUTO: NORMAL EU/DL (ref 0.1–1)
WBC # BLD AUTO: 4.5 K/UL (ref 4.1–11.1)
WBC URNS QL MICRO: ABNORMAL /HPF (ref 0–4)

## 2022-12-08 PROCEDURE — 74176 CT ABD & PELVIS W/O CONTRAST: CPT

## 2022-12-08 PROCEDURE — 81003 URINALYSIS AUTO W/O SCOPE: CPT

## 2022-12-08 PROCEDURE — 99284 EMERGENCY DEPT VISIT MOD MDM: CPT

## 2022-12-08 PROCEDURE — 80053 COMPREHEN METABOLIC PANEL: CPT

## 2022-12-08 PROCEDURE — 85027 COMPLETE CBC AUTOMATED: CPT

## 2022-12-08 PROCEDURE — 36415 COLL VENOUS BLD VENIPUNCTURE: CPT

## 2022-12-08 PROCEDURE — 74011250637 HC RX REV CODE- 250/637: Performed by: EMERGENCY MEDICINE

## 2022-12-08 RX ORDER — TRAMADOL HYDROCHLORIDE 50 MG/1
50 TABLET ORAL
Status: COMPLETED | OUTPATIENT
Start: 2022-12-08 | End: 2022-12-08

## 2022-12-08 RX ADMIN — TRAMADOL HYDROCHLORIDE 50 MG: 50 TABLET, COATED ORAL at 16:35

## 2022-12-08 NOTE — DISCHARGE INSTRUCTIONS
Thank you! Thank you for allowing me to care for you in the emergency department. It is my goal to provide you with excellent care. If you have not received excellent quality care, please ask to speak to the nurse manager. Please fill out the survey that will come to you by mail or email since we listen to your feedback! Below you will find a list of your tests from today's visit. Should you have any questions, please do not hesitate to call the emergency department. Labs  Recent Results (from the past 12 hour(s))   CBC W/O DIFF    Collection Time: 12/08/22  1:42 PM   Result Value Ref Range    WBC 4.5 4.1 - 11.1 K/uL    RBC 3.07 (L) 4.10 - 5.70 M/uL    HGB 8.7 (L) 12.1 - 17.0 g/dL    HCT 28.1 (L) 36.6 - 50.3 %    MCV 91.5 80.0 - 99.0 FL    MCH 28.3 26.0 - 34.0 PG    MCHC 31.0 30.0 - 36.5 g/dL    RDW 14.2 11.5 - 14.5 %    PLATELET 370 227 - 201 K/uL    MPV 10.4 8.9 - 12.9 FL    NRBC 0.0 0.0  WBC    ABSOLUTE NRBC 0.00 0.00 - 1.46 K/uL   METABOLIC PANEL, COMPREHENSIVE    Collection Time: 12/08/22  1:42 PM   Result Value Ref Range    Sodium 145 136 - 145 mmol/L    Potassium 3.8 3.5 - 5.1 mmol/L    Chloride 111 (H) 97 - 108 mmol/L    CO2 29 21 - 32 mmol/L    Anion gap 5 5 - 15 mmol/L    Glucose 119 (H) 65 - 100 mg/dL    BUN 24 (H) 6 - 20 mg/dL    Creatinine 2.18 (H) 0.70 - 1.30 mg/dL    BUN/Creatinine ratio 11 (L) 12 - 20      eGFR 29 (L) >60 ml/min/1.73m2    Calcium 8.4 (L) 8.5 - 10.1 mg/dL    Bilirubin, total 0.5 0.2 - 1.0 mg/dL    AST (SGOT) 18 15 - 37 U/L    ALT (SGPT) 14 12 - 78 U/L    Alk.  phosphatase 90 45 - 117 U/L    Protein, total 6.0 (L) 6.4 - 8.2 g/dL    Albumin 2.5 (L) 3.5 - 5.0 g/dL    Globulin 3.5 2.0 - 4.0 g/dL    A-G Ratio 0.7 (L) 1.1 - 2.2     URINALYSIS W/ RFLX MICROSCOPIC    Collection Time: 12/08/22  3:39 PM   Result Value Ref Range    Color Yellow     Appearance Clear Clear    Specific gravity 1.020 1.003 - 1.030      Specific gravity 1.020 1.003 - 1.030      pH (UA) 5.0 5.0 - 8.0 Protein 500 (A) Negative mg/dL    Glucose Normal (A) Negative mg/dL    Ketone Negative Negative mg/dL    Bilirubin Negative Negative      Blood 50 (A) Negative      Urobilinogen Normal 0.1 - 1.0 EU/dL    Nitrites Negative Negative      Leukocyte Esterase Negative Negative      WBC 0-4 0 - 4 /hpf    RBC 0-5 0 - 5 /hpf    Bacteria Negative Negative /hpf    Mucus Trace (A) Negative /lpf    Hyaline cast 10-20 0 - 5 /lpf       Radiologic Studies  CT ABD PELV WO CONT   Final Result      1. Interval enlargement of abdominal aortic aneurysm from 3.1 cm to 3.7 cm. Follow-up with vascular surgery is recommended   2. Interval right colectomy. Small amount of free fluid within the pelvis. No   obstruction or drainable fluid collection   3. Small bilateral pleural effusions        CT Results  (Last 48 hours)                 12/08/22 1506  CT ABD PELV WO CONT Final result    Impression:      1. Interval enlargement of abdominal aortic aneurysm from 3.1 cm to 3.7 cm. Follow-up with vascular surgery is recommended   2. Interval right colectomy. Small amount of free fluid within the pelvis. No   obstruction or drainable fluid collection   3. Small bilateral pleural effusions       Narrative:  EXAM: CT ABD PELV WO CONT       INDICATION: LLQ abd pain       COMPARISON: 8/19/2022       IV CONTRAST: None. ORAL CONTRAST: None       TECHNIQUE:    Thin axial images were obtained through the abdomen and pelvis. Coronal and   sagittal reformats were generated. CT dose reduction was achieved through use of   a standardized protocol tailored for this examination and automatic exposure   control for dose modulation. The absence of intravenous contrast material reduces the sensitivity for   evaluation of the vasculature and solid organs. FINDINGS:    LOWER THORAX: Small bilateral pleural effusions   LIVER: Small hypodensity segment 4B unchanged. Likely a cyst.   BILIARY TREE: Gallbladder is within normal limits.  CBD is not dilated. SPLEEN: within normal limits. PANCREAS: No focal abnormality. ADRENALS: Unremarkable. KIDNEYS/URETERS: Small parapelvic cyst right kidney unchanged. Punctate   nonobstructing stone left kidney. No hydronephrosis   STOMACH: Unremarkable. SMALL BOWEL: No dilatation or wall thickening. COLON: Post right colectomy. Residual colon is not distended   APPENDIX: Surgically absent   PERITONEUM: Small amount of free fluid   RETROPERITONEUM: Abdominal aortic aneurysm has slightly increased in size in the   interval. It now measures 3.7 x 3.5 cm. Previously measured 3.1 x 3.1 cm. . No   adenopathy   REPRODUCTIVE ORGANS: Enlarged   URINARY BLADDER: No mass or calculus. BONES: No destructive bone lesion. ABDOMINAL WALL: No mass or hernia. ADDITIONAL COMMENTS: N/A                 CXR Results  (Last 48 hours)      None          ------------------------------------------------------------------------------------------------------------  The exam and treatment you received in the Emergency Department were for an urgent problem and are not intended as complete care. It is important that you follow-up with a doctor, nurse practitioner, or physician assistant to:  (1) confirm your diagnosis,  (2) re-evaluation of changes in your illness and treatment, and  (3) for ongoing care. Please take your discharge instructions with you when you go to your follow-up appointment. If you have any problem arranging a follow-up appointment, contact the Emergency Department. If your symptoms become worse or you do not improve as expected and you are unable to reach your health care provider, please return to the Emergency Department. We are available 24 hours a day. If a prescription has been provided, please have it filled as soon as possible to prevent a delay in treatment.  If you have any questions or reservations about taking the medication due to side effects or interactions with other medications, please call your primary care provider or contact the ER.

## 2022-12-27 ENCOUNTER — APPOINTMENT (OUTPATIENT)
Dept: GENERAL RADIOLOGY | Age: 87
End: 2022-12-27
Attending: STUDENT IN AN ORGANIZED HEALTH CARE EDUCATION/TRAINING PROGRAM
Payer: MEDICARE

## 2022-12-27 ENCOUNTER — HOSPITAL ENCOUNTER (EMERGENCY)
Age: 87
Discharge: HOME | End: 2022-12-27
Attending: STUDENT IN AN ORGANIZED HEALTH CARE EDUCATION/TRAINING PROGRAM
Payer: MEDICARE

## 2022-12-27 VITALS
HEART RATE: 78 BPM | RESPIRATION RATE: 16 BRPM | OXYGEN SATURATION: 98 % | SYSTOLIC BLOOD PRESSURE: 165 MMHG | DIASTOLIC BLOOD PRESSURE: 82 MMHG | BODY MASS INDEX: 17.58 KG/M2 | HEIGHT: 67 IN | TEMPERATURE: 98 F | WEIGHT: 112 LBS

## 2022-12-27 DIAGNOSIS — R79.89 ELEVATED BRAIN NATRIURETIC PEPTIDE (BNP) LEVEL: ICD-10-CM

## 2022-12-27 DIAGNOSIS — J44.1 COPD EXACERBATION (HCC): Primary | ICD-10-CM

## 2022-12-27 LAB
ALBUMIN SERPL-MCNC: 2.1 G/DL (ref 3.5–5)
ALBUMIN/GLOB SERPL: 0.7 {RATIO} (ref 1.1–2.2)
ALP SERPL-CCNC: 79 U/L (ref 45–117)
ALT SERPL-CCNC: 16 U/L (ref 12–78)
ANION GAP SERPL CALC-SCNC: 4 MMOL/L (ref 5–15)
AST SERPL W P-5'-P-CCNC: 17 U/L (ref 15–37)
ATRIAL RATE: 111 BPM
ATRIAL RATE: 82 BPM
BASOPHILS # BLD: 0 K/UL (ref 0–0.1)
BASOPHILS NFR BLD: 0 % (ref 0–1)
BILIRUB SERPL-MCNC: 0.5 MG/DL (ref 0.2–1)
BNP SERPL-MCNC: 6518 PG/ML
BUN SERPL-MCNC: 27 MG/DL (ref 6–20)
BUN/CREAT SERPL: 12 (ref 12–20)
CA-I BLD-MCNC: 8 MG/DL (ref 8.5–10.1)
CALCULATED P AXIS, ECG09: 77 DEGREES
CALCULATED P AXIS, ECG09: 80 DEGREES
CALCULATED R AXIS, ECG10: -15 DEGREES
CALCULATED R AXIS, ECG10: -40 DEGREES
CALCULATED T AXIS, ECG11: 71 DEGREES
CALCULATED T AXIS, ECG11: 90 DEGREES
CHLORIDE SERPL-SCNC: 112 MMOL/L (ref 97–108)
CO2 SERPL-SCNC: 28 MMOL/L (ref 21–32)
CREAT SERPL-MCNC: 2.21 MG/DL (ref 0.7–1.3)
DIAGNOSIS, 93000: NORMAL
DIAGNOSIS, 93000: NORMAL
DIFFERENTIAL METHOD BLD: ABNORMAL
EOSINOPHIL # BLD: 0.6 K/UL (ref 0–0.4)
EOSINOPHIL NFR BLD: 12 % (ref 0–7)
ERYTHROCYTE [DISTWIDTH] IN BLOOD BY AUTOMATED COUNT: 14.6 % (ref 11.5–14.5)
GLOBULIN SER CALC-MCNC: 3.2 G/DL (ref 2–4)
GLUCOSE SERPL-MCNC: 125 MG/DL (ref 65–100)
HCT VFR BLD AUTO: 29 % (ref 36.6–50.3)
HGB BLD-MCNC: 9.1 G/DL (ref 12.1–17)
IMM GRANULOCYTES # BLD AUTO: 0 K/UL (ref 0–0.04)
IMM GRANULOCYTES NFR BLD AUTO: 0 % (ref 0–0.5)
LYMPHOCYTES # BLD: 0.8 K/UL (ref 0.8–3.5)
LYMPHOCYTES NFR BLD: 16 % (ref 12–49)
MCH RBC QN AUTO: 28.6 PG (ref 26–34)
MCHC RBC AUTO-ENTMCNC: 31.4 G/DL (ref 30–36.5)
MCV RBC AUTO: 91.2 FL (ref 80–99)
MONOCYTES # BLD: 0.7 K/UL (ref 0–1)
MONOCYTES NFR BLD: 13 % (ref 5–13)
NEUTS SEG # BLD: 3.1 K/UL (ref 1.8–8)
NEUTS SEG NFR BLD: 59 % (ref 32–75)
NRBC # BLD: 0 K/UL (ref 0–0.01)
NRBC BLD-RTO: 0 PER 100 WBC
P-R INTERVAL, ECG05: 120 MS
PLATELET # BLD AUTO: 183 K/UL (ref 150–400)
PMV BLD AUTO: 10.3 FL (ref 8.9–12.9)
POTASSIUM SERPL-SCNC: 3.7 MMOL/L (ref 3.5–5.1)
PROT SERPL-MCNC: 5.3 G/DL (ref 6.4–8.2)
Q-T INTERVAL, ECG07: 348 MS
Q-T INTERVAL, ECG07: 378 MS
QRS DURATION, ECG06: 70 MS
QRS DURATION, ECG06: 76 MS
QTC CALCULATION (BEZET), ECG08: 428 MS
QTC CALCULATION (BEZET), ECG08: 441 MS
RBC # BLD AUTO: 3.18 M/UL (ref 4.1–5.7)
SODIUM SERPL-SCNC: 144 MMOL/L (ref 136–145)
TROPONIN-HIGH SENSITIVITY: 25 NG/L (ref 0–76)
VENTRICULAR RATE, ECG03: 82 BPM
VENTRICULAR RATE, ECG03: 91 BPM
WBC # BLD AUTO: 5.3 K/UL (ref 4.1–11.1)

## 2022-12-27 PROCEDURE — 96374 THER/PROPH/DIAG INJ IV PUSH: CPT

## 2022-12-27 PROCEDURE — 36415 COLL VENOUS BLD VENIPUNCTURE: CPT

## 2022-12-27 PROCEDURE — 71046 X-RAY EXAM CHEST 2 VIEWS: CPT

## 2022-12-27 PROCEDURE — 80053 COMPREHEN METABOLIC PANEL: CPT

## 2022-12-27 PROCEDURE — 85025 COMPLETE CBC W/AUTO DIFF WBC: CPT

## 2022-12-27 PROCEDURE — 93005 ELECTROCARDIOGRAM TRACING: CPT

## 2022-12-27 PROCEDURE — 74011000250 HC RX REV CODE- 250: Performed by: STUDENT IN AN ORGANIZED HEALTH CARE EDUCATION/TRAINING PROGRAM

## 2022-12-27 PROCEDURE — 83880 ASSAY OF NATRIURETIC PEPTIDE: CPT

## 2022-12-27 PROCEDURE — 94640 AIRWAY INHALATION TREATMENT: CPT

## 2022-12-27 PROCEDURE — 74011250636 HC RX REV CODE- 250/636: Performed by: STUDENT IN AN ORGANIZED HEALTH CARE EDUCATION/TRAINING PROGRAM

## 2022-12-27 PROCEDURE — 99285 EMERGENCY DEPT VISIT HI MDM: CPT

## 2022-12-27 PROCEDURE — 84484 ASSAY OF TROPONIN QUANT: CPT

## 2022-12-27 RX ORDER — ALBUTEROL SULFATE 90 UG/1
1 AEROSOL, METERED RESPIRATORY (INHALATION)
Qty: 18 G | Refills: 0 | Status: SHIPPED | OUTPATIENT
Start: 2022-12-27

## 2022-12-27 RX ORDER — PREDNISONE 20 MG/1
40 TABLET ORAL DAILY
Qty: 14 TABLET | Refills: 0 | Status: SHIPPED | OUTPATIENT
Start: 2022-12-27 | End: 2023-01-03

## 2022-12-27 RX ORDER — IPRATROPIUM BROMIDE AND ALBUTEROL SULFATE 2.5; .5 MG/3ML; MG/3ML
3 SOLUTION RESPIRATORY (INHALATION)
Status: COMPLETED | OUTPATIENT
Start: 2022-12-27 | End: 2022-12-27

## 2022-12-27 RX ADMIN — IPRATROPIUM BROMIDE AND ALBUTEROL SULFATE 3 ML: 2.5; .5 SOLUTION RESPIRATORY (INHALATION) at 10:56

## 2022-12-27 RX ADMIN — METHYLPREDNISOLONE SODIUM SUCCINATE 125 MG: 125 INJECTION, POWDER, FOR SOLUTION INTRAMUSCULAR; INTRAVENOUS at 11:04

## 2022-12-27 NOTE — ED TRIAGE NOTES
Patient brought ot ED by EMS. Patient complains of shortness of breath for several weeks. Patient given a duo neb in route. Patient is a+ox4. Skin is warm and dry. Respirations are even with mild retractions and wheezing. Skin is warm and dry. Emergency Department Nursing Plan of Care       The Nursing Plan of Care is developed from the Nursing assessment and Emergency Department Attending provider initial evaluation. The plan of care may be reviewed in the ED Provider note.     The Plan of Care was developed with the following considerations:   Patient / Family readiness to learn indicated by:verbalized understanding  Persons(s) to be included in education: family  Barriers to Learning/Limitations:No    Signed     Niki Farris RN    12/27/2022   9:50 AM
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
## 2022-12-27 NOTE — ED NOTES
Patient (s)  given copy of dc instructions and 0 paper script(s) and 2 electronic scripts. Patient (s)  verbalized understanding of instructions and script (s). Patient given a current medication reconciliation form and verbalized understanding of their medications. Patient (s) verbalized understanding of the importance of discussing medications with  his or her physician or clinic they will be following up with. Patient alert and oriented and in no acute distress. Patient offered wheelchair from treatment area to hospital entrance, patient transported via wheelchair.
no cough/no shortness of breath

## 2022-12-27 NOTE — DISCHARGE INSTRUCTIONS
Thank you! Thank you for allowing me to care for you in the emergency department. It is my goal to provide you with excellent care. If you have not received excellent quality care, please ask to speak to the nurse manager. Please fill out the survey that will come to you by mail or email since we listen to your feedback! Below you will find a list of your tests from today's visit. Should you have any questions, please do not hesitate to call the emergency department. Labs  Recent Results (from the past 12 hour(s))   CBC WITH AUTOMATED DIFF    Collection Time: 12/27/22 10:00 AM   Result Value Ref Range    WBC 5.3 4.1 - 11.1 K/uL    RBC 3.18 (L) 4.10 - 5.70 M/uL    HGB 9.1 (L) 12.1 - 17.0 g/dL    HCT 29.0 (L) 36.6 - 50.3 %    MCV 91.2 80.0 - 99.0 FL    MCH 28.6 26.0 - 34.0 PG    MCHC 31.4 30.0 - 36.5 g/dL    RDW 14.6 (H) 11.5 - 14.5 %    PLATELET 976 905 - 596 K/uL    MPV 10.3 8.9 - 12.9 FL    NRBC 0.0 0.0  WBC    ABSOLUTE NRBC 0.00 0.00 - 0.01 K/uL    NEUTROPHILS 59 32 - 75 %    LYMPHOCYTES 16 12 - 49 %    MONOCYTES 13 5 - 13 %    EOSINOPHILS 12 (H) 0 - 7 %    BASOPHILS 0 0 - 1 %    IMMATURE GRANULOCYTES 0 0 - 0.5 %    ABS. NEUTROPHILS 3.1 1.8 - 8.0 K/UL    ABS. LYMPHOCYTES 0.8 0.8 - 3.5 K/UL    ABS. MONOCYTES 0.7 0.0 - 1.0 K/UL    ABS. EOSINOPHILS 0.6 (H) 0.0 - 0.4 K/UL    ABS. BASOPHILS 0.0 0.0 - 0.1 K/UL    ABS. IMM.  GRANS. 0.0 0.00 - 0.04 K/UL    DF AUTOMATED     METABOLIC PANEL, COMPREHENSIVE    Collection Time: 12/27/22 10:00 AM   Result Value Ref Range    Sodium 144 136 - 145 mmol/L    Potassium 3.7 3.5 - 5.1 mmol/L    Chloride 112 (H) 97 - 108 mmol/L    CO2 28 21 - 32 mmol/L    Anion gap 4 (L) 5 - 15 mmol/L    Glucose 125 (H) 65 - 100 mg/dL    BUN 27 (H) 6 - 20 mg/dL    Creatinine 2.21 (H) 0.70 - 1.30 mg/dL    BUN/Creatinine ratio 12 12 - 20      eGFR 28 (L) >60 ml/min/1.73m2    Calcium 8.0 (L) 8.5 - 10.1 mg/dL    Bilirubin, total 0.5 0.2 - 1.0 mg/dL    AST (SGOT) 17 15 - 37 U/L    ALT (SGPT) 16 12 - 78 U/L    Alk. phosphatase 79 45 - 117 U/L    Protein, total 5.3 (L) 6.4 - 8.2 g/dL    Albumin 2.1 (L) 3.5 - 5.0 g/dL    Globulin 3.2 2.0 - 4.0 g/dL    A-G Ratio 0.7 (L) 1.1 - 2.2     TROPONIN-HIGH SENSITIVITY    Collection Time: 12/27/22 10:00 AM   Result Value Ref Range    Troponin-High Sensitivity 25 0 - 76 ng/L   NT-PRO BNP    Collection Time: 12/27/22 10:00 AM   Result Value Ref Range    NT pro-BNP 6,518 (H) <450 pg/mL       Radiologic Studies  XR CHEST PA LAT   Final Result   No acute process. No definite change in right lung nodular density as above. CT Results  (Last 48 hours)      None          CXR Results  (Last 48 hours)                 12/27/22 1014  XR CHEST PA LAT Final result    Impression:  No acute process. No definite change in right lung nodular density as above. Narrative:  INDICATION: eval for pneumonia       COMPARISON: 10/30/22       FINDINGS:       Frontal and lateral views of the chest demonstrate a normal cardiomediastinal   silhouette. The lungs are adequately expanded. There is no edema, effusion,   consolidation, or pneumothorax. Lobulated nodular density in the right lung base   without definite change though partly obscured by overlying monitoring leads. Probable bilateral nipple shadows. The osseous structures are unremarkable.                 ------------------------------------------------------------------------------------------------------------  The exam and treatment you received in the Emergency Department were for an urgent problem and are not intended as complete care. It is important that you follow-up with a doctor, nurse practitioner, or physician assistant to:  (1) confirm your diagnosis,  (2) re-evaluation of changes in your illness and treatment, and  (3) for ongoing care. Please take your discharge instructions with you when you go to your follow-up appointment.      If you have any problem arranging a follow-up appointment, contact the Emergency Department. If your symptoms become worse or you do not improve as expected and you are unable to reach your health care provider, please return to the Emergency Department. We are available 24 hours a day. If a prescription has been provided, please have it filled as soon as possible to prevent a delay in treatment. If you have any questions or reservations about taking the medication due to side effects or interactions with other medications, please call your primary care provider or contact the ER.

## 2023-01-08 ENCOUNTER — HOSPITAL ENCOUNTER (EMERGENCY)
Age: 88
Discharge: HOME | End: 2023-01-08
Attending: EMERGENCY MEDICINE
Payer: MEDICARE

## 2023-01-08 ENCOUNTER — APPOINTMENT (OUTPATIENT)
Dept: GENERAL RADIOLOGY | Age: 88
End: 2023-01-08
Attending: EMERGENCY MEDICINE
Payer: MEDICARE

## 2023-01-08 VITALS
WEIGHT: 126 LBS | BODY MASS INDEX: 19.78 KG/M2 | SYSTOLIC BLOOD PRESSURE: 142 MMHG | RESPIRATION RATE: 20 BRPM | HEIGHT: 67 IN | TEMPERATURE: 99.3 F | DIASTOLIC BLOOD PRESSURE: 102 MMHG | OXYGEN SATURATION: 94 % | HEART RATE: 110 BPM

## 2023-01-08 DIAGNOSIS — J44.1 ACUTE EXACERBATION OF CHRONIC OBSTRUCTIVE PULMONARY DISEASE (COPD) (HCC): Primary | ICD-10-CM

## 2023-01-08 LAB
ALBUMIN SERPL-MCNC: 1.9 G/DL (ref 3.5–5)
ALBUMIN/GLOB SERPL: 0.5 (ref 1.1–2.2)
ALP SERPL-CCNC: 78 U/L (ref 45–117)
ALT SERPL-CCNC: 25 U/L (ref 12–78)
ANION GAP SERPL CALC-SCNC: 4 MMOL/L (ref 5–15)
AST SERPL W P-5'-P-CCNC: 22 U/L (ref 15–37)
BASOPHILS # BLD: 0 K/UL (ref 0–0.1)
BASOPHILS NFR BLD: 0 % (ref 0–1)
BILIRUB SERPL-MCNC: 0.7 MG/DL (ref 0.2–1)
BNP SERPL-MCNC: 2822 PG/ML
BUN SERPL-MCNC: 40 MG/DL (ref 6–20)
BUN/CREAT SERPL: 16 (ref 12–20)
CA-I BLD-MCNC: 7.8 MG/DL (ref 8.5–10.1)
CHLORIDE SERPL-SCNC: 103 MMOL/L (ref 97–108)
CO2 SERPL-SCNC: 31 MMOL/L (ref 21–32)
CREAT SERPL-MCNC: 2.45 MG/DL (ref 0.7–1.3)
DIFFERENTIAL METHOD BLD: ABNORMAL
EOSINOPHIL # BLD: 0.5 K/UL (ref 0–0.4)
EOSINOPHIL NFR BLD: 9 % (ref 0–7)
ERYTHROCYTE [DISTWIDTH] IN BLOOD BY AUTOMATED COUNT: 14.8 % (ref 11.5–14.5)
GLOBULIN SER CALC-MCNC: 3.5 G/DL (ref 2–4)
GLUCOSE SERPL-MCNC: 103 MG/DL (ref 65–100)
HCT VFR BLD AUTO: 28.9 % (ref 36.6–50.3)
HGB BLD-MCNC: 9.1 G/DL (ref 12.1–17)
IMM GRANULOCYTES # BLD AUTO: 0 K/UL (ref 0–0.04)
IMM GRANULOCYTES NFR BLD AUTO: 0 % (ref 0–0.5)
LYMPHOCYTES # BLD: 0.5 K/UL (ref 0.8–3.5)
LYMPHOCYTES NFR BLD: 10 % (ref 12–49)
MCH RBC QN AUTO: 28.3 PG (ref 26–34)
MCHC RBC AUTO-ENTMCNC: 31.5 G/DL (ref 30–36.5)
MCV RBC AUTO: 89.8 FL (ref 80–99)
MONOCYTES # BLD: 0.4 K/UL (ref 0–1)
MONOCYTES NFR BLD: 8 % (ref 5–13)
NEUTS SEG # BLD: 4.1 K/UL (ref 1.8–8)
NEUTS SEG NFR BLD: 73 % (ref 32–75)
NRBC # BLD: 0 K/UL (ref 0–0.01)
NRBC BLD-RTO: 0 PER 100 WBC
PLATELET # BLD AUTO: 189 K/UL (ref 150–400)
PMV BLD AUTO: 10.4 FL (ref 8.9–12.9)
POTASSIUM SERPL-SCNC: 4 MMOL/L (ref 3.5–5.1)
PROT SERPL-MCNC: 5.4 G/DL (ref 6.4–8.2)
RBC # BLD AUTO: 3.22 M/UL (ref 4.1–5.7)
SODIUM SERPL-SCNC: 138 MMOL/L (ref 136–145)
TROPONIN-HIGH SENSITIVITY: 29 NG/L (ref 0–76)
WBC # BLD AUTO: 5.5 K/UL (ref 4.1–11.1)

## 2023-01-08 PROCEDURE — 94640 AIRWAY INHALATION TREATMENT: CPT

## 2023-01-08 PROCEDURE — 99285 EMERGENCY DEPT VISIT HI MDM: CPT

## 2023-01-08 PROCEDURE — 71045 X-RAY EXAM CHEST 1 VIEW: CPT

## 2023-01-08 PROCEDURE — 74011000250 HC RX REV CODE- 250: Performed by: EMERGENCY MEDICINE

## 2023-01-08 PROCEDURE — 93005 ELECTROCARDIOGRAM TRACING: CPT

## 2023-01-08 PROCEDURE — 83880 ASSAY OF NATRIURETIC PEPTIDE: CPT

## 2023-01-08 PROCEDURE — 84484 ASSAY OF TROPONIN QUANT: CPT

## 2023-01-08 PROCEDURE — 36415 COLL VENOUS BLD VENIPUNCTURE: CPT

## 2023-01-08 PROCEDURE — 85025 COMPLETE CBC W/AUTO DIFF WBC: CPT

## 2023-01-08 PROCEDURE — 80053 COMPREHEN METABOLIC PANEL: CPT

## 2023-01-08 RX ORDER — ALBUTEROL SULFATE 0.83 MG/ML
10 SOLUTION RESPIRATORY (INHALATION)
Status: DISCONTINUED | OUTPATIENT
Start: 2023-01-08 | End: 2023-01-08 | Stop reason: HOSPADM

## 2023-01-08 RX ORDER — DOXYCYCLINE HYCLATE 100 MG
100 TABLET ORAL 2 TIMES DAILY
Qty: 20 TABLET | Refills: 0 | Status: SHIPPED | OUTPATIENT
Start: 2023-01-08 | End: 2023-01-18

## 2023-01-08 RX ORDER — SODIUM CHLORIDE 0.9 % (FLUSH) 0.9 %
5-40 SYRINGE (ML) INJECTION AS NEEDED
Status: DISCONTINUED | OUTPATIENT
Start: 2023-01-08 | End: 2023-01-08 | Stop reason: HOSPADM

## 2023-01-08 RX ORDER — SODIUM CHLORIDE 0.9 % (FLUSH) 0.9 %
5-40 SYRINGE (ML) INJECTION EVERY 8 HOURS
Status: DISCONTINUED | OUTPATIENT
Start: 2023-01-08 | End: 2023-01-08 | Stop reason: HOSPADM

## 2023-01-08 RX ORDER — IPRATROPIUM BROMIDE AND ALBUTEROL SULFATE 2.5; .5 MG/3ML; MG/3ML
3 SOLUTION RESPIRATORY (INHALATION)
Status: COMPLETED | OUTPATIENT
Start: 2023-01-08 | End: 2023-01-08

## 2023-01-08 RX ORDER — PREDNISONE 20 MG/1
60 TABLET ORAL DAILY
Qty: 15 TABLET | Refills: 0 | Status: SHIPPED | OUTPATIENT
Start: 2023-01-08 | End: 2023-01-13

## 2023-01-08 RX ADMIN — IPRATROPIUM BROMIDE AND ALBUTEROL SULFATE 3 ML: 2.5; .5 SOLUTION RESPIRATORY (INHALATION) at 15:47

## 2023-01-08 RX ADMIN — Medication 5 ML: at 15:40

## 2023-01-08 RX ADMIN — IPRATROPIUM BROMIDE AND ALBUTEROL SULFATE 3 ML: 2.5; .5 SOLUTION RESPIRATORY (INHALATION) at 15:37

## 2023-01-08 RX ADMIN — SODIUM CHLORIDE, PRESERVATIVE FREE 10 ML: 5 INJECTION INTRAVENOUS at 15:40

## 2023-01-08 RX ADMIN — IPRATROPIUM BROMIDE AND ALBUTEROL SULFATE 3 ML: 2.5; .5 SOLUTION RESPIRATORY (INHALATION) at 15:42
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
## 2023-01-09 LAB
ATRIAL RATE: 113 BPM
CALCULATED P AXIS, ECG09: 88 DEGREES
CALCULATED R AXIS, ECG10: -67 DEGREES
CALCULATED T AXIS, ECG11: 87 DEGREES
DIAGNOSIS, 93000: NORMAL
P-R INTERVAL, ECG05: 152 MS
Q-T INTERVAL, ECG07: 324 MS
QRS DURATION, ECG06: 72 MS
QTC CALCULATION (BEZET), ECG08: 442 MS
VENTRICULAR RATE, ECG03: 112 BPM

## 2023-01-19 ENCOUNTER — HOSPITAL ENCOUNTER (INPATIENT)
Age: 88
LOS: 6 days | Discharge: NURSING, REHAB OR LONG-TERM CARE | DRG: 308 | End: 2023-01-25
Attending: EMERGENCY MEDICINE | Admitting: FAMILY MEDICINE
Payer: MEDICARE

## 2023-01-19 ENCOUNTER — APPOINTMENT (OUTPATIENT)
Dept: GENERAL RADIOLOGY | Age: 88
DRG: 308 | End: 2023-01-19
Attending: EMERGENCY MEDICINE
Payer: MEDICARE

## 2023-01-19 DIAGNOSIS — I48.91 ATRIAL FIBRILLATION, UNSPECIFIED TYPE (HCC): ICD-10-CM

## 2023-01-19 PROBLEM — I48.92 ATRIAL FLUTTER (HCC): Status: ACTIVE | Noted: 2023-01-19

## 2023-01-19 PROBLEM — I48.92 ATRIAL FLUTTER BY ELECTROCARDIOGRAPHY (HCC): Status: ACTIVE | Noted: 2023-01-19

## 2023-01-19 PROBLEM — R06.09 DYSPNEA ON EXERTION: Status: ACTIVE | Noted: 2023-01-19

## 2023-01-19 LAB
ALBUMIN SERPL-MCNC: 2 G/DL (ref 3.5–5)
ALBUMIN/GLOB SERPL: 0.6 (ref 1.1–2.2)
ALP SERPL-CCNC: 79 U/L (ref 45–117)
ALT SERPL-CCNC: 31 U/L (ref 12–78)
ANION GAP SERPL CALC-SCNC: 7 MMOL/L (ref 5–15)
APTT PPP: 28.8 SEC (ref 21.2–34.1)
AST SERPL W P-5'-P-CCNC: 31 U/L (ref 15–37)
BASOPHILS # BLD: 0 K/UL (ref 0–0.1)
BASOPHILS NFR BLD: 0 % (ref 0–1)
BILIRUB SERPL-MCNC: 0.7 MG/DL (ref 0.2–1)
BNP SERPL-MCNC: 2083 PG/ML
BUN SERPL-MCNC: 68 MG/DL (ref 6–20)
BUN/CREAT SERPL: 28 (ref 12–20)
CA-I BLD-MCNC: 8.6 MG/DL (ref 8.5–10.1)
CHLORIDE SERPL-SCNC: 103 MMOL/L (ref 97–108)
CO2 SERPL-SCNC: 31 MMOL/L (ref 21–32)
CREAT SERPL-MCNC: 2.45 MG/DL (ref 0.7–1.3)
DIFFERENTIAL METHOD BLD: ABNORMAL
EOSINOPHIL # BLD: 0.4 K/UL (ref 0–0.4)
EOSINOPHIL NFR BLD: 8 % (ref 0–7)
ERYTHROCYTE [DISTWIDTH] IN BLOOD BY AUTOMATED COUNT: 15.1 % (ref 11.5–14.5)
GLOBULIN SER CALC-MCNC: 3.2 G/DL (ref 2–4)
GLUCOSE SERPL-MCNC: 102 MG/DL (ref 65–100)
HCT VFR BLD AUTO: 32.7 % (ref 36.6–50.3)
HGB BLD-MCNC: 10.1 G/DL (ref 12.1–17)
IMM GRANULOCYTES # BLD AUTO: 0 K/UL (ref 0–0.04)
IMM GRANULOCYTES NFR BLD AUTO: 0 % (ref 0–0.5)
LYMPHOCYTES # BLD: 0.6 K/UL (ref 0.8–3.5)
LYMPHOCYTES NFR BLD: 12 % (ref 12–49)
MCH RBC QN AUTO: 28 PG (ref 26–34)
MCHC RBC AUTO-ENTMCNC: 30.9 G/DL (ref 30–36.5)
MCV RBC AUTO: 90.6 FL (ref 80–99)
MONOCYTES # BLD: 0.3 K/UL (ref 0–1)
MONOCYTES NFR BLD: 6 % (ref 5–13)
NEUTS SEG # BLD: 3.8 K/UL (ref 1.8–8)
NEUTS SEG NFR BLD: 74 % (ref 32–75)
NRBC # BLD: 0 K/UL (ref 0–0.01)
NRBC BLD-RTO: 0 PER 100 WBC
PLATELET # BLD AUTO: 172 K/UL (ref 150–400)
PMV BLD AUTO: 11.4 FL (ref 8.9–12.9)
POTASSIUM SERPL-SCNC: 3.6 MMOL/L (ref 3.5–5.1)
PROT SERPL-MCNC: 5.2 G/DL (ref 6.4–8.2)
RBC # BLD AUTO: 3.61 M/UL (ref 4.1–5.7)
SODIUM SERPL-SCNC: 141 MMOL/L (ref 136–145)
THERAPEUTIC RANGE,PTTT: NORMAL SEC (ref 82–109)
TROPONIN-HIGH SENSITIVITY: 64 NG/L (ref 0–76)
TROPONIN-HIGH SENSITIVITY: 72 NG/L (ref 0–76)
WBC # BLD AUTO: 5.1 K/UL (ref 4.1–11.1)

## 2023-01-19 PROCEDURE — 74011250636 HC RX REV CODE- 250/636: Performed by: EMERGENCY MEDICINE

## 2023-01-19 PROCEDURE — 87077 CULTURE AEROBIC IDENTIFY: CPT

## 2023-01-19 PROCEDURE — 81001 URINALYSIS AUTO W/SCOPE: CPT

## 2023-01-19 PROCEDURE — 36415 COLL VENOUS BLD VENIPUNCTURE: CPT

## 2023-01-19 PROCEDURE — 87186 SC STD MICRODIL/AGAR DIL: CPT

## 2023-01-19 PROCEDURE — 85730 THROMBOPLASTIN TIME PARTIAL: CPT

## 2023-01-19 PROCEDURE — 80053 COMPREHEN METABOLIC PANEL: CPT

## 2023-01-19 PROCEDURE — 84484 ASSAY OF TROPONIN QUANT: CPT

## 2023-01-19 PROCEDURE — 65270000029 HC RM PRIVATE

## 2023-01-19 PROCEDURE — 85025 COMPLETE CBC W/AUTO DIFF WBC: CPT

## 2023-01-19 PROCEDURE — 83880 ASSAY OF NATRIURETIC PEPTIDE: CPT

## 2023-01-19 PROCEDURE — 71045 X-RAY EXAM CHEST 1 VIEW: CPT

## 2023-01-19 PROCEDURE — 74011250637 HC RX REV CODE- 250/637: Performed by: FAMILY MEDICINE

## 2023-01-19 PROCEDURE — 99285 EMERGENCY DEPT VISIT HI MDM: CPT

## 2023-01-19 PROCEDURE — 96374 THER/PROPH/DIAG INJ IV PUSH: CPT

## 2023-01-19 PROCEDURE — 87086 URINE CULTURE/COLONY COUNT: CPT

## 2023-01-19 PROCEDURE — 93005 ELECTROCARDIOGRAM TRACING: CPT

## 2023-01-19 RX ORDER — ALBUTEROL SULFATE 90 UG/1
1 AEROSOL, METERED RESPIRATORY (INHALATION)
Status: DISCONTINUED | OUTPATIENT
Start: 2023-01-19 | End: 2023-01-25 | Stop reason: HOSPADM

## 2023-01-19 RX ORDER — DOXAZOSIN 4 MG/1
4 TABLET ORAL DAILY
Status: DISCONTINUED | OUTPATIENT
Start: 2023-01-20 | End: 2023-01-25 | Stop reason: HOSPADM

## 2023-01-19 RX ORDER — ONDANSETRON 2 MG/ML
4 INJECTION INTRAMUSCULAR; INTRAVENOUS
Status: DISCONTINUED | OUTPATIENT
Start: 2023-01-19 | End: 2023-01-25 | Stop reason: HOSPADM

## 2023-01-19 RX ORDER — ACETAMINOPHEN 325 MG/1
650 TABLET ORAL
Status: DISCONTINUED | OUTPATIENT
Start: 2023-01-19 | End: 2023-01-25 | Stop reason: HOSPADM

## 2023-01-19 RX ORDER — HEPARIN SODIUM 1000 [USP'U]/ML
30 INJECTION, SOLUTION INTRAVENOUS; SUBCUTANEOUS AS NEEDED
Status: DISCONTINUED | OUTPATIENT
Start: 2023-01-19 | End: 2023-01-20

## 2023-01-19 RX ORDER — MONTELUKAST SODIUM 4 MG/1
2 TABLET, CHEWABLE ORAL 2 TIMES DAILY
COMMUNITY
End: 2023-01-25

## 2023-01-19 RX ORDER — ONDANSETRON 4 MG/1
4 TABLET, ORALLY DISINTEGRATING ORAL
Status: DISCONTINUED | OUTPATIENT
Start: 2023-01-19 | End: 2023-01-25 | Stop reason: HOSPADM

## 2023-01-19 RX ORDER — ERGOCALCIFEROL 1.25 MG/1
50000 CAPSULE ORAL
Status: DISCONTINUED | OUTPATIENT
Start: 2023-01-22 | End: 2023-01-25 | Stop reason: HOSPADM

## 2023-01-19 RX ORDER — ATORVASTATIN CALCIUM 40 MG/1
40 TABLET, FILM COATED ORAL
Status: DISCONTINUED | OUTPATIENT
Start: 2023-01-19 | End: 2023-01-25 | Stop reason: HOSPADM

## 2023-01-19 RX ORDER — SODIUM BICARBONATE 650 MG/1
325 TABLET ORAL 2 TIMES DAILY
Status: DISCONTINUED | OUTPATIENT
Start: 2023-01-19 | End: 2023-01-25 | Stop reason: HOSPADM

## 2023-01-19 RX ORDER — POLYETHYLENE GLYCOL 3350 17 G/17G
17 POWDER, FOR SOLUTION ORAL DAILY PRN
Status: DISCONTINUED | OUTPATIENT
Start: 2023-01-19 | End: 2023-01-25 | Stop reason: HOSPADM

## 2023-01-19 RX ORDER — FUROSEMIDE 10 MG/ML
40 INJECTION INTRAMUSCULAR; INTRAVENOUS DAILY
Status: DISCONTINUED | OUTPATIENT
Start: 2023-01-20 | End: 2023-01-21

## 2023-01-19 RX ORDER — AMLODIPINE BESYLATE 5 MG/1
2.5 TABLET ORAL DAILY
Status: DISCONTINUED | OUTPATIENT
Start: 2023-01-20 | End: 2023-01-20

## 2023-01-19 RX ORDER — HEPARIN SODIUM 1000 [USP'U]/ML
60 INJECTION, SOLUTION INTRAVENOUS; SUBCUTANEOUS ONCE
Status: COMPLETED | OUTPATIENT
Start: 2023-01-19 | End: 2023-01-19

## 2023-01-19 RX ORDER — METOPROLOL TARTRATE 25 MG/1
25 TABLET, FILM COATED ORAL 2 TIMES DAILY
Status: DISCONTINUED | OUTPATIENT
Start: 2023-01-19 | End: 2023-01-25 | Stop reason: HOSPADM

## 2023-01-19 RX ORDER — HEPARIN SODIUM 10000 [USP'U]/100ML
12-25 INJECTION, SOLUTION INTRAVENOUS
Status: DISCONTINUED | OUTPATIENT
Start: 2023-01-19 | End: 2023-01-20

## 2023-01-19 RX ORDER — BUMETANIDE 2 MG/1
2 TABLET ORAL 2 TIMES DAILY
COMMUNITY
End: 2023-01-25

## 2023-01-19 RX ORDER — HEPARIN SODIUM 1000 [USP'U]/ML
60 INJECTION, SOLUTION INTRAVENOUS; SUBCUTANEOUS AS NEEDED
Status: DISCONTINUED | OUTPATIENT
Start: 2023-01-19 | End: 2023-01-20

## 2023-01-19 RX ORDER — ACETAMINOPHEN 650 MG/1
650 SUPPOSITORY RECTAL
Status: DISCONTINUED | OUTPATIENT
Start: 2023-01-19 | End: 2023-01-25 | Stop reason: HOSPADM

## 2023-01-19 RX ADMIN — HEPARIN SODIUM 12 UNITS/KG/HR: 10000 INJECTION, SOLUTION INTRAVENOUS at 17:17

## 2023-01-19 RX ADMIN — SODIUM BICARBONATE 325 MG: 650 TABLET ORAL at 23:10

## 2023-01-19 RX ADMIN — ATORVASTATIN CALCIUM 40 MG: 40 TABLET, FILM COATED ORAL at 23:11

## 2023-01-19 RX ADMIN — METOPROLOL TARTRATE 25 MG: 25 TABLET, FILM COATED ORAL at 23:11

## 2023-01-19 RX ADMIN — HEPARIN SODIUM 2840 UNITS: 1000 INJECTION INTRAVENOUS; SUBCUTANEOUS at 17:17

## 2023-01-19 NOTE — Clinical Note
Status[de-identified] INPATIENT [101]   Type of Bed: Telemetry [19]   Cardiac Monitoring Required?: Yes   Inpatient Hospitalization Certified Necessary for the Following Reasons: 3.  Patient receiving treatment that can only be provided in an inpatient setting (further clarification in H&P documentation)   Admitting Diagnosis: Atrial flutter by electrocardiography [facility]) [[id number]]   Admitting Diagnosis: Dyspnea on exertion [039292]   Admitting Physician: Carla Rueda [[id number]]   Attending Physician: Carla Rueda [[id number]]   Estimated Length of Stay: 2 Midnights   Discharge Plan[de-identified] [facility]
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
## 2023-01-19 NOTE — ED TRIAGE NOTES
Patient sent from [facility] Dr. Kristin Juarez sent patient for A-Flutter and CHF admit. Patient complaining of SOB no chest pain at the time.

## 2023-01-20 ENCOUNTER — ANESTHESIA (OUTPATIENT)
Dept: NON INVASIVE DIAGNOSTICS | Age: 88
DRG: 308 | End: 2023-01-20
Payer: MEDICARE

## 2023-01-20 ENCOUNTER — ANESTHESIA EVENT (OUTPATIENT)
Dept: NON INVASIVE DIAGNOSTICS | Age: 88
DRG: 308 | End: 2023-01-20
Payer: MEDICARE

## 2023-01-20 ENCOUNTER — APPOINTMENT (OUTPATIENT)
Dept: NON INVASIVE DIAGNOSTICS | Age: 88
DRG: 308 | End: 2023-01-20
Attending: INTERNAL MEDICINE
Payer: MEDICARE

## 2023-01-20 ENCOUNTER — APPOINTMENT (OUTPATIENT)
Dept: NON INVASIVE DIAGNOSTICS | Age: 88
DRG: 308 | End: 2023-01-20
Attending: FAMILY MEDICINE
Payer: MEDICARE

## 2023-01-20 LAB
ALBUMIN SERPL-MCNC: 1.8 G/DL (ref 3.5–5)
ALBUMIN/GLOB SERPL: 0.5 (ref 1.1–2.2)
ALP SERPL-CCNC: 74 U/L (ref 45–117)
ALT SERPL-CCNC: 29 U/L (ref 12–78)
ANION GAP SERPL CALC-SCNC: 7 MMOL/L (ref 5–15)
APPEARANCE UR: CLEAR
APTT PPP: 79.9 SEC (ref 21.2–34.1)
APTT PPP: 96.3 SEC (ref 21.2–34.1)
AST SERPL W P-5'-P-CCNC: 32 U/L (ref 15–37)
ATRIAL RATE: 104 BPM
ATRIAL RATE: 120 BPM
BACTERIA URNS QL MICRO: NEGATIVE /HPF
BASOPHILS # BLD: 0 K/UL (ref 0–0.1)
BASOPHILS NFR BLD: 0 % (ref 0–1)
BILIRUB SERPL-MCNC: 0.9 MG/DL (ref 0.2–1)
BILIRUB UR QL: NEGATIVE
BNP SERPL-MCNC: 1760 PG/ML
BUN SERPL-MCNC: 65 MG/DL (ref 6–20)
BUN/CREAT SERPL: 25 (ref 12–20)
CA-I BLD-MCNC: 8.8 MG/DL (ref 8.5–10.1)
CALCULATED R AXIS, ECG10: -43 DEGREES
CALCULATED R AXIS, ECG10: -77 DEGREES
CALCULATED T AXIS, ECG11: -7 DEGREES
CALCULATED T AXIS, ECG11: 89 DEGREES
CHLORIDE SERPL-SCNC: 106 MMOL/L (ref 97–108)
CO2 SERPL-SCNC: 30 MMOL/L (ref 21–32)
COLOR UR: ABNORMAL
CREAT SERPL-MCNC: 2.56 MG/DL (ref 0.7–1.3)
DIAGNOSIS, 93000: NORMAL
DIAGNOSIS, 93000: NORMAL
DIFFERENTIAL METHOD BLD: ABNORMAL
EOSINOPHIL # BLD: 0.4 K/UL (ref 0–0.4)
EOSINOPHIL NFR BLD: 9 % (ref 0–7)
ERYTHROCYTE [DISTWIDTH] IN BLOOD BY AUTOMATED COUNT: 15 % (ref 11.5–14.5)
GLOBULIN SER CALC-MCNC: 3.8 G/DL (ref 2–4)
GLUCOSE SERPL-MCNC: 81 MG/DL (ref 65–100)
GLUCOSE UR STRIP.AUTO-MCNC: NEGATIVE MG/DL
HCT VFR BLD AUTO: 33.8 % (ref 36.6–50.3)
HGB BLD-MCNC: 10.3 G/DL (ref 12.1–17)
HGB UR QL STRIP: ABNORMAL
IMM GRANULOCYTES # BLD AUTO: 0 K/UL (ref 0–0.04)
IMM GRANULOCYTES NFR BLD AUTO: 0 % (ref 0–0.5)
KETONES UR QL STRIP.AUTO: NEGATIVE MG/DL
LEUKOCYTE ESTERASE UR QL STRIP.AUTO: NEGATIVE
LYMPHOCYTES # BLD: 0.7 K/UL (ref 0.8–3.5)
LYMPHOCYTES NFR BLD: 14 % (ref 12–49)
MCH RBC QN AUTO: 27.7 PG (ref 26–34)
MCHC RBC AUTO-ENTMCNC: 30.5 G/DL (ref 30–36.5)
MCV RBC AUTO: 90.9 FL (ref 80–99)
MONOCYTES # BLD: 0.3 K/UL (ref 0–1)
MONOCYTES NFR BLD: 7 % (ref 5–13)
MUCOUS THREADS URNS QL MICRO: ABNORMAL /LPF
NEUTS SEG # BLD: 3.3 K/UL (ref 1.8–8)
NEUTS SEG NFR BLD: 70 % (ref 32–75)
NITRITE UR QL STRIP.AUTO: NEGATIVE
NRBC # BLD: 0 K/UL (ref 0–0.01)
NRBC BLD-RTO: 0 PER 100 WBC
PH UR STRIP: 6
PLATELET # BLD AUTO: 181 K/UL (ref 150–400)
PMV BLD AUTO: 11.8 FL (ref 8.9–12.9)
POTASSIUM SERPL-SCNC: 4 MMOL/L (ref 3.5–5.1)
PROT SERPL-MCNC: 5.6 G/DL (ref 6.4–8.2)
PROT UR STRIP-MCNC: >300 MG/DL
Q-T INTERVAL, ECG07: 350 MS
Q-T INTERVAL, ECG07: 360 MS
QRS DURATION, ECG06: 72 MS
QRS DURATION, ECG06: 74 MS
QTC CALCULATION (BEZET), ECG08: 438 MS
QTC CALCULATION (BEZET), ECG08: 480 MS
RBC # BLD AUTO: 3.72 M/UL (ref 4.1–5.7)
RBC #/AREA URNS HPF: ABNORMAL /HPF (ref 0–5)
SODIUM SERPL-SCNC: 143 MMOL/L (ref 136–145)
SP GR UR REFRACTOMETRY: 1.01 (ref 1–1.03)
THERAPEUTIC RANGE,PTTT: ABNORMAL SEC (ref 82–109)
THERAPEUTIC RANGE,PTTT: ABNORMAL SEC (ref 82–109)
TROPONIN-HIGH SENSITIVITY: 56 NG/L (ref 0–76)
UFH PPP CHRO-ACNC: >1.1 IU/ML
UFH PPP CHRO-ACNC: >1.1 IU/ML
UROBILINOGEN UR QL STRIP.AUTO: 0.1 EU/DL (ref 0.2–1)
VENTRICULAR RATE, ECG03: 113 BPM
VENTRICULAR RATE, ECG03: 89 BPM
WBC # BLD AUTO: 4.8 K/UL (ref 4.1–11.1)
WBC URNS QL MICRO: ABNORMAL /HPF (ref 0–4)

## 2023-01-20 PROCEDURE — 80053 COMPREHEN METABOLIC PANEL: CPT

## 2023-01-20 PROCEDURE — 74011250636 HC RX REV CODE- 250/636: Performed by: FAMILY MEDICINE

## 2023-01-20 PROCEDURE — 84484 ASSAY OF TROPONIN QUANT: CPT

## 2023-01-20 PROCEDURE — 85025 COMPLETE CBC W/AUTO DIFF WBC: CPT

## 2023-01-20 PROCEDURE — 93306 TTE W/DOPPLER COMPLETE: CPT

## 2023-01-20 PROCEDURE — 65270000029 HC RM PRIVATE

## 2023-01-20 PROCEDURE — 83880 ASSAY OF NATRIURETIC PEPTIDE: CPT

## 2023-01-20 PROCEDURE — 74011250636 HC RX REV CODE- 250/636: Performed by: NURSE ANESTHETIST, CERTIFIED REGISTERED

## 2023-01-20 PROCEDURE — 74011250637 HC RX REV CODE- 250/637: Performed by: INTERNAL MEDICINE

## 2023-01-20 PROCEDURE — 36415 COLL VENOUS BLD VENIPUNCTURE: CPT

## 2023-01-20 PROCEDURE — 93005 ELECTROCARDIOGRAM TRACING: CPT

## 2023-01-20 PROCEDURE — 85520 HEPARIN ASSAY: CPT

## 2023-01-20 PROCEDURE — 74011250637 HC RX REV CODE- 250/637: Performed by: FAMILY MEDICINE

## 2023-01-20 PROCEDURE — 74011000258 HC RX REV CODE- 258: Performed by: NURSE ANESTHETIST, CERTIFIED REGISTERED

## 2023-01-20 PROCEDURE — 85730 THROMBOPLASTIN TIME PARTIAL: CPT

## 2023-01-20 RX ORDER — DILTIAZEM HYDROCHLORIDE 30 MG/1
30 TABLET, FILM COATED ORAL 2 TIMES DAILY
Qty: 60 TABLET | Refills: 0 | Status: SHIPPED | OUTPATIENT
Start: 2023-01-20

## 2023-01-20 RX ORDER — SODIUM CHLORIDE 9 MG/ML
INJECTION, SOLUTION INTRAVENOUS
Status: DISCONTINUED | OUTPATIENT
Start: 2023-01-20 | End: 2023-01-20 | Stop reason: HOSPADM

## 2023-01-20 RX ORDER — METOPROLOL TARTRATE 25 MG/1
25 TABLET, FILM COATED ORAL 2 TIMES DAILY
Qty: 60 TABLET | Refills: 0 | Status: SHIPPED | OUTPATIENT
Start: 2023-01-20

## 2023-01-20 RX ORDER — PROPOFOL 10 MG/ML
INJECTION, EMULSION INTRAVENOUS AS NEEDED
Status: DISCONTINUED | OUTPATIENT
Start: 2023-01-20 | End: 2023-01-20 | Stop reason: HOSPADM

## 2023-01-20 RX ORDER — PROPOFOL 10 MG/ML
INJECTION, EMULSION INTRAVENOUS
Status: COMPLETED
Start: 2023-01-20 | End: 2023-01-20

## 2023-01-20 RX ORDER — HEPARIN SODIUM 1000 [USP'U]/ML
30 INJECTION, SOLUTION INTRAVENOUS; SUBCUTANEOUS AS NEEDED
Status: DISCONTINUED | OUTPATIENT
Start: 2023-01-20 | End: 2023-01-20

## 2023-01-20 RX ORDER — DILTIAZEM HYDROCHLORIDE 30 MG/1
30 TABLET, FILM COATED ORAL 2 TIMES DAILY
Status: DISCONTINUED | OUTPATIENT
Start: 2023-01-20 | End: 2023-01-25 | Stop reason: HOSPADM

## 2023-01-20 RX ORDER — FUROSEMIDE 40 MG/1
TABLET ORAL
Qty: 60 TABLET | Refills: 0 | Status: SHIPPED | OUTPATIENT
Start: 2023-01-20

## 2023-01-20 RX ORDER — HEPARIN SODIUM 1000 [USP'U]/ML
60 INJECTION, SOLUTION INTRAVENOUS; SUBCUTANEOUS AS NEEDED
Status: DISCONTINUED | OUTPATIENT
Start: 2023-01-20 | End: 2023-01-20

## 2023-01-20 RX ADMIN — FUROSEMIDE 40 MG: 10 INJECTION, SOLUTION INTRAMUSCULAR; INTRAVENOUS at 09:23

## 2023-01-20 RX ADMIN — APIXABAN 2.5 MG: 2.5 TABLET, FILM COATED ORAL at 20:38

## 2023-01-20 RX ADMIN — SODIUM BICARBONATE 325 MG: 650 TABLET ORAL at 20:38

## 2023-01-20 RX ADMIN — PROPOFOL 30 MG: 10 INJECTION, EMULSION INTRAVENOUS at 10:20

## 2023-01-20 RX ADMIN — METOPROLOL TARTRATE 25 MG: 25 TABLET, FILM COATED ORAL at 09:22

## 2023-01-20 RX ADMIN — DILTIAZEM HYDROCHLORIDE 30 MG: 30 TABLET, FILM COATED ORAL at 20:38

## 2023-01-20 RX ADMIN — SODIUM BICARBONATE 325 MG: 650 TABLET ORAL at 09:23

## 2023-01-20 RX ADMIN — SODIUM CHLORIDE: 9 INJECTION, SOLUTION INTRAVENOUS at 10:05

## 2023-01-20 RX ADMIN — ATORVASTATIN CALCIUM 40 MG: 40 TABLET, FILM COATED ORAL at 20:42

## 2023-01-20 RX ADMIN — HEPARIN SODIUM 1420 UNITS: 1000 INJECTION INTRAVENOUS; SUBCUTANEOUS at 01:04

## 2023-01-20 RX ADMIN — APIXABAN 2.5 MG: 2.5 TABLET, FILM COATED ORAL at 12:04

## 2023-01-20 RX ADMIN — AMLODIPINE BESYLATE 2.5 MG: 5 TABLET ORAL at 09:23

## 2023-01-20 RX ADMIN — DOXAZOSIN 4 MG: 4 TABLET ORAL at 09:22

## 2023-01-20 RX ADMIN — DILTIAZEM HYDROCHLORIDE 30 MG: 30 TABLET, FILM COATED ORAL at 12:04

## 2023-01-20 RX ADMIN — METOPROLOL TARTRATE 25 MG: 25 TABLET, FILM COATED ORAL at 20:42

## 2023-01-20 NOTE — PROGRESS NOTES
Patient daughter signed choice letter for her father to go to [facility] at discharge. [facility]. CM also called and left message for [facility] YONI at [facility] to inform of referral.    1520 pm  CM received call from [facility] YONI at [facility], stating she has been working with Maribel Gonzalez for several months while she tried to get medicaid forher dad. CM will complete UAI today and awaiting eligibility to see patient and daughter and Anibal Kennedy to authorize SNF placement.        1620 pm  UAI completed

## 2023-01-20 NOTE — PROGRESS NOTES
Reason for Admission:   afib/flutter                 RUR Score:     21%      PCP: First and Last name:  Jason Yousif     Name of Practice:   Are you a current patient: Yes/No:yes   Approximate date of last visit: 2 weeks ago   Can you do a virtual visit with your PCP:              Resources/supports as identified by patient/family:                   Top Challenges facing patient (as identified by patient/family and CM): Finances/Medication cost?                  N/a  Transportation? N/a  Support system or lack thereof? Living arrangements? Lives alone, but cannot any longer  Self-care/ADLs/Cognition? Unable to care for himself. Current Advanced Directive/Advance Care Plan:  Full Code      Healthcare Decision Maker:   Click here to complete HealthCare Decision Makers including selection of the Healthcare Decision Maker Relationship (ie \"Primary\")      Primary Decision Maker: Yao Maciel Messer - [phone]    Payor Source Payor: [employer] MEDICARE / Plan: [address] PPO/PFFS / Product Type: Managed Care Medicare /                             Plan for utilizing home health:    none at this time                 Transition of Care Plan:       Patient lives alone in a apartment with 4 steps to entrance into the building and an elevator to get to his floor. Patient stopped eating, taking his meds and recently can no longer walk. Patient daughter denies hh, SNF or IRF services. Patient uses [address] [place], [place] [id number] for his scripts. All demographic info verified with patient daughter Aurelia Mac. Patient would like to go to SNF at discharge. Long term care may be needed. CM will get eligibility to see or speak to daughter about medicaid.

## 2023-01-20 NOTE — DISCHARGE INSTRUCTIONS
Discharge Instructions       PATIENT ID: Claude Boeck  MRN: [id number]   YOB: 1935    DATE OF ADMISSION: [unfilled]    DATE OF DISCHARGE: 1/20/2023    PRIMARY CARE PROVIDER: @PCP@     ATTENDING PHYSICIAN: [unfilled]  DISCHARGING PROVIDER: Nely Paz MD    To contact this individual call [phone] and ask the  to page. If unavailable ask to be transferred the Adult Hospitalist Department. DISCHARGE DIAGNOSES A. fib flutter/pneumonia    CONSULTATIONS: [unfilled]    PROCEDURES/SURGERIES: * No surgery found *    PENDING TEST RESULTS:   At the time of discharge the following test results are still pending: None    FOLLOW UP APPOINTMENTS:   [unfilled]     ADDITIONAL CARE RECOMMENDATIONS: Follow-up with cardiology/repeat CT scan of the chest in 2 weeks after antibiotics    DIET: Cardiac Diet      ACTIVITY: Activity as tolerated    Wound care: Wound Care Order: submitted to [facility] Please view [email or web address]    EQUIPMENT needed: ***      DISCHARGE MEDICATIONS:   See Medication Reconciliation Form    It is important that you take the medication exactly as they are prescribed. Keep your medication in the bottles provided by the pharmacist and keep a list of the medication names, dosages, and times to be taken in your wallet. Do not take other medications without consulting your doctor. NOTIFY YOUR PHYSICIAN FOR ANY OF THE FOLLOWING:   Fever over 101 degrees for 24 hours. Chest pain, shortness of breath, fever, chills, nausea, vomiting, diarrhea, change in mentation, falling, weakness, bleeding. Severe pain or pain not relieved by medications. Or, any other signs or symptoms that you may have questions about.       DISPOSITION:    Home With:   OT  PT  HH  RN       SNF/Inpatient Rehab/LTAC    Independent/assisted living    Hospice    Other:         PROBLEM LIST Updated:  Yes ***       Signed:   Nely Paz MD  1/20/2023  1:36 PM

## 2023-01-20 NOTE — PROGRESS NOTES
Comprehensive Nutrition Assessment    Type and Reason for Visit: Initial    Nutrition Recommendations/Plan:   Continue current diet  Start ONS with meals  Monitor weight status closely     Malnutrition Assessment:  Malnutrition Status: Moderate malnutrition (01/20/23 1406)    Context:  Acute illness     Findings of the 6 clinical characteristics of malnutrition:   Energy Intake:  No significant decrease in energy intake  Weight Loss:  No significant weight loss     Body Fat Loss:  Mild body fat loss, Triceps, Orbital   Muscle Mass Loss:  Mild muscle mass loss, Calf, Scapula (trapezius)  Fluid Accumulation:  No significant fluid accumulation,     Strength:  Not performed     Nutrition Assessment:    Admit via ER from cardiologist appointment with concern for A Flutter, A Fib, dyspnea on exertion. Initially NPO for procedure following admit. BM pta. No reported weight loss pta. Endorses chronically decreased appetite. Pt prefers nutritional supplements over majority of foods. Medications: Lasix, Cardizem, Eliquis, Lipitor, Cardura, Lopressor, B12. Na+ Bicarb. Labs: BUN 65/Cr 2.56 H, Alb 1.8, Hgb 10.3/ Hct 33.8    Nutrition Related Findings:    NFPE indicates moderate malnutrition givenloss/wasting of fat and muscle. Pt is unsure of UBW. No N/V/D/C reported. BM pta. No chew/swallow difficulty per pt. No edema present. Wound Type: None    Current Nutrition Intake & Therapies:  Average Meal Intake: Unable to assess (Observed tray at bedside. Consumed ~25-50% of lunch.)  Average Supplement Intake: None ordered  ADULT DIET Easy to Chew; Low Sodium (2 gm)    Anthropometric Measures:  Height: 5' 7\" (170.2 cm)  Ideal Body Weight (IBW): 148 lbs (67 kg)  Admission Body Weight: 103 lb  Current Body Wt:  47.2 kg (104 lb), 70.3 % IBW.  Bed scale  Current BMI (kg/m2): 16.3                          BMI Category: Underweight (BMI less than 18.5)    Estimated Daily Nutrient Needs:  Energy Requirements Based On: Kcal/kg  Weight Used for Energy Requirements: Current  Energy (kcal/day): 1410 - 1645 kcal/day (30-35 kcal/kg)- low body weight  Weight Used for Protein Requirements: Current  Protein (g/day): 61 - 66 gm/day (1.2 - 1.3 gm/kg)  Method Used for Fluid Requirements: Other (comment)  Fluid (ml/day): 1500 ml min    Nutrition Diagnosis:   Underweight related to cognitive or neurological impairment as evidenced by BMI    Nutrition Interventions:   Food and/or Nutrient Delivery: Start oral nutrition supplement, Continue current diet  Nutrition Education/Counseling: No recommendations at this time  Coordination of Nutrition Care: Continue to monitor while inpatient  Plan of Care discussed with: Patient    Goals:     Goals: PO intake 50% or greater, by next RD assessment       Nutrition Monitoring and Evaluation:   Behavioral-Environmental Outcomes: None identified  Food/Nutrient Intake Outcomes: Food and nutrient intake, Supplement intake  Physical Signs/Symptoms Outcomes: Biochemical data, Meal time behavior, Weight    Discharge Planning:     Too soon to determine    Maria L Almazan RD  Contact: [phone]
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
## 2023-01-20 NOTE — ANESTHESIA POSTPROCEDURE EVALUATION
* No procedures listed *.    total IV anesthesia, MAC    Anesthesia Post Evaluation      Multimodal analgesia: multimodal analgesia not used between 6 hours prior to anesthesia start to PACU discharge  Patient location during evaluation: bedside (Endoscopy suite)  Patient participation: complete - patient cannot participate  Level of consciousness: sleepy but conscious  Pain score: 0  Pain management: adequate  Airway patency: patent  Anesthetic complications: no  Cardiovascular status: acceptable  Respiratory status: acceptable and nasal cannula  Hydration status: acceptable  Comments: This patient remained on the stretcher. The patient was handed off to the endoscopy nursing team.  All questions regarding pre-, intra-, and postoperative care were answered.   Post anesthesia nausea and vomiting:  none  Final Post Anesthesia Temperature Assessment:  Normothermia (36.0-37.5 degrees C)      INITIAL Post-op Vital signs:   Vitals Value Taken Time   /82 01/20/23 1026   Temp 36.7 °C (98.1 °F) 01/20/23 1026   Pulse 85 01/20/23 1026   Resp 18 01/20/23 1026   SpO2 98 % 01/20/23 1026

## 2023-01-20 NOTE — DISCHARGE SUMMARY
Discharge Summary       PATIENT ID: Rosi Doe  MRN: [id number]   YOB: 1935    DATE OF ADMISSION: 1/19/2023  2:32 PM    DATE OF DISCHARGE:   PRIMARY CARE PROVIDER: John Wong PHYSICIAN: Lenore Nava  DISCHARGING PROVIDER: Lenore Nava      CONSULTATIONS: IP CONSULT TO CARDIOLOGY  IP CONSULT TO NEPHROLOGY    PROCEDURES/SURGERIES: * No surgery found *    ADMITTING DIAGNOSES:    Patient Active Problem List    Diagnosis Date Noted    Dyspnea on exertion 01/19/2023    Atrial flutter by electrocardiography ([address] 01/19/2023    Atrial flutter ([address] 01/19/2023    Colon cancer ([facility] [address] 09/15/2022    Status post surgery 09/15/2022    Acute CVA (cerebrovascular accident) ([address] 04/03/2022    Hypercholesteremia 04/03/2022    Accelerated hypertension 04/03/2022    CVA (cerebral vascular accident) ([facility] [address] 04/03/2022    Paresthesia and pain of right extremity 04/01/2022       DISCHARGE DIAGNOSES / PLAN:      A flutter   - cardiology consulted. - patient scheduled for LEONILA w/possible cardioversion today     JULIENNE on CKD stage 4   - baseline creatinine ~2.2    - nephrology consulted    - prerenal?     CHF   - EF 65/75% (4/2/22)   - tricuspid regurg   - diastolic dysfunction of LV   - BNP 2083->1760     Hx of colon cancer    - s/p hemicolectomy 9/15/22     COPD     HTN         DISCHARGE MEDICATIONS:  Current Discharge Medication List        START taking these medications    Details   dilTIAZem IR (CARDIZEM) 30 mg tablet Take 1 Tablet by mouth two (2) times a day. Qty: 60 Tablet, Refills: 0  Start date: 1/20/2023      furosemide (Lasix) 40 mg tablet Tablet daily  Qty: 60 Tablet, Refills: 0  Start date: 1/20/2023           CONTINUE these medications which have CHANGED    Details   metoprolol tartrate (LOPRESSOR) 25 mg tablet Take 1 Tablet by mouth two (2) times a day.   Qty: 60 Tablet, Refills: 0  Start date: 1/20/2023           CONTINUE these medications which have NOT CHANGED    Details albuterol (PROVENTIL HFA, VENTOLIN HFA, PROAIR HFA) 90 mcg/actuation inhaler Take 1 Puff by inhalation every four (4) hours as needed for Wheezing. Qty: 18 g, Refills: 0      ergocalciferol (ERGOCALCIFEROL) 1,250 mcg (50,000 unit) capsule Take 50,000 Units by mouth every seven (7) days. Patient stated takes once weekly on  Sundays      atorvastatin (LIPITOR) 40 mg tablet Take 1 Tablet by mouth nightly. Qty: 30 Tablet, Refills: 1      sodium bicarbonate 325 mg tablet Take 325 mg by mouth two (2) times a day. doxazosin (CARDURA) 4 mg tablet Take 4 mg by mouth daily. STOP taking these medications       bumetanide (BUMEX) 2 mg tablet Comments:   Reason for Stopping:         colestipoL (COLESTID) 1 gram tablet Comments:   Reason for Stopping:         amLODIPine (NORVASC) 2.5 mg tablet Comments:   Reason for Stopping:         doxycycline (VIBRA-TABS) 100 mg tablet Comments:   Reason for Stopping:                 NOTIFY YOUR PHYSICIAN FOR ANY OF THE FOLLOWING:   Fever over 101 degrees for 24 hours. Chest pain, shortness of breath, fever, chills, nausea, vomiting, diarrhea, change in mentation, falling, weakness, bleeding. Severe pain or pain not relieved by medications. Or, any other signs or symptoms that you may have questions about. DISPOSITION:  x  Home With:   OT  PT  HH  RN       Long term SNF/Inpatient Rehab    Independent/assisted living    Hospice    Other:       PATIENT CONDITION AT DISCHARGE: Stable      PHYSICAL EXAMINATION AT DISCHARGE:  General:          Alert, cooperative, no distress, appears stated age. HEENT:           Atraumatic, anicteric sclerae, pink conjunctivae                          No oral ulcers, mucosa moist, throat clear, dentition fair  Neck:               Supple, symmetrical  Lungs:             Clear to auscultation bilaterally. No Wheezing or Rhonchi. No rales. Chest wall:      No tenderness  No Accessory muscle use.   Heart:              Regular  rhythm,  No murmur   No edema  Abdomen:        Soft, non-tender. Not distended. Bowel sounds normal  Extremities:     No cyanosis. No clubbing,                            Skin turgor normal, Capillary refill normal  Skin:                Not pale. Not Jaundiced  No rashes   Psych:             Not anxious or agitated. Neurologic:      Alert, moves all extremities, answers questions appropriately and responds to commands     XR CHEST PORT   Final Result      Unchanged graphic appearance with 2.8 x 2.3 cm infrahilar right pulmonary   nodule. Recent Results (from the past 24 hour(s))   EKG, 12 LEAD, INITIAL    Collection Time: 01/19/23  2:27 PM   Result Value Ref Range    Ventricular Rate 89 BPM    Atrial Rate 104 BPM    QRS Duration 74 ms    Q-T Interval 360 ms    QTC Calculation (Bezet) 438 ms    Calculated R Axis -77 degrees    Calculated T Axis 89 degrees    Diagnosis       Atrial fibrillation  Left axis deviation  Septal infarct (cited on or before 01-APR-2022)  Abnormal ECG  When compared with ECG of 08-JAN-2023 16:38,  Atrial fibrillation has replaced Sinus rhythm  Confirmed by Marbella Washburn ([id number]) on 1/20/2023 12:22:27 PM     TROPONIN-HIGH SENSITIVITY    Collection Time: 01/19/23  3:31 PM   Result Value Ref Range    Troponin-High Sensitivity 72 0 - 76 ng/L   CBC WITH AUTOMATED DIFF    Collection Time: 01/19/23  3:31 PM   Result Value Ref Range    WBC 5.1 4.1 - 11.1 K/uL    RBC 3.61 (L) 4.10 - 5.70 M/uL    HGB 10.1 (L) 12.1 - 17.0 g/dL    HCT 32.7 (L) 36.6 - 50.3 %    MCV 90.6 80.0 - 99.0 FL    MCH 28.0 26.0 - 34.0 PG    MCHC 30.9 30.0 - 36.5 g/dL    RDW 15.1 (H) 11.5 - 14.5 %    PLATELET 412 691 - 649 K/uL    MPV 11.4 8.9 - 12.9 FL    NRBC 0.0 0.0  WBC    ABSOLUTE NRBC 0.00 0.00 - 0.01 K/uL    NEUTROPHILS 74 32 - 75 %    LYMPHOCYTES 12 12 - 49 %    MONOCYTES 6 5 - 13 %    EOSINOPHILS 8 (H) 0 - 7 %    BASOPHILS 0 0 - 1 %    IMMATURE GRANULOCYTES 0 0 - 0.5 %    ABS.  NEUTROPHILS 3.8 1.8 - 8.0 K/UL ABS. LYMPHOCYTES 0.6 (L) 0.8 - 3.5 K/UL    ABS. MONOCYTES 0.3 0.0 - 1.0 K/UL    ABS. EOSINOPHILS 0.4 0.0 - 0.4 K/UL    ABS. BASOPHILS 0.0 0.0 - 0.1 K/UL    ABS. IMM. GRANS. 0.0 0.00 - 0.04 K/UL    DF AUTOMATED     METABOLIC PANEL, COMPREHENSIVE    Collection Time: 01/19/23  3:31 PM   Result Value Ref Range    Sodium 141 136 - 145 mmol/L    Potassium 3.6 3.5 - 5.1 mmol/L    Chloride 103 97 - 108 mmol/L    CO2 31 21 - 32 mmol/L    Anion gap 7 5 - 15 mmol/L    Glucose 102 (H) 65 - 100 mg/dL    BUN 68 (H) 6 - 20 mg/dL    Creatinine 2.45 (H) 0.70 - 1.30 mg/dL    BUN/Creatinine ratio 28 (H) 12 - 20      eGFR 25 (L) >60 ml/min/1.73m2    Calcium 8.6 8.5 - 10.1 mg/dL    Bilirubin, total 0.7 0.2 - 1.0 mg/dL    AST (SGOT) 31 15 - 37 U/L    ALT (SGPT) 31 12 - 78 U/L    Alk.  phosphatase 79 45 - 117 U/L    Protein, total 5.2 (L) 6.4 - 8.2 g/dL    Albumin 2.0 (L) 3.5 - 5.0 g/dL    Globulin 3.2 2.0 - 4.0 g/dL    A-G Ratio 0.6 (L) 1.1 - 2.2     NT-PRO BNP    Collection Time: 01/19/23  3:31 PM   Result Value Ref Range    NT pro-BNP 2,083 (H) <450 pg/mL   PTT    Collection Time: 01/19/23  5:16 PM   Result Value Ref Range    aPTT 28.8 21.2 - 34.1 sec    aPTT, therapeutic range   82 - 109 sec   TROPONIN-HIGH SENSITIVITY    Collection Time: 01/19/23  5:16 PM   Result Value Ref Range    Troponin-High Sensitivity 64 0 - 76 ng/L   PTT    Collection Time: 01/19/23 11:10 PM   Result Value Ref Range    aPTT 79.9 (H) 21.2 - 34.1 sec    aPTT, therapeutic range   82 - 109 sec   URINALYSIS W/MICROSCOPIC    Collection Time: 01/19/23 11:55 PM   Result Value Ref Range    Color Yellow/Straw      Appearance Clear Clear      Specific gravity 1.015 1.003 - 1.030      pH (UA) 6.0      Protein >300 (A) Negative mg/dL    Glucose Negative Negative mg/dL    Ketone Negative Negative mg/dL    Bilirubin Negative Negative      Blood Small (A) Negative      Urobilinogen 0.1 (L) 0.2 - 1.0 EU/dL    Nitrites Negative Negative      Leukocyte Esterase Negative Negative WBC 0-4 0 - 4 /hpf    RBC 5-10 0 - 5 /hpf    Bacteria Negative Negative /hpf    Mucus Trace /lpf   CBC WITH AUTOMATED DIFF    Collection Time: 01/20/23  7:42 AM   Result Value Ref Range    WBC 4.8 4.1 - 11.1 K/uL    RBC 3.72 (L) 4.10 - 5.70 M/uL    HGB 10.3 (L) 12.1 - 17.0 g/dL    HCT 33.8 (L) 36.6 - 50.3 %    MCV 90.9 80.0 - 99.0 FL    MCH 27.7 26.0 - 34.0 PG    MCHC 30.5 30.0 - 36.5 g/dL    RDW 15.0 (H) 11.5 - 14.5 %    PLATELET 712 775 - 424 K/uL    MPV 11.8 8.9 - 12.9 FL    NRBC 0.0 0.0  WBC    ABSOLUTE NRBC 0.00 0.00 - 0.01 K/uL    NEUTROPHILS 70 32 - 75 %    LYMPHOCYTES 14 12 - 49 %    MONOCYTES 7 5 - 13 %    EOSINOPHILS 9 (H) 0 - 7 %    BASOPHILS 0 0 - 1 %    IMMATURE GRANULOCYTES 0 0 - 0.5 %    ABS. NEUTROPHILS 3.3 1.8 - 8.0 K/UL    ABS. LYMPHOCYTES 0.7 (L) 0.8 - 3.5 K/UL    ABS. MONOCYTES 0.3 0.0 - 1.0 K/UL    ABS. EOSINOPHILS 0.4 0.0 - 0.4 K/UL    ABS. BASOPHILS 0.0 0.0 - 0.1 K/UL    ABS. IMM. GRANS. 0.0 0.00 - 0.04 K/UL    DF AUTOMATED     NT-PRO BNP    Collection Time: 01/20/23  7:42 AM   Result Value Ref Range    NT pro-BNP 1,760 (H) <450 pg/mL   TROPONIN-HIGH SENSITIVITY    Collection Time: 01/20/23  7:42 AM   Result Value Ref Range    Troponin-High Sensitivity 56 0 - 76 ng/L   METABOLIC PANEL, COMPREHENSIVE    Collection Time: 01/20/23  7:42 AM   Result Value Ref Range    Sodium 143 136 - 145 mmol/L    Potassium 4.0 3.5 - 5.1 mmol/L    Chloride 106 97 - 108 mmol/L    CO2 30 21 - 32 mmol/L    Anion gap 7 5 - 15 mmol/L    Glucose 81 65 - 100 mg/dL    BUN 65 (H) 6 - 20 mg/dL    Creatinine 2.56 (H) 0.70 - 1.30 mg/dL    BUN/Creatinine ratio 25 (H) 12 - 20      eGFR 24 (L) >60 ml/min/1.73m2    Calcium 8.8 8.5 - 10.1 mg/dL    Bilirubin, total 0.9 0.2 - 1.0 mg/dL    AST (SGOT) 32 15 - 37 U/L    ALT (SGPT) 29 12 - 78 U/L    Alk.  phosphatase 74 45 - 117 U/L    Protein, total 5.6 (L) 6.4 - 8.2 g/dL    Albumin 1.8 (L) 3.5 - 5.0 g/dL    Globulin 3.8 2.0 - 4.0 g/dL    A-G Ratio 0.5 (L) 1.1 - 2.2     PTT Collection Time: 01/20/23  7:42 AM   Result Value Ref Range    aPTT 96.3 (H) 21.2 - 34.1 sec    aPTT, therapeutic range   82 - 109 sec   EKG, 12 LEAD, SUBSEQUENT    Collection Time: 01/20/23  9:29 AM   Result Value Ref Range    Ventricular Rate 113 BPM    Atrial Rate 120 BPM    QRS Duration 72 ms    Q-T Interval 350 ms    QTC Calculation (Bezet) 480 ms    Calculated R Axis -43 degrees    Calculated T Axis -7 degrees    Diagnosis       Atrial fibrillation with rapid ventricular response  Left axis deviation  Septal infarct (cited on or before 01-APR-2022)  Abnormal ECG  When compared with ECG of 19-JAN-2023 14:27, (Unconfirmed)  Non-specific change in ST segment in Inferior leads  Nonspecific T wave abnormality now evident in Inferior leads  T wave inversion no longer evident in Lateral leads  Confirmed by Marbella Washburn ([id number]) on 1/20/2023 12:19:38 PM            HOSPITAL COURSE:  HPI: Patient is a 44-year-old male with PMH of colon cancer s/p hemicolectomy, CKD stage 4, CAD, HTN, hyperlipidemia, hx of CVA presented to ED after patient was found to be in a fib/a flutter at outpatient cardiology office. Patient is a poor historian. Patient denies fever, chills, nausea, vomiting, diarrhea, or constipation. 1/20  Patient evaluated at bedside. Patient complaint of shortness of breath. Patient stated dyspnea does not change with exertion or rest. Dyspnea has been present for 1 month without change.  Patient scheduled for ECHO LEONILA w/possible cardioversion today    Patient had cardioversion done but unsuccessful  Cardiology cleared the patient for discharge    Medication reconciliation done time shopping 35 minutes 50% time counseling coronation of care      Signed:   Derrick Roth MD  1/20/2023  1:37 PM

## 2023-01-20 NOTE — PROGRESS NOTES
Problem: Falls - Risk of  Goal: *Absence of Falls  Description: Document Goldonna Fall Risk and appropriate interventions in the flowsheet.   Outcome: Progressing Towards Goal  Note: Fall Risk Interventions:                                Problem: Patient Education: Go to Patient Education Activity  Goal: Patient/Family Education  Outcome: Progressing Towards Goal     Problem: General Medical Care Plan  Goal: *Skin integrity maintained  Outcome: Progressing Towards Goal     Problem: Patient Education: Go to Patient Education Activity  Goal: Patient/Family Education  Outcome: Progressing Towards Goal
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
## 2023-01-20 NOTE — PROGRESS NOTES
Primary Nurse Dariel Benites RN and Doc JUAN CARLOS Chiu performed a dual skin assessment on this patient No impairment noted  Earl score is 20
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
## 2023-01-20 NOTE — PROGRESS NOTES
*ATTENTION:  This note has been created by a medical student for educational purposes only. Please do not refer to the content of this note for clinical decision-making, billing, or other purposes. Please see attending physicians note to obtain clinical information on this patient. *     General Daily Progress Note          Patient Name:   Brayden Crespo       YOB: 1935       Age:  82 y.o. Admit Date: 1/19/2023      Subjective:   Cc: Shortness of breath     HPI: Patient is a 42-year-old male with PMH of colon cancer s/p hemicolectomy, CKD stage 4, CAD, HTN, hyperlipidemia, hx of CVA presented to ED after patient was found to be in a fib/a flutter at outpatient cardiology office. Patient is a poor historian. Patient denies fever, chills, nausea, vomiting, diarrhea, or constipation. 1/20  Patient evaluated at bedside. Patient complaint of shortness of breath. Patient stated dyspnea does not change with exertion or rest. Dyspnea has been present for 1 month without change. Patient scheduled for ECHO [facility]ON[facility]A w/possible cardioversion today.      Objective:     Visit Vitals  BP (!) 165/87 (BP 1 Location: Left upper arm, BP Patient Position: At rest)   Pulse 88   Temp 98.1 °F (36.7 °C)   Resp 22   Ht 5' 7\" (1.702 m)   Wt 103 lb 6.3 oz (46.9 kg)   SpO2 95%   BMI 16.19 kg/m²        Recent Results (from the past 24 hour(s))   TROPONIN-HIGH SENSITIVITY    Collection Time: 01/19/23  3:31 PM   Result Value Ref Range    Troponin-High Sensitivity 72 0 - 76 ng/L   CBC WITH AUTOMATED DIFF    Collection Time: 01/19/23  3:31 PM   Result Value Ref Range    WBC 5.1 4.1 - 11.1 K/uL    RBC 3.61 (L) 4.10 - 5.70 M/uL    HGB 10.1 (L) 12.1 - 17.0 g/dL    HCT 32.7 (L) 36.6 - 50.3 %    MCV 90.6 80.0 - 99.0 FL    MCH 28.0 26.0 - 34.0 PG    MCHC 30.9 30.0 - 36.5 g/dL    RDW 15.1 (H) 11.5 - 14.5 %    PLATELET 586 870 - 847 K/uL    MPV 11.4 8.9 - 12.9 FL    NRBC 0.0 0.0  WBC    ABSOLUTE NRBC 0.00 0.00 - 0.01 K/uL NEUTROPHILS 74 32 - 75 %    LYMPHOCYTES 12 12 - 49 %    MONOCYTES 6 5 - 13 %    EOSINOPHILS 8 (H) 0 - 7 %    BASOPHILS 0 0 - 1 %    IMMATURE GRANULOCYTES 0 0 - 0.5 %    ABS. NEUTROPHILS 3.8 1.8 - 8.0 K/UL    ABS. LYMPHOCYTES 0.6 (L) 0.8 - 3.5 K/UL    ABS. MONOCYTES 0.3 0.0 - 1.0 K/UL    ABS. EOSINOPHILS 0.4 0.0 - 0.4 K/UL    ABS. BASOPHILS 0.0 0.0 - 0.1 K/UL    ABS. IMM. GRANS. 0.0 0.00 - 0.04 K/UL    DF AUTOMATED     METABOLIC PANEL, COMPREHENSIVE    Collection Time: 01/19/23  3:31 PM   Result Value Ref Range    Sodium 141 136 - 145 mmol/L    Potassium 3.6 3.5 - 5.1 mmol/L    Chloride 103 97 - 108 mmol/L    CO2 31 21 - 32 mmol/L    Anion gap 7 5 - 15 mmol/L    Glucose 102 (H) 65 - 100 mg/dL    BUN 68 (H) 6 - 20 mg/dL    Creatinine 2.45 (H) 0.70 - 1.30 mg/dL    BUN/Creatinine ratio 28 (H) 12 - 20      eGFR 25 (L) >60 ml/min/1.73m2    Calcium 8.6 8.5 - 10.1 mg/dL    Bilirubin, total 0.7 0.2 - 1.0 mg/dL    AST (SGOT) 31 15 - 37 U/L    ALT (SGPT) 31 12 - 78 U/L    Alk.  phosphatase 79 45 - 117 U/L    Protein, total 5.2 (L) 6.4 - 8.2 g/dL    Albumin 2.0 (L) 3.5 - 5.0 g/dL    Globulin 3.2 2.0 - 4.0 g/dL    A-G Ratio 0.6 (L) 1.1 - 2.2     NT-PRO BNP    Collection Time: 01/19/23  3:31 PM   Result Value Ref Range    NT pro-BNP 2,083 (H) <450 pg/mL   PTT    Collection Time: 01/19/23  5:16 PM   Result Value Ref Range    aPTT 28.8 21.2 - 34.1 sec    aPTT, therapeutic range   82 - 109 sec   TROPONIN-HIGH SENSITIVITY    Collection Time: 01/19/23  5:16 PM   Result Value Ref Range    Troponin-High Sensitivity 64 0 - 76 ng/L   PTT    Collection Time: 01/19/23 11:10 PM   Result Value Ref Range    aPTT 79.9 (H) 21.2 - 34.1 sec    aPTT, therapeutic range   82 - 109 sec   URINALYSIS W/MICROSCOPIC    Collection Time: 01/19/23 11:55 PM   Result Value Ref Range    Color Yellow/Straw      Appearance Clear Clear      Specific gravity 1.015 1.003 - 1.030      pH (UA) 6.0      Protein >300 (A) Negative mg/dL    Glucose Negative Negative mg/dL    Ketone Negative Negative mg/dL    Bilirubin Negative Negative      Blood Small (A) Negative      Urobilinogen 0.1 (L) 0.2 - 1.0 EU/dL    Nitrites Negative Negative      Leukocyte Esterase Negative Negative      WBC 0-4 0 - 4 /hpf    RBC 5-10 0 - 5 /hpf    Bacteria Negative Negative /hpf    Mucus Trace /lpf   CBC WITH AUTOMATED DIFF    Collection Time: 01/20/23  7:42 AM   Result Value Ref Range    WBC 4.8 4.1 - 11.1 K/uL    RBC 3.72 (L) 4.10 - 5.70 M/uL    HGB 10.3 (L) 12.1 - 17.0 g/dL    HCT 33.8 (L) 36.6 - 50.3 %    MCV 90.9 80.0 - 99.0 FL    MCH 27.7 26.0 - 34.0 PG    MCHC 30.5 30.0 - 36.5 g/dL    RDW 15.0 (H) 11.5 - 14.5 %    PLATELET 720 556 - 837 K/uL    MPV 11.8 8.9 - 12.9 FL    NRBC 0.0 0.0  WBC    ABSOLUTE NRBC 0.00 0.00 - 0.01 K/uL    NEUTROPHILS 70 32 - 75 %    LYMPHOCYTES 14 12 - 49 %    MONOCYTES 7 5 - 13 %    EOSINOPHILS 9 (H) 0 - 7 %    BASOPHILS 0 0 - 1 %    IMMATURE GRANULOCYTES 0 0 - 0.5 %    ABS. NEUTROPHILS 3.3 1.8 - 8.0 K/UL    ABS. LYMPHOCYTES 0.7 (L) 0.8 - 3.5 K/UL    ABS. MONOCYTES 0.3 0.0 - 1.0 K/UL    ABS. EOSINOPHILS 0.4 0.0 - 0.4 K/UL    ABS. BASOPHILS 0.0 0.0 - 0.1 K/UL    ABS. IMM.  GRANS. 0.0 0.00 - 0.04 K/UL    DF AUTOMATED     NT-PRO BNP    Collection Time: 01/20/23  7:42 AM   Result Value Ref Range    NT pro-BNP 1,760 (H) <450 pg/mL   TROPONIN-HIGH SENSITIVITY    Collection Time: 01/20/23  7:42 AM   Result Value Ref Range    Troponin-High Sensitivity 56 0 - 76 ng/L   METABOLIC PANEL, COMPREHENSIVE    Collection Time: 01/20/23  7:42 AM   Result Value Ref Range    Sodium 143 136 - 145 mmol/L    Potassium 4.0 3.5 - 5.1 mmol/L    Chloride 106 97 - 108 mmol/L    CO2 30 21 - 32 mmol/L    Anion gap 7 5 - 15 mmol/L    Glucose 81 65 - 100 mg/dL    BUN 65 (H) 6 - 20 mg/dL    Creatinine 2.56 (H) 0.70 - 1.30 mg/dL    BUN/Creatinine ratio 25 (H) 12 - 20      eGFR 24 (L) >60 ml/min/1.73m2    Calcium 8.8 8.5 - 10.1 mg/dL    Bilirubin, total 0.9 0.2 - 1.0 mg/dL    AST (SGOT) 32 15 - 37 U/L    ALT (SGPT) 29 12 - 78 U/L    Alk. phosphatase 74 45 - 117 U/L    Protein, total 5.6 (L) 6.4 - 8.2 g/dL    Albumin 1.8 (L) 3.5 - 5.0 g/dL    Globulin 3.8 2.0 - 4.0 g/dL    A-G Ratio 0.5 (L) 1.1 - 2.2     PTT    Collection Time: 01/20/23  7:42 AM   Result Value Ref Range    aPTT 96.3 (H) 21.2 - 34.1 sec    aPTT, therapeutic range   82 - 109 sec     [unfilled]      Review of Systems    Constitutional: Negative for chills and fever. HENT: Negative. Eyes: Negative. Respiratory: Negative. Cardiovascular: Negative. Gastrointestinal: Negative for abdominal pain and nausea. Skin: Negative. Neurological: Negative. Physical Exam:      Constitutional: pt is oriented to person, place, and time. HENT:   Head: Normocephalic and atraumatic. Eyes: Pupils are equal, round, and reactive to light. EOM are normal.   Cardiovascular: Normal rate, regular rhythm and normal heart sounds. Pulmonary/Chest: Breath sounds normal. No wheezes. No rales. Exhibits no tenderness. Abdominal: Soft. Bowel sounds are normal. There is no abdominal tenderness. There is no rebound and no guarding. Musculoskeletal: Normal range of motion. Neurological: pt is alert and oriented to person, place, and time. XR CHEST PORT   Final Result      Unchanged graphic appearance with 2.8 x 2.3 cm infrahilar right pulmonary   nodule.               Recent Results (from the past 24 hour(s))   TROPONIN-HIGH SENSITIVITY    Collection Time: 01/19/23  3:31 PM   Result Value Ref Range    Troponin-High Sensitivity 72 0 - 76 ng/L   CBC WITH AUTOMATED DIFF    Collection Time: 01/19/23  3:31 PM   Result Value Ref Range    WBC 5.1 4.1 - 11.1 K/uL    RBC 3.61 (L) 4.10 - 5.70 M/uL    HGB 10.1 (L) 12.1 - 17.0 g/dL    HCT 32.7 (L) 36.6 - 50.3 %    MCV 90.6 80.0 - 99.0 FL    MCH 28.0 26.0 - 34.0 PG    MCHC 30.9 30.0 - 36.5 g/dL    RDW 15.1 (H) 11.5 - 14.5 %    PLATELET 085 807 - 745 K/uL    MPV 11.4 8.9 - 12.9 FL    NRBC 0.0 0.0 PER 100 WBC    ABSOLUTE NRBC 0.00 0.00 - 0.01 K/uL    NEUTROPHILS 74 32 - 75 %    LYMPHOCYTES 12 12 - 49 %    MONOCYTES 6 5 - 13 %    EOSINOPHILS 8 (H) 0 - 7 %    BASOPHILS 0 0 - 1 %    IMMATURE GRANULOCYTES 0 0 - 0.5 %    ABS. NEUTROPHILS 3.8 1.8 - 8.0 K/UL    ABS. LYMPHOCYTES 0.6 (L) 0.8 - 3.5 K/UL    ABS. MONOCYTES 0.3 0.0 - 1.0 K/UL    ABS. EOSINOPHILS 0.4 0.0 - 0.4 K/UL    ABS. BASOPHILS 0.0 0.0 - 0.1 K/UL    ABS. IMM. GRANS. 0.0 0.00 - 0.04 K/UL    DF AUTOMATED     METABOLIC PANEL, COMPREHENSIVE    Collection Time: 01/19/23  3:31 PM   Result Value Ref Range    Sodium 141 136 - 145 mmol/L    Potassium 3.6 3.5 - 5.1 mmol/L    Chloride 103 97 - 108 mmol/L    CO2 31 21 - 32 mmol/L    Anion gap 7 5 - 15 mmol/L    Glucose 102 (H) 65 - 100 mg/dL    BUN 68 (H) 6 - 20 mg/dL    Creatinine 2.45 (H) 0.70 - 1.30 mg/dL    BUN/Creatinine ratio 28 (H) 12 - 20      eGFR 25 (L) >60 ml/min/1.73m2    Calcium 8.6 8.5 - 10.1 mg/dL    Bilirubin, total 0.7 0.2 - 1.0 mg/dL    AST (SGOT) 31 15 - 37 U/L    ALT (SGPT) 31 12 - 78 U/L    Alk.  phosphatase 79 45 - 117 U/L    Protein, total 5.2 (L) 6.4 - 8.2 g/dL    Albumin 2.0 (L) 3.5 - 5.0 g/dL    Globulin 3.2 2.0 - 4.0 g/dL    A-G Ratio 0.6 (L) 1.1 - 2.2     NT-PRO BNP    Collection Time: 01/19/23  3:31 PM   Result Value Ref Range    NT pro-BNP 2,083 (H) <450 pg/mL   PTT    Collection Time: 01/19/23  5:16 PM   Result Value Ref Range    aPTT 28.8 21.2 - 34.1 sec    aPTT, therapeutic range   82 - 109 sec   TROPONIN-HIGH SENSITIVITY    Collection Time: 01/19/23  5:16 PM   Result Value Ref Range    Troponin-High Sensitivity 64 0 - 76 ng/L   PTT    Collection Time: 01/19/23 11:10 PM   Result Value Ref Range    aPTT 79.9 (H) 21.2 - 34.1 sec    aPTT, therapeutic range   82 - 109 sec   URINALYSIS W/MICROSCOPIC    Collection Time: 01/19/23 11:55 PM   Result Value Ref Range    Color Yellow/Straw      Appearance Clear Clear      Specific gravity 1.015 1.003 - 1.030      pH (UA) 6.0      Protein >300 (A) Negative mg/dL    Glucose Negative Negative mg/dL    Ketone Negative Negative mg/dL    Bilirubin Negative Negative      Blood Small (A) Negative      Urobilinogen 0.1 (L) 0.2 - 1.0 EU/dL    Nitrites Negative Negative      Leukocyte Esterase Negative Negative      WBC 0-4 0 - 4 /hpf    RBC 5-10 0 - 5 /hpf    Bacteria Negative Negative /hpf    Mucus Trace /lpf   CBC WITH AUTOMATED DIFF    Collection Time: 01/20/23  7:42 AM   Result Value Ref Range    WBC 4.8 4.1 - 11.1 K/uL    RBC 3.72 (L) 4.10 - 5.70 M/uL    HGB 10.3 (L) 12.1 - 17.0 g/dL    HCT 33.8 (L) 36.6 - 50.3 %    MCV 90.9 80.0 - 99.0 FL    MCH 27.7 26.0 - 34.0 PG    MCHC 30.5 30.0 - 36.5 g/dL    RDW 15.0 (H) 11.5 - 14.5 %    PLATELET 579 299 - 614 K/uL    MPV 11.8 8.9 - 12.9 FL    NRBC 0.0 0.0  WBC    ABSOLUTE NRBC 0.00 0.00 - 0.01 K/uL    NEUTROPHILS 70 32 - 75 %    LYMPHOCYTES 14 12 - 49 %    MONOCYTES 7 5 - 13 %    EOSINOPHILS 9 (H) 0 - 7 %    BASOPHILS 0 0 - 1 %    IMMATURE GRANULOCYTES 0 0 - 0.5 %    ABS. NEUTROPHILS 3.3 1.8 - 8.0 K/UL    ABS. LYMPHOCYTES 0.7 (L) 0.8 - 3.5 K/UL    ABS. MONOCYTES 0.3 0.0 - 1.0 K/UL    ABS. EOSINOPHILS 0.4 0.0 - 0.4 K/UL    ABS. BASOPHILS 0.0 0.0 - 0.1 K/UL    ABS. IMM.  GRANS. 0.0 0.00 - 0.04 K/UL    DF AUTOMATED     NT-PRO BNP    Collection Time: 01/20/23  7:42 AM   Result Value Ref Range    NT pro-BNP 1,760 (H) <450 pg/mL   TROPONIN-HIGH SENSITIVITY    Collection Time: 01/20/23  7:42 AM   Result Value Ref Range    Troponin-High Sensitivity 56 0 - 76 ng/L   METABOLIC PANEL, COMPREHENSIVE    Collection Time: 01/20/23  7:42 AM   Result Value Ref Range    Sodium 143 136 - 145 mmol/L    Potassium 4.0 3.5 - 5.1 mmol/L    Chloride 106 97 - 108 mmol/L    CO2 30 21 - 32 mmol/L    Anion gap 7 5 - 15 mmol/L    Glucose 81 65 - 100 mg/dL    BUN 65 (H) 6 - 20 mg/dL    Creatinine 2.56 (H) 0.70 - 1.30 mg/dL    BUN/Creatinine ratio 25 (H) 12 - 20      eGFR 24 (L) >60 ml/min/1.73m2    Calcium 8.8 8.5 - 10.1 mg/dL    Bilirubin, total 0.9 0.2 - 1.0 mg/dL    AST (SGOT) 32 15 - 37 U/L    ALT (SGPT) 29 12 - 78 U/L    Alk. phosphatase 74 45 - 117 U/L    Protein, total 5.6 (L) 6.4 - 8.2 g/dL    Albumin 1.8 (L) 3.5 - 5.0 g/dL    Globulin 3.8 2.0 - 4.0 g/dL    A-G Ratio 0.5 (L) 1.1 - 2.2     PTT    Collection Time: 01/20/23  7:42 AM   Result Value Ref Range    aPTT 96.3 (H) 21.2 - 34.1 sec    aPTT, therapeutic range   82 - 109 sec       Results       Procedure Component Value Units Date/Time    CULTURE, URINE [[id number]] Collected: 01/19/23 3976    Order Status: Sent Specimen: Urine Updated: 01/20/23 0004             Labs:     Recent Labs     01/20/23  0742 01/19/23  1531   WBC 4.8 5.1   HGB 10.3* 10.1*   HCT 33.8* 32.7*    172     Recent Labs     01/20/23  0742 01/19/23  1531    141   K 4.0 3.6    103   CO2 30 31   BUN 65* 68*   CREA 2.56* 2.45*   GLU 81 102*   CA 8.8 8.6     Recent Labs     01/20/23  0742 01/19/23  1531   ALT 29 31   AP 74 79   TBILI 0.9 0.7   TP 5.6* 5.2*   ALB 1.8* 2.0*   GLOB 3.8 3.2     Recent Labs     01/20/23  0742 01/19/23  2310 01/19/23  1716   APTT 96.3* 79.9* 28.8      No results for input(s): FE, TIBC, PSAT, FERR in the last 72 hours. No results found for: FOL, RBCF   No results for input(s): PH, PCO2, PO2 in the last 72 hours. No results for input(s): CPK, CKNDX, TROIQ in the last 72 hours.     No lab exists for component: CPKMB  Lab Results   Component Value Date/Time    Cholesterol, total 236 (H) 04/03/2022 08:10 AM    HDL Cholesterol 86 04/03/2022 08:10 AM    LDL, calculated 139 (H) 04/03/2022 08:10 AM    Triglyceride 55 04/03/2022 08:10 AM    CHOL/HDL Ratio 2.7 04/03/2022 08:10 AM     Lab Results   Component Value Date/Time    Glucose (POC) 129 (H) 09/15/2022 10:35 PM    Glucose (POC) 85 09/15/2022 11:48 AM    Glucose (POC) 94 04/01/2022 11:13 AM    Glucose, POC 81 09/15/2022 10:58 AM     Lab Results   Component Value Date/Time    Color Yellow/Straw 01/19/2023 11:55 PM    Appearance Clear 01/19/2023 11:55 PM    Specific gravity 1.015 01/19/2023 11:55 PM    Specific gravity 1.020 12/08/2022 03:39 PM    pH (UA) 6.0 01/19/2023 11:55 PM    Protein >300 (A) 01/19/2023 11:55 PM    Glucose Negative 01/19/2023 11:55 PM    Ketone Negative 01/19/2023 11:55 PM    Bilirubin Negative 01/19/2023 11:55 PM    Urobilinogen 0.1 (L) 01/19/2023 11:55 PM    Nitrites Negative 01/19/2023 11:55 PM    Leukocyte Esterase Negative 01/19/2023 11:55 PM    Bacteria Negative 01/19/2023 11:55 PM    WBC 0-4 01/19/2023 11:55 PM    RBC 5-10 01/19/2023 11:55 PM         Assessment:   A flutter   - cardiology consulted.    - patient scheduled for [facility] w/possible cardioversion today     JULIENNE on CKD stage 4   - baseline creatinine ~2.2    - nephrology consulted    - prerenal?    CHF   - EF 65/75% (4/2/22)   - tricuspid regurg   - diastolic dysfunction of LV   - BNP 2083->1760    Hx of colon cancer    - s/p hemicolectomy 9/15/22    COPD    HTN           Plan:   on heparin drip  Continue amlodipine 2.5 mg daily  Lipitor 40 daily  Cardura 4 mg daily  Lasix 40 daily  Metoprolol 25 twice daily  Sodium bicarb 325 mg daily     Nephrology consulted  Cardiology consulted   awaiting results from LEON[facility]A             Current Facility-Administered Medications:     heparin (porcine) 1,000 unit/mL injection 2,840 Units, 60 Units/kg, IntraVENous, PRN **OR** heparin (porcine) 1,000 unit/mL injection 1,420 Units, 30 Units/kg, IntraVENous, PRN, Andie Nava MD    propofoL (DIPRIVAN) 10 mg/mL injection, , , ,     heparin 25,000 units in D5W 250 ml infusion, 12-25 Units/kg/hr, IntraVENous, TITRATE, Andie Nava MD, Last Rate: 6.6 mL/hr at 01/20/23 0109, 14 Units/kg/hr at 01/20/23 0109    albuterol (PROVENTIL HFA, VENTOLIN HFA, PROAIR HFA) inhaler 1 Puff, 1 Puff, Inhalation, Q4H PRN, Andie Nava MD    amLODIPine (NORVASC) tablet 2.5 mg, 2.5 mg, Oral, DAILY, Andie Nava MD, 2.5 mg at 01/20/23 0923    atorvastatin (LIPITOR) tablet 40 mg, 40 mg, Oral, QHS, Andie Nava MD, 40 mg at 01/19/23 2311    doxazosin (CARDURA) tablet 4 mg, 4 mg, Oral, DAILY, Andie Nava MD, 4 mg at 01/20/23 0922    [START ON 1/22/2023] ergocalciferol capsule 50,000 Units, 50,000 Units, Oral, Q7D, Torsten Nava MD    metoprolol tartrate (LOPRESSOR) tablet 25 mg, 25 mg, Oral, BID, Andie Nava MD, 25 mg at 01/20/23 3071    sodium bicarbonate tablet 325 mg, 325 mg, Oral, BID, Torsten Nava MD, 325 mg at 01/20/23 1880    acetaminophen (TYLENOL) tablet 650 mg, 650 mg, Oral, Q6H PRN **OR** acetaminophen (TYLENOL) suppository 650 mg, 650 mg, Rectal, Q6H PRN, Andie Nava MD    polyethylene glycol (MIRALAX) packet 17 g, 17 g, Oral, DAILY PRN, Andie Nava MD    ondansetron (ZOFRAN ODT) tablet 4 mg, 4 mg, Oral, Q8H PRN **OR** ondansetron (ZOFRAN) injection 4 mg, 4 mg, IntraVENous, Q6H PRN, Andie Nava MD    furosemide (LASIX) injection 40 mg, 40 mg, IntraVENous, DAILY, Andie Nava MD, 40 mg at 01/20/23 1046

## 2023-01-21 LAB
25(OH)D3 SERPL-MCNC: 80.1 NG/ML (ref 30–100)
ALBUMIN SERPL-MCNC: 1.6 G/DL (ref 3.5–5)
ANION GAP SERPL CALC-SCNC: 5 MMOL/L (ref 5–15)
ANION GAP SERPL CALC-SCNC: 5 MMOL/L (ref 5–15)
BUN SERPL-MCNC: 62 MG/DL (ref 6–20)
BUN SERPL-MCNC: 65 MG/DL (ref 6–20)
BUN/CREAT SERPL: 21 (ref 12–20)
BUN/CREAT SERPL: 23 (ref 12–20)
CA-I BLD-MCNC: 8.7 MG/DL (ref 8.5–10.1)
CA-I BLD-MCNC: 8.8 MG/DL (ref 8.5–10.1)
CHLORIDE SERPL-SCNC: 103 MMOL/L (ref 97–108)
CHLORIDE SERPL-SCNC: 106 MMOL/L (ref 97–108)
CO2 SERPL-SCNC: 32 MMOL/L (ref 21–32)
CO2 SERPL-SCNC: 33 MMOL/L (ref 21–32)
CREAT SERPL-MCNC: 2.82 MG/DL (ref 0.7–1.3)
CREAT SERPL-MCNC: 2.92 MG/DL (ref 0.7–1.3)
ECHO AO ROOT DIAM: 3.6 CM
ECHO AO ROOT INDEX: 2.35 CM/M2
ECHO AV AREA PEAK VELOCITY: 2.8 CM2
ECHO AV AREA/BSA PEAK VELOCITY: 1.8 CM2/M2
ECHO AV PEAK GRADIENT: 5 MMHG
ECHO AV PEAK VELOCITY: 1.2 M/S
ECHO AV VELOCITY RATIO: 0.83
ECHO LA DIAMETER INDEX: 1.9 CM/M2
ECHO LA DIAMETER: 2.9 CM
ECHO LA TO AORTIC ROOT RATIO: 0.81
ECHO LV EDV A2C: 22 ML
ECHO LV EDV A4C: 29 ML
ECHO LV EDV INDEX A4C: 19 ML/M2
ECHO LV EDV NDEX A2C: 14 ML/M2
ECHO LV EJECTION FRACTION A2C: 68 %
ECHO LV EJECTION FRACTION A4C: 52 %
ECHO LV EJECTION FRACTION BIPLANE: 59 % (ref 55–100)
ECHO LV ESV A2C: 7 ML
ECHO LV ESV A4C: 14 ML
ECHO LV ESV INDEX A2C: 5 ML/M2
ECHO LV ESV INDEX A4C: 9 ML/M2
ECHO LV FRACTIONAL SHORTENING: 31 % (ref 28–44)
ECHO LV INTERNAL DIMENSION DIASTOLE INDEX: 1.7 CM/M2
ECHO LV INTERNAL DIMENSION DIASTOLIC: 2.6 CM (ref 4.2–5.9)
ECHO LV INTERNAL DIMENSION SYSTOLIC INDEX: 1.18 CM/M2
ECHO LV INTERNAL DIMENSION SYSTOLIC: 1.8 CM
ECHO LV IVSD: 1.1 CM (ref 0.6–1)
ECHO LV MASS 2D: 78 G (ref 88–224)
ECHO LV MASS INDEX 2D: 51 G/M2 (ref 49–115)
ECHO LV POSTERIOR WALL DIASTOLIC: 1.1 CM (ref 0.6–1)
ECHO LV RELATIVE WALL THICKNESS RATIO: 0.85
ECHO LVOT AREA: 3.1 CM2
ECHO LVOT DIAM: 2 CM
ECHO LVOT PEAK GRADIENT: 4 MMHG
ECHO LVOT PEAK VELOCITY: 1 M/S
ECHO TV REGURGITANT MAX VELOCITY: 2.89 M/S
ECHO TV REGURGITANT PEAK GRADIENT: 33 MMHG
GLUCOSE SERPL-MCNC: 100 MG/DL (ref 65–100)
GLUCOSE SERPL-MCNC: 188 MG/DL (ref 65–100)
PHOSPHATE SERPL-MCNC: 3.6 MG/DL (ref 2.6–4.7)
PHOSPHATE SERPL-MCNC: 3.9 MG/DL (ref 2.6–4.7)
POTASSIUM SERPL-SCNC: 4 MMOL/L (ref 3.5–5.1)
POTASSIUM SERPL-SCNC: 4.3 MMOL/L (ref 3.5–5.1)
SODIUM SERPL-SCNC: 141 MMOL/L (ref 136–145)
SODIUM SERPL-SCNC: 143 MMOL/L (ref 136–145)

## 2023-01-21 PROCEDURE — 82306 VITAMIN D 25 HYDROXY: CPT

## 2023-01-21 PROCEDURE — 80048 BASIC METABOLIC PNL TOTAL CA: CPT

## 2023-01-21 PROCEDURE — 74011250637 HC RX REV CODE- 250/637: Performed by: FAMILY MEDICINE

## 2023-01-21 PROCEDURE — 84100 ASSAY OF PHOSPHORUS: CPT

## 2023-01-21 PROCEDURE — 65270000029 HC RM PRIVATE

## 2023-01-21 PROCEDURE — 74011250636 HC RX REV CODE- 250/636: Performed by: FAMILY MEDICINE

## 2023-01-21 PROCEDURE — 74011250637 HC RX REV CODE- 250/637: Performed by: LEGAL MEDICINE

## 2023-01-21 PROCEDURE — 36415 COLL VENOUS BLD VENIPUNCTURE: CPT

## 2023-01-21 PROCEDURE — 74011250637 HC RX REV CODE- 250/637: Performed by: INTERNAL MEDICINE

## 2023-01-21 PROCEDURE — 80069 RENAL FUNCTION PANEL: CPT

## 2023-01-21 RX ORDER — CEFDINIR 300 MG/1
300 CAPSULE ORAL EVERY 24 HOURS
Status: DISCONTINUED | OUTPATIENT
Start: 2023-01-21 | End: 2023-01-24

## 2023-01-21 RX ADMIN — FUROSEMIDE 40 MG: 10 INJECTION, SOLUTION INTRAMUSCULAR; INTRAVENOUS at 09:14

## 2023-01-21 RX ADMIN — CEFDINIR 300 MG: 300 CAPSULE ORAL at 16:19

## 2023-01-21 RX ADMIN — METOPROLOL TARTRATE 25 MG: 25 TABLET, FILM COATED ORAL at 21:38

## 2023-01-21 RX ADMIN — DILTIAZEM HYDROCHLORIDE 30 MG: 30 TABLET, FILM COATED ORAL at 09:14

## 2023-01-21 RX ADMIN — ATORVASTATIN CALCIUM 40 MG: 40 TABLET, FILM COATED ORAL at 21:38

## 2023-01-21 RX ADMIN — SODIUM BICARBONATE 325 MG: 650 TABLET ORAL at 09:14

## 2023-01-21 RX ADMIN — DOXAZOSIN 4 MG: 4 TABLET ORAL at 09:14

## 2023-01-21 RX ADMIN — DILTIAZEM HYDROCHLORIDE 30 MG: 30 TABLET, FILM COATED ORAL at 21:38

## 2023-01-21 RX ADMIN — APIXABAN 2.5 MG: 2.5 TABLET, FILM COATED ORAL at 09:14

## 2023-01-21 RX ADMIN — APIXABAN 2.5 MG: 2.5 TABLET, FILM COATED ORAL at 21:38

## 2023-01-21 RX ADMIN — METOPROLOL TARTRATE 25 MG: 25 TABLET, FILM COATED ORAL at 09:14

## 2023-01-21 NOTE — PROGRESS NOTES
82 yrs old male. Patient alert and oriented *4  Chief complaint: palpitation. SOB  No Known allergy  Cormobidities: HTN, chronic kidney disease, colon cancer, heart attack. Crackels heard uopn listening to lung sounds. Patient on room air. Normal bowel sounds. Skin is dry and intact. High Fall risk. Waiting for placement. Patient assisted in ambulation to the rest room. Patient given the following medication under the supervision of the registered nurse:  Doxazosin, apixaban, atrovastatin, diltiazem, doxazosin, furosemide.

## 2023-01-21 NOTE — PROGRESS NOTES
Call received from lab about critical lab for ANTI-XA of more than 1.10 IU/mL. MD on call (Dr Jony Menchaca) notified about patient  no longer being on heparin. Orders received to discontinue ANTI-XA lab.

## 2023-01-21 NOTE — PROGRESS NOTES
Progress Note    Patient: Soraya Boyle MRN: [id number]  SSN: [id number]    YOB: 1935  Age: 82 y.o. Sex: male      Admit Date: 1/19/2023    LOS: 2 days     Subjective:     82years old with CHF CAD, fluid overload    Objective:     Vitals:    01/21/23 0005 01/21/23 0334 01/21/23 0400 01/21/23 0915   BP: 118/61 (!) 140/64  (!) 141/74   Pulse: 82 93 80 96   Resp: 19 20 20   Temp: 98.2 °F (36.8 °C) 99.3 °F (37.4 °C)  98.1 °F (36.7 °C)   SpO2: 95% 92%  96%   Weight:  45.2 kg (99 lb 10.4 oz)     Height:            Intake and Output:  Current Shift: 01/21 0701 - 01/21 1900  In: 300 [P.O.:300]  Out: 700 [Urine:700]  Last three shifts: 01/19 1901 - 01/21 0700  In: 340 [P.O.:240; I.V.:100]  Out: 1100 [Urine:1100]    Physical Exam:   General:  Alert, cooperative, no distress, appears stated age. Eyes:  Conjunctivae/corneas clear. PERRL, EOMs intact. Fundi benign   Ears:  Normal TMs and external ear canals both ears. Nose: Nares normal. Septum midline. Mucosa normal. No drainage or sinus tenderness. Mouth/Throat: Lips, mucosa, and tongue normal. Teeth and gums normal.   Neck: Supple, symmetrical, trachea midline, no adenopathy, thyroid: no enlargment/tenderness/nodules, no carotid bruit and no JVD. Back:   Symmetric, no curvature. ROM normal. No CVA tenderness. Lungs:   Clear to auscultation bilaterally. Heart:  Regular rate and rhythm, S1, S2 normal, no murmur, click, rub or gallop. Abdomen:   Soft, non-tender. Bowel sounds normal. No masses,  No organomegaly. Extremities: Extremities normal, atraumatic, no cyanosis or edema. Pulses: 2+ and symmetric all extremities. Skin: Skin color, texture, turgor normal. No rashes or lesions   Lymph nodes: Cervical, supraclavicular, and axillary nodes normal.   Neurologic: CNII-XII intact. Normal strength, sensation and reflexes throughout. Lab/Data Review: All lab results for the last 24 hours reviewed.      Recent Results (from the past 24 hour(s))   ANTI-XA UNFRACTIONATED AND LMW HEPARIN    Collection Time: 01/20/23  7:47 PM   Result Value Ref Range    Heparin Xa,Unfrac. and LMW >1.10 (HH) IU/mL   PHOSPHORUS    Collection Time: 01/21/23  7:26 AM   Result Value Ref Range    Phosphorus 3.6 2.6 - 4.7 mg/dL   METABOLIC PANEL, BASIC    Collection Time: 01/21/23  7:26 AM   Result Value Ref Range    Sodium 143 136 - 145 mmol/L    Potassium 4.3 3.5 - 5.1 mmol/L    Chloride 106 97 - 108 mmol/L    CO2 32 21 - 32 mmol/L    Anion gap 5 5 - 15 mmol/L    Glucose 100 65 - 100 mg/dL    BUN 65 (H) 6 - 20 mg/dL    Creatinine 2.82 (H) 0.70 - 1.30 mg/dL    BUN/Creatinine ratio 23 (H) 12 - 20      eGFR 21 (L) >60 ml/min/1.73m2    Calcium 8.8 8.5 - 10.1 mg/dL   VITAMIN D, 25 HYDROXY    Collection Time: 01/21/23  7:26 AM   Result Value Ref Range    Vitamin D 25-Hydroxy 80.1 30 - 100 ng/mL   RENAL FUNCTION PANEL    Collection Time: 01/21/23  2:09 PM   Result Value Ref Range    Sodium 141 136 - 145 mmol/L    Potassium 4.0 3.5 - 5.1 mmol/L    Chloride 103 97 - 108 mmol/L    CO2 33 (H) 21 - 32 mmol/L    Anion gap 5 5 - 15 mmol/L    Glucose 188 (H) 65 - 100 mg/dL    BUN 62 (H) 6 - 20 mg/dL    Creatinine 2.92 (H) 0.70 - 1.30 mg/dL    BUN/Creatinine ratio 21 (H) 12 - 20      eGFR 20 (L) >60 ml/min/1.73m2    Calcium 8.7 8.5 - 10.1 mg/dL    Phosphorus 3.9 2.6 - 4.7 mg/dL    Albumin 1.6 (L) 3.5 - 5.0 g/dL         Assessment:     Active Problems:    Dyspnea on exertion (1/19/2023)      Atrial flutter by electrocardiography (HCC) (1/19/2023)      Atrial flutter (HCC) (1/19/2023)        Plan:       Current Facility-Administered Medications:     cefdinir (OMNICEF) capsule 300 mg, 300 mg, Oral, Q24H, Marcin Rosas MD, 300 mg at 01/21/23 1619    dilTIAZem IR (CARDIZEM) tablet 30 mg, 30 mg, Oral, BID, Marbella Washburn MD, 30 mg at 01/21/23 0914    apixaban (ELIQUIS) tablet 2.5 mg, 2.5 mg, Oral, BID, Marbella Washburn MD, 2.5 mg at 01/21/23 0914    albuterol (PROVENTIL HFA, VENTOLIN HFA, PROAIR HFA) inhaler 1 Puff, 1 Puff, Inhalation, Q4H PRN, Grabiel Nava MD    atorvastatin (LIPITOR) tablet 40 mg, 40 mg, Oral, QHS, Andie Nava MD, 40 mg at 01/20/23 2042    doxazosin (CARDURA) tablet 4 mg, 4 mg, Oral, DAILY, Andie Nava MD, 4 mg at 01/21/23 0914    [START ON 1/22/2023] ergocalciferol capsule 50,000 Units, 50,000 Units, Oral, Q7D, Grabiel Nava MD    metoprolol tartrate (LOPRESSOR) tablet 25 mg, 25 mg, Oral, BID, Andie Nava MD, 25 mg at 01/21/23 5115    [Held by provider] sodium bicarbonate tablet 325 mg, 325 mg, Oral, BID, Andie Nava MD, 325 mg at 01/21/23 4716    acetaminophen (TYLENOL) tablet 650 mg, 650 mg, Oral, Q6H PRN **OR** acetaminophen (TYLENOL) suppository 650 mg, 650 mg, Rectal, Q6H PRN, Andie Nava MD    polyethylene glycol (MIRALAX) packet 17 g, 17 g, Oral, DAILY PRN, Andie Nava MD    ondansetron (ZOFRAN ODT) tablet 4 mg, 4 mg, Oral, Q8H PRN **OR** ondansetron (ZOFRAN) injection 4 mg, 4 mg, IntraVENous, Q6H PRN, Grabiel Nava MD      Signed By: Mali Leblanc MD     January 21, 2023

## 2023-01-21 NOTE — PROGRESS NOTES
1415: MATT requested [employer] be approved via MD - Done. Approved [employer] attached in [address] CM telephoned [employer] to inquire about [place] [facility]. Reference number needed and MATT does not this information to provide. [employer] representative will be available Monday.

## 2023-01-21 NOTE — PROGRESS NOTES
Problem: Falls - Risk of  Goal: *Absence of Falls  Description: Document Christina Dillon Fall Risk and appropriate interventions in the flowsheet.   Outcome: Progressing Towards Goal  Note: Fall Risk Interventions:                                Problem: Patient Education: Go to Patient Education Activity  Goal: Patient/Family Education  Outcome: Progressing Towards Goal     Problem: General Medical Care Plan  Goal: *Skin integrity maintained  Outcome: Progressing Towards Goal     Problem: Patient Education: Go to Patient Education Activity  Goal: Patient/Family Education  Outcome: Progressing Towards Goal

## 2023-01-21 NOTE — PROGRESS NOTES
Renal Dosing/Monitoring  Medication: Cefdinir    Current regimen:  300 mg every 12 hours  Recent Labs     01/21/23  1409 01/21/23  0726 01/20/23  0742   CREA 2.92* 2.82* 2.56*   BUN 62* 65* 65*     Estimated CrCl:  11.8 mL/min  Plan: Change to 300 mg q24h x 7 days  per [facility] P&T Committee Protocol with respect to renal function. Pharmacy will continue to monitor patient daily and will make dosage adjustments based upon changing renal function.

## 2023-01-21 NOTE — PROGRESS NOTES
Renal Daily Progress Note    Admit Date: 1/19/2023      Subjective:   82-year-old gentleman with history of hypertension, hyperlipidemia coronary artery disease CVA and chronic obstructive pulmonary disease who in April of last year had a left ventricular ejection fraction of 65% to 70 % with diastolic dysfunction. He was in atrial fibs/flutter and was sent to the ER. He has been seen by cardiology and a LEONILA was attempted unsuccessfully.     Patient is seen and examined in the room  He looks dehydrated  He tells me that he does not feel hematocrit he does not have any specific complaints  Labs and medications are reviewed  Patient is growing Proteus in the urine-UTI can cause polyuria and poor oral intake which most likely the reason for component of dehydration change in renal function  Current Facility-Administered Medications   Medication Dose Route Frequency    dilTIAZem IR (CARDIZEM) tablet 30 mg  30 mg Oral BID    apixaban (ELIQUIS) tablet 2.5 mg  2.5 mg Oral BID    albuterol (PROVENTIL HFA, VENTOLIN HFA, PROAIR HFA) inhaler 1 Puff  1 Puff Inhalation Q4H PRN    atorvastatin (LIPITOR) tablet 40 mg  40 mg Oral QHS    doxazosin (CARDURA) tablet 4 mg  4 mg Oral DAILY    [START ON 1/22/2023] ergocalciferol capsule 50,000 Units  50,000 Units Oral Q7D    metoprolol tartrate (LOPRESSOR) tablet 25 mg  25 mg Oral BID    sodium bicarbonate tablet 325 mg  325 mg Oral BID    acetaminophen (TYLENOL) tablet 650 mg  650 mg Oral Q6H PRN    Or    acetaminophen (TYLENOL) suppository 650 mg  650 mg Rectal Q6H PRN    polyethylene glycol (MIRALAX) packet 17 g  17 g Oral DAILY PRN    ondansetron (ZOFRAN ODT) tablet 4 mg  4 mg Oral Q8H PRN    Or    ondansetron (ZOFRAN) injection 4 mg  4 mg IntraVENous Q6H PRN    furosemide (LASIX) injection 40 mg  40 mg IntraVENous DAILY        Review of Systems    ROS       Objective:     Patient Vitals for the past 8 hrs:   BP Temp Pulse Resp SpO2 Weight   01/21/23 0915 (!) 141/74 98.1 °F (36.7 °C) 96 20 96 % --   01/21/23 0400 -- -- 80 -- -- --   01/21/23 0334 (!) 140/64 99.3 °F (37.4 °C) 93 20 92 % 45.2 kg (99 lb 10.4 oz)     No intake/output data recorded. 01/19 1901 - 01/21 0700  In: 340 [P.O.:240; I.V.:100]  Out: 1100 [Urine:1100]    Physical Exam:   Physical Exam  Constitutional:       General: He is not in acute distress. Appearance: He is ill-appearing. HENT:      Head: Normocephalic. Mouth/Throat:      Mouth: Mucous membranes are dry. Eyes:      General:         Right eye: No discharge. Left eye: No discharge. Cardiovascular:      Rate and Rhythm: Normal rate. Heart sounds: No friction rub. Pulmonary:      Effort: Pulmonary effort is normal.      Breath sounds: Normal breath sounds. Abdominal:      General: Abdomen is flat. Palpations: Abdomen is soft. Tenderness: There is no abdominal tenderness. Musculoskeletal:         General: No swelling or deformity. Cervical back: Neck supple. Right lower leg: No edema. Left lower leg: No edema. Skin:     General: Skin is warm and dry. Neurological:      General: No focal deficit present. Mental Status: He is alert. Motor: Weakness present. XR CHEST PORT   Final Result      Unchanged graphic appearance with 2.8 x 2.3 cm infrahilar right pulmonary   nodule. Data Review   Recent Labs     01/20/23  0742 01/19/23  1531   WBC 4.8 5.1   HGB 10.3* 10.1*   HCT 33.8* 32.7*    172     Recent Labs     01/21/23  1409 01/21/23  0726 01/20/23  0742 01/19/23  1531    143 143 141   K 4.0 4.3 4.0 3.6    106 106 103   CO2 33* 32 30 31   * 100 81 102*   BUN 62* 65* 65* 68*   CREA 2.92* 2.82* 2.56* 2.45*   CA 8.7 8.8 8.8 8.6   PHOS 3.9 3.6  --   --    ALB 1.6*  --  1.8* 2.0*   ALT  --   --  29 31     No components found for: GLPOC  No results for input(s): PH, PCO2, PO2, HCO3, FIO2 in the last 72 hours.   No results for input(s): INR, INREXT, INREXT in the last 72 hours.       Assessment:     #1 acute kidney injury on CKD  #2 CKD with serum creatinine baseline ranging between 1.8-2.5  #3 UTI with Proteus  #4 anemia  #5 proteinuria  #6 hypertension      Active Problems:    Dyspnea on exertion (1/19/2023)      Atrial flutter by electrocardiography ([address] (1/19/2023)      Atrial flutter ([address] (1/19/2023)        Plan:     Patient need IV fluids at 75 mill per hour  IV antibiotics  Reason for dyspnea on exertion can be anemia  Rx patient with p.o. iron supplement if he is not on any  Thank you

## 2023-01-22 PROCEDURE — 74011250637 HC RX REV CODE- 250/637: Performed by: LEGAL MEDICINE

## 2023-01-22 PROCEDURE — 74011000250 HC RX REV CODE- 250: Performed by: LEGAL MEDICINE

## 2023-01-22 PROCEDURE — 65270000029 HC RM PRIVATE

## 2023-01-22 PROCEDURE — 74011250637 HC RX REV CODE- 250/637: Performed by: FAMILY MEDICINE

## 2023-01-22 PROCEDURE — 74011250637 HC RX REV CODE- 250/637: Performed by: INTERNAL MEDICINE

## 2023-01-22 RX ORDER — SODIUM CHLORIDE 450 MG/100ML
75 INJECTION, SOLUTION INTRAVENOUS CONTINUOUS
Status: DISCONTINUED | OUTPATIENT
Start: 2023-01-22 | End: 2023-01-23

## 2023-01-22 RX ADMIN — ACETAMINOPHEN 650 MG: 325 TABLET ORAL at 17:14

## 2023-01-22 RX ADMIN — ERGOCALCIFEROL 50000 UNITS: 1.25 CAPSULE ORAL at 08:16

## 2023-01-22 RX ADMIN — DILTIAZEM HYDROCHLORIDE 30 MG: 30 TABLET, FILM COATED ORAL at 08:16

## 2023-01-22 RX ADMIN — POLYETHYLENE GLYCOL 3350 17 G: 17 POWDER, FOR SOLUTION ORAL at 13:23

## 2023-01-22 RX ADMIN — SODIUM CHLORIDE 75 ML/HR: 4.5 INJECTION, SOLUTION INTRAVENOUS at 17:12

## 2023-01-22 RX ADMIN — METOPROLOL TARTRATE 25 MG: 25 TABLET, FILM COATED ORAL at 08:16

## 2023-01-22 RX ADMIN — DOXAZOSIN 4 MG: 4 TABLET ORAL at 08:16

## 2023-01-22 RX ADMIN — ATORVASTATIN CALCIUM 40 MG: 40 TABLET, FILM COATED ORAL at 21:21

## 2023-01-22 RX ADMIN — APIXABAN 2.5 MG: 2.5 TABLET, FILM COATED ORAL at 21:21

## 2023-01-22 RX ADMIN — CEFDINIR 300 MG: 300 CAPSULE ORAL at 17:12

## 2023-01-22 RX ADMIN — METOPROLOL TARTRATE 25 MG: 25 TABLET, FILM COATED ORAL at 21:21

## 2023-01-22 RX ADMIN — APIXABAN 2.5 MG: 2.5 TABLET, FILM COATED ORAL at 08:16

## 2023-01-22 RX ADMIN — DILTIAZEM HYDROCHLORIDE 30 MG: 30 TABLET, FILM COATED ORAL at 21:21

## 2023-01-22 NOTE — PROGRESS NOTES
Renal Daily Progress Note    Admit Date: 1/19/2023      Subjective:   68-year-old gentleman with history of hypertension, hyperlipidemia coronary artery disease CVA and chronic obstructive pulmonary disease who in April of last year had a left ventricular ejection fraction of 65% to 70 % with diastolic dysfunction. He was in atrial fibs/flutter and was sent to the ER. He has been seen by cardiology and a LEONILA was attempted unsuccessfully. Patient is not despondent as he was yesterday  Eating lunch  Feeling better  No new complaints offered  No respiratory distress noted  Current Facility-Administered Medications   Medication Dose Route Frequency    cefdinir (OMNICEF) capsule 300 mg  300 mg Oral Q24H    dilTIAZem IR (CARDIZEM) tablet 30 mg  30 mg Oral BID    apixaban (ELIQUIS) tablet 2.5 mg  2.5 mg Oral BID    albuterol (PROVENTIL HFA, VENTOLIN HFA, PROAIR HFA) inhaler 1 Puff  1 Puff Inhalation Q4H PRN    atorvastatin (LIPITOR) tablet 40 mg  40 mg Oral QHS    doxazosin (CARDURA) tablet 4 mg  4 mg Oral DAILY    ergocalciferol capsule 50,000 Units  50,000 Units Oral Q7D    metoprolol tartrate (LOPRESSOR) tablet 25 mg  25 mg Oral BID    [Held by provider] sodium bicarbonate tablet 325 mg  325 mg Oral BID    acetaminophen (TYLENOL) tablet 650 mg  650 mg Oral Q6H PRN    Or    acetaminophen (TYLENOL) suppository 650 mg  650 mg Rectal Q6H PRN    polyethylene glycol (MIRALAX) packet 17 g  17 g Oral DAILY PRN    ondansetron (ZOFRAN ODT) tablet 4 mg  4 mg Oral Q8H PRN    Or    ondansetron (ZOFRAN) injection 4 mg  4 mg IntraVENous Q6H PRN        Review of Systems    Review of Systems   Respiratory:  Negative for cough and shortness of breath. Cardiovascular:  Negative for leg swelling. Gastrointestinal:  Negative for nausea and vomiting. Appetite is better   Genitourinary:  Negative for dysuria, frequency and urgency. Neurological:  Negative for dizziness. Psychiatric/Behavioral:  Negative for depression. Objective:     Patient Vitals for the past 8 hrs:   BP Temp Pulse Resp SpO2 Weight   01/22/23 0755 126/75 98.3 °F (36.8 °C) 84 20 93 % --   01/22/23 0340 -- -- -- -- -- 45.6 kg (100 lb 8.5 oz)   01/22/23 0339 122/78 98.1 °F (36.7 °C) 90 20 97 % --       No intake/output data recorded. 01/20 1901 - 01/22 0700  In: 540 [P.O.:540]  Out: 2050 [Urine:2050]    Physical Exam:   Physical Exam  Constitutional:       General: He is not in acute distress. Appearance: He is not ill-appearing. HENT:      Head: Normocephalic. Mouth/Throat:      Mouth: Mucous membranes are dry. Eyes:      General:         Right eye: No discharge. Left eye: No discharge. Cardiovascular:      Rate and Rhythm: Normal rate. Heart sounds: No friction rub. Pulmonary:      Effort: Pulmonary effort is normal.      Breath sounds: Normal breath sounds. Abdominal:      General: Abdomen is flat. Palpations: Abdomen is soft. Tenderness: There is no abdominal tenderness. Musculoskeletal:         General: No swelling or deformity. Cervical back: Neck supple. Right lower leg: No edema. Left lower leg: No edema. Skin:     General: Skin is warm and dry. Neurological:      General: No focal deficit present. Mental Status: He is alert. Motor: No weakness. XR CHEST PORT   Final Result      Unchanged graphic appearance with 2.8 x 2.3 cm infrahilar right pulmonary   nodule.               Data Review   Recent Labs     01/20/23  0742 01/19/23  1531   WBC 4.8 5.1   HGB 10.3* 10.1*   HCT 33.8* 32.7*    172       Recent Labs     01/21/23  1409 01/21/23  0726 01/20/23  0742    143 143   K 4.0 4.3 4.0    106 106   CO2 33* 32 30   * 100 81   BUN 62* 65* 65*   CREA 2.92* 2.82* 2.56*   CA 8.7 8.8 8.8   PHOS 3.9 3.6  --    ALB 1.6*  --  1.8*   ALT  --   --  29     No components found for: GLPOC  No results for input(s): PH, PCO2, PO2, HCO3, FIO2 in the last 72 hours. No results for input(s): INR, INREXT, INREXT, INREXT in the last 72 hours. Assessment:     #1 acute kidney injury on CKD  #2 CKD with serum creatinine baseline ranging between 1.8-2.5  #3 UTI with Proteus  #4 anemia  #5 proteinuria  #6 hypertension      Active Problems:    Dyspnea on exertion (1/19/2023)      Atrial flutter by electrocardiography ([address] (1/19/2023)      Atrial flutter ([address] (1/19/2023)      Plan:     Encourage p.o. fluids  I did start patient on IV fluids as there was a question of discharge  Will start patient on IV fluids at 75 mill per hour x1 bag  Repeat BMP in a.m.   Continue with antibiotics

## 2023-01-22 NOTE — PROGRESS NOTES
Progress Note    Patient: Kelsey Rose MRN: [id number]  SSN: [id number]    YOB: 1935  Age: 82 y.o. Sex: male      Admit Date: 1/19/2023    LOS: 3 days     Subjective:     82years old with CHF CAD, fluid overload    Objective:     Vitals:    01/22/23 0340 01/22/23 0755 01/22/23 1248 01/22/23 1517   BP:  126/75 128/70 (!) 142/68   Pulse:  84 94 88   Resp:  20 20 20   Temp:  98.3 °F (36.8 °C) 97.6 °F (36.4 °C) 98.1 °F (36.7 °C)   SpO2:  93% 91% 92%   Weight: 45.6 kg (100 lb 8.5 oz)      Height:            Intake and Output:  Current Shift: No intake/output data recorded. Last three shifts: 01/20 1901 - 01/22 0700  In: 540 [P.O.:540]  Out: 2050 [Urine:2050]    Physical Exam:   General:  Alert, cooperative, no distress, appears stated age. Eyes:  Conjunctivae/corneas clear. PERRL, EOMs intact. Fundi benign   Ears:  Normal TMs and external ear canals both ears. Nose: Nares normal. Septum midline. Mucosa normal. No drainage or sinus tenderness. Mouth/Throat: Lips, mucosa, and tongue normal. Teeth and gums normal.   Neck: Supple, symmetrical, trachea midline, no adenopathy, thyroid: no enlargment/tenderness/nodules, no carotid bruit and no JVD. Back:   Symmetric, no curvature. ROM normal. No CVA tenderness. Lungs:   Clear to auscultation bilaterally. Heart:  Regular rate and rhythm, S1, S2 normal, no murmur, click, rub or gallop. Abdomen:   Soft, non-tender. Bowel sounds normal. No masses,  No organomegaly. Extremities: Extremities normal, atraumatic, no cyanosis or edema. Pulses: 2+ and symmetric all extremities. Skin: Skin color, texture, turgor normal. No rashes or lesions   Lymph nodes: Cervical, supraclavicular, and axillary nodes normal.   Neurologic: CNII-XII intact. Normal strength, sensation and reflexes throughout. Lab/Data Review: All lab results for the last 24 hours reviewed.      No results found for this or any previous visit (from the past 24 hour(s)).         Assessment:     Active Problems:    Dyspnea on exertion (1/19/2023)      Atrial flutter by electrocardiography ([facility] [address] (1/19/2023)      [facility] (1/19/2023)      Plan:       Current Facility-Administered Medications:     0.45% sodium chloride infusion, 75 mL/hr, IntraVENous, CONTINUOUS, Amanda Coyle MD    cefdinir (OMNICEF) capsule 300 mg, 300 mg, Oral, Q24H, Pritesh Lau MD, 300 mg at 01/21/23 1619    dilTIAZem IR (CARDIZEM) tablet 30 mg, 30 mg, Oral, BID, Marbella Washburn MD, 30 mg at 01/22/23 0816    apixaban (ELIQUIS) tablet 2.5 mg, 2.5 mg, Oral, BID, Marbella Washburn MD, 2.5 mg at 01/22/23 0816    albuterol (PROVENTIL HFA, VENTOLIN HFA, PROAIR HFA) inhaler 1 Puff, 1 Puff, Inhalation, Q4H PRN, Andie Nava MD    atorvastatin (LIPITOR) tablet 40 mg, 40 mg, Oral, QHS, Andie Nava MD, 40 mg at 01/21/23 2138    doxazosin (CARDURA) tablet 4 mg, 4 mg, Oral, DAILY, Andie Nava MD, 4 mg at 01/22/23 0816    ergocalciferol capsule 50,000 Units, 50,000 Units, Oral, Q7D, Andie Nava MD, 50,000 Units at 01/22/23 0816    metoprolol tartrate (LOPRESSOR) tablet 25 mg, 25 mg, Oral, BID, Andie Nava MD, 25 mg at 01/22/23 0816    [Held by provider] sodium bicarbonate tablet 325 mg, 325 mg, Oral, BID, Andie Nava MD, 325 mg at 01/21/23 4861    acetaminophen (TYLENOL) tablet 650 mg, 650 mg, Oral, Q6H PRN **OR** acetaminophen (TYLENOL) suppository 650 mg, 650 mg, Rectal, Q6H PRN, Andie Nava MD    polyethylene glycol (MIRALAX) packet 17 g, 17 g, Oral, DAILY PRN, Andie Nava MD, 17 g at 01/22/23 1323    ondansetron (ZOFRAN ODT) tablet 4 mg, 4 mg, Oral, Q8H PRN **OR** ondansetron (ZOFRAN) injection 4 mg, 4 mg, IntraVENous, Q6H PRN, Jong Horn MD      Signed By: German Rodriguez MD     January 22, 2023

## 2023-01-22 NOTE — PROGRESS NOTES
Progress Note      1/22/2023 2:01 PM  NAME: Kristina Durand   MRN:  [id number]   Admit Diagnosis: Atrial flutter by electrocardiography [facility]) [I48.92]  Dyspnea on exertion [R06.09]  Atrial flutter ([facility] [address] [I48.92]      Problem List:   -Admitted to the hospital with chest pain and shortness of breath  -COPD  -Colon cancer  -Chronic kidney disease  -Previous myocardial infarction  -Hypertension  -Dyslipidemia  -History of stroke  -Psychiatric disorders     Assessment/Plan:   -Note dictated January 22, 2023  -Follow-up visit from initial consultation from our team for atrial fibrillation and flutter  -Currently patient responded to medical management and in sinus rhythm with heart rate 80s and 90s. -Chest pain but continues to have shortness of breath especially with activity. -Vascular testing at this time and further management deferred to Dr. Marika Escobar who will be seeing the patient tomorrow morning. []       High complexity decision making was performed in this patient at high risk for decompensation with multiple organ involvement. Subjective:     82-year-old -American male with no significant coronary artery disease with history of myocardial infarction in the past admitted to the hospital with shortness of breath and chest pain and found to be in atrial fibrillation responded to medical management. He has history of COPD dyslipidemia hypertension and history of stroke in addition to colon cancer. At the time of my examination today he was lying comfortably in the bed and has no cardiac complaints.     Review of Systems:    Symptom Y/N Comments  Symptom Y/N Comments   Fever/Chills N   Chest Pain N    Poor Appetite N   Edema N    Cough N   Abdominal Pain N    Sputum N   Joint Pain N    SOB/ALFORD N   Pruritis/Rash N    Nausea/vomit N   Tolerating PT/OT Y    Diarrhea N   Tolerating Diet Y    Constipation N   Other       Could NOT obtain due to:      Objective:      Physical Exam:    Last 24hrs VS reviewed since prior progress note. Most recent are:    Visit Vitals  /70 (BP 1 Location: Left upper arm, BP Patient Position: Semi fowlers)   Pulse 94   Temp 97.6 °F (36.4 °C)   Resp 20   Ht 5' 7\" (1.702 m)   Wt 45.6 kg (100 lb 8.5 oz)   SpO2 91%   BMI 15.75 kg/m²       Intake/Output Summary (Last 24 hours) at 1/22/2023 1401  Last data filed at 1/22/2023 0343  Gross per 24 hour   Intake 300 ml   Output 1300 ml   Net -1000 ml        General Appearance: Well developed, well nourished, alert & oriented x 3,    no acute distress. Ears/Nose/Mouth/Throat: Hearing grossly normal.  Neck: Supple. Chest: Lungs clear to auscultation bilaterally. Cardiovascular: Regular rate and rhythm, S1S2 normal, no murmur. Abdomen: Soft, non-tender, bowel sounds are active. Extremities: No edema bilaterally. Skin: Warm and dry. []         Post-cath site without hematoma, bruit, tenderness, or thrill. Distal pulses intact. PMH/SH reviewed - no change compared to H&P    Data Review    Telemetry: normal sinus rhythm     EKG:   []  No new EKG for review  XR CHEST PORT   Final Result      Unchanged graphic appearance with 2.8 x 2.3 cm infrahilar right pulmonary   nodule. Lab Data Personally Reviewed:    Recent Labs     01/20/23  0742 01/19/23  1531   WBC 4.8 5.1   HGB 10.3* 10.1*   HCT 33.8* 32.7*    172     Recent Labs     01/20/23  0742 01/19/23  2310 01/19/23  1716   APTT 96.3* 79.9* 28.8      Recent Labs     01/21/23  1409 01/21/23  0726 01/20/23  0742    143 143   K 4.0 4.3 4.0    106 106   CO2 33* 32 30   BUN 62* 65* 65*   CREA 2.92* 2.82* 2.56*   * 100 81   CA 8.7 8.8 8.8     No results for input(s): CPK, CKNDX, TROIQ in the last 72 hours.     No lab exists for component: CPKMB  Lab Results   Component Value Date/Time    Cholesterol, total 236 (H) 04/03/2022 08:10 AM    HDL Cholesterol 86 04/03/2022 08:10 AM    LDL, calculated 139 (H) 04/03/2022 08:10 AM    Triglyceride 55 04/03/2022 08:10 AM    CHOL/HDL Ratio 2.7 04/03/2022 08:10 AM       Recent Labs     01/21/23  1409 01/20/23  0742 01/19/23  1531   AP  --  74 79   TP  --  5.6* 5.2*   ALB 1.6* 1.8* 2.0*   GLOB  --  3.8 3.2     No results for input(s): PH, PCO2, PO2 in the last 72 hours.     Medications Personally Reviewed:    Current Facility-Administered Medications   Medication Dose Route Frequency    cefdinir (OMNICEF) capsule 300 mg  300 mg Oral Q24H    dilTIAZem IR (CARDIZEM) tablet 30 mg  30 mg Oral BID    apixaban (ELIQUIS) tablet 2.5 mg  2.5 mg Oral BID    albuterol (PROVENTIL HFA, VENTOLIN HFA, PROAIR HFA) inhaler 1 Puff  1 Puff Inhalation Q4H PRN    atorvastatin (LIPITOR) tablet 40 mg  40 mg Oral QHS    doxazosin (CARDURA) tablet 4 mg  4 mg Oral DAILY    ergocalciferol capsule 50,000 Units  50,000 Units Oral Q7D    metoprolol tartrate (LOPRESSOR) tablet 25 mg  25 mg Oral BID    [Held by provider] sodium bicarbonate tablet 325 mg  325 mg Oral BID    acetaminophen (TYLENOL) tablet 650 mg  650 mg Oral Q6H PRN    Or    acetaminophen (TYLENOL) suppository 650 mg  650 mg Rectal Q6H PRN    polyethylene glycol (MIRALAX) packet 17 g  17 g Oral DAILY PRN    ondansetron (ZOFRAN ODT) tablet 4 mg  4 mg Oral Q8H PRN    Or    ondansetron (ZOFRAN) injection 4 mg  4 mg IntraVENous Q6H PRN         Fabby Gonzalez MD

## 2023-01-23 ENCOUNTER — APPOINTMENT (OUTPATIENT)
Dept: CT IMAGING | Age: 88
DRG: 308 | End: 2023-01-23
Attending: FAMILY MEDICINE
Payer: MEDICARE

## 2023-01-23 ENCOUNTER — APPOINTMENT (OUTPATIENT)
Dept: GENERAL RADIOLOGY | Age: 88
DRG: 308 | End: 2023-01-23
Attending: FAMILY MEDICINE
Payer: MEDICARE

## 2023-01-23 LAB
ANION GAP SERPL CALC-SCNC: 4 MMOL/L (ref 5–15)
ARTERIAL PATENCY WRIST A: ABNORMAL
ATRIAL RATE: 64 BPM
ATRIAL RATE: 81 BPM
BACTERIA SPEC CULT: ABNORMAL
BASE EXCESS BLDA CALC-SCNC: 7 MMOL/L (ref 0–3)
BDY SITE: ABNORMAL
BNP SERPL-MCNC: 1708 PG/ML
BODY TEMPERATURE: 97
BUN SERPL-MCNC: 53 MG/DL (ref 6–20)
BUN/CREAT SERPL: 20 (ref 12–20)
CA-I BLD-MCNC: 8.8 MG/DL (ref 8.5–10.1)
CALCULATED P AXIS, ECG09: 83 DEGREES
CALCULATED R AXIS, ECG10: -51 DEGREES
CALCULATED R AXIS, ECG10: -58 DEGREES
CALCULATED T AXIS, ECG11: 79 DEGREES
CALCULATED T AXIS, ECG11: 87 DEGREES
CHLORIDE SERPL-SCNC: 103 MMOL/L (ref 97–108)
CO2 SERPL-SCNC: 31 MMOL/L (ref 21–32)
COHGB MFR BLD: 0.2 % (ref 1–2)
COLONY COUNT,CNT: ABNORMAL
COLONY COUNT,CNT: ABNORMAL
CREAT SERPL-MCNC: 2.6 MG/DL (ref 0.7–1.3)
DIAGNOSIS, 93000: NORMAL
DIAGNOSIS, 93000: NORMAL
FIO2 ON VENT: 28 %
GAS FLOW.O2 O2 DELIVERY SYS: 2 L/MIN
GLUCOSE SERPL-MCNC: 119 MG/DL (ref 65–100)
HCO3 BLDA-SCNC: 32 MMOL/L (ref 22–26)
METHGB MFR BLD: 0.3 % (ref 0–1.4)
OXYHGB MFR BLD: 96.7 % (ref 95–99)
P-R INTERVAL, ECG05: 132 MS
PCO2 BLDA: 46 MMHG (ref 35–45)
PERFORMED BY, TECHID: ABNORMAL
PH BLDA: 7.46 (ref 7.35–7.45)
PO2 BLDA: 96 MMHG (ref 80–100)
POTASSIUM SERPL-SCNC: 4.3 MMOL/L (ref 3.5–5.1)
Q-T INTERVAL, ECG07: 372 MS
Q-T INTERVAL, ECG07: 400 MS
QRS DURATION, ECG06: 72 MS
QRS DURATION, ECG06: 78 MS
QTC CALCULATION (BEZET), ECG08: 439 MS
QTC CALCULATION (BEZET), ECG08: 464 MS
SAO2 % BLD: 97 % (ref 95–99)
SAO2% DEVICE SAO2% SENSOR NAME: ABNORMAL
SODIUM SERPL-SCNC: 138 MMOL/L (ref 136–145)
SPECIAL REQUESTS,SREQ: ABNORMAL
SPECIMEN SITE: ABNORMAL
VENTRICULAR RATE, ECG03: 81 BPM
VENTRICULAR RATE, ECG03: 84 BPM

## 2023-01-23 PROCEDURE — 83880 ASSAY OF NATRIURETIC PEPTIDE: CPT

## 2023-01-23 PROCEDURE — 36415 COLL VENOUS BLD VENIPUNCTURE: CPT

## 2023-01-23 PROCEDURE — 74011250637 HC RX REV CODE- 250/637: Performed by: INTERNAL MEDICINE

## 2023-01-23 PROCEDURE — 97161 PT EVAL LOW COMPLEX 20 MIN: CPT

## 2023-01-23 PROCEDURE — 36600 WITHDRAWAL OF ARTERIAL BLOOD: CPT

## 2023-01-23 PROCEDURE — 94640 AIRWAY INHALATION TREATMENT: CPT

## 2023-01-23 PROCEDURE — 82803 BLOOD GASES ANY COMBINATION: CPT

## 2023-01-23 PROCEDURE — 97530 THERAPEUTIC ACTIVITIES: CPT

## 2023-01-23 PROCEDURE — 97165 OT EVAL LOW COMPLEX 30 MIN: CPT

## 2023-01-23 PROCEDURE — 97162 PT EVAL MOD COMPLEX 30 MIN: CPT

## 2023-01-23 PROCEDURE — 65270000029 HC RM PRIVATE

## 2023-01-23 PROCEDURE — 71250 CT THORAX DX C-: CPT

## 2023-01-23 PROCEDURE — 80048 BASIC METABOLIC PNL TOTAL CA: CPT

## 2023-01-23 PROCEDURE — 74011250637 HC RX REV CODE- 250/637: Performed by: FAMILY MEDICINE

## 2023-01-23 PROCEDURE — 71045 X-RAY EXAM CHEST 1 VIEW: CPT

## 2023-01-23 RX ADMIN — DILTIAZEM HYDROCHLORIDE 30 MG: 30 TABLET, FILM COATED ORAL at 08:24

## 2023-01-23 RX ADMIN — ACETAMINOPHEN 650 MG: 325 TABLET ORAL at 04:06

## 2023-01-23 RX ADMIN — DILTIAZEM HYDROCHLORIDE 30 MG: 30 TABLET, FILM COATED ORAL at 21:45

## 2023-01-23 RX ADMIN — METOPROLOL TARTRATE 25 MG: 25 TABLET, FILM COATED ORAL at 21:44

## 2023-01-23 RX ADMIN — ALBUTEROL SULFATE 1 PUFF: 108 AEROSOL, METERED RESPIRATORY (INHALATION) at 21:25

## 2023-01-23 RX ADMIN — METOPROLOL TARTRATE 25 MG: 25 TABLET, FILM COATED ORAL at 08:24

## 2023-01-23 RX ADMIN — ATORVASTATIN CALCIUM 40 MG: 40 TABLET, FILM COATED ORAL at 21:44

## 2023-01-23 RX ADMIN — APIXABAN 2.5 MG: 2.5 TABLET, FILM COATED ORAL at 08:24

## 2023-01-23 RX ADMIN — DOXAZOSIN 4 MG: 4 TABLET ORAL at 08:24

## 2023-01-23 RX ADMIN — APIXABAN 2.5 MG: 2.5 TABLET, FILM COATED ORAL at 21:44

## 2023-01-23 NOTE — PROGRESS NOTES
Spoke with Dr. Crisostomo Son about pending discharge of pt. Pt's recent complaints of sob without o2 makes pt too unstable for discharge. Discharge order discontinued.
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
## 2023-01-23 NOTE — PROGRESS NOTES
Progress Note    Patient: Stephanie Damon MRN: [id number]  SSN: [id number]    YOB: 1935  Age: 82 y.o. Sex: male      Admit Date: 1/19/2023    LOS: 4 days     Subjective:     No acute event overnight. Objective:     Vitals:    01/23/23 0823 01/23/23 0912 01/23/23 1034 01/23/23 1104   BP:  129/72 135/69 (!) 140/63   Pulse: 68 89 61 65   Resp:  17 16 16   Temp:    97.8 °F (36.6 °C)   SpO2:  94% 100% 100%   Weight:       Height:            Intake and Output:  Current Shift: No intake/output data recorded. Last three shifts: 01/21 1901 - 01/23 0700  In: -   Out: 600 [Urine:600]    Physical Exam:   General:  Alert, cooperative, no distress, appears stated age. Eyes:  Conjunctivae/corneas clear. PERRL, EOMs intact. Fundi benign   Ears:  Normal TMs and external ear canals both ears. Nose: Nares normal. Septum midline. Mucosa normal. No drainage or sinus tenderness. Mouth/Throat: Lips, mucosa, and tongue normal. Teeth and gums normal.   Neck: Supple, symmetrical, trachea midline, no adenopathy, thyroid: no enlargment/tenderness/nodules, no carotid bruit and no JVD. Back:   Symmetric, no curvature. ROM normal. No CVA tenderness. Lungs:   Clear to auscultation bilaterally. Heart:  Regular rate and rhythm, S1, S2 normal, no murmur, click, rub or gallop. Abdomen:   Soft, non-tender. Bowel sounds normal. No masses,  No organomegaly. Extremities: Extremities normal, atraumatic, no cyanosis or edema. Pulses: 2+ and symmetric all extremities. Skin: Skin color, texture, turgor normal. No rashes or lesions   Lymph nodes: Cervical, supraclavicular, and axillary nodes normal.   Neurologic: CNII-XII intact. Normal strength, sensation and reflexes throughout. Lab/Data Review: All lab results for the last 24 hours reviewed.          Assessment:     Active Problems:    Dyspnea on exertion (1/19/2023)      Atrial flutter by electrocardiography ([address] (1/19/2023)      Atrial flutter ([address] (1/19/2023)    Patient is an 53-year-old -American male with:  1. Hypertension  2. Hyperlipidemia  3. Stroke  4. History of myocardial infarction  5. CKD  6. Ex-smoker  7. colon cancer s/p extended right hemicolectomy. Plan:   No new labs. Currently on apixaban, atorvastatin, diltiazem and metoprolol. No further cardiac testing. Okay to discharge from cardiac standpoint.     Signed By: Tony Salazar MD     January 23, 2023

## 2023-01-23 NOTE — PROGRESS NOTES
Pt now states \"I'm breathing normal right now, I feel better. \" Pt's color has improved, normal for ethnicity. Pt's affect has improved and appears to be resting comfortably. Vital stable P-65 R-16 BP-135/69 O2-100% 2l NC. Pt denies any chest pain or sob at this time.
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
## 2023-01-23 NOTE — PROGRESS NOTES
TRANSFER - OUT REPORT:    Verbal report given to [facility] RN on [address]  being transferred to (unit) for routine progression of care       Report consisted of patients Situation, Background, Assessment and   Recommendations(SBAR). Information from the following report(s) SBAR, Kardex, MAR, and Accordion was reviewed with the receiving nurse. Lines:   Peripheral IV 01/19/23 Anterior;Left;Proximal Forearm (Active)   Site Assessment Clean, dry, & intact 01/23/23 0924   Phlebitis Assessment 0 01/23/23 0924   Infiltration Assessment 0 01/23/23 0924   Dressing Status Clean, dry, & intact 01/23/23 0924   Dressing Type Transparent 01/23/23 0924   Hub Color/Line Status Flushed;Capped 01/20/23 2016   Alcohol Cap Used Yes 01/23/23 9449        Opportunity for questions and clarification was provided.       Patient transported with:   Monitor  O2 @ 2L liters

## 2023-01-23 NOTE — PROGRESS NOTES
General Daily Progress Note          Patient Name:   Blaze Tony       YOB: 1935       Age:  82 y.o. Admit Date: 1/19/2023      Subjective:   CHIEF COMPLAINT:      Chest pain palpitation shortness of breath     HISTORY OF PRESENT ILLNESS:        Patient is a 82y.o. year old male with signal past medical history of colon cancer chronic kidney disease COPD history of CAD hypertension hyperlipidemia history of CVA not a very good historian patient was seen by the cardiology in his office and recommended patient go to the ER for A. fib a flutter for possible cardioversion patient denies any fever chills nausea vomiting diarrhea no constipation    1/23  Patient seen at bedside. Per nursing, patient has been more lethargic, with increased pallor since last night. Patient told nursing staff he felt shortness of breath. d CXR and ABG were ordered . Objective:     Visit Vitals  BP (!) 140/63 (BP 1 Location: Left upper arm)   Pulse 65   Temp 97.8 °F (36.6 °C)   Resp 16   Ht 5' 7\" (1.702 m)   Wt 45.6 kg (100 lb 8.5 oz)   SpO2 100%   BMI 15.75 kg/m²        Recent Results (from the past 24 hour(s))   BLOOD GAS, ARTERIAL    Collection Time: 01/23/23 10:13 AM   Result Value Ref Range    pH 7.46 (H) 7.35 - 7.45      PCO2 46 (H) 35 - 45 mmHg    PO2 96 80 - 100 mmHg    O2 SATURATION 97 95 - 99 %    BICARBONATE 32 (H) 22 - 26 mmol/L    BASE EXCESS 7.0 (H) 0 - 3 mmol/L    O2 METHOD Nasal Cannula      O2 FLOW RATE 2.00 L/min    FIO2 28.0 %    Sample source Arterial      SITE Right Brachial      RAFIA'S TEST NOT APPLICABLE      Carboxy-Hgb 0.2 (L) 1 - 2 %    Methemoglobin 0.3 0 - 1.4 %    Oxyhemoglobin 96.7 95 - 99 %    Performed by [employer]     TEMPERATURE 97.0       [unfilled]      Review of Systems    Constitutional: Negative for chills and fever. HENT: Negative. Eyes: Negative. Respiratory: Negative. Cardiovascular: Negative.     Gastrointestinal: Negative for abdominal pain and nausea. Skin: Negative. Neurological: Negative. Physical Exam:      Constitutional: pt is oriented to person, place, and time. HENT:   Head: Normocephalic and atraumatic. Eyes: Pupils are equal, round, and reactive to light. EOM are normal.   Cardiovascular: Normal rate, regular rhythm and normal heart sounds. Pulmonary/Chest: Breath sounds normal. No wheezes. No rales. Exhibits no tenderness. Abdominal: Soft. Bowel sounds are normal. There is no abdominal tenderness. There is no rebound and no guarding. Musculoskeletal: Normal range of motion. Neurological: pt is alert and oriented to person, place, and time. XR CHEST PORT   Final Result      No acute process on portable chest.   Persistent mass in the lower right chest zone, for which correlation with PET/CT   is recommended. Emphysema         XR CHEST PORT   Final Result      Unchanged graphic appearance with 2.8 x 2.3 cm infrahilar right pulmonary   nodule.               Recent Results (from the past 24 hour(s))   BLOOD GAS, ARTERIAL    Collection Time: 01/23/23 10:13 AM   Result Value Ref Range    pH 7.46 (H) 7.35 - 7.45      PCO2 46 (H) 35 - 45 mmHg    PO2 96 80 - 100 mmHg    O2 SATURATION 97 95 - 99 %    BICARBONATE 32 (H) 22 - 26 mmol/L    BASE EXCESS 7.0 (H) 0 - 3 mmol/L    O2 METHOD Nasal Cannula      O2 FLOW RATE 2.00 L/min    FIO2 28.0 %    Sample source Arterial      SITE Right Brachial      RAFIA'S TEST NOT APPLICABLE      Carboxy-Hgb 0.2 (L) 1 - 2 %    Methemoglobin 0.3 0 - 1.4 %    Oxyhemoglobin 96.7 95 - 99 %    Performed by Ilene Nageotte     TEMPERATURE 97.0         Results       Procedure Component Value Units Date/Time    CULTURE, URINE [[id number]]  (Abnormal) Collected: 01/19/23 2075    Order Status: Completed Specimen: Urine Updated: 01/22/23 2472     Special Requests: No Special Requests        [place] Count 11,000        [place] Count colonies/ml        Culture result: Proteus mirabilis                Labs:   No results for input(s): WBC, HGB, HCT, PLT, HGBEXT, HCTEXT, PLTEXT, HGBEXT, HCTEXT, PLTEXT in the last 72 hours. Recent Labs     01/21/23  1409 01/21/23  0726    143   K 4.0 4.3    106   CO2 33* 32   BUN 62* 65*   CREA 2.92* 2.82*   * 100   CA 8.7 8.8   PHOS 3.9 3.6       Recent Labs     01/21/23  1409   ALB 1.6*       No results for input(s): INR, PTP, APTT, INREXT, INREXT in the last 72 hours. No results for input(s): FE, TIBC, PSAT, FERR in the last 72 hours. No results found for: FOL, RBCF   Recent Labs     01/23/23  1013   PH 7.46*   PCO2 46*   PO2 96       No results for input(s): CPK, CKNDX, TROIQ in the last 72 hours.     No lab exists for component: CPKMB  Lab Results   Component Value Date/Time    Cholesterol, total 236 (H) 04/03/2022 08:10 AM    HDL Cholesterol 86 04/03/2022 08:10 AM    LDL, calculated 139 (H) 04/03/2022 08:10 AM    Triglyceride 55 04/03/2022 08:10 AM    CHOL/HDL Ratio 2.7 04/03/2022 08:10 AM     Lab Results   Component Value Date/Time    Glucose (POC) 129 (H) 09/15/2022 10:35 PM    Glucose (POC) 85 09/15/2022 11:48 AM    Glucose (POC) 94 04/01/2022 11:13 AM    Glucose, POC 81 09/15/2022 10:58 AM     Lab Results   Component Value Date/Time    Color Yellow/Straw 01/19/2023 11:55 PM    Appearance Clear 01/19/2023 11:55 PM    Specific gravity 1.015 01/19/2023 11:55 PM    Specific gravity 1.020 12/08/2022 03:39 PM    pH (UA) 6.0 01/19/2023 11:55 PM    Protein >300 (A) 01/19/2023 11:55 PM    Glucose Negative 01/19/2023 11:55 PM    Ketone Negative 01/19/2023 11:55 PM    Bilirubin Negative 01/19/2023 11:55 PM    Urobilinogen 0.1 (L) 01/19/2023 11:55 PM    Nitrites Negative 01/19/2023 11:55 PM    Leukocyte Esterase Negative 01/19/2023 11:55 PM    Bacteria Negative 01/19/2023 11:55 PM    WBC 0-4 01/19/2023 11:55 PM    RBC 5-10 01/19/2023 11:55 PM         Assessment:      A flutter  Acute on chronic kidney disease  Congestive heart failure  History of colon cancer  COPD  Hypertension  Lung nodule  UTI   Plan:   Awaiting follow up with cardiology regarding vascular testing     Scan of the chest without contrast    Continue Eliquis 2.5 mg twice a day Lipitor 40 daily Cardizem 30 mg twice a day metoprolol 25 twice daily    PT OT consult            Current Facility-Administered Medications:     cefdinir (OMNICEF) capsule 300 mg, 300 mg, Oral, Q24H, Michell Kate MD, 300 mg at 01/22/23 1712    dilTIAZem IR (CARDIZEM) tablet 30 mg, 30 mg, Oral, BID, Marbella Washburn MD, 30 mg at 01/23/23 0824    apixaban (ELIQUIS) tablet 2.5 mg, 2.5 mg, Oral, BID, Marbella Washburn MD, 2.5 mg at 01/23/23 0824    albuterol (PROVENTIL HFA, VENTOLIN HFA, PROAIR HFA) inhaler 1 Puff, 1 Puff, Inhalation, Q4H PRN, Andie Nava MD    atorvastatin (LIPITOR) tablet 40 mg, 40 mg, Oral, QHS, Andie Nava MD, 40 mg at 01/22/23 2121    doxazosin (CARDURA) tablet 4 mg, 4 mg, Oral, DAILY, Leonarda West MD, 4 mg at 01/23/23 5795    ergocalciferol capsule 50,000 Units, 50,000 Units, Oral, Q7D, Andie Nava MD, 50,000 Units at 01/22/23 0816    metoprolol tartrate (LOPRESSOR) tablet 25 mg, 25 mg, Oral, BID, Andie Nava MD, 25 mg at 01/23/23 9912    [Held by provider] sodium bicarbonate tablet 325 mg, 325 mg, Oral, BID, Andie Nava MD, 325 mg at 01/21/23 9857    acetaminophen (TYLENOL) tablet 650 mg, 650 mg, Oral, Q6H PRN, 650 mg at 01/23/23 0406 **OR** acetaminophen (TYLENOL) suppository 650 mg, 650 mg, Rectal, Q6H PRN, Andie Nava MD    polyethylene glycol (MIRALAX) packet 17 g, 17 g, Oral, DAILY PRN, Andie Nava MD, 17 g at 01/22/23 1323    ondansetron (ZOFRAN ODT) tablet 4 mg, 4 mg, Oral, Q8H PRN **OR** ondansetron (ZOFRAN) injection 4 mg, 4 mg, IntraVENous, Q6H PRN, Leonarda West MD

## 2023-01-23 NOTE — PROGRESS NOTES
Renal Daily Progress Note    Admit Date: 1/19/2023      Subjective:   He tells me that he does not feel well today. He denies shortness of breath. He does not have an appetite. He denies vomiting. No abdominal pain. Urine output not monitored. Current Facility-Administered Medications   Medication Dose Route Frequency    cefdinir (OMNICEF) capsule 300 mg  300 mg Oral Q24H    dilTIAZem IR (CARDIZEM) tablet 30 mg  30 mg Oral BID    apixaban (ELIQUIS) tablet 2.5 mg  2.5 mg Oral BID    albuterol (PROVENTIL HFA, VENTOLIN HFA, PROAIR HFA) inhaler 1 Puff  1 Puff Inhalation Q4H PRN    atorvastatin (LIPITOR) tablet 40 mg  40 mg Oral QHS    doxazosin (CARDURA) tablet 4 mg  4 mg Oral DAILY    ergocalciferol capsule 50,000 Units  50,000 Units Oral Q7D    metoprolol tartrate (LOPRESSOR) tablet 25 mg  25 mg Oral BID    [Held by provider] sodium bicarbonate tablet 325 mg  325 mg Oral BID    acetaminophen (TYLENOL) tablet 650 mg  650 mg Oral Q6H PRN    Or    acetaminophen (TYLENOL) suppository 650 mg  650 mg Rectal Q6H PRN    polyethylene glycol (MIRALAX) packet 17 g  17 g Oral DAILY PRN    ondansetron (ZOFRAN ODT) tablet 4 mg  4 mg Oral Q8H PRN    Or    ondansetron (ZOFRAN) injection 4 mg  4 mg IntraVENous Q6H PRN        Review of Systems    Review of Systems   Constitutional:  Negative for fever. Respiratory:  Negative for shortness of breath. Cardiovascular:  Negative for chest pain. Gastrointestinal:  Negative for abdominal pain. Neurological:  Negative for headaches. Objective:     Patient Vitals for the past 8 hrs:   BP Temp Pulse Resp SpO2   01/23/23 1451 129/67 97.6 °F (36.4 °C) 71 16 100 %   01/23/23 1104 (!) 140/63 97.8 °F (36.6 °C) 65 16 100 %   01/23/23 1034 135/69 -- 61 16 100 %   01/23/23 0912 129/72 -- 89 17 94 %       No intake/output data recorded.   01/21 1901 - 01/23 0700  In: -   Out: 600 [Urine:600]    Physical Exam:   Physical Exam  Constitutional:       General: He is not in acute distress. Eyes:      General:         Right eye: No discharge. Left eye: No discharge. Cardiovascular:      Rate and Rhythm: Regular rhythm. Pulmonary:      Breath sounds: Normal breath sounds. Abdominal:      General: Abdomen is flat. Palpations: Abdomen is soft. Tenderness: There is no abdominal tenderness. Skin:     General: Skin is warm and dry. Neurological:      General: No focal deficit present. Mental Status: He is alert. Trace edema        CT CHEST WO CONT   Final Result   1. Multiple bilateral pulmonary nodules with the largest measuring 2.1 x 2.4 cm   in the right lower lobe. Findings may be infectious or inflammatory, though   neoplasm not excluded. Recommend posttreatment imaging to document resolution. 2.  Moderate-severe centrilobular emphysema. 3.  35mm ectasia ascending aorta. 4.  Moderate coronary calcifications. 5.  Other findings as above. XR CHEST PORT   Final Result      No acute process on portable chest.   Persistent mass in the lower right chest zone, for which correlation with PET/CT   is recommended. Emphysema         XR CHEST PORT   Final Result      Unchanged graphic appearance with 2.8 x 2.3 cm infrahilar right pulmonary   nodule. Data Review   No results for input(s): WBC, HGB, HGBEXT, HCT, HCTEXT, PLT, PLTEXT, HGBEXT, HCTEXT, PLTEXT, HGBEXT, HCTEXT, PLTEXT in the last 72 hours. Recent Labs     01/21/23  1409 01/21/23  0726    143   K 4.0 4.3    106   CO2 33* 32   * 100   BUN 62* 65*   CREA 2.92* 2.82*   CA 8.7 8.8   PHOS 3.9 3.6   ALB 1.6*  --        No components found for: GLPOC  Recent Labs     01/23/23  1013   PH 7.46*   PCO2 46*   PO2 96   HCO3 32*   FIO2 28.0     No results for input(s): INR, INREXT, INREXT, INREXT in the last 72 hours.       Assessment:           Active Problems:    Dyspnea on exertion (1/19/2023)      Atrial flutter by electrocardiography ([facility] [address] (1/19/2023)      Atrial flutter ([facility] (1/19/2023)  JULIENNE on CKD. Last creat 2.9 mg.    Metabolic alkalemia    Bilateral pulmonary nodules    Proteus mirabilis in the urine    Anemia, normocytic    Plan:   Keep well-hydrated  Avoid hypotension  Continue Omnicef. Prognosis poor.

## 2023-01-23 NOTE — PROGRESS NOTES
Patient accepted at [facility]. Awaiting [employer] to give auth for SNF. CM faxed all paperwork Friday. CM received notice today that no paperwork received Friday by Mike Keating. Fax history in careport shows info sent to [facility] [employer] on 1/20/23. CM called [employer] to have auth started, as suggested by [facility] rep. CM called [phone] and gave info. Reference number for Ariel Mayito is #[id number]. Faxed clinicals as done previously to [phone].

## 2023-01-23 NOTE — PROGRESS NOTES
*ATTENTION:  This note has been created by a medical student for educational purposes only. Please do not refer to the content of this note for clinical decision-making, billing, or other purposes. Please see attending physicians note to obtain clinical information on this patient. *   General Daily Progress Note          Patient Name:   Dale Last       YOB: 1935       Age:  82 y.o. Admit Date: 1/19/2023      Subjective:   CHIEF COMPLAINT:      Chest pain palpitation shortness of breath     HISTORY OF PRESENT ILLNESS:        Patient is a 82y.o. year old male with signal past medical history of colon cancer chronic kidney disease COPD history of CAD hypertension hyperlipidemia history of CVA not a very good historian patient was seen by the cardiology in his office and recommended patient go to the ER for A. fib a flutter for possible cardioversion patient denies any fever chills nausea vomiting diarrhea no constipation    1/23  Patient seen at bedside. Per nursing, patient has been more lethargic, with increased pallor since last night. Patient told nursing staff he felt shortness of breath. Dr. Meliza Figueroa was informed, and CXR and ABG were ordered .     Objective:     Visit Vitals  /69 (BP 1 Location: Left upper arm)   Pulse 61   Temp 97.4 °F (36.3 °C)   Resp 16   Ht 5' 7\" (1.702 m)   Wt 100 lb 8.5 oz (45.6 kg)   SpO2 100%   BMI 15.75 kg/m²        Recent Results (from the past 24 hour(s))   BLOOD GAS, ARTERIAL    Collection Time: 01/23/23 10:13 AM   Result Value Ref Range    pH 7.46 (H) 7.35 - 7.45      PCO2 46 (H) 35 - 45 mmHg    PO2 96 80 - 100 mmHg    O2 SATURATION 97 95 - 99 %    BICARBONATE 32 (H) 22 - 26 mmol/L    BASE EXCESS 7.0 (H) 0 - 3 mmol/L    O2 METHOD Nasal Cannula      O2 FLOW RATE 2.00 L/min    FIO2 28.0 %    Sample source Arterial      SITE Right Brachial      RAFIA'S TEST NOT APPLICABLE      Carboxy-Hgb 0.2 (L) 1 - 2 %    Methemoglobin 0.3 0 - 1.4 % Oxyhemoglobin 96.7 95 - 99 %    Performed by Dylan Hanley 97.0       [unfilled]      Review of Systems    Constitutional: Negative for chills and fever. HENT: Negative. Eyes: Negative. Respiratory: Negative. Cardiovascular: Negative. Gastrointestinal: Negative for abdominal pain and nausea. Skin: Negative. Neurological: Negative. Physical Exam:      Constitutional: pt is oriented to person, place, and time. HENT:   Head: Normocephalic and atraumatic. Eyes: Pupils are equal, round, and reactive to light. EOM are normal.   Cardiovascular: Normal rate, regular rhythm and normal heart sounds. Pulmonary/Chest: Breath sounds normal. No wheezes. No rales. Exhibits no tenderness. Abdominal: Soft. Bowel sounds are normal. There is no abdominal tenderness. There is no rebound and no guarding. Musculoskeletal: Normal range of motion. Neurological: pt is alert and oriented to person, place, and time. XR CHEST PORT   Final Result      No acute process on portable chest.   Persistent mass in the lower right chest zone, for which correlation with PET/CT   is recommended. Emphysema         XR CHEST PORT   Final Result      Unchanged graphic appearance with 2.8 x 2.3 cm infrahilar right pulmonary   nodule.               Recent Results (from the past 24 hour(s))   BLOOD GAS, ARTERIAL    Collection Time: 01/23/23 10:13 AM   Result Value Ref Range    pH 7.46 (H) 7.35 - 7.45      PCO2 46 (H) 35 - 45 mmHg    PO2 96 80 - 100 mmHg    O2 SATURATION 97 95 - 99 %    BICARBONATE 32 (H) 22 - 26 mmol/L    BASE EXCESS 7.0 (H) 0 - 3 mmol/L    O2 METHOD Nasal Cannula      O2 FLOW RATE 2.00 L/min    FIO2 28.0 %    Sample source Arterial      SITE Right Brachial      RAFIA'S TEST NOT APPLICABLE      Carboxy-Hgb 0.2 (L) 1 - 2 %    Methemoglobin 0.3 0 - 1.4 %    Oxyhemoglobin 96.7 95 - 99 %    Performed by [employer]     TEMPERATURE 97.0         Results       Procedure Component Value Units Date/Time    CULTURE, URINE [[id number]]  (Abnormal) Collected: 01/19/23 9117    Order Status: Completed Specimen: Urine Updated: 01/22/23 114     Special Requests: No Special Requests        [place] Count 11,000        [place] Count colonies/ml        Culture result: Proteus mirabilis                Labs:   No results for input(s): WBC, HGB, HCT, PLT, HGBEXT, HCTEXT, PLTEXT in the last 72 hours. Recent Labs     01/21/23  1409 01/21/23  0726    143   K 4.0 4.3    106   CO2 33* 32   BUN 62* 65*   CREA 2.92* 2.82*   * 100   CA 8.7 8.8   PHOS 3.9 3.6     Recent Labs     01/21/23  1409   ALB 1.6*     No results for input(s): INR, PTP, APTT, INREXT in the last 72 hours. No results for input(s): FE, TIBC, PSAT, FERR in the last 72 hours. No results found for: FOL, RBCF   Recent Labs     01/23/23  1013   PH 7.46*   PCO2 46*   PO2 96     No results for input(s): CPK, CKNDX, TROIQ in the last 72 hours.     No lab exists for component: CPKMB  Lab Results   Component Value Date/Time    Cholesterol, total 236 (H) 04/03/2022 08:10 AM    HDL Cholesterol 86 04/03/2022 08:10 AM    LDL, calculated 139 (H) 04/03/2022 08:10 AM    Triglyceride 55 04/03/2022 08:10 AM    CHOL/HDL Ratio 2.7 04/03/2022 08:10 AM     Lab Results   Component Value Date/Time    Glucose (POC) 129 (H) 09/15/2022 10:35 PM    Glucose (POC) 85 09/15/2022 11:48 AM    Glucose (POC) 94 04/01/2022 11:13 AM    Glucose, POC 81 09/15/2022 10:58 AM     Lab Results   Component Value Date/Time    Color Yellow/Straw 01/19/2023 11:55 PM    Appearance Clear 01/19/2023 11:55 PM    Specific gravity 1.015 01/19/2023 11:55 PM    Specific gravity 1.020 12/08/2022 03:39 PM    pH (UA) 6.0 01/19/2023 11:55 PM    Protein >300 (A) 01/19/2023 11:55 PM    Glucose Negative 01/19/2023 11:55 PM    Ketone Negative 01/19/2023 11:55 PM    Bilirubin Negative 01/19/2023 11:55 PM    Urobilinogen 0.1 (L) 01/19/2023 11:55 PM    Nitrites Negative 01/19/2023 11:55 PM Leukocyte Esterase Negative 01/19/2023 11:55 PM    Bacteria Negative 01/19/2023 11:55 PM    WBC 0-4 01/19/2023 11:55 PM    RBC 5-10 01/19/2023 11:55 PM         Assessment:      A flutter  Acute on chronic kidney disease  Congestive heart failure  History of colon cancer  COPD  Hypertension  Plan:   Awaiting follow up with cardiology regarding vascular testing             Current Facility-Administered Medications:     cefdinir (OMNICEF) capsule 300 mg, 300 mg, Oral, Q24H, Sirisha Cronin MD, 300 mg at 01/22/23 1712    dilTIAZem IR (CARDIZEM) tablet 30 mg, 30 mg, Oral, BID, Marbella Washburn MD, 30 mg at 01/23/23 0824    apixaban (ELIQUIS) tablet 2.5 mg, 2.5 mg, Oral, BID, Marbella Washburn MD, 2.5 mg at 01/23/23 0824    albuterol (PROVENTIL HFA, VENTOLIN HFA, PROAIR HFA) inhaler 1 Puff, 1 Puff, Inhalation, Q4H PRN, Andie Nava MD    atorvastatin (LIPITOR) tablet 40 mg, 40 mg, Oral, QHS, Andie Nava MD, 40 mg at 01/22/23 2121    doxazosin (CARDURA) tablet 4 mg, 4 mg, Oral, DAILY, Peterson Davenport MD, 4 mg at 01/23/23 5272    ergocalciferol capsule 50,000 Units, 50,000 Units, Oral, Q7D, Andie Nava MD, 50,000 Units at 01/22/23 0816    metoprolol tartrate (LOPRESSOR) tablet 25 mg, 25 mg, Oral, BID, Andie Nava MD, 25 mg at 01/23/23 3430    [Held by provider] sodium bicarbonate tablet 325 mg, 325 mg, Oral, BID, Andie Nava MD, 325 mg at 01/21/23 1617    acetaminophen (TYLENOL) tablet 650 mg, 650 mg, Oral, Q6H PRN, 650 mg at 01/23/23 0406 **OR** acetaminophen (TYLENOL) suppository 650 mg, 650 mg, Rectal, Q6H PRN, Andie Nava MD    polyethylene glycol (MIRALAX) packet 17 g, 17 g, Oral, DAILY PRN, Andie Nava MD, 17 g at 01/22/23 1323    ondansetron (ZOFRAN ODT) tablet 4 mg, 4 mg, Oral, Q8H PRN **OR** ondansetron (ZOFRAN) injection 4 mg, 4 mg, IntraVENous, Q6H PRN, Peterson Davenport MD

## 2023-01-23 NOTE — PROGRESS NOTES
Physician Progress Note      PATIENT:               Theresa Moser  CSN #:                  [id number]  :                       1935  ADMIT DATE:       2023 2:32 PM  [address] [place] DATE:  RESPONDING  PROVIDER #:        Bharathi MILLS MD          QUERY TEXT:    Pt admitted with SOB and has CHF documented. If possible, please document in progress notes and discharge summary further specificity regarding the type and acuity of CHF:    The medical record reflects the following:  Risk Factors: 82year old male, shortness of breath, HTN, elevated BNP  Clinical Indicators:  H&P - Congestive heart failure  BNP: 2,083-1,760   Echo - Left Ventricle: Normal left ventricular systolic function with a visually estimated EF of 55 - 60%. Left ventricle is smaller than normal. Mildly increased wall thickness. Normal wall motion.  CXR - Trace left pleural effusion is noted. Treatment: IV Lasix 40mg    Please email [email or web address] with any questions  Options provided:  -- Acute on Chronic Systolic CHF/HFrEF  -- Acute on Chronic Diastolic CHF/HFpEF  -- Acute on Chronic Systolic and Diastolic CHF  -- Acute Systolic CHF/HFrEF  -- Acute Diastolic CHF/HFpEF  -- Acute Systolic and Diastolic CHF  -- Chronic Systolic CHF/HFrEF  -- Chronic Diastolic CHF/HFpEF  -- Chronic Systolic and Diastolic CHF  -- Other - I will add my own diagnosis  -- Disagree - Not applicable / Not valid  -- Disagree - Clinically unable to determine / Unknown  -- Refer to Clinical Documentation Reviewer    PROVIDER RESPONSE TEXT:    This patient is in acute on chronic diastolic CHF/HFpEF. Query created by: Moses Rodriguez on 2023 8:36 AM      QUERY TEXT:    Pt admitted with SOB. Noted documentation of Moderate malnutrition on  Dietician consult note.   If possible, please document in progress notes and discharge summary:      The medical record reflects the following:  Risk Factors: 82year old male, chronically decreased appetite, BMI 15.75, Albumin 1.8  Clinical Indicators: 1/20 Dietician consult - Moderate malnutrition, Acute illness  Body Fat Loss:  Mild body fat loss, Triceps, Orbital  Muscle Mass Loss:  Mild muscle mass loss, Calf, Scapula (trapezius)  NFPE indicates moderate malnutrition givenloss/wasting of fat and muscle. Treatment: 1. Continue current diet  2. Start ONS with meals  3. Monitor weight status closely      Please email [email or web address] with any questions  Options provided:  -- moderate malnutrition confirmed present on admission  -- moderate malnutrition ruled out  -- Other - I will add my own diagnosis  -- Disagree - Not applicable / Not valid  -- Disagree - Clinically unable to determine / Unknown  -- Refer to Clinical Documentation Reviewer    PROVIDER RESPONSE TEXT:    The diagnosis of moderate malnutrition was confirmed as present on admission. Query created by:  Tiffany Alanis on 1/23/2023 8:40 AM      Electronically signed by:  Erin Ha MD 1/23/2023 1:05 PM

## 2023-01-23 NOTE — PROGRESS NOTES
Pt states 'it feels hard to breathe\". VS stable P-89 R- 18 bp 129/72 O2-93%. Pt's skin was costa in color. Notified . Order received for abg and stat chest xray. Applied NC at 3L. Pt's color improved.

## 2023-01-24 LAB
ALBUMIN SERPL-MCNC: 1.6 G/DL (ref 3.5–5)
ALBUMIN/GLOB SERPL: 0.4 (ref 1.1–2.2)
ALP SERPL-CCNC: 83 U/L (ref 45–117)
ALT SERPL-CCNC: 22 U/L (ref 12–78)
ANION GAP SERPL CALC-SCNC: 5 MMOL/L (ref 5–15)
AST SERPL W P-5'-P-CCNC: 29 U/L (ref 15–37)
BILIRUB SERPL-MCNC: 0.4 MG/DL (ref 0.2–1)
BUN SERPL-MCNC: 45 MG/DL (ref 6–20)
BUN/CREAT SERPL: 16 (ref 12–20)
CA-I BLD-MCNC: 8.9 MG/DL (ref 8.5–10.1)
CHLORIDE SERPL-SCNC: 102 MMOL/L (ref 97–108)
CO2 SERPL-SCNC: 33 MMOL/L (ref 21–32)
CREAT SERPL-MCNC: 2.75 MG/DL (ref 0.7–1.3)
ERYTHROCYTE [DISTWIDTH] IN BLOOD BY AUTOMATED COUNT: 14.5 % (ref 11.5–14.5)
GLOBULIN SER CALC-MCNC: 3.8 G/DL (ref 2–4)
GLUCOSE SERPL-MCNC: 120 MG/DL (ref 65–100)
HCT VFR BLD AUTO: 29 % (ref 36.6–50.3)
HGB BLD-MCNC: 8.9 G/DL (ref 12.1–17)
MCH RBC QN AUTO: 28 PG (ref 26–34)
MCHC RBC AUTO-ENTMCNC: 30.7 G/DL (ref 30–36.5)
MCV RBC AUTO: 91.2 FL (ref 80–99)
NRBC # BLD: 0 K/UL (ref 0–0.01)
NRBC BLD-RTO: 0 PER 100 WBC
PLATELET # BLD AUTO: 207 K/UL (ref 150–400)
PMV BLD AUTO: 11.5 FL (ref 8.9–12.9)
POTASSIUM SERPL-SCNC: 4.2 MMOL/L (ref 3.5–5.1)
PROT SERPL-MCNC: 5.4 G/DL (ref 6.4–8.2)
RBC # BLD AUTO: 3.18 M/UL (ref 4.1–5.7)
SODIUM SERPL-SCNC: 140 MMOL/L (ref 136–145)
WBC # BLD AUTO: 3.8 K/UL (ref 4.1–11.1)

## 2023-01-24 PROCEDURE — 94760 N-INVAS EAR/PLS OXIMETRY 1: CPT

## 2023-01-24 PROCEDURE — 74011250637 HC RX REV CODE- 250/637: Performed by: INTERNAL MEDICINE

## 2023-01-24 PROCEDURE — 74011000250 HC RX REV CODE- 250: Performed by: FAMILY MEDICINE

## 2023-01-24 PROCEDURE — 77010033678 HC OXYGEN DAILY

## 2023-01-24 PROCEDURE — 80053 COMPREHEN METABOLIC PANEL: CPT

## 2023-01-24 PROCEDURE — 94640 AIRWAY INHALATION TREATMENT: CPT

## 2023-01-24 PROCEDURE — 94761 N-INVAS EAR/PLS OXIMETRY MLT: CPT

## 2023-01-24 PROCEDURE — 85027 COMPLETE CBC AUTOMATED: CPT

## 2023-01-24 PROCEDURE — 74011250637 HC RX REV CODE- 250/637: Performed by: FAMILY MEDICINE

## 2023-01-24 PROCEDURE — 36415 COLL VENOUS BLD VENIPUNCTURE: CPT

## 2023-01-24 PROCEDURE — 65270000029 HC RM PRIVATE

## 2023-01-24 RX ORDER — AMOXICILLIN AND CLAVULANATE POTASSIUM 500; 125 MG/1; MG/1
1 TABLET, FILM COATED ORAL EVERY 12 HOURS
Status: DISCONTINUED | OUTPATIENT
Start: 2023-01-24 | End: 2023-01-25 | Stop reason: HOSPADM

## 2023-01-24 RX ORDER — BUDESONIDE AND FORMOTEROL FUMARATE DIHYDRATE 160; 4.5 UG/1; UG/1
2 AEROSOL RESPIRATORY (INHALATION) 2 TIMES DAILY
Status: DISCONTINUED | OUTPATIENT
Start: 2023-01-24 | End: 2023-01-25 | Stop reason: HOSPADM

## 2023-01-24 RX ORDER — IPRATROPIUM BROMIDE AND ALBUTEROL SULFATE 2.5; .5 MG/3ML; MG/3ML
3 SOLUTION RESPIRATORY (INHALATION)
Status: DISCONTINUED | OUTPATIENT
Start: 2023-01-24 | End: 2023-01-24

## 2023-01-24 RX ORDER — IPRATROPIUM BROMIDE AND ALBUTEROL SULFATE 2.5; .5 MG/3ML; MG/3ML
3 SOLUTION RESPIRATORY (INHALATION)
Status: DISCONTINUED | OUTPATIENT
Start: 2023-01-24 | End: 2023-01-25 | Stop reason: HOSPADM

## 2023-01-24 RX ORDER — AMOXICILLIN AND CLAVULANATE POTASSIUM 875; 125 MG/1; MG/1
1 TABLET, FILM COATED ORAL EVERY 12 HOURS
Qty: 20 TABLET | Refills: 0 | Status: SHIPPED | OUTPATIENT
Start: 2023-01-24

## 2023-01-24 RX ORDER — BUDESONIDE AND FORMOTEROL FUMARATE DIHYDRATE 160; 4.5 UG/1; UG/1
2 AEROSOL RESPIRATORY (INHALATION) 2 TIMES DAILY
Qty: 10.2 G | Refills: 2 | Status: SHIPPED | OUTPATIENT
Start: 2023-01-24

## 2023-01-24 RX ORDER — IPRATROPIUM BROMIDE AND ALBUTEROL SULFATE 2.5; .5 MG/3ML; MG/3ML
3 SOLUTION RESPIRATORY (INHALATION)
Qty: 1 EACH | Refills: 1 | Status: SHIPPED | OUTPATIENT
Start: 2023-01-24

## 2023-01-24 RX ORDER — LEVOFLOXACIN 250 MG/1
250 TABLET ORAL EVERY 24 HOURS
Status: DISCONTINUED | OUTPATIENT
Start: 2023-01-24 | End: 2023-01-25 | Stop reason: HOSPADM

## 2023-01-24 RX ORDER — LEVOFLOXACIN 250 MG/1
250 TABLET ORAL EVERY 24 HOURS
Qty: 10 TABLET | Refills: 0 | Status: SHIPPED | OUTPATIENT
Start: 2023-01-24

## 2023-01-24 RX ADMIN — DOXAZOSIN 4 MG: 4 TABLET ORAL at 09:05

## 2023-01-24 RX ADMIN — AMOXICILLIN AND CLAVULANATE POTASSIUM 1 TABLET: 500; 125 TABLET, FILM COATED ORAL at 21:47

## 2023-01-24 RX ADMIN — APIXABAN 2.5 MG: 2.5 TABLET, FILM COATED ORAL at 21:48

## 2023-01-24 RX ADMIN — ATORVASTATIN CALCIUM 40 MG: 40 TABLET, FILM COATED ORAL at 21:47

## 2023-01-24 RX ADMIN — METOPROLOL TARTRATE 25 MG: 25 TABLET, FILM COATED ORAL at 09:05

## 2023-01-24 RX ADMIN — BUDESONIDE AND FORMOTEROL FUMARATE DIHYDRATE 2 PUFF: 160; 4.5 AEROSOL RESPIRATORY (INHALATION) at 20:28

## 2023-01-24 RX ADMIN — BUDESONIDE AND FORMOTEROL FUMARATE DIHYDRATE 2 PUFF: 160; 4.5 AEROSOL RESPIRATORY (INHALATION) at 08:09

## 2023-01-24 RX ADMIN — METOPROLOL TARTRATE 25 MG: 25 TABLET, FILM COATED ORAL at 21:47

## 2023-01-24 RX ADMIN — LEVOFLOXACIN 250 MG: 250 TABLET, FILM COATED ORAL at 10:46

## 2023-01-24 RX ADMIN — APIXABAN 2.5 MG: 2.5 TABLET, FILM COATED ORAL at 09:05

## 2023-01-24 RX ADMIN — IPRATROPIUM BROMIDE AND ALBUTEROL SULFATE 3 ML: 2.5; .5 SOLUTION RESPIRATORY (INHALATION) at 08:09

## 2023-01-24 RX ADMIN — AMOXICILLIN AND CLAVULANATE POTASSIUM 1 TABLET: 500; 125 TABLET, FILM COATED ORAL at 09:05

## 2023-01-24 RX ADMIN — DILTIAZEM HYDROCHLORIDE 30 MG: 30 TABLET, FILM COATED ORAL at 21:47

## 2023-01-24 RX ADMIN — DILTIAZEM HYDROCHLORIDE 30 MG: 30 TABLET, FILM COATED ORAL at 09:05

## 2023-01-24 NOTE — PROGRESS NOTES
Problem: Falls - Risk of  Goal: *Absence of Falls  Description: Document Aden Miller Fall Risk and appropriate interventions in the flowsheet. Outcome: Progressing Towards Goal  Note: Fall Risk Interventions:  Mobility Interventions: Patient to call before getting OOB         Medication Interventions: Bed/chair exit alarm         History of Falls Interventions: Door open when patient unattended         Problem: Patient Education: Go to Patient Education Activity  Goal: Patient/Family Education  Outcome: Progressing Towards Goal     Problem: General Medical Care Plan  Goal: *Skin integrity maintained  Outcome: Progressing Towards Goal     Problem: Patient Education: Go to Patient Education Activity  Goal: Patient/Family Education  Outcome: Progressing Towards Goal     Problem: Pressure Injury - Risk of  Goal: *Prevention of pressure injury  Description: Document Earl Scale and appropriate interventions in the flowsheet.   Outcome: Progressing Towards Goal  Note: Pressure Injury Interventions:  Sensory Interventions: Assess changes in LOC, Minimize linen layers    Moisture Interventions: Absorbent underpads    Activity Interventions: Increase time out of bed    Mobility Interventions: PT/OT evaluation, HOB 30 degrees or less    Nutrition Interventions: Document food/fluid/supplement intake, Offer support with meals,snacks and hydration    Friction and Shear Interventions: Apply protective barrier, creams and emollients, Minimize layers                Problem: Patient Education: Go to Patient Education Activity  Goal: Patient/Family Education  Outcome: Progressing Towards Goal     Problem: Patient Education: Go to Patient Education Activity  Goal: Patient/Family Education  Outcome: Progressing Towards Goal     Problem: Patient Education: Go to Patient Education Activity  Goal: Patient/Family Education  Outcome: Progressing Towards Goal

## 2023-01-24 NOTE — DISCHARGE SUMMARY
Discharge Summary       PATIENT ID: Dale Bosch  MRN: [id number]   YOB: 1935    DATE OF ADMISSION: 1/19/2023  2:32 PM    DATE OF DISCHARGE:   PRIMARY CARE PROVIDER: Mele Velásquez     ATTENDING PHYSICIAN: Annmarie Nava  DISCHARGING PROVIDER: Annmarie Nava      CONSULTATIONS: IP CONSULT TO CARDIOLOGY  IP CONSULT TO NEPHROLOGY    PROCEDURES/SURGERIES: * No surgery found *    ADMITTING DIAGNOSES:    Patient Active Problem List    Diagnosis Date Noted    Dyspnea on exertion 01/19/2023    Atrial flutter by electrocardiography ([facility] 01/19/2023    Atrial flutter ([facility] 01/19/2023    Colon cancer ([facility] 09/15/2022    Status post surgery 09/15/2022    Acute CVA (cerebrovascular accident) ([facility] 04/03/2022    Hypercholesteremia 04/03/2022    Accelerated hypertension 04/03/2022    CVA (cerebral vascular accident) ([facility] 04/03/2022    Paresthesia and pain of right extremity 04/01/2022       DISCHARGE DIAGNOSES / PLAN:      A flutter     JULIENNE on CKD stage 4   -     CHF     Hx of colon cancer    - s/p hemicolectomy 9/15/22     COPD     HTN     Pneumonia        DISCHARGE MEDICATIONS:  Current Discharge Medication List        START taking these medications    Details   amoxicillin-clavulanate (AUGMENTIN) 875-125 mg per tablet Take 1 Tablet by mouth every twelve (12) hours. Qty: 20 Tablet, Refills: 0  Start date: 1/24/2023      levoFLOXacin (LEVAQUIN) 250 mg tablet Take 1 Tablet by mouth every twenty-four (24) hours. Qty: 10 Tablet, Refills: 0  Start date: 1/24/2023      dilTIAZem IR (CARDIZEM) 30 mg tablet Take 1 Tablet by mouth two (2) times a day. Qty: 60 Tablet, Refills: 0  Start date: 1/20/2023      furosemide (Lasix) 40 mg tablet Tablet daily  Qty: 60 Tablet, Refills: 0  Start date: 1/20/2023      apixaban (ELIQUIS) 2.5 mg tablet Take 1 Tablet by mouth two (2) times a day.   Qty: 60 Tablet, Refills: 0  Start date: 1/20/2023           CONTINUE these medications which have CHANGED    Details metoprolol tartrate (LOPRESSOR) 25 mg tablet Take 1 Tablet by mouth two (2) times a day. Qty: 60 Tablet, Refills: 0  Start date: 1/20/2023           CONTINUE these medications which have NOT CHANGED    Details   albuterol (PROVENTIL HFA, VENTOLIN HFA, PROAIR HFA) 90 mcg/actuation inhaler Take 1 Puff by inhalation every four (4) hours as needed for Wheezing. Qty: 18 g, Refills: 0      ergocalciferol (ERGOCALCIFEROL) 1,250 mcg (50,000 unit) capsule Take 50,000 Units by mouth every seven (7) days. Patient stated takes once weekly on  Sundays      atorvastatin (LIPITOR) 40 mg tablet Take 1 Tablet by mouth nightly. Qty: 30 Tablet, Refills: 1      sodium bicarbonate 325 mg tablet Take 325 mg by mouth two (2) times a day. doxazosin (CARDURA) 4 mg tablet Take 4 mg by mouth daily. STOP taking these medications       bumetanide (BUMEX) 2 mg tablet Comments:   Reason for Stopping:         colestipoL (COLESTID) 1 gram tablet Comments:   Reason for Stopping:         amLODIPine (NORVASC) 2.5 mg tablet Comments:   Reason for Stopping:         doxycycline (VIBRA-TABS) 100 mg tablet Comments:   Reason for Stopping:                 NOTIFY YOUR PHYSICIAN FOR ANY OF THE FOLLOWING:   Fever over 101 degrees for 24 hours. Chest pain, shortness of breath, fever, chills, nausea, vomiting, diarrhea, change in mentation, falling, weakness, bleeding. Severe pain or pain not relieved by medications. Or, any other signs or symptoms that you may have questions about. DISPOSITION:  x  Home With:   OT  PT  HH  RN       Long term SNF/Inpatient Rehab    Independent/assisted living    Hospice    Other:       PATIENT CONDITION AT DISCHARGE: Stable      PHYSICAL EXAMINATION AT DISCHARGE:  General:          Alert, cooperative, no distress, appears stated age.      HEENT:           Atraumatic, anicteric sclerae, pink conjunctivae                          No oral ulcers, mucosa moist, throat clear, dentition fair  Neck: Supple, symmetrical  Lungs:             Clear to auscultation bilaterally. No Wheezing or Rhonchi. No rales. Chest wall:      No tenderness  No Accessory muscle use. Heart:              Regular  rhythm,  No  murmur   No edema  Abdomen:        Soft, non-tender. Not distended. Bowel sounds normal  Extremities:     No cyanosis. No clubbing,                            Skin turgor normal, Capillary refill normal  Skin:                Not pale. Not Jaundiced  No rashes   Psych:             Not anxious or agitated. Neurologic:      Alert, moves all extremities, answers questions appropriately and responds to commands     CT CHEST WO CONT   Final Result   1. Multiple bilateral pulmonary nodules with the largest measuring 2.1 x 2.4 cm   in the right lower lobe. Findings may be infectious or inflammatory, though   neoplasm not excluded. Recommend posttreatment imaging to document resolution. 2.  Moderate-severe centrilobular emphysema. 3.  35mm ectasia ascending aorta. 4.  Moderate coronary calcifications. 5.  Other findings as above. XR CHEST PORT   Final Result      No acute process on portable chest.   Persistent mass in the lower right chest zone, for which correlation with PET/CT   is recommended. Emphysema         XR CHEST PORT   Final Result      Unchanged graphic appearance with 2.8 x 2.3 cm infrahilar right pulmonary   nodule.               Recent Results (from the past 24 hour(s))   BLOOD GAS, ARTERIAL    Collection Time: 01/23/23 10:13 AM   Result Value Ref Range    pH 7.46 (H) 7.35 - 7.45      PCO2 46 (H) 35 - 45 mmHg    PO2 96 80 - 100 mmHg    O2 SATURATION 97 95 - 99 %    BICARBONATE 32 (H) 22 - 26 mmol/L    BASE EXCESS 7.0 (H) 0 - 3 mmol/L    O2 METHOD Nasal Cannula      O2 FLOW RATE 2.00 L/min    FIO2 28.0 %    Sample source Arterial      SITE Right Brachial      RAFIA'S TEST NOT APPLICABLE      Carboxy-Hgb 0.2 (L) 1 - 2 %    Methemoglobin 0.3 0 - 1.4 %    Oxyhemoglobin 96.7 95 - 99 % Performed by Simon Pinzon     TEMPERATURE 43.4     METABOLIC PANEL, BASIC    Collection Time: 01/23/23  4:10 PM   Result Value Ref Range    Sodium 138 136 - 145 mmol/L    Potassium 4.3 3.5 - 5.1 mmol/L    Chloride 103 97 - 108 mmol/L    CO2 31 21 - 32 mmol/L    Anion gap 4 (L) 5 - 15 mmol/L    Glucose 119 (H) 65 - 100 mg/dL    BUN 53 (H) 6 - 20 mg/dL    Creatinine 2.60 (H) 0.70 - 1.30 mg/dL    BUN/Creatinine ratio 20 12 - 20      eGFR 23 (L) >60 ml/min/1.73m2    Calcium 8.8 8.5 - 10.1 mg/dL   NT-PRO BNP    Collection Time: 01/23/23  4:10 PM   Result Value Ref Range    NT pro-BNP 1,708 (H) <450 pg/mL          HOSPITAL COURSE:  HPI: Patient is a 82-year-old male with PMH of colon cancer s/p hemicolectomy, CKD stage 4, CAD, HTN, hyperlipidemia, hx of CVA presented to ED after patient was found to be in a fib/a flutter at outpatient cardiology office. Patient is a poor historian. Patient denies fever, chills, nausea, vomiting, diarrhea, or constipation. 1/20  Patient evaluated at bedside. Patient complaint of shortness of breath. Patient stated dyspnea does not change with exertion or rest. Dyspnea has been present for 1 month without change. Patient scheduled for ECHO LEONILA w/possible cardioversion today    Patient had cardioversion done but unsuccessful  Cardiology cleared the patient for discharge    Medication reconciliation done time shopping 35 minutes 50% time counseling coronation of care    Patient has scan of the chest which shows multiple bilateral pulmonary nodule with the largest measuring 2.1 to 2.4 cm in the right lobe consistent with infectious or inflammatory recommend treated with antibiotics and repeat chest x-ray for resolution    P.o.  Augmentin and Levaquin patient can be discharged to skilled care rehab    And repeat chest x-ray in 2 weeks      Signed:   Nely Paz MD  1/24/2023  1:37 PM

## 2023-01-24 NOTE — PROGRESS NOTES
Telemetry notified nurse that patient went into a 2nd degree Wenkebach block at 1630. Vitals signs stable at /54, HR 63, O2 96%, RR 18. Patient not complaining of any chest pain. Dr. Farshad Troy notified and he asked that I contact cardiology. Dr. Elaine Conrad notified of patient situation and nurse asked about discharge, as patient has order in and is supposed to leave this evening. He stated that it was okay for patient to discharge and to have the patient follow-up with him in 2 weeks. Dr. Brittni Rodriguez notified of conversation.

## 2023-01-24 NOTE — PROGRESS NOTES
DC Plan: [facility]        Room# 304A       Report: [phone]       Transportation: daughter (before 6pm)    Discharge plan of care/case management plan validated with provider's discharge order.

## 2023-01-24 NOTE — PROGRESS NOTES
DC Plan: [facility] ([facility] pending)  Ref# [id number]    Pt transferred from 4W to 2E.    1323    CM called [employer] to f/up on the status of pt's auth [id number]. CM was informed they are having technical issues. The [employer] rep suggested Cm call back in an hr.     1430    Cm called [employer] again to check on the status of pt's auth [id number]. Cm spoke with Colleen العراقي. Colleen العراقي indicated pt was approved for three days. Cm received approved start date 1/23/23 (Monday). Next review date: 1/25/23 (Wednesday). [id number] [facility] via [employer] to see if they can take pt today. Awaiting response. 1453    Cm attempted to contact Marylene in admissions at [address]. CM left a voice message. 1520    Cm received a response via [employer] from [facility] indicating pt can come to them today. Cm stopped by pt's room. Cm informed him of discharge to [facility] today. Pt is agreeable with writer calling his daughter to notify her of discharge. Medicare pt has received, reviewed, and signed 2nd IM letter informing them of their right to appeal the discharge. Signed copied has been placed on pt bedside chart. Cm called pt's daughter - Taylor Ceja [phone] and informed her of discharge to [facility] today. Transportation was discussed. Cm explained to daughter pt does not qualify/meet criteria for stretcher transportation. Daughter indicated she will be at the hospital before 6pm to  her father. Daughter reports if she is able to find someone else to  her father, she will call the nurses station to let us know. Cm updated pt's nurse on pt's discharge. Discharge plan of care/case management plan validated with provider's discharge order. Cheek Interpolation Flap Text: A decision was made to reconstruct the defect utilizing an interpolation axial flap and a staged reconstruction.  A telfa template was made of the defect.  This telfa template was then used to outline the Cheek Interpolation flap.  The donor area for the pedicle flap was then injected with anesthesia.  The flap was excised through the skin and subcutaneous tissue down to the layer of the underlying musculature.  The interpolation flap was carefully excised within this deep plane to maintain its blood supply.  The edges of the donor site were undermined.   The donor site was closed in a primary fashion.  The pedicle was then rotated into position and sutured.  Once the tube was sutured into place, adequate blood supply was confirmed with blanching and refill.  The pedicle was then wrapped with xeroform gauze and dressed appropriately with a telfa and gauze bandage to ensure continued blood supply and protect the attached pedicle.

## 2023-01-25 VITALS
WEIGHT: 100.53 LBS | RESPIRATION RATE: 17 BRPM | SYSTOLIC BLOOD PRESSURE: 149 MMHG | DIASTOLIC BLOOD PRESSURE: 74 MMHG | OXYGEN SATURATION: 96 % | HEART RATE: 67 BPM | TEMPERATURE: 97.9 F | BODY MASS INDEX: 15.78 KG/M2 | HEIGHT: 67 IN

## 2023-01-25 PROCEDURE — 94640 AIRWAY INHALATION TREATMENT: CPT

## 2023-01-25 PROCEDURE — 74011250637 HC RX REV CODE- 250/637: Performed by: FAMILY MEDICINE

## 2023-01-25 PROCEDURE — 74011250637 HC RX REV CODE- 250/637: Performed by: INTERNAL MEDICINE

## 2023-01-25 RX ADMIN — APIXABAN 2.5 MG: 2.5 TABLET, FILM COATED ORAL at 09:34

## 2023-01-25 RX ADMIN — AMOXICILLIN AND CLAVULANATE POTASSIUM 1 TABLET: 500; 125 TABLET, FILM COATED ORAL at 09:34

## 2023-01-25 RX ADMIN — BUDESONIDE AND FORMOTEROL FUMARATE DIHYDRATE 2 PUFF: 160; 4.5 AEROSOL RESPIRATORY (INHALATION) at 07:38

## 2023-01-25 RX ADMIN — LEVOFLOXACIN 250 MG: 250 TABLET, FILM COATED ORAL at 09:34

## 2023-01-25 RX ADMIN — DILTIAZEM HYDROCHLORIDE 30 MG: 30 TABLET, FILM COATED ORAL at 09:34

## 2023-01-25 RX ADMIN — DOXAZOSIN 4 MG: 4 TABLET ORAL at 09:34

## 2023-01-25 RX ADMIN — METOPROLOL TARTRATE 25 MG: 25 TABLET, FILM COATED ORAL at 09:34

## 2023-01-25 NOTE — PROGRESS NOTES
Problem: Falls - Risk of  Goal: *Absence of Falls  Description: Document Jenny Eastman Fall Risk and appropriate interventions in the flowsheet. Outcome: Progressing Towards Goal  Note: Fall Risk Interventions:  Mobility Interventions: Patient to call before getting OOB         Medication Interventions: Bed/chair exit alarm         History of Falls Interventions: Door open when patient unattended         Problem: Patient Education: Go to Patient Education Activity  Goal: Patient/Family Education  Outcome: Progressing Towards Goal     Problem: General Medical Care Plan  Goal: *Skin integrity maintained  Outcome: Progressing Towards Goal     Problem: Patient Education: Go to Patient Education Activity  Goal: Patient/Family Education  Outcome: Progressing Towards Goal     Problem: Pressure Injury - Risk of  Goal: *Prevention of pressure injury  Description: Document Earl Scale and appropriate interventions in the flowsheet.   Outcome: Progressing Towards Goal  Note: Pressure Injury Interventions:  Sensory Interventions: Assess need for specialty bed    Moisture Interventions: Absorbent underpads    Activity Interventions: PT/OT evaluation    Mobility Interventions: PT/OT evaluation    Nutrition Interventions: Document food/fluid/supplement intake, Discuss nutritional consult with provider    Friction and Shear Interventions: Apply protective barrier, creams and emollients                Problem: Patient Education: Go to Patient Education Activity  Goal: Patient/Family Education  Outcome: Progressing Towards Goal     Problem: Patient Education: Go to Patient Education Activity  Goal: Patient/Family Education  Outcome: Progressing Towards Goal     Problem: Patient Education: Go to Patient Education Activity  Goal: Patient/Family Education  Outcome: Progressing Towards Goal

## 2023-01-25 NOTE — PROGRESS NOTES
Apparently, pt did not discharge yesterday due to issues with transportation. CM called [facility] [phone] to obtain a quote for wheelchair transportation. CM received the following quotes: if paid by cash/check $121.30 / if paid by card (card fee included) $ 126.15.     7095    Cm attempted to contact pt's daughter - Monet Gutierrez [phone]. Cm received no answer. ___________________________________________________    Writer and CM Supervisor met with pt and pt's daughter at the bedside. Transportation was discussed. Daughter indicated she will provide transportation to [facility]. Cm spoke with pt's nurse. Discharge plan of care/case management plan validated with provider's discharge order.

## 2023-01-25 NOTE — PROGRESS NOTES
Discharge order in and patient was to be transported via daughter, per MATT. Nurse received a call from [facility] stating that the daughter was at their facility trying to get a wheelchair and informing them that she has to pick the patient up from the hospital and transport him because there is no other option but that she is not comfortable transporting the patient alone. [facility] personnel informed that daughter that the hospital is able to set up transport and that she is not required to transport the patient. Daughter arrived at hospital and I discussed with her that the patient is not eligible for stretcher transport but that we could set up a medicare cab and the staff at [facility] could assist him when he arrives. Daughter called the facility as she wanted to ensure that he would have help when he arrives. [facility] is stating that their policy, per their Director, is that their staff cannot assist the patient out of a vehicle and into a wheelchair/walk with him inside due to \"liability issues since they do not know the mobility of the patient. \" Therefore, the patient would have no assistance into facility if transport were set up via cab. Daughter states that she is not comfortable assisting the patient of of a vehicle and into facility due to his shortness of breath and the fact that he can not walk certain distances. Patient is able to get up out of bed and walk with walker to bathroom with minimal assist but daughter states that she does not think he would be able to walk much further. Contacted MATT Willard and she gave the option of [facility] which would take the patient on a stretcher and into the facility. However, the patient/daughter would have to cover the cost which the daughter refused. Nursing supervisor notified of situation and stated that we would have to keep patient overnight so that transport could be figured out for tomorrow.  Dr. Katie Rosario notified of issues and that patient could not be discharged today.

## 2023-01-25 NOTE — PROGRESS NOTES
Problem: Falls - Risk of  Goal: *Absence of Falls  Description: Document Bard Montgomery Fall Risk and appropriate interventions in the flowsheet. Outcome: Resolved/Not Met  Note: Fall Risk Interventions:  Mobility Interventions: Patient to call before getting OOB         Medication Interventions: Bed/chair exit alarm         History of Falls Interventions: Door open when patient unattended         Problem: Patient Education: Go to Patient Education Activity  Goal: Patient/Family Education  Outcome: Resolved/Not Met     Problem: General Medical Care Plan  Goal: *Skin integrity maintained  Outcome: Resolved/Not Met     Problem: Patient Education: Go to Patient Education Activity  Goal: Patient/Family Education  Outcome: Resolved/Not Met     Problem: Pressure Injury - Risk of  Goal: *Prevention of pressure injury  Description: Document Earl Scale and appropriate interventions in the flowsheet.   Outcome: Resolved/Not Met  Note: Pressure Injury Interventions:  Sensory Interventions: Assess need for specialty bed    Moisture Interventions: Absorbent underpads    Activity Interventions: PT/OT evaluation    Mobility Interventions: PT/OT evaluation    Nutrition Interventions: Document food/fluid/supplement intake, Discuss nutritional consult with provider    Friction and Shear Interventions: Apply protective barrier, creams and emollients                Problem: Patient Education: Go to Patient Education Activity  Goal: Patient/Family Education  Outcome: Resolved/Not Met     Problem: Patient Education: Go to Patient Education Activity  Goal: Patient/Family Education  Outcome: Resolved/Not Met     Problem: Patient Education: Go to Patient Education Activity  Goal: Patient/Family Education  Outcome: Resolved/Not Met

## 2023-01-25 NOTE — DISCHARGE SUMMARY
Discharge Summary       PATIENT ID: Laura Montes  MRN: [id number]   YOB: 1935    DATE OF ADMISSION: 1/19/2023  2:32 PM    DATE OF DISCHARGE:   PRIMARY CARE PROVIDER: Nancy Montana     ATTENDING PHYSICIAN: Tiburcio Nava  DISCHARGING PROVIDER: Tiburcio Nava      CONSULTATIONS: IP CONSULT TO CARDIOLOGY  IP CONSULT TO NEPHROLOGY    PROCEDURES/SURGERIES: * No surgery found *    ADMITTING DIAGNOSES:    Patient Active Problem List    Diagnosis Date Noted    Dyspnea on exertion 01/19/2023    Atrial flutter by electrocardiography ([facility] [address] 01/19/2023    Atrial flutter ([facility] [address] 01/19/2023    Colon cancer ([facility] [address] 09/15/2022    Status post surgery 09/15/2022    Acute CVA (cerebrovascular accident) ([facility] [address] 04/03/2022    Hypercholesteremia 04/03/2022    Accelerated hypertension 04/03/2022    CVA (cerebral vascular accident) ([facility] [address] 04/03/2022    Paresthesia and pain of right extremity 04/01/2022       DISCHARGE DIAGNOSES / PLAN:      A flutter     JULIENNE on CKD stage 4   -     CHF     Hx of colon cancer    - s/p hemicolectomy 9/15/22     COPD     HTN     Pneumonia        DISCHARGE MEDICATIONS:  Current Discharge Medication List        START taking these medications    Details   amoxicillin-clavulanate (AUGMENTIN) 875-125 mg per tablet Take 1 Tablet by mouth every twelve (12) hours. Qty: 20 Tablet, Refills: 0  Start date: 1/24/2023      levoFLOXacin (LEVAQUIN) 250 mg tablet Take 1 Tablet by mouth every twenty-four (24) hours. Qty: 10 Tablet, Refills: 0  Start date: 1/24/2023      albuterol-ipratropium (DUO-NEB) 2.5 mg-0.5 mg/3 ml nebu 3 mL by Nebulization route every six (6) hours as needed for Wheezing. Qty: 1 Each, Refills: 1  Start date: 1/24/2023      budesonide-formoteroL (SYMBICORT) 160-4.5 mcg/actuation HFAA Take 2 Puffs by inhalation two (2) times a day. Qty: 10.2 g, Refills: 2  Start date: 1/24/2023      dilTIAZem IR (CARDIZEM) 30 mg tablet Take 1 Tablet by mouth two (2) times a day.   Qty: 60 Tablet, Refills: 0  Start date: 1/20/2023      furosemide (Lasix) 40 mg tablet Tablet daily  Qty: 60 Tablet, Refills: 0  Start date: 1/20/2023      apixaban (ELIQUIS) 2.5 mg tablet Take 1 Tablet by mouth two (2) times a day. Qty: 60 Tablet, Refills: 0  Start date: 1/20/2023           CONTINUE these medications which have CHANGED    Details   metoprolol tartrate (LOPRESSOR) 25 mg tablet Take 1 Tablet by mouth two (2) times a day. Qty: 60 Tablet, Refills: 0  Start date: 1/20/2023           CONTINUE these medications which have NOT CHANGED    Details   albuterol (PROVENTIL HFA, VENTOLIN HFA, PROAIR HFA) 90 mcg/actuation inhaler Take 1 Puff by inhalation every four (4) hours as needed for Wheezing. Qty: 18 g, Refills: 0      ergocalciferol (ERGOCALCIFEROL) 1,250 mcg (50,000 unit) capsule Take 50,000 Units by mouth every seven (7) days. Patient stated takes once weekly on  Sundays      atorvastatin (LIPITOR) 40 mg tablet Take 1 Tablet by mouth nightly. Qty: 30 Tablet, Refills: 1      sodium bicarbonate 325 mg tablet Take 325 mg by mouth two (2) times a day. doxazosin (CARDURA) 4 mg tablet Take 4 mg by mouth daily. STOP taking these medications       bumetanide (BUMEX) 2 mg tablet Comments:   Reason for Stopping:         colestipoL (COLESTID) 1 gram tablet Comments:   Reason for Stopping:         amLODIPine (NORVASC) 2.5 mg tablet Comments:   Reason for Stopping:         doxycycline (VIBRA-TABS) 100 mg tablet Comments:   Reason for Stopping:                 NOTIFY YOUR PHYSICIAN FOR ANY OF THE FOLLOWING:   Fever over 101 degrees for 24 hours. Chest pain, shortness of breath, fever, chills, nausea, vomiting, diarrhea, change in mentation, falling, weakness, bleeding. Severe pain or pain not relieved by medications. Or, any other signs or symptoms that you may have questions about.     DISPOSITION:  x  Home With:   OT  PT  HH  RN       Long term SNF/Inpatient Rehab    Independent/assisted living    Hospice Other: PATIENT CONDITION AT DISCHARGE: Stable      PHYSICAL EXAMINATION AT DISCHARGE:  General:          Alert, cooperative, no distress, appears stated age. HEENT:           Atraumatic, anicteric sclerae, pink conjunctivae                          No oral ulcers, mucosa moist, throat clear, dentition fair  Neck:               Supple, symmetrical  Lungs:             Clear to auscultation bilaterally. No Wheezing or Rhonchi. No rales. Chest wall:      No tenderness  No Accessory muscle use. Heart:              Regular  rhythm,  No  murmur   No edema  Abdomen:        Soft, non-tender. Not distended. Bowel sounds normal  Extremities:     No cyanosis. No clubbing,                            Skin turgor normal, Capillary refill normal  Skin:                Not pale. Not Jaundiced  No rashes   Psych:             Not anxious or agitated. Neurologic:      Alert, moves all extremities, answers questions appropriately and responds to commands     CT CHEST WO CONT   Final Result   1. Multiple bilateral pulmonary nodules with the largest measuring 2.1 x 2.4 cm   in the right lower lobe. Findings may be infectious or inflammatory, though   neoplasm not excluded. Recommend posttreatment imaging to document resolution. 2.  Moderate-severe centrilobular emphysema. 3.  35mm ectasia ascending aorta. 4.  Moderate coronary calcifications. 5.  Other findings as above. XR CHEST PORT   Final Result      No acute process on portable chest.   Persistent mass in the lower right chest zone, for which correlation with PET/CT   is recommended. Emphysema         XR CHEST PORT   Final Result      Unchanged graphic appearance with 2.8 x 2.3 cm infrahilar right pulmonary   nodule. No results found for this or any previous visit (from the past 24 hour(s)).          HOSPITAL COURSE:  HPI: Patient is a 82-year-old male with PMH of colon cancer s/p hemicolectomy, CKD stage 4, CAD, HTN, hyperlipidemia, hx of CVA presented to ED after patient was found to be in a fib/a flutter at outpatient cardiology office. Patient is a poor historian. Patient denies fever, chills, nausea, vomiting, diarrhea, or constipation. 1/20  Patient evaluated at bedside. Patient complaint of shortness of breath. Patient stated dyspnea does not change with exertion or rest. Dyspnea has been present for 1 month without change. Patient scheduled for ECHO LEONILA w/possible cardioversion today    Patient had cardioversion done but unsuccessful  Cardiology cleared the patient for discharge    Medication reconciliation done time shopping 35 minutes 50% time counseling coronation of care    Patient has scan of the chest which shows multiple bilateral pulmonary nodule with the largest measuring 2.1 to 2.4 cm in the right lobe consistent with infectious or inflammatory recommend treated with antibiotics and repeat chest x-ray for resolution    P.o.  Augmentin and Levaquin patient can be discharged to skilled care rehab    And repeat chest x-ray in 2 weeks      Signed:   Ang Gu MD  1/25/2023  1:37 PM

## 2023-01-25 NOTE — PROGRESS NOTES
Report called to [facility], given to Khadijah Cheema LPN at 1051. patient transported via car by daughter, belongings left with patient, tele box removed, IV removed.

## 2023-02-15 ENCOUNTER — TRANSCRIBE ORDER (OUTPATIENT)
Dept: SCHEDULING | Age: 88
End: 2023-02-15

## 2023-02-15 DIAGNOSIS — R91.8 LUNG NODULES: Primary | ICD-10-CM

## 2023-04-22 DIAGNOSIS — R91.8 LUNG NODULES: Primary | ICD-10-CM

## 2023-04-23 DIAGNOSIS — R91.8 LUNG NODULES: Primary | ICD-10-CM

## 2023-04-26 ENCOUNTER — APPOINTMENT (OUTPATIENT)
Dept: GENERAL RADIOLOGY | Age: 88
DRG: 689 | End: 2023-04-26
Attending: EMERGENCY MEDICINE
Payer: MEDICARE

## 2023-04-26 ENCOUNTER — HOSPITAL ENCOUNTER (INPATIENT)
Age: 88
LOS: 7 days | Discharge: NURSING, REHAB OR LONG-TERM CARE | DRG: 689 | End: 2023-05-03
Attending: EMERGENCY MEDICINE | Admitting: FAMILY MEDICINE
Payer: MEDICARE

## 2023-04-26 DIAGNOSIS — N18.9 CHRONIC KIDNEY DISEASE, UNSPECIFIED CKD STAGE: ICD-10-CM

## 2023-04-26 DIAGNOSIS — R41.82 ALTERED MENTAL STATUS, UNSPECIFIED ALTERED MENTAL STATUS TYPE: Primary | ICD-10-CM

## 2023-04-26 DIAGNOSIS — N39.0 URINARY TRACT INFECTION WITHOUT HEMATURIA, SITE UNSPECIFIED: ICD-10-CM

## 2023-04-26 DIAGNOSIS — E87.5 ACUTE HYPERKALEMIA: ICD-10-CM

## 2023-04-26 PROBLEM — R50.9 FEVER: Status: ACTIVE | Noted: 2023-04-26

## 2023-04-26 LAB
ALBUMIN SERPL-MCNC: 2.9 G/DL (ref 3.5–5)
ALBUMIN/GLOB SERPL: 0.8 (ref 1.1–2.2)
ALP SERPL-CCNC: 116 U/L (ref 45–117)
ALT SERPL-CCNC: 59 U/L (ref 12–78)
ANION GAP SERPL CALC-SCNC: 3 MMOL/L (ref 5–15)
APPEARANCE UR: CLEAR
AST SERPL W P-5'-P-CCNC: 52 U/L (ref 15–37)
BACTERIA URNS QL MICRO: ABNORMAL /HPF
BASOPHILS # BLD: 0 K/UL (ref 0–0.1)
BASOPHILS NFR BLD: 1 % (ref 0–1)
BILIRUB SERPL-MCNC: 0.4 MG/DL (ref 0.2–1)
BILIRUB UR QL: NEGATIVE
BUN SERPL-MCNC: 43 MG/DL (ref 6–20)
BUN/CREAT SERPL: 15 (ref 12–20)
CA-I BLD-MCNC: 9 MG/DL (ref 8.5–10.1)
CHLORIDE SERPL-SCNC: 107 MMOL/L (ref 97–108)
CO2 SERPL-SCNC: 27 MMOL/L (ref 21–32)
COLOR UR: ABNORMAL
CREAT SERPL-MCNC: 2.79 MG/DL (ref 0.7–1.3)
DIFFERENTIAL METHOD BLD: ABNORMAL
EOSINOPHIL # BLD: 0.1 K/UL (ref 0–0.4)
EOSINOPHIL NFR BLD: 1 % (ref 0–7)
EPITH CASTS URNS QL MICRO: ABNORMAL /LPF
ERYTHROCYTE [DISTWIDTH] IN BLOOD BY AUTOMATED COUNT: 14.5 % (ref 11.5–14.5)
FLUAV AG NPH QL IA: NEGATIVE
FLUBV AG NOSE QL IA: NEGATIVE
GLOBULIN SER CALC-MCNC: 3.8 G/DL (ref 2–4)
GLUCOSE SERPL-MCNC: 110 MG/DL (ref 65–100)
GLUCOSE UR STRIP.AUTO-MCNC: NEGATIVE MG/DL
HCT VFR BLD AUTO: 32.4 % (ref 36.6–50.3)
HGB BLD-MCNC: 10.1 G/DL (ref 12.1–17)
HGB UR QL STRIP: NEGATIVE
HYALINE CASTS URNS QL MICRO: ABNORMAL /LPF (ref 0–5)
IMM GRANULOCYTES # BLD AUTO: 0 K/UL (ref 0–0.04)
IMM GRANULOCYTES NFR BLD AUTO: 0 % (ref 0–0.5)
KETONES UR QL STRIP.AUTO: NEGATIVE MG/DL
LACTATE SERPL-SCNC: 1.1 MMOL/L (ref 0.4–2)
LEUKOCYTE ESTERASE UR QL STRIP.AUTO: ABNORMAL
LYMPHOCYTES # BLD: 1.3 K/UL (ref 0.8–3.5)
LYMPHOCYTES NFR BLD: 17 % (ref 12–49)
MCH RBC QN AUTO: 28.6 PG (ref 26–34)
MCHC RBC AUTO-ENTMCNC: 31.2 G/DL (ref 30–36.5)
MCV RBC AUTO: 91.8 FL (ref 80–99)
MONOCYTES # BLD: 0.8 K/UL (ref 0–1)
MONOCYTES NFR BLD: 10 % (ref 5–13)
MUCOUS THREADS URNS QL MICRO: ABNORMAL /LPF
NEUTS SEG # BLD: 5.3 K/UL (ref 1.8–8)
NEUTS SEG NFR BLD: 71 % (ref 32–75)
NITRITE UR QL STRIP.AUTO: NEGATIVE
NRBC # BLD: 0 K/UL (ref 0–0.01)
NRBC BLD-RTO: 0 PER 100 WBC
PH UR STRIP: 7 (ref 5–8)
PLATELET # BLD AUTO: 256 K/UL (ref 150–400)
PMV BLD AUTO: 10.2 FL (ref 8.9–12.9)
POTASSIUM SERPL-SCNC: 5.8 MMOL/L (ref 3.5–5.1)
PROT SERPL-MCNC: 6.7 G/DL (ref 6.4–8.2)
PROT UR STRIP-MCNC: 100 MG/DL
RBC # BLD AUTO: 3.53 M/UL (ref 4.1–5.7)
RBC #/AREA URNS HPF: ABNORMAL /HPF (ref 0–5)
SARS-COV-2 RDRP RESP QL NAA+PROBE: NOT DETECTED
SODIUM SERPL-SCNC: 137 MMOL/L (ref 136–145)
SP GR UR REFRACTOMETRY: 1.01 (ref 1–1.03)
TROPONIN I SERPL HS-MCNC: 13 NG/L (ref 0–76)
UA: UC IF INDICATED,UAUC: ABNORMAL
UROBILINOGEN UR QL STRIP.AUTO: 0.1 EU/DL (ref 0.1–1)
WBC # BLD AUTO: 7.6 K/UL (ref 4.1–11.1)
WBC URNS QL MICRO: ABNORMAL /HPF (ref 0–4)

## 2023-04-26 PROCEDURE — 80053 COMPREHEN METABOLIC PANEL: CPT

## 2023-04-26 PROCEDURE — 84484 ASSAY OF TROPONIN QUANT: CPT

## 2023-04-26 PROCEDURE — 74011000250 HC RX REV CODE- 250: Performed by: EMERGENCY MEDICINE

## 2023-04-26 PROCEDURE — 71045 X-RAY EXAM CHEST 1 VIEW: CPT

## 2023-04-26 PROCEDURE — 87040 BLOOD CULTURE FOR BACTERIA: CPT

## 2023-04-26 PROCEDURE — 87635 SARS-COV-2 COVID-19 AMP PRB: CPT

## 2023-04-26 PROCEDURE — 93005 ELECTROCARDIOGRAM TRACING: CPT

## 2023-04-26 PROCEDURE — 74011250636 HC RX REV CODE- 250/636: Performed by: EMERGENCY MEDICINE

## 2023-04-26 PROCEDURE — 65270000029 HC RM PRIVATE

## 2023-04-26 PROCEDURE — 96374 THER/PROPH/DIAG INJ IV PUSH: CPT

## 2023-04-26 PROCEDURE — 99285 EMERGENCY DEPT VISIT HI MDM: CPT

## 2023-04-26 PROCEDURE — 87804 INFLUENZA ASSAY W/OPTIC: CPT

## 2023-04-26 PROCEDURE — 83605 ASSAY OF LACTIC ACID: CPT

## 2023-04-26 PROCEDURE — 85025 COMPLETE CBC W/AUTO DIFF WBC: CPT

## 2023-04-26 PROCEDURE — 81001 URINALYSIS AUTO W/SCOPE: CPT

## 2023-04-26 PROCEDURE — 36415 COLL VENOUS BLD VENIPUNCTURE: CPT

## 2023-04-26 RX ORDER — SODIUM CHLORIDE 0.9 % (FLUSH) 0.9 %
5-10 SYRINGE (ML) INJECTION AS NEEDED
Status: DISCONTINUED | OUTPATIENT
Start: 2023-04-26 | End: 2023-05-03 | Stop reason: HOSPADM

## 2023-04-26 RX ADMIN — CEFTRIAXONE SODIUM 1 G: 1 INJECTION, POWDER, FOR SOLUTION INTRAMUSCULAR; INTRAVENOUS at 21:37

## 2023-04-26 RX ADMIN — SODIUM CHLORIDE 1632 ML: 9 INJECTION, SOLUTION INTRAVENOUS at 21:40

## 2023-04-27 ENCOUNTER — TRANSCRIBE ORDER (OUTPATIENT)
Dept: SCHEDULING | Age: 88
End: 2023-04-27

## 2023-04-27 DIAGNOSIS — C18.9 COLON CANCER (HCC): Primary | ICD-10-CM

## 2023-04-27 LAB
ALBUMIN SERPL-MCNC: 2.5 G/DL (ref 3.5–5)
ALBUMIN/GLOB SERPL: 0.6 (ref 1.1–2.2)
ALP SERPL-CCNC: 97 U/L (ref 45–117)
ALT SERPL-CCNC: 46 U/L (ref 12–78)
AMMONIA PLAS-SCNC: 16 UMOL/L
ANION GAP SERPL CALC-SCNC: 3 MMOL/L (ref 5–15)
ANION GAP SERPL CALC-SCNC: 6 MMOL/L (ref 5–15)
AST SERPL W P-5'-P-CCNC: 42 U/L (ref 15–37)
ATRIAL RATE: 107 BPM
BASOPHILS # BLD: 0 K/UL (ref 0–0.1)
BASOPHILS # BLD: 0 K/UL (ref 0–0.1)
BASOPHILS NFR BLD: 0 % (ref 0–1)
BASOPHILS NFR BLD: 1 % (ref 0–1)
BILIRUB SERPL-MCNC: 0.5 MG/DL (ref 0.2–1)
BUN SERPL-MCNC: 39 MG/DL (ref 6–20)
BUN SERPL-MCNC: 40 MG/DL (ref 6–20)
BUN/CREAT SERPL: 14 (ref 12–20)
BUN/CREAT SERPL: 16 (ref 12–20)
CA-I BLD-MCNC: 8.7 MG/DL (ref 8.5–10.1)
CA-I BLD-MCNC: 8.7 MG/DL (ref 8.5–10.1)
CALCULATED P AXIS, ECG09: 83 DEGREES
CALCULATED R AXIS, ECG10: -59 DEGREES
CALCULATED T AXIS, ECG11: 85 DEGREES
CHLORIDE SERPL-SCNC: 112 MMOL/L (ref 97–108)
CHLORIDE SERPL-SCNC: 115 MMOL/L (ref 97–108)
CO2 SERPL-SCNC: 20 MMOL/L (ref 21–32)
CO2 SERPL-SCNC: 22 MMOL/L (ref 21–32)
CREAT SERPL-MCNC: 2.58 MG/DL (ref 0.7–1.3)
CREAT SERPL-MCNC: 2.76 MG/DL (ref 0.7–1.3)
DIAGNOSIS, 93000: NORMAL
DIFFERENTIAL METHOD BLD: ABNORMAL
DIFFERENTIAL METHOD BLD: ABNORMAL
EOSINOPHIL # BLD: 0 K/UL (ref 0–0.4)
EOSINOPHIL # BLD: 0.2 K/UL (ref 0–0.4)
EOSINOPHIL NFR BLD: 0 % (ref 0–7)
EOSINOPHIL NFR BLD: 3 % (ref 0–7)
ERYTHROCYTE [DISTWIDTH] IN BLOOD BY AUTOMATED COUNT: 14.4 % (ref 11.5–14.5)
ERYTHROCYTE [DISTWIDTH] IN BLOOD BY AUTOMATED COUNT: 14.5 % (ref 11.5–14.5)
GLOBULIN SER CALC-MCNC: 4 G/DL (ref 2–4)
GLUCOSE SERPL-MCNC: 121 MG/DL (ref 65–100)
GLUCOSE SERPL-MCNC: 95 MG/DL (ref 65–100)
HCT VFR BLD AUTO: 29.8 % (ref 36.6–50.3)
HCT VFR BLD AUTO: 31.6 % (ref 36.6–50.3)
HGB BLD-MCNC: 9.3 G/DL (ref 12.1–17)
HGB BLD-MCNC: 9.7 G/DL (ref 12.1–17)
IMM GRANULOCYTES # BLD AUTO: 0 K/UL (ref 0–0.04)
IMM GRANULOCYTES # BLD AUTO: 0 K/UL (ref 0–0.04)
IMM GRANULOCYTES NFR BLD AUTO: 0 % (ref 0–0.5)
IMM GRANULOCYTES NFR BLD AUTO: 0 % (ref 0–0.5)
LYMPHOCYTES # BLD: 0.9 K/UL (ref 0.8–3.5)
LYMPHOCYTES # BLD: 1.1 K/UL (ref 0.8–3.5)
LYMPHOCYTES NFR BLD: 14 % (ref 12–49)
LYMPHOCYTES NFR BLD: 15 % (ref 12–49)
MCH RBC QN AUTO: 28.4 PG (ref 26–34)
MCH RBC QN AUTO: 28.7 PG (ref 26–34)
MCHC RBC AUTO-ENTMCNC: 30.7 G/DL (ref 30–36.5)
MCHC RBC AUTO-ENTMCNC: 31.2 G/DL (ref 30–36.5)
MCV RBC AUTO: 91.1 FL (ref 80–99)
MCV RBC AUTO: 93.5 FL (ref 80–99)
MONOCYTES # BLD: 0.6 K/UL (ref 0–1)
MONOCYTES # BLD: 0.7 K/UL (ref 0–1)
MONOCYTES NFR BLD: 10 % (ref 5–13)
MONOCYTES NFR BLD: 8 % (ref 5–13)
NEUTS SEG # BLD: 4.5 K/UL (ref 1.8–8)
NEUTS SEG # BLD: 6.1 K/UL (ref 1.8–8)
NEUTS SEG NFR BLD: 71 % (ref 32–75)
NEUTS SEG NFR BLD: 78 % (ref 32–75)
NRBC # BLD: 0 K/UL (ref 0–0.01)
NRBC # BLD: 0 K/UL (ref 0–0.01)
NRBC BLD-RTO: 0 PER 100 WBC
NRBC BLD-RTO: 0 PER 100 WBC
P-R INTERVAL, ECG05: 152 MS
PLATELET # BLD AUTO: 238 K/UL (ref 150–400)
PLATELET # BLD AUTO: 249 K/UL (ref 150–400)
PMV BLD AUTO: 10.4 FL (ref 8.9–12.9)
PMV BLD AUTO: 10.5 FL (ref 8.9–12.9)
POTASSIUM SERPL-SCNC: 4.8 MMOL/L (ref 3.5–5.1)
POTASSIUM SERPL-SCNC: 5.4 MMOL/L (ref 3.5–5.1)
PROT SERPL-MCNC: 6.5 G/DL (ref 6.4–8.2)
Q-T INTERVAL, ECG07: 332 MS
QRS DURATION, ECG06: 66 MS
QTC CALCULATION (BEZET), ECG08: 443 MS
RBC # BLD AUTO: 3.27 M/UL (ref 4.1–5.7)
RBC # BLD AUTO: 3.38 M/UL (ref 4.1–5.7)
SODIUM SERPL-SCNC: 137 MMOL/L (ref 136–145)
SODIUM SERPL-SCNC: 141 MMOL/L (ref 136–145)
VENTRICULAR RATE, ECG03: 107 BPM
WBC # BLD AUTO: 6.3 K/UL (ref 4.1–11.1)
WBC # BLD AUTO: 7.8 K/UL (ref 4.1–11.1)

## 2023-04-27 PROCEDURE — 74011250637 HC RX REV CODE- 250/637: Performed by: FAMILY MEDICINE

## 2023-04-27 PROCEDURE — 74011000258 HC RX REV CODE- 258: Performed by: FAMILY MEDICINE

## 2023-04-27 PROCEDURE — 94640 AIRWAY INHALATION TREATMENT: CPT

## 2023-04-27 PROCEDURE — 80048 BASIC METABOLIC PNL TOTAL CA: CPT

## 2023-04-27 PROCEDURE — 36415 COLL VENOUS BLD VENIPUNCTURE: CPT

## 2023-04-27 PROCEDURE — 80053 COMPREHEN METABOLIC PANEL: CPT

## 2023-04-27 PROCEDURE — 65270000029 HC RM PRIVATE

## 2023-04-27 PROCEDURE — 74011250636 HC RX REV CODE- 250/636: Performed by: FAMILY MEDICINE

## 2023-04-27 PROCEDURE — 82140 ASSAY OF AMMONIA: CPT

## 2023-04-27 PROCEDURE — 85025 COMPLETE CBC W/AUTO DIFF WBC: CPT

## 2023-04-27 RX ORDER — POLYETHYLENE GLYCOL 3350 17 G/17G
17 POWDER, FOR SOLUTION ORAL DAILY PRN
Status: DISCONTINUED | OUTPATIENT
Start: 2023-04-27 | End: 2023-05-03 | Stop reason: HOSPADM

## 2023-04-27 RX ORDER — AMLODIPINE BESYLATE 5 MG/1
5 TABLET ORAL DAILY
Status: DISCONTINUED | OUTPATIENT
Start: 2023-04-27 | End: 2023-05-01

## 2023-04-27 RX ORDER — ERGOCALCIFEROL 1.25 MG/1
50000 CAPSULE ORAL
Status: DISCONTINUED | OUTPATIENT
Start: 2023-04-30 | End: 2023-05-03 | Stop reason: HOSPADM

## 2023-04-27 RX ORDER — SODIUM POLYSTYRENE SULFONATE 15 G/60ML
30 SUSPENSION ORAL; RECTAL
Status: COMPLETED | OUTPATIENT
Start: 2023-04-27 | End: 2023-04-27

## 2023-04-27 RX ORDER — TAMSULOSIN HYDROCHLORIDE 0.4 MG/1
0.4 CAPSULE ORAL
Status: DISCONTINUED | OUTPATIENT
Start: 2023-04-27 | End: 2023-05-03 | Stop reason: HOSPADM

## 2023-04-27 RX ORDER — ACETAMINOPHEN 650 MG/1
650 SUPPOSITORY RECTAL
Status: DISCONTINUED | OUTPATIENT
Start: 2023-04-27 | End: 2023-05-03 | Stop reason: HOSPADM

## 2023-04-27 RX ORDER — ATORVASTATIN CALCIUM 20 MG/1
10 TABLET, FILM COATED ORAL
Status: DISCONTINUED | OUTPATIENT
Start: 2023-04-27 | End: 2023-05-03 | Stop reason: HOSPADM

## 2023-04-27 RX ORDER — LANOLIN ALCOHOL/MO/W.PET/CERES
CREAM (GRAM) TOPICAL
COMMUNITY

## 2023-04-27 RX ORDER — TAMSULOSIN HYDROCHLORIDE 0.4 MG/1
0.4 CAPSULE ORAL DAILY
COMMUNITY

## 2023-04-27 RX ORDER — LANOLIN ALCOHOL/MO/W.PET/CERES
3 CREAM (GRAM) TOPICAL
Status: DISCONTINUED | OUTPATIENT
Start: 2023-04-27 | End: 2023-05-03 | Stop reason: HOSPADM

## 2023-04-27 RX ORDER — METOPROLOL TARTRATE 25 MG/1
25 TABLET, FILM COATED ORAL 2 TIMES DAILY
Status: DISCONTINUED | OUTPATIENT
Start: 2023-04-27 | End: 2023-05-03 | Stop reason: HOSPADM

## 2023-04-27 RX ORDER — UREA 10 %
1 LOTION (ML) TOPICAL
Status: DISCONTINUED | OUTPATIENT
Start: 2023-04-27 | End: 2023-05-03 | Stop reason: HOSPADM

## 2023-04-27 RX ORDER — BUDESONIDE AND FORMOTEROL FUMARATE DIHYDRATE 160; 4.5 UG/1; UG/1
2 AEROSOL RESPIRATORY (INHALATION)
Status: DISCONTINUED | OUTPATIENT
Start: 2023-04-27 | End: 2023-05-03 | Stop reason: HOSPADM

## 2023-04-27 RX ORDER — SODIUM BICARBONATE 650 MG/1
650 TABLET ORAL 2 TIMES DAILY
Status: DISCONTINUED | OUTPATIENT
Start: 2023-04-27 | End: 2023-05-03 | Stop reason: HOSPADM

## 2023-04-27 RX ORDER — ONDANSETRON 4 MG/1
4 TABLET, ORALLY DISINTEGRATING ORAL
Status: DISCONTINUED | OUTPATIENT
Start: 2023-04-27 | End: 2023-05-03 | Stop reason: HOSPADM

## 2023-04-27 RX ORDER — SODIUM POLYSTYRENE SULFONATE 15 G/60ML
30 SUSPENSION ORAL; RECTAL ONCE
Status: COMPLETED | OUTPATIENT
Start: 2023-04-27 | End: 2023-04-27

## 2023-04-27 RX ORDER — ACETAMINOPHEN 325 MG/1
650 TABLET ORAL
COMMUNITY
End: 2023-05-03

## 2023-04-27 RX ORDER — FERROUS SULFATE 325(65) MG
325 TABLET, DELAYED RELEASE (ENTERIC COATED) ORAL DAILY
COMMUNITY

## 2023-04-27 RX ORDER — ATORVASTATIN CALCIUM 10 MG/1
10 TABLET, FILM COATED ORAL
Status: DISCONTINUED | OUTPATIENT
Start: 2023-04-27 | End: 2023-04-27

## 2023-04-27 RX ORDER — ONDANSETRON 2 MG/ML
4 INJECTION INTRAMUSCULAR; INTRAVENOUS
Status: DISCONTINUED | OUTPATIENT
Start: 2023-04-27 | End: 2023-05-03 | Stop reason: HOSPADM

## 2023-04-27 RX ORDER — ALBUTEROL SULFATE 90 UG/1
1 AEROSOL, METERED RESPIRATORY (INHALATION)
Status: DISCONTINUED | OUTPATIENT
Start: 2023-04-27 | End: 2023-05-03 | Stop reason: HOSPADM

## 2023-04-27 RX ORDER — SENNOSIDES 8.6 MG/1
1 TABLET ORAL
Status: DISCONTINUED | OUTPATIENT
Start: 2023-04-27 | End: 2023-05-03 | Stop reason: HOSPADM

## 2023-04-27 RX ORDER — ACETAMINOPHEN 325 MG/1
650 TABLET ORAL
Status: DISCONTINUED | OUTPATIENT
Start: 2023-04-27 | End: 2023-05-03 | Stop reason: HOSPADM

## 2023-04-27 RX ORDER — ACETAMINOPHEN 325 MG/1
325 TABLET ORAL
Status: DISCONTINUED | OUTPATIENT
Start: 2023-04-27 | End: 2023-05-03 | Stop reason: HOSPADM

## 2023-04-27 RX ORDER — FUROSEMIDE 40 MG/1
20 TABLET ORAL EVERY OTHER DAY
Status: DISCONTINUED | OUTPATIENT
Start: 2023-04-27 | End: 2023-05-03 | Stop reason: HOSPADM

## 2023-04-27 RX ORDER — SODIUM CHLORIDE 9 MG/ML
50 INJECTION, SOLUTION INTRAVENOUS CONTINUOUS
Status: DISCONTINUED | OUTPATIENT
Start: 2023-04-27 | End: 2023-05-03 | Stop reason: HOSPADM

## 2023-04-27 RX ORDER — HYDRALAZINE HYDROCHLORIDE 50 MG/1
25 TABLET, FILM COATED ORAL 3 TIMES DAILY
Status: DISCONTINUED | OUTPATIENT
Start: 2023-04-27 | End: 2023-05-03 | Stop reason: HOSPADM

## 2023-04-27 RX ADMIN — ATORVASTATIN CALCIUM 10 MG: 20 TABLET, FILM COATED ORAL at 21:31

## 2023-04-27 RX ADMIN — METOPROLOL TARTRATE 25 MG: 25 TABLET, FILM COATED ORAL at 21:31

## 2023-04-27 RX ADMIN — SODIUM BICARBONATE 650 MG: 650 TABLET ORAL at 09:49

## 2023-04-27 RX ADMIN — SODIUM CHLORIDE 50 ML/HR: 9 INJECTION, SOLUTION INTRAVENOUS at 03:45

## 2023-04-27 RX ADMIN — PIPERACILLIN AND TAZOBACTAM 3.38 G: 3; .375 INJECTION, POWDER, LYOPHILIZED, FOR SOLUTION INTRAVENOUS at 22:53

## 2023-04-27 RX ADMIN — SODIUM POLYSTYRENE SULFONATE 30 G: 15 SUSPENSION ORAL; RECTAL at 11:47

## 2023-04-27 RX ADMIN — SODIUM POLYSTYRENE SULFONATE 30 G: 15 SUSPENSION ORAL; RECTAL at 03:45

## 2023-04-27 RX ADMIN — TAMSULOSIN HYDROCHLORIDE 0.4 MG: 0.4 CAPSULE ORAL at 21:31

## 2023-04-27 RX ADMIN — BUDESONIDE AND FORMOTEROL FUMARATE DIHYDRATE 2 PUFF: 160; 4.5 AEROSOL RESPIRATORY (INHALATION) at 08:14

## 2023-04-27 RX ADMIN — PIPERACILLIN AND TAZOBACTAM 3.38 G: 3; .375 INJECTION, POWDER, LYOPHILIZED, FOR SOLUTION INTRAVENOUS at 10:54

## 2023-04-27 RX ADMIN — MELATONIN TAB 3 MG 3 MG: 3 TAB at 21:31

## 2023-04-27 RX ADMIN — HYDRALAZINE HYDROCHLORIDE 25 MG: 50 TABLET, FILM COATED ORAL at 21:31

## 2023-04-27 RX ADMIN — FERROUS SULFATE TAB 325 MG (65 MG ELEMENTAL FE) 325 MG: 325 (65 FE) TAB at 08:41

## 2023-04-27 RX ADMIN — APIXABAN 2.5 MG: 2.5 TABLET, FILM COATED ORAL at 21:31

## 2023-04-27 RX ADMIN — AMLODIPINE BESYLATE 5 MG: 5 TABLET ORAL at 09:49

## 2023-04-27 RX ADMIN — VANCOMYCIN HYDROCHLORIDE 1000 MG: 1 INJECTION, POWDER, LYOPHILIZED, FOR SOLUTION INTRAVENOUS at 05:42

## 2023-04-27 RX ADMIN — HYDRALAZINE HYDROCHLORIDE 25 MG: 50 TABLET, FILM COATED ORAL at 15:53

## 2023-04-27 RX ADMIN — SODIUM BICARBONATE 650 MG: 650 TABLET ORAL at 21:30

## 2023-04-27 RX ADMIN — PIPERACILLIN AND TAZOBACTAM 4.5 G: 4; .5 INJECTION, POWDER, LYOPHILIZED, FOR SOLUTION INTRAVENOUS at 03:44

## 2023-04-27 RX ADMIN — BUDESONIDE AND FORMOTEROL FUMARATE DIHYDRATE 2 PUFF: 160; 4.5 AEROSOL RESPIRATORY (INHALATION) at 21:45

## 2023-04-27 RX ADMIN — METOPROLOL TARTRATE 25 MG: 25 TABLET, FILM COATED ORAL at 10:00

## 2023-04-27 RX ADMIN — HYDRALAZINE HYDROCHLORIDE 25 MG: 50 TABLET, FILM COATED ORAL at 09:49

## 2023-04-27 RX ADMIN — APIXABAN 2.5 MG: 2.5 TABLET, FILM COATED ORAL at 09:49

## 2023-04-28 LAB
ALBUMIN SERPL-MCNC: 2.3 G/DL (ref 3.5–5)
ALBUMIN/GLOB SERPL: 0.6 (ref 1.1–2.2)
ALP SERPL-CCNC: 74 U/L (ref 45–117)
ALT SERPL-CCNC: 36 U/L (ref 12–78)
ANION GAP SERPL CALC-SCNC: 6 MMOL/L (ref 5–15)
AST SERPL W P-5'-P-CCNC: 34 U/L (ref 15–37)
BASOPHILS # BLD: 0 K/UL (ref 0–0.1)
BASOPHILS NFR BLD: 1 % (ref 0–1)
BILIRUB SERPL-MCNC: 0.6 MG/DL (ref 0.2–1)
BUN SERPL-MCNC: 37 MG/DL (ref 6–20)
BUN/CREAT SERPL: 15 (ref 12–20)
CA-I BLD-MCNC: 8.6 MG/DL (ref 8.5–10.1)
CHLORIDE SERPL-SCNC: 115 MMOL/L (ref 97–108)
CO2 SERPL-SCNC: 20 MMOL/L (ref 21–32)
CREAT SERPL-MCNC: 2.54 MG/DL (ref 0.7–1.3)
DIFFERENTIAL METHOD BLD: ABNORMAL
EOSINOPHIL # BLD: 0.4 K/UL (ref 0–0.4)
EOSINOPHIL NFR BLD: 6 % (ref 0–7)
ERYTHROCYTE [DISTWIDTH] IN BLOOD BY AUTOMATED COUNT: 14.3 % (ref 11.5–14.5)
GLOBULIN SER CALC-MCNC: 3.8 G/DL (ref 2–4)
GLUCOSE SERPL-MCNC: 84 MG/DL (ref 65–100)
HCT VFR BLD AUTO: 30.4 % (ref 36.6–50.3)
HGB BLD-MCNC: 9.5 G/DL (ref 12.1–17)
IMM GRANULOCYTES # BLD AUTO: 0 K/UL (ref 0–0.04)
IMM GRANULOCYTES NFR BLD AUTO: 0 % (ref 0–0.5)
LYMPHOCYTES # BLD: 1 K/UL (ref 0.8–3.5)
LYMPHOCYTES NFR BLD: 16 % (ref 12–49)
MCH RBC QN AUTO: 28.3 PG (ref 26–34)
MCHC RBC AUTO-ENTMCNC: 31.3 G/DL (ref 30–36.5)
MCV RBC AUTO: 90.5 FL (ref 80–99)
MONOCYTES # BLD: 0.6 K/UL (ref 0–1)
MONOCYTES NFR BLD: 9 % (ref 5–13)
NEUTS SEG # BLD: 4.4 K/UL (ref 1.8–8)
NEUTS SEG NFR BLD: 68 % (ref 32–75)
NRBC # BLD: 0 K/UL (ref 0–0.01)
NRBC BLD-RTO: 0 PER 100 WBC
PLATELET # BLD AUTO: 237 K/UL (ref 150–400)
PMV BLD AUTO: 10.5 FL (ref 8.9–12.9)
POTASSIUM SERPL-SCNC: 4 MMOL/L (ref 3.5–5.1)
PROT SERPL-MCNC: 6.1 G/DL (ref 6.4–8.2)
RBC # BLD AUTO: 3.36 M/UL (ref 4.1–5.7)
SODIUM SERPL-SCNC: 141 MMOL/L (ref 136–145)
VANCOMYCIN SERPL-MCNC: 8.2 UG/ML
WBC # BLD AUTO: 6.4 K/UL (ref 4.1–11.1)

## 2023-04-28 PROCEDURE — 80053 COMPREHEN METABOLIC PANEL: CPT

## 2023-04-28 PROCEDURE — 65270000029 HC RM PRIVATE

## 2023-04-28 PROCEDURE — 85025 COMPLETE CBC W/AUTO DIFF WBC: CPT

## 2023-04-28 PROCEDURE — 94640 AIRWAY INHALATION TREATMENT: CPT

## 2023-04-28 PROCEDURE — 36415 COLL VENOUS BLD VENIPUNCTURE: CPT

## 2023-04-28 PROCEDURE — 74011250636 HC RX REV CODE- 250/636: Performed by: FAMILY MEDICINE

## 2023-04-28 PROCEDURE — 80202 ASSAY OF VANCOMYCIN: CPT

## 2023-04-28 PROCEDURE — 74011250637 HC RX REV CODE- 250/637: Performed by: FAMILY MEDICINE

## 2023-04-28 PROCEDURE — 87086 URINE CULTURE/COLONY COUNT: CPT

## 2023-04-28 PROCEDURE — 74011000258 HC RX REV CODE- 258: Performed by: FAMILY MEDICINE

## 2023-04-28 RX ADMIN — TAMSULOSIN HYDROCHLORIDE 0.4 MG: 0.4 CAPSULE ORAL at 22:40

## 2023-04-28 RX ADMIN — PIPERACILLIN AND TAZOBACTAM 3.38 G: 3; .375 INJECTION, POWDER, LYOPHILIZED, FOR SOLUTION INTRAVENOUS at 22:41

## 2023-04-28 RX ADMIN — HYDRALAZINE HYDROCHLORIDE 25 MG: 50 TABLET, FILM COATED ORAL at 08:06

## 2023-04-28 RX ADMIN — APIXABAN 2.5 MG: 2.5 TABLET, FILM COATED ORAL at 22:40

## 2023-04-28 RX ADMIN — HYDRALAZINE HYDROCHLORIDE 25 MG: 50 TABLET, FILM COATED ORAL at 22:40

## 2023-04-28 RX ADMIN — SODIUM BICARBONATE 650 MG: 650 TABLET ORAL at 08:08

## 2023-04-28 RX ADMIN — METOPROLOL TARTRATE 25 MG: 25 TABLET, FILM COATED ORAL at 22:40

## 2023-04-28 RX ADMIN — ATORVASTATIN CALCIUM 10 MG: 20 TABLET, FILM COATED ORAL at 22:40

## 2023-04-28 RX ADMIN — METOPROLOL TARTRATE 25 MG: 25 TABLET, FILM COATED ORAL at 08:05

## 2023-04-28 RX ADMIN — BUDESONIDE AND FORMOTEROL FUMARATE DIHYDRATE 2 PUFF: 160; 4.5 AEROSOL RESPIRATORY (INHALATION) at 20:21

## 2023-04-28 RX ADMIN — APIXABAN 2.5 MG: 2.5 TABLET, FILM COATED ORAL at 08:09

## 2023-04-28 RX ADMIN — AMLODIPINE BESYLATE 5 MG: 5 TABLET ORAL at 08:06

## 2023-04-28 RX ADMIN — HYDRALAZINE HYDROCHLORIDE 25 MG: 50 TABLET, FILM COATED ORAL at 16:29

## 2023-04-28 RX ADMIN — ACETAMINOPHEN 325 MG: 325 TABLET ORAL at 22:40

## 2023-04-28 RX ADMIN — BUDESONIDE AND FORMOTEROL FUMARATE DIHYDRATE 2 PUFF: 160; 4.5 AEROSOL RESPIRATORY (INHALATION) at 08:37

## 2023-04-28 RX ADMIN — FERROUS SULFATE TAB 325 MG (65 MG ELEMENTAL FE) 325 MG: 325 (65 FE) TAB at 08:04

## 2023-04-28 RX ADMIN — ACETAMINOPHEN 325 MG: 325 TABLET ORAL at 02:20

## 2023-04-28 RX ADMIN — PIPERACILLIN AND TAZOBACTAM 3.38 G: 3; .375 INJECTION, POWDER, LYOPHILIZED, FOR SOLUTION INTRAVENOUS at 11:10

## 2023-04-28 RX ADMIN — MELATONIN TAB 3 MG 3 MG: 3 TAB at 22:40

## 2023-04-28 RX ADMIN — SODIUM BICARBONATE 650 MG: 650 TABLET ORAL at 22:40

## 2023-04-28 RX ADMIN — VANCOMYCIN HYDROCHLORIDE 750 MG: 750 INJECTION, POWDER, LYOPHILIZED, FOR SOLUTION INTRAVENOUS at 11:09

## 2023-04-28 RX ADMIN — TIOTROPIUM BROMIDE INHALATION SPRAY 2 PUFF: 3.12 SPRAY, METERED RESPIRATORY (INHALATION) at 08:37

## 2023-04-29 LAB
BACTERIA SPEC CULT: NORMAL
CREAT SERPL-MCNC: 2.47 MG/DL (ref 0.7–1.3)
ERYTHROCYTE [DISTWIDTH] IN BLOOD BY AUTOMATED COUNT: 14.4 % (ref 11.5–14.5)
HCT VFR BLD AUTO: 28.4 % (ref 36.6–50.3)
HGB BLD-MCNC: 9 G/DL (ref 12.1–17)
MCH RBC QN AUTO: 28.7 PG (ref 26–34)
MCHC RBC AUTO-ENTMCNC: 31.7 G/DL (ref 30–36.5)
MCV RBC AUTO: 90.4 FL (ref 80–99)
NRBC # BLD: 0 K/UL (ref 0–0.01)
NRBC BLD-RTO: 0 PER 100 WBC
PLATELET # BLD AUTO: 214 K/UL (ref 150–400)
PMV BLD AUTO: 10.3 FL (ref 8.9–12.9)
RBC # BLD AUTO: 3.14 M/UL (ref 4.1–5.7)
SPECIAL REQUESTS,SREQ: NORMAL
VANCOMYCIN SERPL-MCNC: 11.8 UG/ML
WBC # BLD AUTO: 6.6 K/UL (ref 4.1–11.1)

## 2023-04-29 PROCEDURE — 36415 COLL VENOUS BLD VENIPUNCTURE: CPT

## 2023-04-29 PROCEDURE — 65270000029 HC RM PRIVATE

## 2023-04-29 PROCEDURE — 74011250637 HC RX REV CODE- 250/637: Performed by: FAMILY MEDICINE

## 2023-04-29 PROCEDURE — 74011250636 HC RX REV CODE- 250/636: Performed by: FAMILY MEDICINE

## 2023-04-29 PROCEDURE — 82565 ASSAY OF CREATININE: CPT

## 2023-04-29 PROCEDURE — 80202 ASSAY OF VANCOMYCIN: CPT

## 2023-04-29 PROCEDURE — 74011000258 HC RX REV CODE- 258: Performed by: FAMILY MEDICINE

## 2023-04-29 PROCEDURE — 94640 AIRWAY INHALATION TREATMENT: CPT

## 2023-04-29 PROCEDURE — 85027 COMPLETE CBC AUTOMATED: CPT

## 2023-04-29 RX ADMIN — METOPROLOL TARTRATE 25 MG: 25 TABLET, FILM COATED ORAL at 22:24

## 2023-04-29 RX ADMIN — TAMSULOSIN HYDROCHLORIDE 0.4 MG: 0.4 CAPSULE ORAL at 22:24

## 2023-04-29 RX ADMIN — HYDRALAZINE HYDROCHLORIDE 25 MG: 50 TABLET, FILM COATED ORAL at 22:24

## 2023-04-29 RX ADMIN — SODIUM BICARBONATE 650 MG: 650 TABLET ORAL at 08:06

## 2023-04-29 RX ADMIN — FUROSEMIDE 20 MG: 40 TABLET ORAL at 11:48

## 2023-04-29 RX ADMIN — MELATONIN TAB 3 MG 3 MG: 3 TAB at 22:25

## 2023-04-29 RX ADMIN — HYDRALAZINE HYDROCHLORIDE 25 MG: 50 TABLET, FILM COATED ORAL at 08:06

## 2023-04-29 RX ADMIN — APIXABAN 2.5 MG: 2.5 TABLET, FILM COATED ORAL at 22:24

## 2023-04-29 RX ADMIN — ATORVASTATIN CALCIUM 10 MG: 20 TABLET, FILM COATED ORAL at 22:24

## 2023-04-29 RX ADMIN — AMLODIPINE BESYLATE 5 MG: 5 TABLET ORAL at 08:06

## 2023-04-29 RX ADMIN — FERROUS SULFATE TAB 325 MG (65 MG ELEMENTAL FE) 325 MG: 325 (65 FE) TAB at 08:06

## 2023-04-29 RX ADMIN — SODIUM BICARBONATE 650 MG: 650 TABLET ORAL at 21:00

## 2023-04-29 RX ADMIN — METOPROLOL TARTRATE 25 MG: 25 TABLET, FILM COATED ORAL at 08:06

## 2023-04-29 RX ADMIN — HYDRALAZINE HYDROCHLORIDE 25 MG: 50 TABLET, FILM COATED ORAL at 17:16

## 2023-04-29 RX ADMIN — PIPERACILLIN AND TAZOBACTAM 3.38 G: 3; .375 INJECTION, POWDER, LYOPHILIZED, FOR SOLUTION INTRAVENOUS at 11:48

## 2023-04-29 RX ADMIN — VANCOMYCIN HYDROCHLORIDE 1000 MG: 1 INJECTION, POWDER, LYOPHILIZED, FOR SOLUTION INTRAVENOUS at 13:16

## 2023-04-29 RX ADMIN — APIXABAN 2.5 MG: 2.5 TABLET, FILM COATED ORAL at 08:06

## 2023-04-29 RX ADMIN — PIPERACILLIN AND TAZOBACTAM 3.38 G: 3; .375 INJECTION, POWDER, LYOPHILIZED, FOR SOLUTION INTRAVENOUS at 22:28

## 2023-04-29 RX ADMIN — BUDESONIDE AND FORMOTEROL FUMARATE DIHYDRATE 2 PUFF: 160; 4.5 AEROSOL RESPIRATORY (INHALATION) at 19:41

## 2023-04-30 LAB
CREAT SERPL-MCNC: 2.36 MG/DL (ref 0.7–1.3)
ERYTHROCYTE [DISTWIDTH] IN BLOOD BY AUTOMATED COUNT: 14.1 % (ref 11.5–14.5)
HCT VFR BLD AUTO: 28.5 % (ref 36.6–50.3)
HGB BLD-MCNC: 9.1 G/DL (ref 12.1–17)
MCH RBC QN AUTO: 28.7 PG (ref 26–34)
MCHC RBC AUTO-ENTMCNC: 31.9 G/DL (ref 30–36.5)
MCV RBC AUTO: 89.9 FL (ref 80–99)
NRBC # BLD: 0 K/UL (ref 0–0.01)
NRBC BLD-RTO: 0 PER 100 WBC
PLATELET # BLD AUTO: 233 K/UL (ref 150–400)
PMV BLD AUTO: 10.3 FL (ref 8.9–12.9)
RBC # BLD AUTO: 3.17 M/UL (ref 4.1–5.7)
VANCOMYCIN SERPL-MCNC: 17.1 UG/ML
WBC # BLD AUTO: 6.4 K/UL (ref 4.1–11.1)

## 2023-04-30 PROCEDURE — 94640 AIRWAY INHALATION TREATMENT: CPT

## 2023-04-30 PROCEDURE — 74011000258 HC RX REV CODE- 258: Performed by: FAMILY MEDICINE

## 2023-04-30 PROCEDURE — 85027 COMPLETE CBC AUTOMATED: CPT

## 2023-04-30 PROCEDURE — 74011250636 HC RX REV CODE- 250/636: Performed by: FAMILY MEDICINE

## 2023-04-30 PROCEDURE — 82565 ASSAY OF CREATININE: CPT

## 2023-04-30 PROCEDURE — 80202 ASSAY OF VANCOMYCIN: CPT

## 2023-04-30 PROCEDURE — 36415 COLL VENOUS BLD VENIPUNCTURE: CPT

## 2023-04-30 PROCEDURE — 65270000029 HC RM PRIVATE

## 2023-04-30 PROCEDURE — 94761 N-INVAS EAR/PLS OXIMETRY MLT: CPT

## 2023-04-30 PROCEDURE — 74011250637 HC RX REV CODE- 250/637: Performed by: FAMILY MEDICINE

## 2023-04-30 RX ADMIN — SODIUM BICARBONATE 650 MG: 650 TABLET ORAL at 21:02

## 2023-04-30 RX ADMIN — ERGOCALCIFEROL 50000 UNITS: 1.25 CAPSULE ORAL at 10:27

## 2023-04-30 RX ADMIN — SODIUM CHLORIDE 50 ML/HR: 9 INJECTION, SOLUTION INTRAVENOUS at 18:00

## 2023-04-30 RX ADMIN — BUDESONIDE AND FORMOTEROL FUMARATE DIHYDRATE 2 PUFF: 160; 4.5 AEROSOL RESPIRATORY (INHALATION) at 07:41

## 2023-04-30 RX ADMIN — TAMSULOSIN HYDROCHLORIDE 0.4 MG: 0.4 CAPSULE ORAL at 21:03

## 2023-04-30 RX ADMIN — ATORVASTATIN CALCIUM 10 MG: 20 TABLET, FILM COATED ORAL at 21:02

## 2023-04-30 RX ADMIN — PIPERACILLIN AND TAZOBACTAM 3.38 G: 3; .375 INJECTION, POWDER, LYOPHILIZED, FOR SOLUTION INTRAVENOUS at 22:59

## 2023-04-30 RX ADMIN — PIPERACILLIN AND TAZOBACTAM 3.38 G: 3; .375 INJECTION, POWDER, LYOPHILIZED, FOR SOLUTION INTRAVENOUS at 10:28

## 2023-04-30 RX ADMIN — TIOTROPIUM BROMIDE INHALATION SPRAY 2 PUFF: 3.12 SPRAY, METERED RESPIRATORY (INHALATION) at 07:41

## 2023-04-30 RX ADMIN — FERROUS SULFATE TAB 325 MG (65 MG ELEMENTAL FE) 325 MG: 325 (65 FE) TAB at 08:05

## 2023-04-30 RX ADMIN — HYDRALAZINE HYDROCHLORIDE 25 MG: 50 TABLET, FILM COATED ORAL at 08:05

## 2023-04-30 RX ADMIN — BUDESONIDE AND FORMOTEROL FUMARATE DIHYDRATE 2 PUFF: 160; 4.5 AEROSOL RESPIRATORY (INHALATION) at 20:19

## 2023-04-30 RX ADMIN — HYDRALAZINE HYDROCHLORIDE 25 MG: 50 TABLET, FILM COATED ORAL at 21:02

## 2023-04-30 RX ADMIN — SODIUM BICARBONATE 650 MG: 650 TABLET ORAL at 09:00

## 2023-04-30 RX ADMIN — MELATONIN TAB 3 MG 3 MG: 3 TAB at 21:03

## 2023-04-30 RX ADMIN — METOPROLOL TARTRATE 25 MG: 25 TABLET, FILM COATED ORAL at 21:02

## 2023-04-30 RX ADMIN — SODIUM CHLORIDE 50 ML/HR: 9 INJECTION, SOLUTION INTRAVENOUS at 07:09

## 2023-04-30 RX ADMIN — APIXABAN 2.5 MG: 2.5 TABLET, FILM COATED ORAL at 08:04

## 2023-04-30 RX ADMIN — AMLODIPINE BESYLATE 5 MG: 5 TABLET ORAL at 08:05

## 2023-04-30 RX ADMIN — HYDRALAZINE HYDROCHLORIDE 25 MG: 50 TABLET, FILM COATED ORAL at 15:49

## 2023-04-30 RX ADMIN — APIXABAN 2.5 MG: 2.5 TABLET, FILM COATED ORAL at 21:02

## 2023-04-30 RX ADMIN — METOPROLOL TARTRATE 25 MG: 25 TABLET, FILM COATED ORAL at 08:05

## 2023-05-01 LAB
ERYTHROCYTE [DISTWIDTH] IN BLOOD BY AUTOMATED COUNT: 14.5 % (ref 11.5–14.5)
HCT VFR BLD AUTO: 28.7 % (ref 36.6–50.3)
HGB BLD-MCNC: 9.2 G/DL (ref 12.1–17)
MCH RBC QN AUTO: 28.8 PG (ref 26–34)
MCHC RBC AUTO-ENTMCNC: 32.1 G/DL (ref 30–36.5)
MCV RBC AUTO: 89.7 FL (ref 80–99)
NRBC # BLD: 0 K/UL (ref 0–0.01)
NRBC BLD-RTO: 0 PER 100 WBC
PLATELET # BLD AUTO: 229 K/UL (ref 150–400)
PMV BLD AUTO: 10.6 FL (ref 8.9–12.9)
RBC # BLD AUTO: 3.2 M/UL (ref 4.1–5.7)
WBC # BLD AUTO: 6.3 K/UL (ref 4.1–11.1)

## 2023-05-01 PROCEDURE — 74011250636 HC RX REV CODE- 250/636: Performed by: FAMILY MEDICINE

## 2023-05-01 PROCEDURE — 74011250637 HC RX REV CODE- 250/637: Performed by: FAMILY MEDICINE

## 2023-05-01 PROCEDURE — 65270000029 HC RM PRIVATE

## 2023-05-01 PROCEDURE — 85027 COMPLETE CBC AUTOMATED: CPT

## 2023-05-01 PROCEDURE — 94640 AIRWAY INHALATION TREATMENT: CPT

## 2023-05-01 PROCEDURE — 74011000258 HC RX REV CODE- 258: Performed by: FAMILY MEDICINE

## 2023-05-01 PROCEDURE — 36415 COLL VENOUS BLD VENIPUNCTURE: CPT

## 2023-05-01 RX ORDER — HYDRALAZINE HYDROCHLORIDE 25 MG/1
25 TABLET, FILM COATED ORAL 3 TIMES DAILY
Qty: 60 TABLET | Refills: 1 | Status: SHIPPED | OUTPATIENT
Start: 2023-05-01

## 2023-05-01 RX ORDER — AMOXICILLIN AND CLAVULANATE POTASSIUM 875; 125 MG/1; MG/1
1 TABLET, FILM COATED ORAL 2 TIMES DAILY
Qty: 20 TABLET | Refills: 0 | Status: SHIPPED | OUTPATIENT
Start: 2023-05-01

## 2023-05-01 RX ORDER — AMLODIPINE BESYLATE 10 MG/1
10 TABLET ORAL DAILY
Qty: 60 TABLET | Refills: 0 | Status: SHIPPED | OUTPATIENT
Start: 2023-05-02

## 2023-05-01 RX ORDER — AMLODIPINE BESYLATE 5 MG/1
10 TABLET ORAL DAILY
Status: DISCONTINUED | OUTPATIENT
Start: 2023-05-02 | End: 2023-05-03 | Stop reason: HOSPADM

## 2023-05-01 RX ADMIN — METOPROLOL TARTRATE 25 MG: 25 TABLET, FILM COATED ORAL at 21:02

## 2023-05-01 RX ADMIN — SODIUM BICARBONATE 650 MG: 650 TABLET ORAL at 21:00

## 2023-05-01 RX ADMIN — APIXABAN 2.5 MG: 2.5 TABLET, FILM COATED ORAL at 08:17

## 2023-05-01 RX ADMIN — HYDRALAZINE HYDROCHLORIDE 25 MG: 50 TABLET, FILM COATED ORAL at 21:02

## 2023-05-01 RX ADMIN — METOPROLOL TARTRATE 25 MG: 25 TABLET, FILM COATED ORAL at 08:17

## 2023-05-01 RX ADMIN — ATORVASTATIN CALCIUM 10 MG: 20 TABLET, FILM COATED ORAL at 21:02

## 2023-05-01 RX ADMIN — FERROUS SULFATE TAB 325 MG (65 MG ELEMENTAL FE) 325 MG: 325 (65 FE) TAB at 08:16

## 2023-05-01 RX ADMIN — MELATONIN TAB 3 MG 3 MG: 3 TAB at 21:02

## 2023-05-01 RX ADMIN — HYDRALAZINE HYDROCHLORIDE 25 MG: 50 TABLET, FILM COATED ORAL at 08:15

## 2023-05-01 RX ADMIN — BUDESONIDE AND FORMOTEROL FUMARATE DIHYDRATE 2 PUFF: 160; 4.5 AEROSOL RESPIRATORY (INHALATION) at 08:09

## 2023-05-01 RX ADMIN — TIOTROPIUM BROMIDE INHALATION SPRAY 2 PUFF: 3.12 SPRAY, METERED RESPIRATORY (INHALATION) at 08:09

## 2023-05-01 RX ADMIN — BUDESONIDE AND FORMOTEROL FUMARATE DIHYDRATE 2 PUFF: 160; 4.5 AEROSOL RESPIRATORY (INHALATION) at 23:21

## 2023-05-01 RX ADMIN — APIXABAN 2.5 MG: 2.5 TABLET, FILM COATED ORAL at 21:02

## 2023-05-01 RX ADMIN — AMLODIPINE BESYLATE 5 MG: 5 TABLET ORAL at 08:17

## 2023-05-01 RX ADMIN — SODIUM BICARBONATE 650 MG: 650 TABLET ORAL at 08:17

## 2023-05-01 RX ADMIN — TAMSULOSIN HYDROCHLORIDE 0.4 MG: 0.4 CAPSULE ORAL at 21:02

## 2023-05-01 RX ADMIN — HYDRALAZINE HYDROCHLORIDE 25 MG: 50 TABLET, FILM COATED ORAL at 17:04

## 2023-05-01 RX ADMIN — SODIUM CHLORIDE 50 ML/HR: 9 INJECTION, SOLUTION INTRAVENOUS at 17:06

## 2023-05-01 RX ADMIN — PIPERACILLIN AND TAZOBACTAM 3.38 G: 3; .375 INJECTION, POWDER, LYOPHILIZED, FOR SOLUTION INTRAVENOUS at 23:45

## 2023-05-01 RX ADMIN — FUROSEMIDE 20 MG: 40 TABLET ORAL at 10:08

## 2023-05-01 RX ADMIN — PIPERACILLIN AND TAZOBACTAM 3.38 G: 3; .375 INJECTION, POWDER, LYOPHILIZED, FOR SOLUTION INTRAVENOUS at 11:12

## 2023-05-01 NOTE — PROGRESS NOTES
Problem: Falls - Risk of  Goal: *Absence of Falls  Description: Document Arelis Baird Fall Risk and appropriate interventions in the flowsheet. Outcome: Progressing Towards Goal  Note: Fall Risk Interventions:                                Problem: Patient Education: Go to Patient Education Activity  Goal: Patient/Family Education  Outcome: Progressing Towards Goal     Problem: Pressure Injury - Risk of  Goal: *Prevention of pressure injury  Description: Document Earl Scale and appropriate interventions in the flowsheet.   Outcome: Progressing Towards Goal  Note: Pressure Injury Interventions:  Sensory Interventions: Maintain/enhance activity level    Moisture Interventions: Absorbent underpads, Apply protective barrier, creams and emollients    Activity Interventions: Increase time out of bed    Mobility Interventions: HOB 30 degrees or less    Nutrition Interventions: Document food/fluid/supplement intake    Friction and Shear Interventions: HOB 30 degrees or less, Minimize layers                Problem: Patient Education: Go to Patient Education Activity  Goal: Patient/Family Education  Outcome: Progressing Towards Goal

## 2023-05-01 NOTE — PROGRESS NOTES
Problem: Falls - Risk of  Goal: *Absence of Falls  Description: Document Lilliana Valerio Fall Risk and appropriate interventions in the flowsheet. Outcome: Progressing Towards Goal  Note: Fall Risk Interventions:                                Problem: Patient Education: Go to Patient Education Activity  Goal: Patient/Family Education  Outcome: Progressing Towards Goal     Problem: Pressure Injury - Risk of  Goal: *Prevention of pressure injury  Description: Document Earl Scale and appropriate interventions in the flowsheet.   Outcome: Progressing Towards Goal  Note: Pressure Injury Interventions:  Sensory Interventions: Maintain/enhance activity level    Moisture Interventions: Absorbent underpads, Apply protective barrier, creams and emollients, Assess need for specialty bed    Activity Interventions: PT/OT evaluation, Increase time out of bed    Mobility Interventions: HOB 30 degrees or less    Nutrition Interventions: Document food/fluid/supplement intake    Friction and Shear Interventions: HOB 30 degrees or less, Minimize layers                Problem: Patient Education: Go to Patient Education Activity  Goal: Patient/Family Education  Outcome: Progressing Towards Goal

## 2023-05-01 NOTE — DISCHARGE SUMMARY
Discharge Summary       PATIENT ID: Montserrat Holt  MRN: [id number]   YOB: 1935    DATE OF ADMISSION: 4/26/2023  9:08 PM    DATE OF DISCHARGE:   PRIMARY CARE PROVIDER: Abrahan Finney MD     ATTENDING PHYSICIAN: Jolene Grover  DISCHARGING PROVIDER: Isaura Nava      CONSULTATIONS: IP CONSULT TO PULMONOLOGY    PROCEDURES/SURGERIES: * No surgery found *    ADMITTING DIAGNOSES:    Patient Active Problem List    Diagnosis Date Noted    UTI (urinary tract infection) 04/26/2023    Fever 04/26/2023    AMS (altered mental status) 04/26/2023    Dyspnea on exertion 01/19/2023    Atrial flutter by electrocardiography ([facility] [address] 01/19/2023    Atrial flutter ([address] 01/19/2023    Colon cancer ([facility] [address] 09/15/2022    Status post surgery 09/15/2022    Acute CVA (cerebrovascular accident) ([address] 04/03/2022    Hypercholesteremia 04/03/2022    Accelerated hypertension 04/03/2022    CVA (cerebral vascular accident) ([address] 04/03/2022    Paresthesia and pain of right extremity 04/01/2022       DISCHARGE DIAGNOSES / PLAN:      Metabolic encephalopathy  UTI with negative urine cultures  Hyperkalemia resolved  Hypertension  Lipidemia  COPD  Colon cancer  Previous MI  History of CVA  Chronic kidney disease stage IV  Lung mass      DISCHARGE MEDICATIONS:  Current Discharge Medication List        START taking these medications    Details   amLODIPine (NORVASC) 10 mg tablet Take 1 Tablet by mouth daily. Qty: 60 Tablet, Refills: 0  Start date: 5/2/2023      hydrALAZINE (APRESOLINE) 25 mg tablet Take 1 Tablet by mouth three (3) times daily. Qty: 60 Tablet, Refills: 1  Start date: 5/1/2023      !! amoxicillin-clavulanate (Augmentin) 875-125 mg per tablet Take 1 Tablet by mouth two (2) times a day. Qty: 20 Tablet, Refills: 0  Start date: 5/1/2023       !! - Potential duplicate medications found. Please discuss with provider.         CONTINUE these medications which have NOT CHANGED    Details   ferrous sulfate (IRON) 325 mg (65 mg iron) EC tablet Take 1 Tablet by mouth daily. tamsulosin (Flomax) 0.4 mg capsule Take 1 Capsule by mouth daily. melatonin 3 mg tablet Take  by mouth.      budesonide-formoteroL (SYMBICORT) 160-4.5 mcg/actuation HFAA Take 2 Puffs by inhalation two (2) times a day. Qty: 10.2 g, Refills: 2      metoprolol tartrate (LOPRESSOR) 25 mg tablet Take 1 Tablet by mouth two (2) times a day. Qty: 60 Tablet, Refills: 0      furosemide (Lasix) 40 mg tablet Tablet daily  Qty: 60 Tablet, Refills: 0      apixaban (ELIQUIS) 2.5 mg tablet Take 1 Tablet by mouth two (2) times a day. Qty: 60 Tablet, Refills: 0      albuterol (PROVENTIL HFA, VENTOLIN HFA, PROAIR HFA) 90 mcg/actuation inhaler Take 1 Puff by inhalation every four (4) hours as needed for Wheezing. Qty: 18 g, Refills: 0      ergocalciferol (ERGOCALCIFEROL) 1,250 mcg (50,000 unit) capsule Take 1 Capsule by mouth every seven (7) days. Patient stated takes once weekly on  Sundays      atorvastatin (LIPITOR) 40 mg tablet Take 1 Tablet by mouth nightly. Qty: 30 Tablet, Refills: 1      sodium bicarbonate 325 mg tablet Take 2 Tablets by mouth two (2) times a day. !! amoxicillin-clavulanate (AUGMENTIN) 875-125 mg per tablet Take 1 Tablet by mouth every twelve (12) hours. Qty: 20 Tablet, Refills: 0      albuterol-ipratropium (DUO-NEB) 2.5 mg-0.5 mg/3 ml nebu 3 mL by Nebulization route every six (6) hours as needed for Wheezing. Qty: 1 Each, Refills: 1       !! - Potential duplicate medications found. Please discuss with provider.         STOP taking these medications       acetaminophen (TYLENOL) 325 mg tablet Comments:   Reason for Stopping:         doxazosin (CARDURA) 4 mg tablet Comments:   Reason for Stopping:         levoFLOXacin (LEVAQUIN) 250 mg tablet Comments:   Reason for Stopping:         dilTIAZem IR (CARDIZEM) 30 mg tablet Comments:   Reason for Stopping:                 NOTIFY YOUR PHYSICIAN FOR ANY OF THE FOLLOWING:   Fever over 101 degrees for 24 hours.   Chest pain, shortness of breath, fever, chills, nausea, vomiting, diarrhea, change in mentation, falling, weakness, bleeding. Severe pain or pain not relieved by medications. Or, any other signs or symptoms that you may have questions about. DISPOSITION:  x  Home With:   OT  PT  HH  RN       Long term SNF/Inpatient Rehab    Independent/assisted living    Hospice    Other:       PATIENT CONDITION AT DISCHARGE: Stable      PHYSICAL EXAMINATION AT DISCHARGE:  General:          Alert, cooperative, no distress, appears stated age. HEENT:           Atraumatic, anicteric sclerae, pink conjunctivae                          No oral ulcers, mucosa moist, throat clear, dentition fair  Neck:               Supple, symmetrical  Lungs:             Clear to auscultation bilaterally. No Wheezing or Rhonchi. No rales. Chest wall:      No tenderness  No Accessory muscle use. Heart:              Regular  rhythm,  No  murmur   No edema  Abdomen:        Soft, non-tender. Not distended. Bowel sounds normal  Extremities:     No cyanosis. No clubbing,                            Skin turgor normal, Capillary refill normal  Skin:                Not pale. Not Jaundiced  No rashes   Psych:             Not anxious or agitated. Neurologic:      Alert, moves all extremities, answers questions appropriately and responds to commands     XR CHEST PORT   Final Result   Pulmonary nodules with right lung mass. Allowing for differences in technique no significant interval change.            Recent Results (from the past 24 hour(s))   CBC W/O DIFF    Collection Time: 05/01/23  7:17 AM   Result Value Ref Range    WBC 6.3 4.1 - 11.1 K/uL    RBC 3.20 (L) 4.10 - 5.70 M/uL    HGB 9.2 (L) 12.1 - 17.0 g/dL    HCT 28.7 (L) 36.6 - 50.3 %    MCV 89.7 80.0 - 99.0 FL    MCH 28.8 26.0 - 34.0 PG    MCHC 32.1 30.0 - 36.5 g/dL    RDW 14.5 11.5 - 14.5 %    PLATELET 133 989 - 617 K/uL    MPV 10.6 8.9 - 12.9 FL    NRBC 0.0 0.0  WBC ABSOLUTE NRBC 0.00 0.00 - 0.01 K/uL          HOSPITAL COURSE:    Patient is a 82y.o. year old male with a history of CKD, COPD, MI, colon cancer, hyperlipidemia, hypertension, and stoke who presented to the ED with altered mental status. His daughter reported to ED staff that he was not being his normal self. Patient is coming from [facility]. 1:1 sitter in place at this time. Currently patient denies any chest pain, shortness of breath, pain with urination or increased urinary frequency, fever or chills. 4/28  Patient is A&Ox3 but unaware of why he is here. No sitter at this time  Pulmonology consulted regarding lung nodules. No intervention to be done at this time per shared decision making with family  Kidney functions remain stable. CKD stage 4   Urine culture ordered. Ammonia was within normal limits  Blood culture shows no growth so far   Hyperkalemia resolved   Followed by case management - can return to [facility] when medically cleared     5/1  Urine cultures showed no growth   Creatine back to below baseline  Patient is pleasant and has no complaints at this time  Elevated blood pressure      Patient discharged home on p.o.  Augmentin  Need to follow-up regarding the lung mass as an outpatient    Medication reconciliation done patient discharged to skilled care rehab    Medication reconciliation done time sharply 35 minutes 50% time spent counseling and coordination of care      Signed:   Gil Coleman MD  5/1/2023  11:30 AM

## 2023-05-01 NOTE — PROGRESS NOTES
Pulmonary/ CC progress note    Subjective:     Patient seen and examined in his room on the floor this afternoon, no acute events overnight. Saturating well on room air, CBC stable, no BMP drawn. Per documentation review of my partners, family does not want biopsy of likely lung cancer and right lung, would recommend follow-up with pulmonary in the outpatient setting. Cleared for discharge from a pulmonary standpoint. No Known Allergies     Review of Systems:  Review of systems not obtained due to patient factors. Objective:   Blood pressure (!) 168/82, pulse 72, temperature 98.1 °F (36.7 °C), resp. rate 18, height 5' 9.02\" (1.753 m), weight 49.9 kg (110 lb), SpO2 100 %. Temp (24hrs), Av.3 °F (36.8 °C), Min:97.7 °F (36.5 °C), Max:99.4 °F (37.4 °C)    XR CHEST PORT   Final Result   Pulmonary nodules with right lung mass. Allowing for differences in technique no significant interval change. Data Review: CBC:   Recent Labs     23  0717 23  0710 23  0320   WBC 6.3 6.4 6.6   RBC 3.20* 3.17* 3.14*   HGB 9.2* 9.1* 9.0*   HCT 28.7* 28.5* 28.4*    233 214       CMP:   Recent Labs     23  0710 23  0320   CREA 2.36* 2.47*       Liver Enzymes:   No results for input(s): TP, ALB, TBIL, AP in the last 72 hours. No lab exists for component: SGOT, GPT, DBIL    ABGs: No results for input(s): PH, PCO2, PO2, HCO3 in the last 72 hours.   Inflammation studies:   Lab Results   Component Value Date/Time    C-Reactive protein 8.63 (H) 2022 06:00 AM    C-Reactive protein 12.00 (H) 2022 04:45 AM     Urinalysis:   Lab Results   Component Value Date/Time    Color Yellow/Straw 2023 09:30 PM    Appearance Clear 2023 09:30 PM    Specific gravity 1.015 2023 11:55 PM    pH (UA) 7.0 2023 09:30 PM    Protein 100 (A) 2023 09:30 PM    Glucose Negative 2023 09:30 PM    Ketone Negative 2023 09:30 PM    Bilirubin Negative 04/26/2023 09:30 PM    Blood Negative 04/26/2023 09:30 PM    Urobilinogen 0.1 04/26/2023 09:30 PM    Nitrites Negative 04/26/2023 09:30 PM    Leukocyte Esterase Trace (A) 04/26/2023 09:30 PM    WBC 0-4 04/26/2023 09:30 PM    RBC 0-5 04/26/2023 09:30 PM    Bacteria 1+ (A) 04/26/2023 09:30 PM     Current Facility-Administered Medications   Medication Dose Route Frequency    [START ON 5/2/2023] amLODIPine (NORVASC) tablet 10 mg  10 mg Oral DAILY    0.9% sodium chloride infusion  50 mL/hr IntraVENous CONTINUOUS    hydrALAZINE (APRESOLINE) tablet 25 mg  25 mg Oral TID    acetaminophen (TYLENOL) tablet 325 mg  325 mg Oral Q6H PRN    albuterol (PROVENTIL HFA, VENTOLIN HFA, PROAIR HFA) inhaler 1 Puff  1 Puff Inhalation Q4H PRN    apixaban (ELIQUIS) tablet 2.5 mg  2.5 mg Oral BID    budesonide-formoteroL (SYMBICORT) 160-4.5 mcg/actuation HFA inhaler 2 Puff  2 Puff Inhalation BID RT    ergocalciferol capsule 50,000 Units  50,000 Units Oral Q7D    ferrous sulfate tablet 325 mg  1 Tablet Oral DAILY WITH BREAKFAST    tamsulosin (FLOMAX) capsule 0.4 mg  0.4 mg Oral QHS    furosemide (LASIX) tablet 20 mg  20 mg Oral EVERY OTHER DAY    melatonin tablet 3 mg  3 mg Oral QHS    metoprolol tartrate (LOPRESSOR) tablet 25 mg  25 mg Oral BID    senna (SENOKOT) tablet 8.6 mg  1 Tablet Oral BID PRN    sodium bicarbonate tablet 650 mg  650 mg Oral BID    tiotropium bromide (SPIRIVA RESPIMAT) 2.5 mcg /actuation  2 Puff Inhalation DAILY    piperacillin-tazobactam (ZOSYN) 3.375 g in 0.9% sodium chloride (MBP/ADV) 100 mL MBP  3.375 g IntraVENous Q12H    acetaminophen (TYLENOL) tablet 650 mg  650 mg Oral Q6H PRN    Or    acetaminophen (TYLENOL) suppository 650 mg  650 mg Rectal Q6H PRN    polyethylene glycol (MIRALAX) packet 17 g  17 g Oral DAILY PRN    ondansetron (ZOFRAN ODT) tablet 4 mg  4 mg Oral Q8H PRN    Or    ondansetron (ZOFRAN) injection 4 mg  4 mg IntraVENous Q6H PRN    atorvastatin (LIPITOR) tablet 10 mg  10 mg Oral QHS    sodium chloride (NS) flush 5-10 mL  5-10 mL IntraVENous PRN        Exam:      This is an elderly thinly built male who is currently without any distress. Patient has mild dementia. He sometimes tries to come out of the bed, he has a sitter one-to-one in room  Head normocephalic and atraumatic, pupils are round responsive light scantily conjunctive are pink  Chest: No wheezing appreciated. No rales appreciated  Heart: S1-S2 normal  Abdomen: Soft, nontender, no visceromegaly  Scar raymond from for abdominal surgery in the past  Extremities: No edema, sinus or clubbing  Neuro: No focal motor deficit. Impression: This is an elderly male with known history of colon cancer status post resection in September 2022, history of kidney disease, COPD and ASHD. He got transferred from local nursing home after he had a fall because of dizziness. No skeletal injury was noted. He was seen by his oncologist yesterday and was referred to the hospital because he was running fever. His chest x-ray has shown right lower lobe mass along with lung nodules there was also picked up in CT scan of chest in January 2023    Plan:   Right lower lobe lung mass:  Patient's chest x-ray showed right lower lobe lung mass. His CT scan of the chest from January 2023 were reviewed which showed 2.1 x 2.4 cm right lobe mass along with additional 3 lung nodules. He was treated in January with antibiotics and with recommendations to have a follow-up CT/chest x-ray. Repeat imaging studies did not show any increase in size of nodules. Differential diagnosis of his right lower lobe mass includes lung cancer primary versus metastatic cancer from colon. Patient is running fever, infection is another possibility. Discussed at length with the patient's daughter. Given patient's age, dementia and his poor functional status doing transbronchial/CT-guided lung biopsy and subsequent treatment for cancer would be overwhelming for the patient.   I have discussed with patient's daughter and her aunt Hannah Muller who herself is a retired nurse. Family has decided that most likely it is cancer and they are not going to pursue any biopsy or treatment of suspected lung cancer. I will continue with IV antibiotics which include IV Zosyn. Cleared for discharge from a pulmonary standpoint. 2.  Dementia :  Supportive care    3. Chronic kidney disease:    4. Colon cancer   dizziness:   Resulted in fall . Mostly from generalized weakness, dehydration. Getting IV fluids. Thank you for involving me in the management of your patient    This dictation was done by dragon, computer voice recognition software. Often unanticipated grammatical, syntax, [place] phones and other interpretive errors are inadvertently transcribed. Some errors may escape final proofreading.     Chris Claire, DO  [facility] ( [facility])

## 2023-05-01 NOTE — PROGRESS NOTES
DC Plan: [facility] ([facility])    Cm called [facility]. Cm was informed she was at [facility]. Cm called [facility] and [facility]. CM informed her of discharge. Marylene indicated she will send a room# via [employer]. Cm spoke with pt's daughter Paco Espinosa [phone]. Cm notified her of discharge back to [facility] today. Daughter indicated she is currently in the hospital and will bring up pt's clothes. Cm informed daughter Cm will f/up with her. Cm received room# 304A via [employer], however CM then received a message indicating [place] [address]. will need to submit for auth due to pt having [employer] as primary insurance. Cm spoke with pt's daughter at the nurses station and updated her. Pt is a long term resident of [place] [address].. Pt was not seen by PT/OT during this hospitalization. In addition, pt is not discharging with iv abx. Jonnie received the following response from [employer] via [address] [place] [address],Ground Floor: Ross Hinson is however his primary is [employer] . . we have to go through his primary in order for his Medicaid to pay for his say\". Medicare pt has received, reviewed, and signed 2nd  letter informing them of their right to appeal the discharge. Signed copied has been placed on pt bedside chart. Cm spoke with Dr. Felisha Lindsay and updated him on pt's dc. CM was given verbal permission to order PT/OT. Cm messaged [place] via [address] [place] [address] [address] them CM spoke with Dr. Felisha Lindsay, PT/OT was ordered and that CM updated family. Cm then received a response back from [employer] via [address] [place] [address],Ground Floor indicating they have submitted for auth. CM sent a message back asking what they submitted for auth. Cm informed [place] [address]. pt is not on iv abx and we don't have PT/OT notes.      [place] [address]. sent a message back via [address] [place] [address],Ground Floor stating \"we will send the notes in once they are available\"    Daughter is aware pt is not discharging today. Cm will let [facility] know when PT/OT notes are available so they can start auth.

## 2023-05-01 NOTE — DISCHARGE INSTRUCTIONS
Discharge Instructions       PATIENT ID: Jon Guerra  MRN: [id number]   YOB: 1935    DATE OF ADMISSION: [unfilled]    DATE OF DISCHARGE: 5/1/2023    PRIMARY CARE PROVIDER: @PCP@     ATTENDING PHYSICIAN: [unfilled]  DISCHARGING PROVIDER: Ana Orellana MD    To contact this individual call [phone] and ask the  to page. If unavailable ask to be transferred the Adult Hospitalist Department. DISCHARGE DIAGNOSES metabolic encephalopathy    CONSULTATIONS: [unfilled]    PROCEDURES/SURGERIES: * No surgery found *    PENDING TEST RESULTS:   At the time of discharge the following test results are still pending: None    [facility]MENTS:   [facility]     ADDITIONAL CARE RECOMMENDATIONS: Follow-up repeat the labs    DIET: Resume previous diet      ACTIVITY: Activity as tolerated    Wound care: Wound Care Order: submitted to [facility] Please view [email or web address]    EQUIPMENT needed: ***      DISCHARGE MEDICATIONS:   See Medication Reconciliation Form    It is important that you take the medication exactly as they are prescribed. Keep your medication in the bottles provided by the pharmacist and keep a list of the medication names, dosages, and times to be taken in your wallet. Do not take other medications without consulting your doctor. NOTIFY YOUR PHYSICIAN FOR ANY OF THE FOLLOWING:   Fever over 101 degrees for 24 hours. Chest pain, shortness of breath, fever, chills, nausea, vomiting, diarrhea, change in mentation, falling, weakness, bleeding. Severe pain or pain not relieved by medications. Or, any other signs or symptoms that you may have questions about.       DISPOSITION:    Home With:   OT  PT  HH  RN       SNF/Inpatient Rehab/LTAC    Independent/assisted living    Hospice    Other:         PROBLEM LIST Updated:  Yes ***       Signed:   Ana Orellana MD  5/1/2023  11:29 AM
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
## 2023-05-01 NOTE — PROGRESS NOTES
Spiritual Care Assessment/Progress Note  [facility]      NAME: Joaquin Wright      MRN: [id number]  AGE: 82 y.o. SEX: male  Religion Affiliation: Samaritan   Language: English     5/1/2023     Total Time (in minutes): 5     Spiritual Assessment begun in [facility] through conversation with:         [x]Patient        [] Family    [] Friend(s)        Reason for Consult: Initial/Spiritual assessment, patient floor     Spiritual beliefs: (Please include comment if needed)     [] Identifies with a trinidad tradition:         [] Supported by a trinidad community:            [] Claims no spiritual orientation:           [] Seeking spiritual identity:                [] Adheres to an individual form of spirituality:           [x] Not able to assess:                           Identified resources for coping:      [] Prayer                               [] Music                  [] Guided Imagery     [] Family/friends                 [] Pet visits     [] Devotional reading                         [x] Unknown     [] Other:                                               Interventions offered during this visit: (See comments for more details)                Plan of Care:     [] Support spiritual and/or cultural needs    [] Support AMD and/or advance care planning process      [] Support grieving process   [] Coordinate Rites and/or Rituals    [] Coordination with community clergy   [] No spiritual needs identified at this time   [] Detailed Plan of Care below (See Comments)  [] Make referral to Music Therapy  [] Make referral to Pet Therapy     [] Make referral to Addiction services  [] Make referral to [facility]  [] Make referral to Spiritual Care Partner  [] No future visits requested         Contact Spiritual Care for further referrals     Comments: Attempted Spiritual Care visit during routine rounding; patient was asleep. Spiritual assessment could not be completed at this time.     Spiritual Care remains available if needed. Rev.  April LUIS Graff can be reached by calling the  at [facility]  [phone]

## 2023-05-02 LAB
ALBUMIN SERPL-MCNC: 2.1 G/DL (ref 3.5–5)
ANION GAP SERPL CALC-SCNC: 7 MMOL/L (ref 5–15)
BUN SERPL-MCNC: 43 MG/DL (ref 6–20)
BUN/CREAT SERPL: 16 (ref 12–20)
CA-I BLD-MCNC: 8.6 MG/DL (ref 8.5–10.1)
CHLORIDE SERPL-SCNC: 112 MMOL/L (ref 97–108)
CO2 SERPL-SCNC: 21 MMOL/L (ref 21–32)
CREAT SERPL-MCNC: 2.66 MG/DL (ref 0.7–1.3)
ERYTHROCYTE [DISTWIDTH] IN BLOOD BY AUTOMATED COUNT: 14.5 % (ref 11.5–14.5)
GLUCOSE SERPL-MCNC: 127 MG/DL (ref 65–100)
HCT VFR BLD AUTO: 29.7 % (ref 36.6–50.3)
HGB BLD-MCNC: 9.4 G/DL (ref 12.1–17)
MAGNESIUM SERPL-MCNC: 1.9 MG/DL (ref 1.6–2.4)
MCH RBC QN AUTO: 28.6 PG (ref 26–34)
MCHC RBC AUTO-ENTMCNC: 31.6 G/DL (ref 30–36.5)
MCV RBC AUTO: 90.3 FL (ref 80–99)
NRBC # BLD: 0 K/UL (ref 0–0.01)
NRBC BLD-RTO: 0 PER 100 WBC
PHOSPHATE SERPL-MCNC: 2.9 MG/DL (ref 2.6–4.7)
PLATELET # BLD AUTO: 222 K/UL (ref 150–400)
PMV BLD AUTO: 10.6 FL (ref 8.9–12.9)
POTASSIUM SERPL-SCNC: 4.3 MMOL/L (ref 3.5–5.1)
RBC # BLD AUTO: 3.29 M/UL (ref 4.1–5.7)
SODIUM SERPL-SCNC: 140 MMOL/L (ref 136–145)
WBC # BLD AUTO: 6.7 K/UL (ref 4.1–11.1)

## 2023-05-02 PROCEDURE — 85027 COMPLETE CBC AUTOMATED: CPT

## 2023-05-02 PROCEDURE — 97530 THERAPEUTIC ACTIVITIES: CPT

## 2023-05-02 PROCEDURE — 74011000250 HC RX REV CODE- 250: Performed by: FAMILY MEDICINE

## 2023-05-02 PROCEDURE — 65270000029 HC RM PRIVATE

## 2023-05-02 PROCEDURE — 83735 ASSAY OF MAGNESIUM: CPT

## 2023-05-02 PROCEDURE — 94640 AIRWAY INHALATION TREATMENT: CPT

## 2023-05-02 PROCEDURE — 74011250637 HC RX REV CODE- 250/637: Performed by: FAMILY MEDICINE

## 2023-05-02 PROCEDURE — 94761 N-INVAS EAR/PLS OXIMETRY MLT: CPT

## 2023-05-02 PROCEDURE — 97165 OT EVAL LOW COMPLEX 30 MIN: CPT

## 2023-05-02 PROCEDURE — 74011250636 HC RX REV CODE- 250/636: Performed by: FAMILY MEDICINE

## 2023-05-02 PROCEDURE — 36415 COLL VENOUS BLD VENIPUNCTURE: CPT

## 2023-05-02 PROCEDURE — 80069 RENAL FUNCTION PANEL: CPT

## 2023-05-02 PROCEDURE — 97161 PT EVAL LOW COMPLEX 20 MIN: CPT

## 2023-05-02 PROCEDURE — 74011000258 HC RX REV CODE- 258: Performed by: FAMILY MEDICINE

## 2023-05-02 RX ADMIN — SODIUM BICARBONATE 650 MG: 650 TABLET ORAL at 21:29

## 2023-05-02 RX ADMIN — METOPROLOL TARTRATE 25 MG: 25 TABLET, FILM COATED ORAL at 08:28

## 2023-05-02 RX ADMIN — MELATONIN TAB 3 MG 3 MG: 3 TAB at 21:30

## 2023-05-02 RX ADMIN — APIXABAN 2.5 MG: 2.5 TABLET, FILM COATED ORAL at 21:30

## 2023-05-02 RX ADMIN — AMLODIPINE BESYLATE 10 MG: 5 TABLET ORAL at 08:28

## 2023-05-02 RX ADMIN — APIXABAN 2.5 MG: 2.5 TABLET, FILM COATED ORAL at 08:28

## 2023-05-02 RX ADMIN — TIOTROPIUM BROMIDE INHALATION SPRAY 2 PUFF: 3.12 SPRAY, METERED RESPIRATORY (INHALATION) at 07:52

## 2023-05-02 RX ADMIN — SODIUM CHLORIDE, PRESERVATIVE FREE 10 ML: 5 INJECTION INTRAVENOUS at 21:30

## 2023-05-02 RX ADMIN — PIPERACILLIN AND TAZOBACTAM 3.38 G: 3; .375 INJECTION, POWDER, LYOPHILIZED, FOR SOLUTION INTRAVENOUS at 21:31

## 2023-05-02 RX ADMIN — HYDRALAZINE HYDROCHLORIDE 25 MG: 50 TABLET, FILM COATED ORAL at 21:29

## 2023-05-02 RX ADMIN — METOPROLOL TARTRATE 25 MG: 25 TABLET, FILM COATED ORAL at 21:29

## 2023-05-02 RX ADMIN — PIPERACILLIN AND TAZOBACTAM 3.38 G: 3; .375 INJECTION, POWDER, LYOPHILIZED, FOR SOLUTION INTRAVENOUS at 11:26

## 2023-05-02 RX ADMIN — BUDESONIDE AND FORMOTEROL FUMARATE DIHYDRATE 2 PUFF: 160; 4.5 AEROSOL RESPIRATORY (INHALATION) at 07:52

## 2023-05-02 RX ADMIN — HYDRALAZINE HYDROCHLORIDE 25 MG: 50 TABLET, FILM COATED ORAL at 08:29

## 2023-05-02 RX ADMIN — BUDESONIDE AND FORMOTEROL FUMARATE DIHYDRATE 2 PUFF: 160; 4.5 AEROSOL RESPIRATORY (INHALATION) at 23:21

## 2023-05-02 RX ADMIN — TAMSULOSIN HYDROCHLORIDE 0.4 MG: 0.4 CAPSULE ORAL at 21:29

## 2023-05-02 RX ADMIN — SODIUM BICARBONATE 650 MG: 650 TABLET ORAL at 08:28

## 2023-05-02 RX ADMIN — ATORVASTATIN CALCIUM 10 MG: 20 TABLET, FILM COATED ORAL at 21:29

## 2023-05-02 RX ADMIN — FERROUS SULFATE TAB 325 MG (65 MG ELEMENTAL FE) 325 MG: 325 (65 FE) TAB at 08:27

## 2023-05-02 RX ADMIN — HYDRALAZINE HYDROCHLORIDE 25 MG: 50 TABLET, FILM COATED ORAL at 16:06

## 2023-05-02 NOTE — PROGRESS NOTES
Problem: Falls - Risk of  Goal: *Absence of Falls  Description: Document Jolayne Levels Fall Risk and appropriate interventions in the flowsheet. Outcome: Progressing Towards Goal  Note: Fall Risk Interventions:                                Problem: Pressure Injury - Risk of  Goal: *Prevention of pressure injury  Description: Document Earl Scale and appropriate interventions in the flowsheet.   Outcome: Progressing Towards Goal  Note: Pressure Injury Interventions:  Sensory Interventions: Maintain/enhance activity level    Moisture Interventions: Absorbent underpads    Activity Interventions: Increase time out of bed    Mobility Interventions: HOB 30 degrees or less    Nutrition Interventions: Document food/fluid/supplement intake    Friction and Shear Interventions: HOB 30 degrees or less, Minimize layers

## 2023-05-03 VITALS
HEART RATE: 71 BPM | HEIGHT: 69 IN | WEIGHT: 107 LBS | RESPIRATION RATE: 18 BRPM | OXYGEN SATURATION: 98 % | TEMPERATURE: 97.8 F | DIASTOLIC BLOOD PRESSURE: 69 MMHG | BODY MASS INDEX: 15.85 KG/M2 | SYSTOLIC BLOOD PRESSURE: 137 MMHG

## 2023-05-03 LAB
BACTERIA SPEC CULT: NORMAL
SPECIAL REQUESTS,SREQ: NORMAL

## 2023-05-03 PROCEDURE — 74011250637 HC RX REV CODE- 250/637: Performed by: FAMILY MEDICINE

## 2023-05-03 PROCEDURE — 74011000258 HC RX REV CODE- 258: Performed by: FAMILY MEDICINE

## 2023-05-03 PROCEDURE — 74011250636 HC RX REV CODE- 250/636: Performed by: FAMILY MEDICINE

## 2023-05-03 PROCEDURE — 94640 AIRWAY INHALATION TREATMENT: CPT

## 2023-05-03 RX ADMIN — BUDESONIDE AND FORMOTEROL FUMARATE DIHYDRATE 2 PUFF: 160; 4.5 AEROSOL RESPIRATORY (INHALATION) at 09:31

## 2023-05-03 RX ADMIN — HYDRALAZINE HYDROCHLORIDE 25 MG: 50 TABLET, FILM COATED ORAL at 08:46

## 2023-05-03 RX ADMIN — METOPROLOL TARTRATE 25 MG: 25 TABLET, FILM COATED ORAL at 08:47

## 2023-05-03 RX ADMIN — SODIUM CHLORIDE 50 ML/HR: 9 INJECTION, SOLUTION INTRAVENOUS at 07:21

## 2023-05-03 RX ADMIN — APIXABAN 2.5 MG: 2.5 TABLET, FILM COATED ORAL at 08:47

## 2023-05-03 RX ADMIN — FUROSEMIDE 20 MG: 40 TABLET ORAL at 11:00

## 2023-05-03 RX ADMIN — HYDRALAZINE HYDROCHLORIDE 25 MG: 50 TABLET, FILM COATED ORAL at 16:55

## 2023-05-03 RX ADMIN — TIOTROPIUM BROMIDE INHALATION SPRAY 2 PUFF: 3.12 SPRAY, METERED RESPIRATORY (INHALATION) at 09:31

## 2023-05-03 RX ADMIN — FERROUS SULFATE TAB 325 MG (65 MG ELEMENTAL FE) 325 MG: 325 (65 FE) TAB at 08:47

## 2023-05-03 RX ADMIN — AMLODIPINE BESYLATE 10 MG: 5 TABLET ORAL at 08:47

## 2023-05-03 RX ADMIN — SODIUM BICARBONATE 650 MG: 650 TABLET ORAL at 08:47

## 2023-05-03 RX ADMIN — PIPERACILLIN AND TAZOBACTAM 3.38 G: 3; .375 INJECTION, POWDER, LYOPHILIZED, FOR SOLUTION INTRAVENOUS at 11:00

## 2023-05-23 RX ORDER — TAMSULOSIN HYDROCHLORIDE 0.4 MG/1
0.4 CAPSULE ORAL DAILY
COMMUNITY

## 2023-05-23 RX ORDER — ALBUTEROL SULFATE 90 UG/1
1 AEROSOL, METERED RESPIRATORY (INHALATION) EVERY 4 HOURS PRN
COMMUNITY
Start: 2022-12-27

## 2023-05-23 RX ORDER — ATORVASTATIN CALCIUM 40 MG/1
1 TABLET, FILM COATED ORAL NIGHTLY
COMMUNITY
Start: 2022-04-03

## 2023-05-23 RX ORDER — LANOLIN ALCOHOL/MO/W.PET/CERES
325 CREAM (GRAM) TOPICAL DAILY
COMMUNITY

## 2023-05-23 RX ORDER — FUROSEMIDE 40 MG/1
TABLET ORAL
Status: ON HOLD | COMMUNITY
Start: 2023-01-20 | End: 2023-06-23 | Stop reason: HOSPADM

## 2023-05-23 RX ORDER — SODIUM BICARBONATE 325 MG/1
650 TABLET ORAL 2 TIMES DAILY
COMMUNITY

## 2023-05-23 RX ORDER — AMLODIPINE BESYLATE 10 MG/1
10 TABLET ORAL DAILY
Status: ON HOLD | COMMUNITY
Start: 2023-05-02 | End: 2023-06-23 | Stop reason: HOSPADM

## 2023-05-23 RX ORDER — AMOXICILLIN AND CLAVULANATE POTASSIUM 875; 125 MG/1; MG/1
1 TABLET, FILM COATED ORAL EVERY 12 HOURS
Status: ON HOLD | COMMUNITY
Start: 2023-01-24 | End: 2023-06-23 | Stop reason: HOSPADM

## 2023-05-23 RX ORDER — HYDRALAZINE HYDROCHLORIDE 25 MG/1
25 TABLET, FILM COATED ORAL 3 TIMES DAILY
Status: ON HOLD | COMMUNITY
Start: 2023-05-01 | End: 2023-06-23 | Stop reason: HOSPADM

## 2023-05-23 RX ORDER — ERGOCALCIFEROL 1.25 MG/1
50000 CAPSULE ORAL
COMMUNITY

## 2023-05-23 RX ORDER — LANOLIN ALCOHOL/MO/W.PET/CERES
CREAM (GRAM) TOPICAL
COMMUNITY

## 2023-05-23 RX ORDER — IPRATROPIUM BROMIDE AND ALBUTEROL SULFATE 2.5; .5 MG/3ML; MG/3ML
3 SOLUTION RESPIRATORY (INHALATION) EVERY 6 HOURS PRN
COMMUNITY
Start: 2023-01-24

## 2023-05-26 PROBLEM — N39.0 UTI (URINARY TRACT INFECTION): Status: RESOLVED | Noted: 2023-04-26 | Resolved: 2023-05-26

## 2023-06-07 ENCOUNTER — HOSPITAL ENCOUNTER (OUTPATIENT)
Facility: HOSPITAL | Age: 88
Discharge: HOME | End: 2023-06-10
Attending: INTERNAL MEDICINE
Payer: MEDICARE

## 2023-06-07 DIAGNOSIS — C18.9 MALIGNANT NEOPLASM OF COLON, UNSPECIFIED PART OF COLON (HCC): ICD-10-CM

## 2023-06-07 PROCEDURE — 71250 CT THORAX DX C-: CPT

## 2023-06-20 ENCOUNTER — HOSPITAL ENCOUNTER (INPATIENT)
Facility: HOSPITAL | Age: 88
LOS: 5 days | Discharge: NURSING, REHAB OR LONG-TERM CARE | DRG: 378 | End: 2023-06-26
Attending: STUDENT IN AN ORGANIZED HEALTH CARE EDUCATION/TRAINING PROGRAM | Admitting: FAMILY MEDICINE
Payer: MEDICARE

## 2023-06-20 DIAGNOSIS — D64.9 ANEMIA, UNSPECIFIED TYPE: ICD-10-CM

## 2023-06-20 DIAGNOSIS — R19.5 FECAL OCCULT BLOOD TEST POSITIVE: ICD-10-CM

## 2023-06-20 DIAGNOSIS — R60.9 EDEMA, UNSPECIFIED TYPE: Primary | ICD-10-CM

## 2023-06-20 DIAGNOSIS — J44.9 CHRONIC OBSTRUCTIVE PULMONARY DISEASE, UNSPECIFIED COPD TYPE (HCC): ICD-10-CM

## 2023-06-20 DIAGNOSIS — N18.9 CHRONIC KIDNEY DISEASE, UNSPECIFIED CKD STAGE: ICD-10-CM

## 2023-06-20 LAB
ANION GAP SERPL CALC-SCNC: 6 MMOL/L (ref 5–15)
BASOPHILS # BLD: 0.1 K/UL (ref 0–0.1)
BASOPHILS NFR BLD: 1 % (ref 0–1)
BUN SERPL-MCNC: 58 MG/DL (ref 6–20)
BUN/CREAT SERPL: 18 (ref 12–20)
CA-I BLD-MCNC: 8.3 MG/DL (ref 8.5–10.1)
CHLORIDE SERPL-SCNC: 108 MMOL/L (ref 97–108)
CO2 SERPL-SCNC: 26 MMOL/L (ref 21–32)
CREAT SERPL-MCNC: 3.29 MG/DL (ref 0.7–1.3)
DIFFERENTIAL METHOD BLD: ABNORMAL
EOSINOPHIL # BLD: 0.3 K/UL (ref 0–0.4)
EOSINOPHIL NFR BLD: 3 % (ref 0–7)
ERYTHROCYTE [DISTWIDTH] IN BLOOD BY AUTOMATED COUNT: 15.7 % (ref 11.5–14.5)
GLUCOSE SERPL-MCNC: 98 MG/DL (ref 65–100)
HCT VFR BLD AUTO: 18.4 % (ref 36.6–50.3)
HEMOCCULT SP1 STL QL: POSITIVE
HGB BLD-MCNC: 5.5 G/DL (ref 12.1–17)
IMM GRANULOCYTES # BLD AUTO: 0 K/UL (ref 0–0.04)
IMM GRANULOCYTES NFR BLD AUTO: 0 % (ref 0–0.5)
LYMPHOCYTES # BLD: 2.2 K/UL (ref 0.8–3.5)
LYMPHOCYTES NFR BLD: 21 % (ref 12–49)
MCH RBC QN AUTO: 27.8 PG (ref 26–34)
MCHC RBC AUTO-ENTMCNC: 29.9 G/DL (ref 30–36.5)
MCV RBC AUTO: 92.9 FL (ref 80–99)
MONOCYTES # BLD: 0.9 K/UL (ref 0–1)
MONOCYTES NFR BLD: 9 % (ref 5–13)
NEUTS SEG # BLD: 6.8 K/UL (ref 1.8–8)
NEUTS SEG NFR BLD: 66 % (ref 32–75)
NRBC # BLD: 0 K/UL (ref 0–0.01)
NRBC BLD-RTO: 0 PER 100 WBC
PLATELET # BLD AUTO: 316 K/UL (ref 150–400)
PMV BLD AUTO: 10 FL (ref 8.9–12.9)
POTASSIUM SERPL-SCNC: 4.5 MMOL/L (ref 3.5–5.1)
RBC # BLD AUTO: 1.98 M/UL (ref 4.1–5.7)
SODIUM SERPL-SCNC: 140 MMOL/L (ref 136–145)
WBC # BLD AUTO: 10.1 K/UL (ref 4.1–11.1)

## 2023-06-20 PROCEDURE — 86923 COMPATIBILITY TEST ELECTRIC: CPT

## 2023-06-20 PROCEDURE — 86900 BLOOD TYPING SEROLOGIC ABO: CPT

## 2023-06-20 PROCEDURE — 82272 OCCULT BLD FECES 1-3 TESTS: CPT

## 2023-06-20 PROCEDURE — 36415 COLL VENOUS BLD VENIPUNCTURE: CPT

## 2023-06-20 PROCEDURE — 86901 BLOOD TYPING SEROLOGIC RH(D): CPT

## 2023-06-20 PROCEDURE — 99285 EMERGENCY DEPT VISIT HI MDM: CPT

## 2023-06-20 PROCEDURE — 80048 BASIC METABOLIC PNL TOTAL CA: CPT

## 2023-06-20 PROCEDURE — 85025 COMPLETE CBC W/AUTO DIFF WBC: CPT

## 2023-06-20 PROCEDURE — 86850 RBC ANTIBODY SCREEN: CPT

## 2023-06-20 ASSESSMENT — LIFESTYLE VARIABLES
HOW OFTEN DO YOU HAVE A DRINK CONTAINING ALCOHOL: NEVER
HOW MANY STANDARD DRINKS CONTAINING ALCOHOL DO YOU HAVE ON A TYPICAL DAY: PATIENT DOES NOT DRINK

## 2023-06-20 ASSESSMENT — PAIN - FUNCTIONAL ASSESSMENT: PAIN_FUNCTIONAL_ASSESSMENT: NONE - DENIES PAIN

## 2023-06-21 ENCOUNTER — ANESTHESIA (OUTPATIENT)
Facility: HOSPITAL | Age: 88
End: 2023-06-21
Payer: MEDICARE

## 2023-06-21 ENCOUNTER — ANESTHESIA EVENT (OUTPATIENT)
Facility: HOSPITAL | Age: 88
End: 2023-06-21
Payer: MEDICARE

## 2023-06-21 PROBLEM — D64.9 ANEMIA: Status: ACTIVE | Noted: 2023-06-21

## 2023-06-21 PROBLEM — K92.2 GI BLEED: Status: ACTIVE | Noted: 2023-06-21

## 2023-06-21 LAB
ERYTHROCYTE [DISTWIDTH] IN BLOOD BY AUTOMATED COUNT: 17.5 % (ref 11.5–14.5)
HCT VFR BLD AUTO: 20.1 % (ref 36.6–50.3)
HGB BLD-MCNC: 6.4 G/DL (ref 12.1–17)
MCH RBC QN AUTO: 27.8 PG (ref 26–34)
MCHC RBC AUTO-ENTMCNC: 31.8 G/DL (ref 30–36.5)
MCV RBC AUTO: 87.4 FL (ref 80–99)
NRBC # BLD: 0 K/UL (ref 0–0.01)
NRBC BLD-RTO: 0 PER 100 WBC
PLATELET # BLD AUTO: 243 K/UL (ref 150–400)
PMV BLD AUTO: 9.7 FL (ref 8.9–12.9)
RBC # BLD AUTO: 2.3 M/UL (ref 4.1–5.7)
WBC # BLD AUTO: 10.2 K/UL (ref 4.1–11.1)

## 2023-06-21 PROCEDURE — 2709999900 HC NON-CHARGEABLE SUPPLY: Performed by: INTERNAL MEDICINE

## 2023-06-21 PROCEDURE — 7100000010 HC PHASE II RECOVERY - FIRST 15 MIN: Performed by: INTERNAL MEDICINE

## 2023-06-21 PROCEDURE — C9113 INJ PANTOPRAZOLE SODIUM, VIA: HCPCS | Performed by: FAMILY MEDICINE

## 2023-06-21 PROCEDURE — 2580000003 HC RX 258: Performed by: STUDENT IN AN ORGANIZED HEALTH CARE EDUCATION/TRAINING PROGRAM

## 2023-06-21 PROCEDURE — 1100000000 HC RM PRIVATE

## 2023-06-21 PROCEDURE — 3700000000 HC ANESTHESIA ATTENDED CARE: Performed by: INTERNAL MEDICINE

## 2023-06-21 PROCEDURE — P9016 RBC LEUKOCYTES REDUCED: HCPCS

## 2023-06-21 PROCEDURE — 0W3P8ZZ CONTROL BLEEDING IN GASTROINTESTINAL TRACT, VIA NATURAL OR ARTIFICIAL OPENING ENDOSCOPIC: ICD-10-PCS | Performed by: INTERNAL MEDICINE

## 2023-06-21 PROCEDURE — 6360000002 HC RX W HCPCS: Performed by: NURSE ANESTHETIST, CERTIFIED REGISTERED

## 2023-06-21 PROCEDURE — 3700000001 HC ADD 15 MINUTES (ANESTHESIA): Performed by: INTERNAL MEDICINE

## 2023-06-21 PROCEDURE — 85027 COMPLETE CBC AUTOMATED: CPT

## 2023-06-21 PROCEDURE — 2700000000 HC OXYGEN THERAPY PER DAY

## 2023-06-21 PROCEDURE — 6360000002 HC RX W HCPCS: Performed by: FAMILY MEDICINE

## 2023-06-21 PROCEDURE — 36415 COLL VENOUS BLD VENIPUNCTURE: CPT

## 2023-06-21 PROCEDURE — 94640 AIRWAY INHALATION TREATMENT: CPT

## 2023-06-21 PROCEDURE — 94761 N-INVAS EAR/PLS OXIMETRY MLT: CPT

## 2023-06-21 PROCEDURE — 3600007512: Performed by: INTERNAL MEDICINE

## 2023-06-21 PROCEDURE — 6370000000 HC RX 637 (ALT 250 FOR IP): Performed by: FAMILY MEDICINE

## 2023-06-21 PROCEDURE — 2580000003 HC RX 258: Performed by: FAMILY MEDICINE

## 2023-06-21 PROCEDURE — 2500000003 HC RX 250 WO HCPCS: Performed by: NURSE ANESTHETIST, CERTIFIED REGISTERED

## 2023-06-21 PROCEDURE — 3600007502: Performed by: INTERNAL MEDICINE

## 2023-06-21 RX ORDER — SODIUM CHLORIDE 9 MG/ML
INJECTION, SOLUTION INTRAVENOUS PRN
Status: DISCONTINUED | OUTPATIENT
Start: 2023-06-21 | End: 2023-06-26 | Stop reason: HOSPADM

## 2023-06-21 RX ORDER — POLYETHYLENE GLYCOL 3350 17 G/17G
17 POWDER, FOR SOLUTION ORAL DAILY PRN
Status: DISCONTINUED | OUTPATIENT
Start: 2023-06-21 | End: 2023-06-26 | Stop reason: HOSPADM

## 2023-06-21 RX ORDER — IPRATROPIUM BROMIDE AND ALBUTEROL SULFATE 2.5; .5 MG/3ML; MG/3ML
1 SOLUTION RESPIRATORY (INHALATION) EVERY 4 HOURS PRN
Status: DISCONTINUED | OUTPATIENT
Start: 2023-06-21 | End: 2023-06-26 | Stop reason: HOSPADM

## 2023-06-21 RX ORDER — ERGOCALCIFEROL 1.25 MG/1
50000 CAPSULE ORAL
Status: DISCONTINUED | OUTPATIENT
Start: 2023-06-21 | End: 2023-06-26 | Stop reason: HOSPADM

## 2023-06-21 RX ORDER — ONDANSETRON 2 MG/ML
4 INJECTION INTRAMUSCULAR; INTRAVENOUS EVERY 6 HOURS PRN
Status: DISCONTINUED | OUTPATIENT
Start: 2023-06-21 | End: 2023-06-26 | Stop reason: HOSPADM

## 2023-06-21 RX ORDER — SODIUM CHLORIDE 0.9 % (FLUSH) 0.9 %
5-40 SYRINGE (ML) INJECTION EVERY 12 HOURS SCHEDULED
Status: DISCONTINUED | OUTPATIENT
Start: 2023-06-21 | End: 2023-06-26 | Stop reason: HOSPADM

## 2023-06-21 RX ORDER — IPRATROPIUM BROMIDE AND ALBUTEROL SULFATE 2.5; .5 MG/3ML; MG/3ML
1 SOLUTION RESPIRATORY (INHALATION) EVERY 6 HOURS PRN
Status: DISCONTINUED | OUTPATIENT
Start: 2023-06-21 | End: 2023-06-21 | Stop reason: SDUPTHER

## 2023-06-21 RX ORDER — ATORVASTATIN CALCIUM 40 MG/1
40 TABLET, FILM COATED ORAL NIGHTLY
Status: DISCONTINUED | OUTPATIENT
Start: 2023-06-21 | End: 2023-06-26 | Stop reason: HOSPADM

## 2023-06-21 RX ORDER — PANTOPRAZOLE SODIUM 40 MG/10ML
40 INJECTION, POWDER, LYOPHILIZED, FOR SOLUTION INTRAVENOUS DAILY
Status: DISCONTINUED | OUTPATIENT
Start: 2023-06-21 | End: 2023-06-26 | Stop reason: HOSPADM

## 2023-06-21 RX ORDER — SODIUM BICARBONATE 650 MG/1
650 TABLET ORAL 2 TIMES DAILY
Status: DISCONTINUED | OUTPATIENT
Start: 2023-06-21 | End: 2023-06-26 | Stop reason: HOSPADM

## 2023-06-21 RX ORDER — ACETAMINOPHEN 325 MG/1
650 TABLET ORAL EVERY 6 HOURS PRN
Status: DISCONTINUED | OUTPATIENT
Start: 2023-06-21 | End: 2023-06-26 | Stop reason: HOSPADM

## 2023-06-21 RX ORDER — LANOLIN ALCOHOL/MO/W.PET/CERES
6 CREAM (GRAM) TOPICAL NIGHTLY PRN
Status: DISCONTINUED | OUTPATIENT
Start: 2023-06-21 | End: 2023-06-26 | Stop reason: HOSPADM

## 2023-06-21 RX ORDER — FERROUS SULFATE 325(65) MG
325 TABLET ORAL DAILY
Status: DISCONTINUED | OUTPATIENT
Start: 2023-06-21 | End: 2023-06-26 | Stop reason: HOSPADM

## 2023-06-21 RX ORDER — ACETAMINOPHEN 650 MG/1
650 SUPPOSITORY RECTAL EVERY 6 HOURS PRN
Status: DISCONTINUED | OUTPATIENT
Start: 2023-06-21 | End: 2023-06-26 | Stop reason: HOSPADM

## 2023-06-21 RX ORDER — SODIUM CHLORIDE 9 MG/ML
INJECTION, SOLUTION INTRAVENOUS PRN
Status: COMPLETED | OUTPATIENT
Start: 2023-06-21 | End: 2023-06-21

## 2023-06-21 RX ORDER — ALBUTEROL SULFATE 90 UG/1
1 AEROSOL, METERED RESPIRATORY (INHALATION) EVERY 4 HOURS PRN
Status: DISCONTINUED | OUTPATIENT
Start: 2023-06-21 | End: 2023-06-26 | Stop reason: HOSPADM

## 2023-06-21 RX ORDER — SODIUM CHLORIDE 0.9 % (FLUSH) 0.9 %
5-40 SYRINGE (ML) INJECTION PRN
Status: DISCONTINUED | OUTPATIENT
Start: 2023-06-21 | End: 2023-06-26 | Stop reason: HOSPADM

## 2023-06-21 RX ORDER — SODIUM CHLORIDE 9 MG/ML
INJECTION, SOLUTION INTRAVENOUS CONTINUOUS
Status: DISCONTINUED | OUTPATIENT
Start: 2023-06-21 | End: 2023-06-24

## 2023-06-21 RX ORDER — TAMSULOSIN HYDROCHLORIDE 0.4 MG/1
0.4 CAPSULE ORAL DAILY
Status: DISCONTINUED | OUTPATIENT
Start: 2023-06-21 | End: 2023-06-26 | Stop reason: HOSPADM

## 2023-06-21 RX ORDER — ONDANSETRON 4 MG/1
4 TABLET, ORALLY DISINTEGRATING ORAL EVERY 8 HOURS PRN
Status: DISCONTINUED | OUTPATIENT
Start: 2023-06-21 | End: 2023-06-26 | Stop reason: HOSPADM

## 2023-06-21 RX ADMIN — LIDOCAINE HYDROCHLORIDE 60 MG: 20 INJECTION, SOLUTION EPIDURAL; INFILTRATION; INTRACAUDAL; PERINEURAL at 13:05

## 2023-06-21 RX ADMIN — SODIUM CHLORIDE: 9 INJECTION, SOLUTION INTRAVENOUS at 10:48

## 2023-06-21 RX ADMIN — SODIUM CHLORIDE: 9 INJECTION, SOLUTION INTRAVENOUS at 02:06

## 2023-06-21 RX ADMIN — PANTOPRAZOLE SODIUM 40 MG: 40 INJECTION, POWDER, FOR SOLUTION INTRAVENOUS at 09:46

## 2023-06-21 RX ADMIN — IPRATROPIUM BROMIDE AND ALBUTEROL SULFATE 1 DOSE: 2.5; .5 SOLUTION RESPIRATORY (INHALATION) at 03:03

## 2023-06-21 RX ADMIN — ACETAMINOPHEN 650 MG: 325 TABLET ORAL at 19:41

## 2023-06-21 RX ADMIN — ERGOCALCIFEROL 50000 UNITS: 1.25 CAPSULE ORAL at 09:49

## 2023-06-21 RX ADMIN — TAMSULOSIN HYDROCHLORIDE 0.4 MG: 0.4 CAPSULE ORAL at 09:46

## 2023-06-21 RX ADMIN — FERROUS SULFATE TAB 325 MG (65 MG ELEMENTAL FE) 325 MG: 325 (65 FE) TAB at 09:46

## 2023-06-21 RX ADMIN — PROPOFOL 10 MG: 10 INJECTION, EMULSION INTRAVENOUS at 13:09

## 2023-06-21 RX ADMIN — PROPOFOL 20 MG: 10 INJECTION, EMULSION INTRAVENOUS at 13:16

## 2023-06-21 RX ADMIN — PROPOFOL 20 MG: 10 INJECTION, EMULSION INTRAVENOUS at 13:22

## 2023-06-21 RX ADMIN — SODIUM CHLORIDE, PRESERVATIVE FREE 10 ML: 5 INJECTION INTRAVENOUS at 09:47

## 2023-06-21 RX ADMIN — PROPOFOL 30 MG: 10 INJECTION, EMULSION INTRAVENOUS at 13:05

## 2023-06-21 RX ADMIN — SODIUM CHLORIDE, PRESERVATIVE FREE 10 ML: 5 INJECTION INTRAVENOUS at 21:34

## 2023-06-21 RX ADMIN — PROPOFOL 10 MG: 10 INJECTION, EMULSION INTRAVENOUS at 13:07

## 2023-06-21 RX ADMIN — PROPOFOL 10 MG: 10 INJECTION, EMULSION INTRAVENOUS at 13:12

## 2023-06-21 RX ADMIN — SODIUM BICARBONATE 650 MG: 650 TABLET ORAL at 09:46

## 2023-06-21 ASSESSMENT — PAIN SCALES - GENERAL
PAINLEVEL_OUTOF10: 3
PAINLEVEL_OUTOF10: 3

## 2023-06-21 ASSESSMENT — PAIN DESCRIPTION - LOCATION
LOCATION: BACK
LOCATION: BACK

## 2023-06-21 NOTE — ANESTHESIA PRE PROCEDURE
(If Applicable):   Lab Results   Component Value Date/Time    COVID19 Not Detected 04/26/2023 09:35 PM           Anesthesia Evaluation  Patient summary reviewed and Nursing notes reviewed  Airway: Mallampati: II  TM distance: >3 FB   Neck ROM: full  Mouth opening: > = 3 FB   Dental: normal exam         Pulmonary:normal exam  breath sounds clear to auscultation  (+) COPD:                             Cardiovascular:  Exercise tolerance: poor (<4 METS),   (+) hypertension:, past MI: > 6 months and no interval change, dysrhythmias: atrial flutter,       ECG reviewed  Rhythm: regular  Rate: abnormal  Echocardiogram reviewed               ROS comment: TTE 2023:    Left Ventricle: Normal left ventricular systolic function with a visually estimated EF of 55 - 60%. Left ventricle is smaller than normal. Mildly increased wall thickness. Normal wall motion.   Aortic Valve: Tricuspid valve. Thickened cusp.   Mitral Valve: Mild regurgitation.   Right Atrium: Right atrium is dilated. Neuro/Psych:   (+) CVA:, psychiatric history:            GI/Hepatic/Renal:   (+) renal disease: CRI,           Endo/Other:    (+) blood dyscrasia: anemia:., malignancy/cancer. Abdominal:             Vascular: negative vascular ROS. Other Findings:           Anesthesia Plan      MAC and TIVA     ASA 3 - emergent       Induction: intravenous. continuous noninvasive hemodynamic monitor    Anesthetic plan and risks discussed with patient. Plan discussed with CRNA.     Attending anesthesiologist reviewed and agrees with Michelle Aldridge MD   6/21/2023

## 2023-06-22 LAB
ALBUMIN SERPL-MCNC: 2.3 G/DL (ref 3.5–5)
ALBUMIN/GLOB SERPL: 0.5 (ref 1.1–2.2)
ALP SERPL-CCNC: 115 U/L (ref 45–117)
ALT SERPL-CCNC: 41 U/L (ref 12–78)
ANION GAP SERPL CALC-SCNC: 8 MMOL/L (ref 5–15)
AST SERPL W P-5'-P-CCNC: 49 U/L (ref 15–37)
BASOPHILS # BLD: 0.1 K/UL (ref 0–0.1)
BASOPHILS NFR BLD: 1 % (ref 0–1)
BILIRUB SERPL-MCNC: 0.9 MG/DL (ref 0.2–1)
BUN SERPL-MCNC: 49 MG/DL (ref 6–20)
BUN/CREAT SERPL: 18 (ref 12–20)
CA-I BLD-MCNC: 8.5 MG/DL (ref 8.5–10.1)
CHLORIDE SERPL-SCNC: 114 MMOL/L (ref 97–108)
CO2 SERPL-SCNC: 22 MMOL/L (ref 21–32)
CREAT SERPL-MCNC: 2.8 MG/DL (ref 0.7–1.3)
DIFFERENTIAL METHOD BLD: ABNORMAL
EOSINOPHIL # BLD: 0.2 K/UL (ref 0–0.4)
EOSINOPHIL NFR BLD: 2 % (ref 0–7)
ERYTHROCYTE [DISTWIDTH] IN BLOOD BY AUTOMATED COUNT: 17.2 % (ref 11.5–14.5)
GLOBULIN SER CALC-MCNC: 4.7 G/DL (ref 2–4)
GLUCOSE SERPL-MCNC: 70 MG/DL (ref 65–100)
HCT VFR BLD AUTO: 27.5 % (ref 36.6–50.3)
HGB BLD-MCNC: 8.9 G/DL (ref 12.1–17)
IMM GRANULOCYTES # BLD AUTO: 0.1 K/UL (ref 0–0.04)
IMM GRANULOCYTES NFR BLD AUTO: 1 % (ref 0–0.5)
LYMPHOCYTES # BLD: 1.5 K/UL (ref 0.8–3.5)
LYMPHOCYTES NFR BLD: 10 % (ref 12–49)
MCH RBC QN AUTO: 28.6 PG (ref 26–34)
MCHC RBC AUTO-ENTMCNC: 32.4 G/DL (ref 30–36.5)
MCV RBC AUTO: 88.4 FL (ref 80–99)
MONOCYTES # BLD: 0.9 K/UL (ref 0–1)
MONOCYTES NFR BLD: 6 % (ref 5–13)
NEUTS SEG # BLD: 12.5 K/UL (ref 1.8–8)
NEUTS SEG NFR BLD: 80 % (ref 32–75)
NRBC # BLD: 0 K/UL (ref 0–0.01)
NRBC BLD-RTO: 0 PER 100 WBC
PLATELET # BLD AUTO: 289 K/UL (ref 150–400)
PMV BLD AUTO: 10.5 FL (ref 8.9–12.9)
POTASSIUM SERPL-SCNC: 4.3 MMOL/L (ref 3.5–5.1)
PROT SERPL-MCNC: 7 G/DL (ref 6.4–8.2)
RBC # BLD AUTO: 3.11 M/UL (ref 4.1–5.7)
SODIUM SERPL-SCNC: 144 MMOL/L (ref 136–145)
WBC # BLD AUTO: 15.3 K/UL (ref 4.1–11.1)

## 2023-06-22 PROCEDURE — 6370000000 HC RX 637 (ALT 250 FOR IP): Performed by: FAMILY MEDICINE

## 2023-06-22 PROCEDURE — 97530 THERAPEUTIC ACTIVITIES: CPT

## 2023-06-22 PROCEDURE — 97165 OT EVAL LOW COMPLEX 30 MIN: CPT

## 2023-06-22 PROCEDURE — 97535 SELF CARE MNGMENT TRAINING: CPT

## 2023-06-22 PROCEDURE — 80053 COMPREHEN METABOLIC PANEL: CPT

## 2023-06-22 PROCEDURE — 30233N1 TRANSFUSION OF NONAUTOLOGOUS RED BLOOD CELLS INTO PERIPHERAL VEIN, PERCUTANEOUS APPROACH: ICD-10-PCS | Performed by: FAMILY MEDICINE

## 2023-06-22 PROCEDURE — 2580000003 HC RX 258: Performed by: FAMILY MEDICINE

## 2023-06-22 PROCEDURE — 1100000000 HC RM PRIVATE

## 2023-06-22 PROCEDURE — 36415 COLL VENOUS BLD VENIPUNCTURE: CPT

## 2023-06-22 PROCEDURE — 94640 AIRWAY INHALATION TREATMENT: CPT

## 2023-06-22 PROCEDURE — 97161 PT EVAL LOW COMPLEX 20 MIN: CPT

## 2023-06-22 PROCEDURE — 6360000002 HC RX W HCPCS: Performed by: FAMILY MEDICINE

## 2023-06-22 PROCEDURE — 85025 COMPLETE CBC W/AUTO DIFF WBC: CPT

## 2023-06-22 PROCEDURE — C9113 INJ PANTOPRAZOLE SODIUM, VIA: HCPCS | Performed by: FAMILY MEDICINE

## 2023-06-22 RX ORDER — IPRATROPIUM BROMIDE AND ALBUTEROL SULFATE 2.5; .5 MG/3ML; MG/3ML
1 SOLUTION RESPIRATORY (INHALATION)
Status: DISCONTINUED | OUTPATIENT
Start: 2023-06-23 | End: 2023-06-23

## 2023-06-22 RX ORDER — IPRATROPIUM BROMIDE AND ALBUTEROL SULFATE 2.5; .5 MG/3ML; MG/3ML
1 SOLUTION RESPIRATORY (INHALATION)
Status: DISCONTINUED | OUTPATIENT
Start: 2023-06-22 | End: 2023-06-22

## 2023-06-22 RX ADMIN — SODIUM BICARBONATE 650 MG: 650 TABLET ORAL at 08:14

## 2023-06-22 RX ADMIN — ATORVASTATIN CALCIUM 40 MG: 40 TABLET, FILM COATED ORAL at 22:24

## 2023-06-22 RX ADMIN — ALBUTEROL SULFATE 1 PUFF: 108 INHALANT RESPIRATORY (INHALATION) at 08:09

## 2023-06-22 RX ADMIN — SODIUM BICARBONATE 650 MG: 650 TABLET ORAL at 22:24

## 2023-06-22 RX ADMIN — SODIUM CHLORIDE: 9 INJECTION, SOLUTION INTRAVENOUS at 08:14

## 2023-06-22 RX ADMIN — IPRATROPIUM BROMIDE AND ALBUTEROL SULFATE 1 DOSE: .5; 2.5 SOLUTION RESPIRATORY (INHALATION) at 12:36

## 2023-06-22 RX ADMIN — SODIUM CHLORIDE, PRESERVATIVE FREE 10 ML: 5 INJECTION INTRAVENOUS at 22:24

## 2023-06-22 RX ADMIN — FERROUS SULFATE TAB 325 MG (65 MG ELEMENTAL FE) 325 MG: 325 (65 FE) TAB at 08:13

## 2023-06-22 RX ADMIN — SODIUM CHLORIDE, PRESERVATIVE FREE 10 ML: 5 INJECTION INTRAVENOUS at 08:14

## 2023-06-22 RX ADMIN — SODIUM CHLORIDE: 9 INJECTION, SOLUTION INTRAVENOUS at 18:21

## 2023-06-22 RX ADMIN — TAMSULOSIN HYDROCHLORIDE 0.4 MG: 0.4 CAPSULE ORAL at 08:13

## 2023-06-22 RX ADMIN — PANTOPRAZOLE SODIUM 40 MG: 40 INJECTION, POWDER, FOR SOLUTION INTRAVENOUS at 08:14

## 2023-06-22 RX ADMIN — IPRATROPIUM BROMIDE AND ALBUTEROL SULFATE 1 DOSE: 2.5; .5 SOLUTION RESPIRATORY (INHALATION) at 23:27

## 2023-06-22 RX ADMIN — MELATONIN TAB 3 MG 6 MG: 3 TAB at 22:23

## 2023-06-23 PROBLEM — K92.2 GI BLEED: Status: RESOLVED | Noted: 2023-06-21 | Resolved: 2023-06-23

## 2023-06-23 PROBLEM — D64.9 ANEMIA: Status: RESOLVED | Noted: 2023-06-21 | Resolved: 2023-06-23

## 2023-06-23 LAB
ABO + RH BLD: NORMAL
ALBUMIN SERPL-MCNC: 2.1 G/DL (ref 3.5–5)
ALBUMIN/GLOB SERPL: 0.5 (ref 1.1–2.2)
ALP SERPL-CCNC: 100 U/L (ref 45–117)
ALT SERPL-CCNC: 33 U/L (ref 12–78)
ANION GAP SERPL CALC-SCNC: 8 MMOL/L (ref 5–15)
AST SERPL W P-5'-P-CCNC: 44 U/L (ref 15–37)
BASOPHILS # BLD: 0.1 K/UL (ref 0–0.1)
BASOPHILS NFR BLD: 1 % (ref 0–1)
BILIRUB SERPL-MCNC: 0.7 MG/DL (ref 0.2–1)
BLD PROD TYP BPU: NORMAL
BLD PROD TYP BPU: NORMAL
BLOOD BANK DISPENSE STATUS: NORMAL
BLOOD BANK DISPENSE STATUS: NORMAL
BLOOD GROUP ANTIBODIES SERPL: NEGATIVE
BPU ID: NORMAL
BPU ID: NORMAL
BUN SERPL-MCNC: 42 MG/DL (ref 6–20)
BUN/CREAT SERPL: 16 (ref 12–20)
CA-I BLD-MCNC: 8.4 MG/DL (ref 8.5–10.1)
CEA SERPL-MCNC: 1.8 NG/ML
CHLORIDE SERPL-SCNC: 115 MMOL/L (ref 97–108)
CO2 SERPL-SCNC: 22 MMOL/L (ref 21–32)
CREAT SERPL-MCNC: 2.57 MG/DL (ref 0.7–1.3)
CROSSMATCH RESULT: NORMAL
CROSSMATCH RESULT: NORMAL
DIFFERENTIAL METHOD BLD: ABNORMAL
EOSINOPHIL # BLD: 0.2 K/UL (ref 0–0.4)
EOSINOPHIL NFR BLD: 1 % (ref 0–7)
ERYTHROCYTE [DISTWIDTH] IN BLOOD BY AUTOMATED COUNT: 17 % (ref 11.5–14.5)
GLOBULIN SER CALC-MCNC: 4.5 G/DL (ref 2–4)
GLUCOSE SERPL-MCNC: 85 MG/DL (ref 65–100)
HCT VFR BLD AUTO: 26.4 % (ref 36.6–50.3)
HGB BLD-MCNC: 8.5 G/DL (ref 12.1–17)
IMM GRANULOCYTES # BLD AUTO: 0.1 K/UL (ref 0–0.04)
IMM GRANULOCYTES NFR BLD AUTO: 1 % (ref 0–0.5)
IRON SERPL-MCNC: 26 UG/DL (ref 35–150)
LYMPHOCYTES # BLD: 1.2 K/UL (ref 0.8–3.5)
LYMPHOCYTES NFR BLD: 8 % (ref 12–49)
MCH RBC QN AUTO: 29 PG (ref 26–34)
MCHC RBC AUTO-ENTMCNC: 32.2 G/DL (ref 30–36.5)
MCV RBC AUTO: 90.1 FL (ref 80–99)
MONOCYTES # BLD: 1.1 K/UL (ref 0–1)
MONOCYTES NFR BLD: 8 % (ref 5–13)
NEUTS SEG # BLD: 12.4 K/UL (ref 1.8–8)
NEUTS SEG NFR BLD: 81 % (ref 32–75)
NRBC # BLD: 0 K/UL (ref 0–0.01)
NRBC BLD-RTO: 0 PER 100 WBC
PLATELET # BLD AUTO: 235 K/UL (ref 150–400)
PMV BLD AUTO: 9.8 FL (ref 8.9–12.9)
POTASSIUM SERPL-SCNC: 4.4 MMOL/L (ref 3.5–5.1)
PROT SERPL-MCNC: 6.6 G/DL (ref 6.4–8.2)
RBC # BLD AUTO: 2.93 M/UL (ref 4.1–5.7)
SODIUM SERPL-SCNC: 145 MMOL/L (ref 136–145)
SPECIMEN EXP DATE BLD: NORMAL
TRANSFUSION STATUS PATIENT QL: NORMAL
TRANSFUSION STATUS PATIENT QL: NORMAL
UNIT DIVISION: 0
UNIT DIVISION: 0
WBC # BLD AUTO: 15 K/UL (ref 4.1–11.1)

## 2023-06-23 PROCEDURE — 83540 ASSAY OF IRON: CPT

## 2023-06-23 PROCEDURE — 2700000000 HC OXYGEN THERAPY PER DAY

## 2023-06-23 PROCEDURE — 82378 CARCINOEMBRYONIC ANTIGEN: CPT

## 2023-06-23 PROCEDURE — 1100000000 HC RM PRIVATE

## 2023-06-23 PROCEDURE — 94640 AIRWAY INHALATION TREATMENT: CPT

## 2023-06-23 PROCEDURE — 2580000003 HC RX 258: Performed by: FAMILY MEDICINE

## 2023-06-23 PROCEDURE — C9113 INJ PANTOPRAZOLE SODIUM, VIA: HCPCS | Performed by: FAMILY MEDICINE

## 2023-06-23 PROCEDURE — 94761 N-INVAS EAR/PLS OXIMETRY MLT: CPT

## 2023-06-23 PROCEDURE — 36415 COLL VENOUS BLD VENIPUNCTURE: CPT

## 2023-06-23 PROCEDURE — 6370000000 HC RX 637 (ALT 250 FOR IP): Performed by: FAMILY MEDICINE

## 2023-06-23 PROCEDURE — 83550 IRON BINDING TEST: CPT

## 2023-06-23 PROCEDURE — 80053 COMPREHEN METABOLIC PANEL: CPT

## 2023-06-23 PROCEDURE — 85025 COMPLETE CBC W/AUTO DIFF WBC: CPT

## 2023-06-23 PROCEDURE — 6360000002 HC RX W HCPCS: Performed by: FAMILY MEDICINE

## 2023-06-23 RX ORDER — PANTOPRAZOLE SODIUM 40 MG/1
40 TABLET, DELAYED RELEASE ORAL
Qty: 30 TABLET | Refills: 5 | Status: SHIPPED | OUTPATIENT
Start: 2023-06-23

## 2023-06-23 RX ORDER — IPRATROPIUM BROMIDE AND ALBUTEROL SULFATE 2.5; .5 MG/3ML; MG/3ML
1 SOLUTION RESPIRATORY (INHALATION) 2 TIMES DAILY
Status: DISCONTINUED | OUTPATIENT
Start: 2023-06-24 | End: 2023-06-26 | Stop reason: HOSPADM

## 2023-06-23 RX ADMIN — SODIUM CHLORIDE, PRESERVATIVE FREE 10 ML: 5 INJECTION INTRAVENOUS at 08:32

## 2023-06-23 RX ADMIN — ALBUTEROL SULFATE 1 PUFF: 108 INHALANT RESPIRATORY (INHALATION) at 20:17

## 2023-06-23 RX ADMIN — SODIUM CHLORIDE, PRESERVATIVE FREE 10 ML: 5 INJECTION INTRAVENOUS at 20:21

## 2023-06-23 RX ADMIN — PANTOPRAZOLE SODIUM 40 MG: 40 INJECTION, POWDER, FOR SOLUTION INTRAVENOUS at 08:31

## 2023-06-23 RX ADMIN — IPRATROPIUM BROMIDE AND ALBUTEROL SULFATE 1 DOSE: 2.5; .5 SOLUTION RESPIRATORY (INHALATION) at 07:54

## 2023-06-23 RX ADMIN — SODIUM BICARBONATE 650 MG: 650 TABLET ORAL at 20:14

## 2023-06-23 RX ADMIN — SODIUM BICARBONATE 650 MG: 650 TABLET ORAL at 08:32

## 2023-06-23 RX ADMIN — ATORVASTATIN CALCIUM 40 MG: 40 TABLET, FILM COATED ORAL at 20:14

## 2023-06-23 RX ADMIN — FERROUS SULFATE TAB 325 MG (65 MG ELEMENTAL FE) 325 MG: 325 (65 FE) TAB at 08:31

## 2023-06-23 RX ADMIN — TAMSULOSIN HYDROCHLORIDE 0.4 MG: 0.4 CAPSULE ORAL at 08:32

## 2023-06-24 PROCEDURE — 6370000000 HC RX 637 (ALT 250 FOR IP): Performed by: FAMILY MEDICINE

## 2023-06-24 PROCEDURE — 2580000003 HC RX 258: Performed by: FAMILY MEDICINE

## 2023-06-24 PROCEDURE — 94761 N-INVAS EAR/PLS OXIMETRY MLT: CPT

## 2023-06-24 PROCEDURE — 6360000002 HC RX W HCPCS: Performed by: FAMILY MEDICINE

## 2023-06-24 PROCEDURE — 94640 AIRWAY INHALATION TREATMENT: CPT

## 2023-06-24 PROCEDURE — 1100000000 HC RM PRIVATE

## 2023-06-24 PROCEDURE — 2700000000 HC OXYGEN THERAPY PER DAY

## 2023-06-24 RX ORDER — FUROSEMIDE 10 MG/ML
20 INJECTION INTRAMUSCULAR; INTRAVENOUS ONCE
Status: COMPLETED | OUTPATIENT
Start: 2023-06-24 | End: 2023-06-24

## 2023-06-24 RX ADMIN — SODIUM CHLORIDE, PRESERVATIVE FREE 10 ML: 5 INJECTION INTRAVENOUS at 09:11

## 2023-06-24 RX ADMIN — TAMSULOSIN HYDROCHLORIDE 0.4 MG: 0.4 CAPSULE ORAL at 09:11

## 2023-06-24 RX ADMIN — IPRATROPIUM BROMIDE AND ALBUTEROL SULFATE 1 DOSE: 2.5; .5 SOLUTION RESPIRATORY (INHALATION) at 07:46

## 2023-06-24 RX ADMIN — FUROSEMIDE 20 MG: 10 INJECTION, SOLUTION INTRAMUSCULAR; INTRAVENOUS at 12:27

## 2023-06-24 RX ADMIN — ALBUTEROL SULFATE 1 PUFF: 108 INHALANT RESPIRATORY (INHALATION) at 09:41

## 2023-06-24 RX ADMIN — FERROUS SULFATE TAB 325 MG (65 MG ELEMENTAL FE) 325 MG: 325 (65 FE) TAB at 09:10

## 2023-06-24 RX ADMIN — SODIUM BICARBONATE 650 MG: 650 TABLET ORAL at 19:58

## 2023-06-24 RX ADMIN — IPRATROPIUM BROMIDE AND ALBUTEROL SULFATE 1 DOSE: 2.5; .5 SOLUTION RESPIRATORY (INHALATION) at 19:28

## 2023-06-24 RX ADMIN — SODIUM BICARBONATE 650 MG: 650 TABLET ORAL at 09:10

## 2023-06-24 RX ADMIN — SODIUM CHLORIDE, PRESERVATIVE FREE 10 ML: 5 INJECTION INTRAVENOUS at 20:03

## 2023-06-24 RX ADMIN — ATORVASTATIN CALCIUM 40 MG: 40 TABLET, FILM COATED ORAL at 19:58

## 2023-06-24 RX ADMIN — ALBUTEROL SULFATE 1 PUFF: 108 INHALANT RESPIRATORY (INHALATION) at 01:01

## 2023-06-24 RX ADMIN — PANTOPRAZOLE SODIUM 40 MG: 40 INJECTION, POWDER, FOR SOLUTION INTRAVENOUS at 09:11

## 2023-06-24 ASSESSMENT — PAIN SCALES - GENERAL: PAINLEVEL_OUTOF10: 0

## 2023-06-25 PROBLEM — R60.9 EDEMA: Status: ACTIVE | Noted: 2023-06-25

## 2023-06-25 PROCEDURE — 1100000000 HC RM PRIVATE

## 2023-06-25 PROCEDURE — 2700000000 HC OXYGEN THERAPY PER DAY

## 2023-06-25 PROCEDURE — 6370000000 HC RX 637 (ALT 250 FOR IP): Performed by: FAMILY MEDICINE

## 2023-06-25 PROCEDURE — 2580000003 HC RX 258: Performed by: FAMILY MEDICINE

## 2023-06-25 PROCEDURE — 6360000002 HC RX W HCPCS: Performed by: FAMILY MEDICINE

## 2023-06-25 PROCEDURE — 94761 N-INVAS EAR/PLS OXIMETRY MLT: CPT

## 2023-06-25 PROCEDURE — 94640 AIRWAY INHALATION TREATMENT: CPT

## 2023-06-25 RX ORDER — HYDRALAZINE HYDROCHLORIDE 20 MG/ML
10 INJECTION INTRAMUSCULAR; INTRAVENOUS EVERY 6 HOURS PRN
Status: DISCONTINUED | OUTPATIENT
Start: 2023-06-25 | End: 2023-06-26 | Stop reason: HOSPADM

## 2023-06-25 RX ADMIN — ATORVASTATIN CALCIUM 40 MG: 40 TABLET, FILM COATED ORAL at 20:44

## 2023-06-25 RX ADMIN — TAMSULOSIN HYDROCHLORIDE 0.4 MG: 0.4 CAPSULE ORAL at 08:57

## 2023-06-25 RX ADMIN — IPRATROPIUM BROMIDE AND ALBUTEROL SULFATE 1 DOSE: 2.5; .5 SOLUTION RESPIRATORY (INHALATION) at 16:44

## 2023-06-25 RX ADMIN — PANTOPRAZOLE SODIUM 40 MG: 40 INJECTION, POWDER, FOR SOLUTION INTRAVENOUS at 09:20

## 2023-06-25 RX ADMIN — IPRATROPIUM BROMIDE AND ALBUTEROL SULFATE 1 DOSE: 2.5; .5 SOLUTION RESPIRATORY (INHALATION) at 19:12

## 2023-06-25 RX ADMIN — SODIUM BICARBONATE 650 MG: 650 TABLET ORAL at 08:59

## 2023-06-25 RX ADMIN — IPRATROPIUM BROMIDE AND ALBUTEROL SULFATE 1 DOSE: 2.5; .5 SOLUTION RESPIRATORY (INHALATION) at 08:34

## 2023-06-25 RX ADMIN — SODIUM CHLORIDE, PRESERVATIVE FREE 10 ML: 5 INJECTION INTRAVENOUS at 21:05

## 2023-06-25 RX ADMIN — SODIUM BICARBONATE 650 MG: 650 TABLET ORAL at 20:44

## 2023-06-25 RX ADMIN — HYDRALAZINE HYDROCHLORIDE 10 MG: 20 INJECTION INTRAMUSCULAR; INTRAVENOUS at 16:21

## 2023-06-25 RX ADMIN — FERROUS SULFATE TAB 325 MG (65 MG ELEMENTAL FE) 325 MG: 325 (65 FE) TAB at 08:57

## 2023-06-25 RX ADMIN — SODIUM CHLORIDE, PRESERVATIVE FREE 10 ML: 5 INJECTION INTRAVENOUS at 09:26

## 2023-06-26 ENCOUNTER — APPOINTMENT (OUTPATIENT)
Facility: HOSPITAL | Age: 88
DRG: 378 | End: 2023-06-26
Attending: FAMILY MEDICINE
Payer: MEDICARE

## 2023-06-26 VITALS
DIASTOLIC BLOOD PRESSURE: 69 MMHG | SYSTOLIC BLOOD PRESSURE: 150 MMHG | RESPIRATION RATE: 17 BRPM | HEART RATE: 104 BPM | TEMPERATURE: 98.4 F | OXYGEN SATURATION: 95 % | HEIGHT: 69 IN | BODY MASS INDEX: 17.48 KG/M2 | WEIGHT: 118 LBS

## 2023-06-26 LAB — ECHO BSA: 1.54 M2

## 2023-06-26 PROCEDURE — 2580000003 HC RX 258: Performed by: FAMILY MEDICINE

## 2023-06-26 PROCEDURE — 2700000000 HC OXYGEN THERAPY PER DAY

## 2023-06-26 PROCEDURE — 97530 THERAPEUTIC ACTIVITIES: CPT

## 2023-06-26 PROCEDURE — 6370000000 HC RX 637 (ALT 250 FOR IP): Performed by: FAMILY MEDICINE

## 2023-06-26 PROCEDURE — 93971 EXTREMITY STUDY: CPT

## 2023-06-26 PROCEDURE — 94640 AIRWAY INHALATION TREATMENT: CPT

## 2023-06-26 PROCEDURE — 94761 N-INVAS EAR/PLS OXIMETRY MLT: CPT

## 2023-06-26 PROCEDURE — 97116 GAIT TRAINING THERAPY: CPT

## 2023-06-26 RX ADMIN — SODIUM CHLORIDE, PRESERVATIVE FREE 10 ML: 5 INJECTION INTRAVENOUS at 09:00

## 2023-06-26 RX ADMIN — TAMSULOSIN HYDROCHLORIDE 0.4 MG: 0.4 CAPSULE ORAL at 08:59

## 2023-06-26 RX ADMIN — PANTOPRAZOLE SODIUM 40 MG: 40 INJECTION, POWDER, FOR SOLUTION INTRAVENOUS at 09:00

## 2023-06-26 RX ADMIN — FERROUS SULFATE TAB 325 MG (65 MG ELEMENTAL FE) 325 MG: 325 (65 FE) TAB at 08:59

## 2023-06-26 RX ADMIN — SODIUM BICARBONATE 650 MG: 650 TABLET ORAL at 09:00

## 2023-06-26 RX ADMIN — IPRATROPIUM BROMIDE AND ALBUTEROL SULFATE 1 DOSE: 2.5; .5 SOLUTION RESPIRATORY (INHALATION) at 08:00

## 2023-09-14 NOTE — ANESTHESIA POSTPROCEDURE EVALUATION
Left message on answering machine requesting pt to call back at earliest convenience. Procedure(s):  EXPLORATORY LAPAROTOMY, LEFT ELIZABETH COLECTOMY POSSIBLE COLOSTOMY. general    Anesthesia Post Evaluation      Multimodal analgesia: multimodal analgesia used between 6 hours prior to anesthesia start to PACU discharge  Patient location during evaluation: bedside  Patient participation: complete - patient cannot participate  Level of consciousness: sleepy but conscious  Pain score: 0  Pain management: adequate  Airway patency: patent  Anesthetic complications: no  Cardiovascular status: acceptable  Respiratory status: acceptable  Hydration status: acceptable  Post anesthesia nausea and vomiting:  none  Final Post Anesthesia Temperature Assessment:  Normothermia (36.0-37.5 degrees C)      INITIAL Post-op Vital signs:   Vitals Value Taken Time   /72 09/15/22 1555   Temp     Pulse 92 09/15/22 1600   Resp 15 09/15/22 1600   SpO2 100 % 09/15/22 1558   Vitals shown include unvalidated device data. impaired postural control/decreased strength

## (undated) DEVICE — MASK ANES INF SZ 2 PREM TAIL VLV INFL PRT UNSCENTED SGL PT

## (undated) DEVICE — SYRINGE IRRIG 60ML SFT PLIABLE BLB EZ TO GRP 1 HND USE W/

## (undated) DEVICE — 3-0 COATED VICRYL PLUS UNDYED 1X27" SH --

## (undated) DEVICE — SPONGE LAP 18X18IN STRL -- 5/PK

## (undated) DEVICE — FORCEPS BX L240CM JAW DIA2.8MM L CAP W/ NDL MIC MESH TOOTH

## (undated) DEVICE — CONTAINER,SPECIMEN,PNEU TUBE,4OZ,OR STRL: Brand: MEDLINE

## (undated) DEVICE — INTENDED FOR TISSUE SEPARATION, AND OTHER PROCEDURES THAT REQUIRE A SHARP SURGICAL BLADE TO PUNCTURE OR CUT.: Brand: BARD-PARKER ® CARBON RIB-BACK BLADES

## (undated) DEVICE — SOUTHSIDE TURNOVER: Brand: MEDLINE INDUSTRIES, INC.

## (undated) DEVICE — NEEDLE SCLERO 23GA L240CM OD064MM ID032MM CLR INTERJECT

## (undated) DEVICE — BLOCK BITE STD GRN ORAL AD W/O STRP SLD PLAS DISP BITEGARD

## (undated) DEVICE — PROCEDURE KIT BX00717111

## (undated) DEVICE — BLADE ELECTRODE: Brand: EDGE

## (undated) DEVICE — SUTURE PERMAHAND SZ 3-0 L18IN NONABSORBABLE BLK L26MM SH C013D

## (undated) DEVICE — DRESSING TRNSPAR W4XL4.8IN FLM SURESITE 123

## (undated) DEVICE — SUTURE VCRL + SZ 3-0 L18IN ABSRB UD SH 1/2 CIR TAPERCUT NDL VCP864D

## (undated) DEVICE — SYSTEM TISS CLOSURE ENDOSCP STD PDLOK CLP

## (undated) DEVICE — DRESSING HYDROFIBER AQUACEL AG ADVANTAGE 3.5X14 IN

## (undated) DEVICE — TOWEL SURG W17XL27IN STD BLU COT NONFENESTRATED PREWASHED

## (undated) DEVICE — GARMENT,MEDLINE,DVT,INT,CALF,MED, GEN2: Brand: MEDLINE

## (undated) DEVICE — YANKAUER,BULB TIP,W/O VENT,RIGID,STERILE: Brand: MEDLINE

## (undated) DEVICE — WET SKIN PREP TRAY: Brand: MEDLINE INDUSTRIES, INC.

## (undated) DEVICE — BINDER ABD UNIV H9IN WAIST 30-45IN E SFT COT PREM 3 PNL

## (undated) DEVICE — 3M™ SURGICAL CLIPPER WITH PIVOTING HEAD, 9660, 50/CASE: Brand: 3M™

## (undated) DEVICE — CANNULA NSL O2 AD 7 FT END-TIDAL CARBON DIOX VENTFLO

## (undated) DEVICE — DRAPE IRRIG FLD WRM W44XL66IN W/ AORN STD PRTBL INTRATEMP

## (undated) DEVICE — GLOVE ORANGE PI 7   MSG9070

## (undated) DEVICE — Device: Brand: SPOT EX ENDOSCOPIC TATTOO

## (undated) DEVICE — SOLUTION IRRIG 1000ML 0.9% SOD CHL USP POUR PLAS BTL

## (undated) DEVICE — MOUTHPIECE ENDOSCP 20X27MM --

## (undated) DEVICE — THE ENDO CARRY-ON PROCEDURE KIT CONTAINS ALL OF THE SUPPLIES AND INFECTION PREVENTION PRODUCTS NEEDED FOR ENDOSCOPIC PROCEDURES: Brand: ENDO CARRY-ON PROCEDURE KIT

## (undated) DEVICE — PACK,AURORA,LAVH: Brand: MEDLINE

## (undated) DEVICE — BASIC SINGLE BASIN-LF: Brand: MEDLINE INDUSTRIES, INC.

## (undated) DEVICE — STAPLER SKIN H3.9MM WIRE DIA0.58MM CRWN 6.9MM 35 STPL FIX

## (undated) DEVICE — STAPLER INT 75MM CUT LN L73MM STPL LN L77MM LNAR B-FORM

## (undated) DEVICE — MAJOR LAP PROCEDURE PACK: Brand: MEDLINE INDUSTRIES, INC.

## (undated) DEVICE — SUTURE VCRL + SZ 3-0 L36IN ABSRB UD L36MM CT-1 1/2 CIR VCP944H

## (undated) DEVICE — CURVED, LARGE JAW, OPEN SEALER/DIVIDER NANO-COATED: Brand: LIGASURE IMPACT

## (undated) DEVICE — GLOVE SURG SZ 7 L12IN FNGR THK79MIL GRN LTX FREE

## (undated) DEVICE — TOTAL TRAY, 16FR 10ML SIL FOLEY, URN: Brand: MEDLINE

## (undated) DEVICE — FORCEPS BX 240CM 2.4MM L NDL RAD JAW 4 M00513334

## (undated) DEVICE — HYPODERMIC SAFETY NEEDLE: Brand: MONOJECT

## (undated) DEVICE — RELOAD STPL L75MM OPN H3.8MM CLS 1.5MM WIRE DIA0.2MM REG

## (undated) DEVICE — SYR 50ML LR LCK 1ML GRAD NSAF --

## (undated) DEVICE — FCPS RAD JAW 4LC 240CM W/NDL -- BX/20 RADIAL JAW 4